# Patient Record
Sex: FEMALE | Race: WHITE | Employment: OTHER | URBAN - METROPOLITAN AREA
[De-identification: names, ages, dates, MRNs, and addresses within clinical notes are randomized per-mention and may not be internally consistent; named-entity substitution may affect disease eponyms.]

---

## 2019-11-05 PROBLEM — I48.0 PAROXYSMAL ATRIAL FIBRILLATION (HCC): Status: ACTIVE | Noted: 2019-11-05

## 2020-07-17 PROBLEM — I49.5 SINUS NODE DYSFUNCTION (HCC): Status: ACTIVE | Noted: 2020-07-17

## 2021-07-20 PROBLEM — R53.82 CHRONIC FATIGUE: Status: ACTIVE | Noted: 2021-07-20

## 2021-07-27 PROBLEM — R94.39 ABNORMAL NUCLEAR STRESS TEST: Status: ACTIVE | Noted: 2021-07-27

## 2021-08-10 NOTE — PROGRESS NOTES
Called to pre-assess for [facility] , Scheduled [facility]. No answer & message left.

## 2021-08-10 NOTE — PROGRESS NOTES
Patient pre-assessment complete for [facility] poss with Dr Jesse Luu scheduled for 21 at 11am, arrival time 9am. Patient verified using . Patient instructed to bring all home medications in labeled bottles on the day of procedure. NPO status reinforced. Patient informed to take a full dose aspirin 325mg  or 81 mg x 4 on the day of procedure. Patient instructed to HOLD Eliquis (last dose 8/10/21 am). Instructed they can take all other medications excluding vitamins & supplements. Patient verbalizes understanding of all instructions & denies any questions at this time.

## 2021-08-11 ENCOUNTER — HOSPITAL ENCOUNTER (OUTPATIENT)
Age: 86
Setting detail: OUTPATIENT SURGERY
Discharge: HOME | End: 2021-08-11
Attending: INTERNAL MEDICINE | Admitting: INTERNAL MEDICINE
Payer: MEDICARE

## 2021-08-11 VITALS
HEIGHT: 67 IN | SYSTOLIC BLOOD PRESSURE: 139 MMHG | WEIGHT: 200 LBS | BODY MASS INDEX: 31.39 KG/M2 | DIASTOLIC BLOOD PRESSURE: 75 MMHG | OXYGEN SATURATION: 95 % | HEART RATE: 80 BPM

## 2021-08-11 DIAGNOSIS — R94.39 ABNORMAL NUCLEAR STRESS TEST: ICD-10-CM

## 2021-08-11 LAB
ATRIAL RATE: 394 BPM
CALCULATED R AXIS, ECG10: -59 DEGREES
CALCULATED T AXIS, ECG11: 23 DEGREES
DIAGNOSIS, 93000: NORMAL
Q-T INTERVAL, ECG07: 394 MS
QRS DURATION, ECG06: 130 MS
QTC CALCULATION (BEZET), ECG08: 425 MS
VENTRICULAR RATE, ECG03: 70 BPM

## 2021-08-11 PROCEDURE — 93005 ELECTROCARDIOGRAM TRACING: CPT | Performed by: INTERNAL MEDICINE

## 2021-08-11 PROCEDURE — 93458 L HRT ARTERY/VENTRICLE ANGIO: CPT | Performed by: INTERNAL MEDICINE

## 2021-08-11 PROCEDURE — 74011000636 HC RX REV CODE- 636: Performed by: INTERNAL MEDICINE

## 2021-08-11 PROCEDURE — 74011250636 HC RX REV CODE- 250/636: Performed by: INTERNAL MEDICINE

## 2021-08-11 PROCEDURE — C1894 INTRO/SHEATH, NON-LASER: HCPCS | Performed by: INTERNAL MEDICINE

## 2021-08-11 PROCEDURE — 74011000250 HC RX REV CODE- 250: Performed by: INTERNAL MEDICINE

## 2021-08-11 PROCEDURE — 76210000027 HC REC RM PH II 3.5 TO 4 HR: Performed by: INTERNAL MEDICINE

## 2021-08-11 PROCEDURE — 99152 MOD SED SAME PHYS/QHP 5/>YRS: CPT | Performed by: INTERNAL MEDICINE

## 2021-08-11 PROCEDURE — 77030042317 HC BND COMPR HEMSTAT -B: Performed by: INTERNAL MEDICINE

## 2021-08-11 PROCEDURE — C1769 GUIDE WIRE: HCPCS | Performed by: INTERNAL MEDICINE

## 2021-08-11 PROCEDURE — 77030016699 HC CATH ANGI DX INFN1 CARD -A: Performed by: INTERNAL MEDICINE

## 2021-08-11 RX ORDER — LIDOCAINE HYDROCHLORIDE 10 MG/ML
INJECTION INFILTRATION; PERINEURAL AS NEEDED
Status: DISCONTINUED | OUTPATIENT
Start: 2021-08-11 | End: 2021-08-11 | Stop reason: HOSPADM

## 2021-08-11 RX ORDER — SODIUM CHLORIDE 9 MG/ML
75 INJECTION, SOLUTION INTRAVENOUS CONTINUOUS
Status: DISCONTINUED | OUTPATIENT
Start: 2021-08-11 | End: 2021-08-11 | Stop reason: HOSPADM

## 2021-08-11 RX ORDER — HEPARIN SODIUM 200 [USP'U]/100ML
INJECTION, SOLUTION INTRAVENOUS
Status: DISCONTINUED | OUTPATIENT
Start: 2021-08-11 | End: 2021-08-11 | Stop reason: HOSPADM

## 2021-08-11 RX ORDER — MIDAZOLAM HYDROCHLORIDE 1 MG/ML
INJECTION, SOLUTION INTRAMUSCULAR; INTRAVENOUS AS NEEDED
Status: DISCONTINUED | OUTPATIENT
Start: 2021-08-11 | End: 2021-08-11 | Stop reason: HOSPADM

## 2021-08-11 RX ORDER — FENTANYL CITRATE 50 UG/ML
INJECTION, SOLUTION INTRAMUSCULAR; INTRAVENOUS AS NEEDED
Status: DISCONTINUED | OUTPATIENT
Start: 2021-08-11 | End: 2021-08-11 | Stop reason: HOSPADM

## 2021-08-11 NOTE — PROGRESS NOTES
Report received from [address] [place] [address] Procedural findings communicated. Intra procedural  medication administration reviewed. Progression of care discussed.      Patient received into [address] [facility] 4 post sheath removal.     Access site without bleeding or swelling yes    Dressing dry and intact yes    Patient instructed to limit movement to left upper extremity    Routine post procedural vital signs and site assessment initiated yes

## 2021-08-11 NOTE — Clinical Note
Contrast Dose Calculator:   Patient's age: 80.   Patient's sex: Female. Patient weight (kg) = 90.7. Creatinine level (mg/dL) = 0.94. Creatinine clearance (mL/min): 62.   Contrast concentration (mg/mL) = 370. MACD = 300 mL. Max Contrast dose per Creatinine Cl calculator = 139.5 mL.

## 2021-08-11 NOTE — Clinical Note
TRANSFER - OUT REPORT:     Verbal report given to: eileen. Report consisted of patient's Situation, Background, Assessment and   Recommendations(SBAR). Opportunity for questions and clarification was provided. Patient transported with a Cardiac Cath Tech / Patient Care Tech.

## 2021-08-11 NOTE — DISCHARGE INSTRUCTIONS
HEART CATHETERIZATION/ANGIOGRAPHY DISCHARGE INSTRUCTIONS    1. Check puncture site frequently for swelling or bleeding. If there is any bleeding, lie down and apply pressure over the area with a clean towel or washcloth and call 911. Notify your doctor for any redness, swelling, drainage, or oozing from the puncture site. Notify your doctor for any fever or chills. 2. If the extremity becomes cold, numb, or painful call Dr. Celeste Yanez at [phone].  3. Activity should be limited for the next 48 hours. Avoid pushing, pulling and bending of affected wrist for 48 hours. No heavy lifting (anything over 5 pounds) for 3 days. No driving for 48 hours. 4. You may resume your usual diet. Drink more fluids than usual.  5. Have a responsible person drive you home and stay with you for at least 24 hours after your heart catheterization/angiography. 6. You may remove bandage from your right arm  in 24 hours. You may shower in 24 hours. No tub baths, hot tubs, or swimming for 1 week. Do not place any lotions, creams, powders, or ointments over puncture site for 1 week. You may place a clean band-aid over the puncture site each day for 5 days. Change daily. I have read the above instructions and have had the opportunity to ask questions.

## 2021-08-11 NOTE — PROGRESS NOTES
Terumo band completely deflated. 1335 Terumo band removed from right wrist using sterile technique. Sterile dressing applied. No signs and symptoms of bleeding, oozing or hematoma.

## 2021-08-11 NOTE — PROGRESS NOTES
Patient received to [address] [place] [address] room # 10  Ambulatory from [facility]. Patient scheduled for [facility] today with Dr Sandip Mckeon. Procedure reviewed & questions answered, voiced good understanding consent obtained & placed on chart. All medications and medical history reviewed. Will prep patient per orders. Patient & family updated on plan of care.       The patient has a fraility score of 3-MANAGING WELL, based on age and abilities

## 2021-08-11 NOTE — Clinical Note
TRANSFER - IN REPORT:     Verbal report received from: eileen. Report consisted of patient's Situation, Background, Assessment and   Recommendations(SBAR). Opportunity for questions and clarification was provided. Assessment completed upon patient's arrival to unit and care assumed. Patient transported with a Cardiac Cath Tech / Patient Care Tech.

## 2021-08-11 NOTE — PROGRESS NOTES
Patient up to bedside, vital signs and site stable. Patient ambulated to bathroom without difficulty. Patient voided without difficulty. Vascular site stable. Discharge instructions and home medications reviewed with patient. Time allowed for questions and answers. Site stable after ambulation. Peripheral IV sites dc'd without difficulty with tips intact. 1350 Patient discharged to home with family.

## 2021-08-11 NOTE — ROUTINE PROCESS
TRANSFER - OUT REPORT:    [facility]  Dr. Gerald Beltran  LRA access    Versed 2mg, fentanyl 25mcg  2000 units heparin in radial cocktail  Diagnostic cath, medical management  R band placed left wrist with 12ml air    Verbal report given to [facility] RN(name) on Tia Purvis  being transferred to cpru(unit) for routine progression of care       Report consisted of patients Situation, Background, Assessment and   Recommendations(SBAR). Information from the following report(s) Procedure Summary was reviewed with the receiving nurse. Lines:   Peripheral IV 08/11/21 Anterior;Proximal;Right Forearm (Active)       Peripheral IV 08/11/21 Anterior;Left;Proximal Forearm (Active)        Opportunity for questions and clarification was provided.       Patient transported with:   [employer]

## 2021-09-29 ENCOUNTER — HOSPITAL ENCOUNTER (OUTPATIENT)
Dept: GENERAL RADIOLOGY | Age: 86
Discharge: HOME | End: 2021-09-29

## 2021-09-29 DIAGNOSIS — R05.9 COUGH: ICD-10-CM

## 2022-03-03 PROBLEM — I25.10 CORONARY ARTERY DISEASE INVOLVING NATIVE CORONARY ARTERY OF NATIVE HEART WITHOUT ANGINA PECTORIS: Status: ACTIVE | Noted: 2022-03-03

## 2022-03-19 PROBLEM — I48.0 PAROXYSMAL ATRIAL FIBRILLATION (HCC): Status: ACTIVE | Noted: 2019-11-05

## 2022-03-19 PROBLEM — I49.5 SINUS NODE DYSFUNCTION (HCC): Status: ACTIVE | Noted: 2020-07-17

## 2022-03-19 PROBLEM — R94.39 ABNORMAL NUCLEAR STRESS TEST: Status: ACTIVE | Noted: 2021-07-27

## 2022-03-19 PROBLEM — I25.10 CORONARY ARTERY DISEASE INVOLVING NATIVE CORONARY ARTERY OF NATIVE HEART WITHOUT ANGINA PECTORIS: Status: ACTIVE | Noted: 2022-03-03

## 2022-03-20 PROBLEM — R53.82 CHRONIC FATIGUE: Status: ACTIVE | Noted: 2021-07-20

## 2022-04-29 PROBLEM — R00.1 SYMPTOMATIC BRADYCARDIA: Status: ACTIVE | Noted: 2022-04-29

## 2022-05-13 ENCOUNTER — HOSPITAL ENCOUNTER (OUTPATIENT)
Dept: LAB | Age: 87
Discharge: HOME | End: 2022-05-13
Payer: MEDICARE

## 2022-05-13 DIAGNOSIS — I49.5 SINUS NODE DYSFUNCTION (HCC): ICD-10-CM

## 2022-05-13 DIAGNOSIS — I48.0 PAROXYSMAL ATRIAL FIBRILLATION (HCC): ICD-10-CM

## 2022-05-13 DIAGNOSIS — R00.1 SYMPTOMATIC BRADYCARDIA: ICD-10-CM

## 2022-05-13 LAB
ANION GAP SERPL CALC-SCNC: 6 MMOL/L (ref 7–16)
BASOPHILS # BLD: 0 K/UL (ref 0–0.2)
BASOPHILS NFR BLD: 1 % (ref 0–2)
BUN SERPL-MCNC: 23 MG/DL (ref 8–23)
CALCIUM SERPL-MCNC: 9.3 MG/DL (ref 8.3–10.4)
CHLORIDE SERPL-SCNC: 109 MMOL/L (ref 98–107)
CO2 SERPL-SCNC: 28 MMOL/L (ref 21–32)
CREAT SERPL-MCNC: 0.8 MG/DL (ref 0.6–1)
DIFFERENTIAL METHOD BLD: NORMAL
EOSINOPHIL # BLD: 0.1 K/UL (ref 0–0.8)
EOSINOPHIL NFR BLD: 2 % (ref 0.5–7.8)
ERYTHROCYTE [DISTWIDTH] IN BLOOD BY AUTOMATED COUNT: 13.3 % (ref 11.9–14.6)
GLUCOSE SERPL-MCNC: 98 MG/DL (ref 65–100)
HCT VFR BLD AUTO: 45.1 % (ref 35.8–46.3)
HGB BLD-MCNC: 14.6 G/DL (ref 11.7–15.4)
IMM GRANULOCYTES # BLD AUTO: 0 K/UL (ref 0–0.5)
IMM GRANULOCYTES NFR BLD AUTO: 0 % (ref 0–5)
LYMPHOCYTES # BLD: 1.5 K/UL (ref 0.5–4.6)
LYMPHOCYTES NFR BLD: 28 % (ref 13–44)
MAGNESIUM SERPL-MCNC: 2.4 MG/DL (ref 1.8–2.4)
MCH RBC QN AUTO: 30.6 PG (ref 26.1–32.9)
MCHC RBC AUTO-ENTMCNC: 32.4 G/DL (ref 31.4–35)
MCV RBC AUTO: 94.5 FL (ref 79.6–97.8)
MONOCYTES # BLD: 0.5 K/UL (ref 0.1–1.3)
MONOCYTES NFR BLD: 9 % (ref 4–12)
NEUTS SEG # BLD: 3.2 K/UL (ref 1.7–8.2)
NEUTS SEG NFR BLD: 61 % (ref 43–78)
NRBC # BLD: 0 K/UL (ref 0–0.2)
PLATELET # BLD AUTO: 241 K/UL (ref 150–450)
PMV BLD AUTO: 11.3 FL (ref 9.4–12.3)
POTASSIUM SERPL-SCNC: 4.2 MMOL/L (ref 3.5–5.1)
RBC # BLD AUTO: 4.77 M/UL (ref 4.05–5.2)
SODIUM SERPL-SCNC: 143 MMOL/L (ref 136–145)
WBC # BLD AUTO: 5.3 K/UL (ref 4.3–11.1)

## 2022-05-13 PROCEDURE — 36415 COLL VENOUS BLD VENIPUNCTURE: CPT

## 2022-05-13 PROCEDURE — 80048 BASIC METABOLIC PNL TOTAL CA: CPT

## 2022-05-13 PROCEDURE — 85025 COMPLETE CBC W/AUTO DIFF WBC: CPT

## 2022-05-13 PROCEDURE — 83735 ASSAY OF MAGNESIUM: CPT

## 2022-05-14 NOTE — PROGRESS NOTES
Patient pre-assessment complete for Pacemaker with Dr John Davis scheduled for 22 at 1:30pm, arrival time 11:30am. Patient verified using . Patient instructed to bring a list of all home medications on the day of procedure. NPO status reinforced. Patient instructed to HOLD eliquis on  & ONLY take cardizem on Monday. Instructed they can take all other medications excluding vitamins & supplements. Patient verbalizes understanding of all instructions & denies any questions at this time.

## 2022-05-16 ENCOUNTER — APPOINTMENT (OUTPATIENT)
Dept: GENERAL RADIOLOGY | Age: 87
End: 2022-05-16
Attending: INTERNAL MEDICINE
Payer: MEDICARE

## 2022-05-16 ENCOUNTER — HOSPITAL ENCOUNTER (OUTPATIENT)
Age: 87
Setting detail: OBSERVATION
Discharge: HOME | End: 2022-05-17
Attending: INTERNAL MEDICINE | Admitting: INTERNAL MEDICINE
Payer: MEDICARE

## 2022-05-16 ENCOUNTER — ANESTHESIA EVENT (OUTPATIENT)
Dept: CARDIAC CATH/INVASIVE PROCEDURES | Age: 87
End: 2022-05-16
Payer: MEDICARE

## 2022-05-16 ENCOUNTER — ANESTHESIA (OUTPATIENT)
Dept: CARDIAC CATH/INVASIVE PROCEDURES | Age: 87
End: 2022-05-16
Payer: MEDICARE

## 2022-05-16 DIAGNOSIS — R00.1 SYMPTOMATIC BRADYCARDIA: ICD-10-CM

## 2022-05-16 LAB
ATRIAL RATE: 60 BPM
CALCULATED R AXIS, ECG10: -50 DEGREES
CALCULATED T AXIS, ECG11: -9 DEGREES
DIAGNOSIS, 93000: NORMAL
Q-T INTERVAL, ECG07: 470 MS
QRS DURATION, ECG06: 134 MS
QTC CALCULATION (BEZET), ECG08: 484 MS
VENTRICULAR RATE, ECG03: 64 BPM

## 2022-05-16 PROCEDURE — 74011250637 HC RX REV CODE- 250/637: Performed by: INTERNAL MEDICINE

## 2022-05-16 PROCEDURE — 74011000250 HC RX REV CODE- 250: Performed by: NURSE ANESTHETIST, CERTIFIED REGISTERED

## 2022-05-16 PROCEDURE — C1892 INTRO/SHEATH,FIXED,PEEL-AWAY: HCPCS | Performed by: INTERNAL MEDICINE

## 2022-05-16 PROCEDURE — 77030013504 HC DEV ELECSURG MEDT -F: Performed by: INTERNAL MEDICINE

## 2022-05-16 PROCEDURE — 76060000033 HC ANESTHESIA 1 TO 1.5 HR: Performed by: INTERNAL MEDICINE

## 2022-05-16 PROCEDURE — C1898 LEAD, PMKR, OTHER THAN TRANS: HCPCS | Performed by: INTERNAL MEDICINE

## 2022-05-16 PROCEDURE — 74011250636 HC RX REV CODE- 250/636: Performed by: INTERNAL MEDICINE

## 2022-05-16 PROCEDURE — 77030031139 HC SUT VCRL2 J&J -A: Performed by: INTERNAL MEDICINE

## 2022-05-16 PROCEDURE — G0378 HOSPITAL OBSERVATION PER HR: HCPCS

## 2022-05-16 PROCEDURE — 93005 ELECTROCARDIOGRAM TRACING: CPT | Performed by: INTERNAL MEDICINE

## 2022-05-16 PROCEDURE — 74011000250 HC RX REV CODE- 250: Performed by: INTERNAL MEDICINE

## 2022-05-16 PROCEDURE — 33207 INSERT HEART PM VENTRICULAR: CPT | Performed by: INTERNAL MEDICINE

## 2022-05-16 PROCEDURE — 74011000272 HC RX REV CODE- 272: Performed by: INTERNAL MEDICINE

## 2022-05-16 PROCEDURE — 77030041279 HC DRSG PRMSL AG MDII -B: Performed by: INTERNAL MEDICINE

## 2022-05-16 PROCEDURE — 74011250636 HC RX REV CODE- 250/636: Performed by: NURSE ANESTHETIST, CERTIFIED REGISTERED

## 2022-05-16 PROCEDURE — C1786 PMKR, SINGLE, RATE-RESP: HCPCS | Performed by: INTERNAL MEDICINE

## 2022-05-16 PROCEDURE — 77030022704 HC SUT VLOC COVD -B: Performed by: INTERNAL MEDICINE

## 2022-05-16 PROCEDURE — 71045 X-RAY EXAM CHEST 1 VIEW: CPT

## 2022-05-16 PROCEDURE — 77030002912 HC SUT ETHBND J&J -A: Performed by: INTERNAL MEDICINE

## 2022-05-16 PROCEDURE — 77030008462 HC STPLR SKN PROX J&J -A: Performed by: INTERNAL MEDICINE

## 2022-05-16 PROCEDURE — 77030013687 HC GD NDL BARD -B: Performed by: INTERNAL MEDICINE

## 2022-05-16 DEVICE — PACE/SENSE LEAD
Type: IMPLANTABLE DEVICE | Status: FUNCTIONAL
Brand: INGEVITY™+

## 2022-05-16 DEVICE — PACEMAKER
Type: IMPLANTABLE DEVICE | Site: CHEST | Status: FUNCTIONAL
Brand: ACCOLADE™ MRI SR

## 2022-05-16 RX ORDER — PROPOFOL 10 MG/ML
INJECTION, EMULSION INTRAVENOUS
Status: DISCONTINUED | OUTPATIENT
Start: 2022-05-16 | End: 2022-05-16 | Stop reason: HOSPADM

## 2022-05-16 RX ORDER — CEFAZOLIN SODIUM/WATER 2 G/20 ML
2 SYRINGE (ML) INTRAVENOUS EVERY 8 HOURS
Status: COMPLETED | OUTPATIENT
Start: 2022-05-16 | End: 2022-05-17

## 2022-05-16 RX ORDER — LIDOCAINE HYDROCHLORIDE 20 MG/ML
INJECTION, SOLUTION EPIDURAL; INFILTRATION; INTRACAUDAL; PERINEURAL AS NEEDED
Status: DISCONTINUED | OUTPATIENT
Start: 2022-05-16 | End: 2022-05-16 | Stop reason: HOSPADM

## 2022-05-16 RX ORDER — ACETAMINOPHEN 325 MG/1
650 TABLET ORAL
Status: DISCONTINUED | OUTPATIENT
Start: 2022-05-16 | End: 2022-05-17 | Stop reason: HOSPADM

## 2022-05-16 RX ORDER — SODIUM CHLORIDE 0.9 % (FLUSH) 0.9 %
5-40 SYRINGE (ML) INJECTION EVERY 8 HOURS
Status: DISCONTINUED | OUTPATIENT
Start: 2022-05-16 | End: 2022-05-17 | Stop reason: HOSPADM

## 2022-05-16 RX ORDER — CEFAZOLIN SODIUM/WATER 2 G/20 ML
SYRINGE (ML) INTRAVENOUS AS NEEDED
Status: DISCONTINUED | OUTPATIENT
Start: 2022-05-16 | End: 2022-05-16 | Stop reason: HOSPADM

## 2022-05-16 RX ORDER — PROPOFOL 10 MG/ML
INJECTION, EMULSION INTRAVENOUS AS NEEDED
Status: DISCONTINUED | OUTPATIENT
Start: 2022-05-16 | End: 2022-05-16 | Stop reason: HOSPADM

## 2022-05-16 RX ORDER — SODIUM CHLORIDE, SODIUM LACTATE, POTASSIUM CHLORIDE, CALCIUM CHLORIDE 600; 310; 30; 20 MG/100ML; MG/100ML; MG/100ML; MG/100ML
INJECTION, SOLUTION INTRAVENOUS
Status: DISCONTINUED | OUTPATIENT
Start: 2022-05-16 | End: 2022-05-16 | Stop reason: HOSPADM

## 2022-05-16 RX ORDER — HYDROCODONE BITARTRATE AND ACETAMINOPHEN 5; 325 MG/1; MG/1
1 TABLET ORAL
Status: DISCONTINUED | OUTPATIENT
Start: 2022-05-16 | End: 2022-05-17 | Stop reason: HOSPADM

## 2022-05-16 RX ORDER — DILTIAZEM HYDROCHLORIDE 120 MG/1
120 CAPSULE, COATED, EXTENDED RELEASE ORAL DAILY
Status: DISCONTINUED | OUTPATIENT
Start: 2022-05-17 | End: 2022-05-17

## 2022-05-16 RX ORDER — LOSARTAN POTASSIUM 25 MG/1
25 TABLET ORAL DAILY
Status: DISCONTINUED | OUTPATIENT
Start: 2022-05-17 | End: 2022-05-17 | Stop reason: HOSPADM

## 2022-05-16 RX ORDER — SODIUM CHLORIDE 0.9 % (FLUSH) 0.9 %
5-40 SYRINGE (ML) INJECTION AS NEEDED
Status: DISCONTINUED | OUTPATIENT
Start: 2022-05-16 | End: 2022-05-17 | Stop reason: HOSPADM

## 2022-05-16 RX ORDER — LANOLIN ALCOHOL/MO/W.PET/CERES
400 CREAM (GRAM) TOPICAL 2 TIMES DAILY
Status: DISCONTINUED | OUTPATIENT
Start: 2022-05-16 | End: 2022-05-17 | Stop reason: HOSPADM

## 2022-05-16 RX ORDER — MORPHINE SULFATE 2 MG/ML
2 INJECTION, SOLUTION INTRAMUSCULAR; INTRAVENOUS
Status: DISCONTINUED | OUTPATIENT
Start: 2022-05-16 | End: 2022-05-17 | Stop reason: HOSPADM

## 2022-05-16 RX ADMIN — Medication 2 G: at 14:30

## 2022-05-16 RX ADMIN — PROPOFOL 150 MCG/KG/MIN: 10 INJECTION, EMULSION INTRAVENOUS at 14:33

## 2022-05-16 RX ADMIN — PROPOFOL 25 MG: 10 INJECTION, EMULSION INTRAVENOUS at 14:25

## 2022-05-16 RX ADMIN — PROPOFOL 100 MCG/KG/MIN: 10 INJECTION, EMULSION INTRAVENOUS at 14:25

## 2022-05-16 RX ADMIN — PHENYLEPHRINE HYDROCHLORIDE 60 MCG: 10 INJECTION INTRAVENOUS at 14:48

## 2022-05-16 RX ADMIN — PHENYLEPHRINE HYDROCHLORIDE 120 MCG: 10 INJECTION INTRAVENOUS at 15:03

## 2022-05-16 RX ADMIN — PROPOFOL 25 MG: 10 INJECTION, EMULSION INTRAVENOUS at 14:27

## 2022-05-16 RX ADMIN — SODIUM CHLORIDE, PRESERVATIVE FREE 10 ML: 5 INJECTION INTRAVENOUS at 21:11

## 2022-05-16 RX ADMIN — SODIUM CHLORIDE, PRESERVATIVE FREE 10 ML: 5 INJECTION INTRAVENOUS at 17:43

## 2022-05-16 RX ADMIN — Medication 400 MG: at 17:42

## 2022-05-16 RX ADMIN — SODIUM CHLORIDE, SODIUM LACTATE, POTASSIUM CHLORIDE, AND CALCIUM CHLORIDE: 600; 310; 30; 20 INJECTION, SOLUTION INTRAVENOUS at 14:15

## 2022-05-16 RX ADMIN — CEFAZOLIN 2 G: 10 INJECTION, POWDER, FOR SOLUTION INTRAVENOUS at 21:12

## 2022-05-16 RX ADMIN — PHENYLEPHRINE HYDROCHLORIDE 128 MCG: 10 INJECTION INTRAVENOUS at 15:09

## 2022-05-16 RX ADMIN — LIDOCAINE HYDROCHLORIDE 60 MG: 20 INJECTION, SOLUTION EPIDURAL; INFILTRATION; INTRACAUDAL; PERINEURAL at 14:25

## 2022-05-16 NOTE — ANESTHESIA PREPROCEDURE EVALUATION
Relevant Problems   No relevant active problems       Anesthetic History               Review of Systems / Medical History  Patient summary reviewed and pertinent labs reviewed    Pulmonary                   Neuro/Psych              Cardiovascular    Hypertension        Dysrhythmias (Paroxysmal AF, Sinus node dysfunction) : atrial fibrillation and PVC  CAD and hyperlipidemia    Exercise tolerance: >4 METS  Comments: VANESSA 4/2022:  Left Ventricle: Normal left ventricular systolic function with a visually estimated EF of 60 - 65%. Left ventricle size is normal. Normal wall thickness. Normal wall motion.   Aortic Valve: Valve structure is normal. Mild sclerosis of the aortic valve cusp. Mild regurgitation.   Mitral Valve: Mild to moderate regurgitation.   Tricuspid Valve: The estimated RVSP is 38 mmHgmmHg.   Left Atrium: Left atrium is moderately dilated. LA Vol Index is  49 ml/m2.   Right Atrium: Right atrium is moderately dilated. Cath: 8/2021:  · Mild to moderate nonobstructive coronary artery disease  · Normal LV systolic function. · Aortic root appears mildly dilated.          GI/Hepatic/Renal     GERD           Endo/Other             Other Findings            Physical Exam    Airway  Mallampati: II  TM Distance: > 6 cm  Neck ROM: normal range of motion   Mouth opening: Normal     Cardiovascular  Regular rate and rhythm,  S1 and S2 normal,  no murmur, click, rub, or gallop             Dental  No notable dental hx       Pulmonary  Breath sounds clear to auscultation               Abdominal         Other Findings            Anesthetic Plan    ASA: 3  Anesthesia type: total IV anesthesia            Anesthetic plan and risks discussed with: Patient

## 2022-05-16 NOTE — PROGRESS NOTES
TRANSFER - IN REPORT:    Verbal report received from ANASTASIA Rg on Kraig Simpson  being received from cath lab for routine progression of care      Report consisted of patients Situation, Background, Assessment and   Recommendations(SBAR). Information from the following report(s) SBAR, Kardex, Procedure Summary, MAR and Recent Results was reviewed with the receiving nurse. Opportunity for questions and clarification was provided. Assessment completed upon patients arrival to unit and care assumed. Patient arrived to unit with L arm in a sling post pacemaker insertion. Patients sacrum and heels are clean, dry and intact with no other issues noted.

## 2022-05-16 NOTE — Clinical Note
Pocket closed with 2-0 vicryl and 3-0 vloc sutures, incision closed with staples and covered with aquacel dressing.

## 2022-05-16 NOTE — PROGRESS NOTES
Pt arrived for scheduled PPM today with Dr Jeovanny Ortiz. CM chart reviewed. PCP confirmed. Insurance verified. No d/c needs identified at this time but will remain available. Care Management Interventions  PCP Verified by CM: Yes Jose Farooq)  Mode of Transport at Discharge:  Other (see comment) (Family)  Transition of Care Consult (CM Consult): Discharge Planning  MyChart Signup: No  Discharge Durable Medical Equipment: No  Physical Therapy Consult: No  Occupational Therapy Consult: No  Speech Therapy Consult: No  Support Systems: Child(lillian),Other Family Member(s),Friend/Neighbor,[employer]/[place] [employer]  Confirm Follow Up Transport: Family  The Plan for Transition of Care is Related to the Following Treatment Goals : Return home and back to her baseline  Discharge Location  Patient Expects to be Discharged to[de-identified] Home

## 2022-05-16 NOTE — PROGRESS NOTES
TRANSFER - OUT REPORT:    Verbal report given to Elsa RN(name) on Veronica Aponte  being transferred to tele(unit) for routine progression of care       Report consisted of patients Situation, Background, Assessment and   Recommendations(SBAR). Information from the following report(s) Procedure Summary was reviewed with the receiving nurse. Lines:   Peripheral IV 05/16/22 Anterior;Left;Proximal Forearm (Active)        Opportunity for questions and clarification was provided.       Patient transported with:   [employer]

## 2022-05-16 NOTE — ANESTHESIA POSTPROCEDURE EVALUATION
Procedure(s):  INSERT PPM SINGLE VENTRICULAR.    total IV anesthesia    Anesthesia Post Evaluation      Multimodal analgesia: multimodal analgesia used between 6 hours prior to anesthesia start to PACU discharge  Patient location during evaluation: bedside  Patient participation: complete - patient participated  Level of consciousness: awake and awake and alert  Pain management: adequate  Airway patency: patent  Anesthetic complications: no  Cardiovascular status: acceptable and stable  Respiratory status: acceptable and room air  Hydration status: acceptable  Post anesthesia nausea and vomiting:  none      INITIAL Post-op Vital signs:   Vitals Value Taken Time   /80 05/16/22 1528   Temp 37.2 °C (98.9 °F) 05/16/22 1520   Pulse 77 05/16/22 1528   Resp 14 05/16/22 1520   SpO2 97 % 05/16/22 1528   Vitals shown include unvalidated device data.

## 2022-05-16 NOTE — PROGRESS NOTES
Patient received to [facility] room # 9  Ambulatory from [facility]. Patient scheduled for PPM today with Dr Marcus Barber. Procedure reviewed & questions answered, voiced good understanding consent obtained & placed on chart. All medications and medical history reviewed. Will prep patient per orders. Patient & family updated on plan of care. The patient has a fraility score of 3-MANAGING WELL, based on patient A&Ox3, patient able to ambulate to room without difficulty.

## 2022-05-16 NOTE — PROGRESS NOTES
Report received from EP Lab RN. Procedural finding communicated. Intra procedural medication administration reviewed. Progression of care discussed. Patient received into CPRU room 3, Post pacemaker    Access site without bleeding or swelling. Left chest    Patient instructed to limit movement of left upper extremity. Routine post procedural vital signs & site assessment initiated.

## 2022-05-17 VITALS
BODY MASS INDEX: 31.06 KG/M2 | OXYGEN SATURATION: 94 % | HEIGHT: 67 IN | RESPIRATION RATE: 18 BRPM | HEART RATE: 69 BPM | DIASTOLIC BLOOD PRESSURE: 82 MMHG | TEMPERATURE: 97.8 F | SYSTOLIC BLOOD PRESSURE: 130 MMHG | WEIGHT: 197.9 LBS

## 2022-05-17 PROCEDURE — 74011250637 HC RX REV CODE- 250/637: Performed by: INTERNAL MEDICINE

## 2022-05-17 PROCEDURE — 74011250636 HC RX REV CODE- 250/636: Performed by: INTERNAL MEDICINE

## 2022-05-17 PROCEDURE — G0378 HOSPITAL OBSERVATION PER HR: HCPCS

## 2022-05-17 PROCEDURE — 74011000250 HC RX REV CODE- 250: Performed by: INTERNAL MEDICINE

## 2022-05-17 RX ORDER — HYDROCODONE BITARTRATE AND ACETAMINOPHEN 5; 325 MG/1; MG/1
1 TABLET ORAL
Qty: 10 TABLET | Refills: 0 | Status: SHIPPED | OUTPATIENT
Start: 2022-05-17 | End: 2022-05-20

## 2022-05-17 RX ORDER — DILTIAZEM HYDROCHLORIDE 120 MG/1
240 CAPSULE, COATED, EXTENDED RELEASE ORAL DAILY
Status: DISCONTINUED | OUTPATIENT
Start: 2022-05-17 | End: 2022-05-17 | Stop reason: HOSPADM

## 2022-05-17 RX ADMIN — CEFAZOLIN 2 G: 10 INJECTION, POWDER, FOR SOLUTION INTRAVENOUS at 06:24

## 2022-05-17 RX ADMIN — SODIUM CHLORIDE, PRESERVATIVE FREE 10 ML: 5 INJECTION INTRAVENOUS at 06:24

## 2022-05-17 RX ADMIN — Medication 400 MG: at 08:44

## 2022-05-17 RX ADMIN — ACETAMINOPHEN 650 MG: 325 TABLET ORAL at 02:31

## 2022-05-17 RX ADMIN — DILTIAZEM HYDROCHLORIDE 240 MG: 120 CAPSULE, COATED, EXTENDED RELEASE ORAL at 08:43

## 2022-05-17 NOTE — ROUTINE PROCESS
Bedside shift report received from [facility], [address] [facility]

## 2022-05-17 NOTE — PROGRESS NOTES
Discharge order is in. Pt is discharging home in stable condition. No discharge needs identified. Tx goals have been met. Care Management Interventions  PCP Verified by CM: Yes Conchita Trotter)  Mode of Transport at Discharge:  Other (see comment) (Family)  Transition of Care Consult (CM Consult): Discharge Planning  MyChart Signup: No  Discharge Durable Medical Equipment: No  Physical Therapy Consult: No  Occupational Therapy Consult: No  Speech Therapy Consult: No  Support Systems: Child(lillian),Other Family Member(s),[employer]/[place] [employer],Friend/Neighbor  Confirm Follow Up Transport: Family  The Plan for Transition of Care is Related to the Following Treatment Goals : Return home and back to his baseline  The Patient and/or Patient Representative was Provided with a Choice of Provider and Agrees with the Discharge Plan?: Yes  Name of the Patient Representative Who was Provided with a Choice of Provider and Agrees with the Discharge Plan: Patient  Freedom of Choice List was Provided with Basic Dialogue that Supports the Patient's Individualized Plan of Care/Goals, Treatment Preferences and Shares the Quality Data Associated with the Providers?: Yes  [place] Resource Information Provided?: No  Discharge Location  Patient Expects to be Discharged to[de-identified] Home

## 2022-05-17 NOTE — DISCHARGE SUMMARY
[facility] Discharge Summary     Patient ID:  Mary Hernandez  [id number]  80 y.o.  1935    Admit date: 5/16/2022    Discharge date:  5/17/2022    Admitting Physician: Kylee Camacho MD     Discharge Physician: Dr. Sarina Betts    Admission Diagnoses: Symptomatic bradycardia [R00.1]    Discharge Diagnoses:    Diagnosis    Symptomatic bradycardia       Cardiology Procedures this admission:  pacemaker implantation, CXR and device interrogation   Consults: None    Hospital Course: Patient was seen at the office of [facility] by Dr. Sarina Betts for management of symptomatic bradycardia and was subsequently scheduled for an AM Admission PM implantation at [facility] on 5/16/22. Patient was taken to the EP lab and underwent successful implantation of [employer] PM by Dr. Sarina Betts. Patient tolerated the procedure well and was taken to the telemetry floor for recovery. Follow up chest xray showed no pneumothorax. The following morning patient was up feeling well without any complaints of chest pain, shortness of breath, or palpitations. Patient's left subclavian cath site was clean, dry and intact without hematoma. Patient's labs were WNL. Patient device was interrogated prior to d/c showing normal function. Patient was seen and examined by Dr. Sarina Betts and determined stable and ready for discharge. Patient was instructed on the importance of medication compliance and outpatient follow up. Patient has been scheduled for follow-up with [facility] -- device clinic on 5/27/2022 at 3pm in [place] office. DISPOSITION: Patient has been instructed to keep affected arm below shoulder level for the next 4 weeks or until cleared by doctor. The arm sling should be worn while sleeping. The dressing will be removed at follow-up. The incision site must be kept clean and dry. The patient may shower in a few days.   Lotions, powders, or creams should be avoided as these can increase the risk of infection. The affected arm should not be used for any pushing, pulling, or lifting until cleared by doctor. Driving is prohibited until cleared by doctor in a follow up appointment. Any signs of infection including increased redness, suspicious drainage, or unexplained fever should be reported to the doctor immediately by calling [phone]. Discharge Exam:  Patient has been seen by Dr. Enzo Caceres: see his progress note for exam details. Visit Vitals  BP (!) 140/85 (BP 1 Location: Right upper arm, BP Patient Position: At rest)   Pulse 66   Temp 97.5 °F (36.4 °C)   Resp 18   Ht 5' 7\" (1.702 m)   Wt 89.8 kg (197 lb 14.4 oz)   SpO2 96%   BMI 31.00 kg/m²         Recent Results (from the past 24 hour(s))   EKG, 12 LEAD, INITIAL    Collection Time: 05/16/22  6:06 PM   Result Value Ref Range    Ventricular Rate 64 BPM    Atrial Rate 60 BPM    QRS Duration 134 ms    Q-T Interval 470 ms    QTC Calculation (Bezet) 484 ms    Calculated R Axis -50 degrees    Calculated T Axis -9 degrees    Diagnosis       Atrial fibrillation with frequent ventricular-paced complexes  Right bundle branch block  Left anterior fascicular block    Confirmed by Quoc Warner ([id number]) on 5/16/2022 7:44:55 PM           Patient Instructions:     Current Discharge Medication List      START taking these medications    Details   HYDROcodone-acetaminophen (NORCO) 5-325 mg per tablet Take 1 Tablet by mouth every four (4) hours as needed for Pain for up to 3 days. Max Daily Amount: 6 Tablets. Qty: 10 Tablet, Refills: 0  Start date: 5/17/2022, End date: 5/20/2022    Associated Diagnoses: Symptomatic bradycardia         CONTINUE these medications which have NOT CHANGED    Details   cholecalciferol, vitamin D3, (Vitamin D3) 50 mcg (2,000 unit) tab Take  by mouth daily. dilTIAZem ER (CARDIZEM CD) 120 mg capsule Take 1 Capsule by mouth daily.   Qty: 30 Capsule, Refills: 11      apixaban (Eliquis) 5 mg tablet Take 1 Tablet by mouth two (2) times a day. Indications: treatment to prevent blood clots in chronic atrial fibrillation  Qty: 180 Tablet, Refills: 3      multivitamin (ONE A DAY) tablet Take 1 Tablet by mouth daily. AZO CRANBERRY 587-84-61 mg-mg-million tab Take 2 Tablets by mouth daily. magnesium oxide (MAG-OX) 400 mg tablet Take 1 Tab by mouth two (2) times a day. Qty: 180 Tab, Refills: 3    Associated Diagnoses: Hypomagnesemia      ASCORBATE CALCIUM (ALHAJI-C PO) Take  by mouth daily (after breakfast). losartan (COZAAR) 25 mg tablet Take 1 Tablet by mouth daily.   Qty: 90 Tablet, Refills: 3

## 2022-05-17 NOTE — DISCHARGE INSTRUCTIONS
DEVICE IMPLANT FOLLOWUP INSTRUCTIONS  You will be discharged with an occlusive dressing over the incision site. This dressing will be removed at the 10 -14-day postop site check with the [address]. Your new device will be checked at that visit and all your device teaching will be given. Keep the incision site dry until your follow up. Use a hand-held shower or bath. Do not submerge or soak in pools or tubs for 6 weeks. If you are discharged with a prescription for antibiotics, please take the full prescription until it is gone. After your site check, you may shower and get the incision site wet. You may let soap and water run on the incision and pat dry. Please do not apply creams, lotions or powders on or near the incision. Mild bruising and swelling can be normal after implant and will resolve in a few weeks. Call the office at [phone] if you have any of the following:  -Signs of infection, such as fever over 100 F, drainage from the incision, redness, significant swelling, or warmth at the incision site.  -Significant pain around the site that gets worse. Mild discomfort can be normal.   -Bleeding from the incision site  -Swelling in the arm on the side of the incision site  -Chest pain or shortness of breath     Your device has the capability of transmitting device information from home to our office using a home monitoring system or phone rahel. The information will transmit through a cellular connection outside of your home. Your device data is reviewed during working hours to ensure proper device function. You will be given your equipment and instruction upon your hospital discharge.                                                                       -You will be given a sling to wear upon discharge with instructions when it should be worn.    -Do not lift the affected arm above the shoulder level, on the side the device was put in, for 4 weeks.   -Do not lift or push more than 5 to 10 pounds for 4 weeks on the affected side. Walking is fine and encouraged.   -Driving is restricted for 5-7 days. Long travel is not recommended until after your site check.    -Do not do any vigorous upper body activities, such as golfing, bowling tennis or mowing for about 6 weeks. -If you work, you may return to work. However, with limitations on lifting for 4 weeks.   -Please avoid any dental work or invasive procedures for 6 weeks, if possible, after implant. Please avoid strong magnetic fields, large power plant transformers and arc welders. Please stay 10 feet away of electromagnetic fields such as working over a large running engine, stereo speakers and large stereo systems. Home appliances are safe. You may operate any electrical or battery-operated device in your home. Modern Devices are seldom affected by normally operating home appliances, such as microwave ovens. Flying is safe and airport metal detectors will not affect your device, although your device may occasionally set off the metal detectors. If this happens, show the  your identification card. Security wanding over the device is contraindicated. Cellular Phones should be used with the hand opposite to the side where the device was implanted. The phone should not be carried in the pocket on the side of the device. CDL licenses are not renewed if you have a defibrillator implanted. Radiation Therapy should be avoided on or near the device. Should you require surgery in the future; some electrosurgical devices can interfere with the device function. You should discuss this with your surgeon prior to any operation. If you are ordered an MRI, Please contact the clinic prior to ensure you have an MRI safe device. You must wait 6 weeks after implant before you may have an MRI. Tens units are contraindicated. [address] [phone]          Patient Education        Pacemaker Placement: What to Expect at [facility]     Pacemaker placement is surgery to put a pacemaker in your chest. This surgery may be done if you have bradycardia (a slow heart rate). Your doctor made a cut (incision) in your chest. The doctor put the pacemaker leads through the cut, into a large blood vessel, then into the heart. The doctor put the pacemaker under the skin of your chest and attached the leads to it. Your chest may be sore where the doctor made the cut. You also may have a bruise and mild swelling. These symptoms usually get better in 1 to 2 weeks. You may feel a hard ridge along the incision. This usually gets softer in the months after surgery. You may be able to see or feel the outline of the pacemaker under your skin. You will probably be able to go back to work or your usual routine 1 to 2 weeks after surgery. Pacemaker batteries usually last 5 to 15 years. Your doctor will talk to you about how often you will need to have your pacemaker checked. You'll need to take steps to safely use electric devices. Some of these devices can stop your pacemaker from working right for a short time. Check with your doctor about what to avoid and what to keep a short distance away from your pacemaker. For example, you will need to stay away from things with strong magnetic and electrical fields. An example is an MRI machine (unless your pacemaker is safe for an MRI). You can use a cellphone and other wireless devices, but keep them at least 6 inches away from your pacemaker. Many household and office electronics don't affect a pacemaker. These include kitchen appliances and computers. This care sheet gives you a general idea about how long it will take for you to recover. But each person recovers at a different pace. Follow the steps below to get better as quickly as possible.   How can you care for yourself at home? Activity    · Rest when you feel tired.     · Be active. Walking is a good choice.     · For 4 to 6 weeks:  ? Avoid activities that strain your chest or upper arm muscles. This includes pushing a  or vacuum, or mopping floors. It also includes swimming, or swinging a golf club or tennis racquet. ? Do not raise your arm (the one on the side of your body where the pacemaker is located) above your shoulder. ? Allow your body to heal. Don't move quickly or lift anything heavy until you are feeling better.     · Many people are able to return to work within 1 to 2 weeks after surgery.     · Ask your doctor when it is okay for you to have sex. Diet    · You can eat your normal diet. If your stomach is upset, try bland, low-fat foods like plain rice, broiled chicken, toast, and yogurt. Medicines    · Your doctor will tell you if and when you can restart your medicines. He or she will also give you instructions about taking any new medicines.     · If you take aspirin or some other blood thinner, be sure to talk to your doctor. He or she will tell you if and when to start taking this medicine again. Make sure that you understand exactly what your doctor wants you to do.     · Be safe with medicines. Read and follow all instructions on the label. ? If the doctor gave you a prescription medicine for pain, take it as prescribed. ? If you are not taking a prescription pain medicine, ask your doctor if you can take an over-the-counter medicine. ? Do not take aspirin, ibuprofen (Advil, Motrin), naproxen (Aleve), or other nonsteroidal anti-inflammatory drugs (NSAIDs) unless your doctor says it is okay.     · If your doctor prescribed antibiotics, take them as directed. Do not stop taking them just because you feel better. You need to take the full course of antibiotics.    Incision care    · If you have strips of tape on the incision, leave the tape on for a week or until it falls off.     · Keep the incision dry while it heals. Your doctor may recommend sponge baths for about 7 days, but do not get the incision wet. Your doctor will let you know when you may take showers. After a shower, pat the incision dry.     · Don't use hydrogen peroxide or alcohol on the incision, which can slow healing. You may cover the area with a gauze bandage if it oozes fluid or rubs against clothing. Change the bandage every day.     · Do not take a bath or get into a hot tub for the first 2 weeks, or until your doctor tells you it is okay. Other instructions    · Keep a medical ID card with you at all times that says you have a pacemaker. The card should include the  and model information.     · Wear medical alert jewelry stating that you have a pacemaker. You can buy this at most [employer].     · Check your pulse as directed by your doctor.     · Have your pacemaker checked as often as your doctor recommends. In some cases, this may be done over the phone or the Internet. Your doctor will give you instructions about how to do this. Follow-up care is a key part of your treatment and safety. Be sure to make and go to all appointments, and call your doctor if you are having problems. It's also a good idea to know your test results and keep a list of the medicines you take. When should you call for help? Call 911 anytime you think you may need emergency care. For example, call if:    · You passed out (lost consciousness).     · You have trouble breathing.    Call your doctor now or seek immediate medical care if:    · You are dizzy or light-headed, or you feel like you may faint.     · You have pain that does not get better after you take pain medicine.     · You hear an alarm or feel a vibration from your pacemaker.     · You have loose stitches, or your incision comes open.     · Bright red blood has soaked through the bandage over your incision.     · You have signs of infection, such as:  ? Increased pain, swelling, warmth, or redness. ? Red streaks leading from the incision. ? Pus draining from the incision. ? A fever. Watch closely for changes in your health, and be sure to contact your doctor if:    · You have any problems with your pacemaker. Where can you learn more? Go to http://www.gray.com/  Enter G550 in the search box to learn more about \"Pacemaker Placement: What to Expect at Home. \"  Current as of: January 10, 2022               Content Version: 13.2  © 9679-0751 [employer]. Care instructions adapted under license by [employer] (which disclaims liability or warranty for this information). If you have questions about a medical condition or this instruction, always ask your healthcare professional. [address] any warranty or liability for your use of this information. Patient Education      Hydrocodone/Acetaminophen (Vicodin, Norco, Lortab) - (By mouth)   Why this medicine is used:   Treats pain. Contact a nurse or doctor right away if you have:  · Blistering, peeling, red skin rash  · Fast or slow heartbeat, shallow breathing, blue lips, fingernails, or skin  · Anxiety, restlessness, muscle spasms, twitching, seeing or hearing things that are not there  · Dark urine or pale stools, yellow skin or eyes  · Extreme weakness, sweating, seizures, cold or clammy skin  · Lightheadedness, dizziness, fainting, fever, sweating     Common side effects:  · Constipation, nausea, vomiting, loss of appetite, stomach pain  · Tiredness or sleepiness  © 2017 [facility] [place] [facility]– [place] Information is for End User's use only and may not be sold, redistributed or otherwise used for commercial purposes.

## 2022-05-17 NOTE — PROGRESS NOTES
[facility] CARDIOLOGY PROGRESS NOTE           5/17/2022 6:42 AM    Admit Date: 5/16/2022    Admit Diagnosis: Symptomatic bradycardia [R00.1]      Subjective:   No complaints this AM, no chest pain or shortness of breath, appropriate post op device pocket pain    Interval History: (History of pertinent interval events obtained from nursing staff)  Cardiac device placed yesterday, no events overnight, no immediate complications    ROS:  GEN:  No fever or chills  Cardiovascular:  As noted above  Pulmonary:  As noted above  Neuro:  No new focal motor or sensory loss      Objective:     Vitals:    05/16/22 1638 05/16/22 2000 05/16/22 2356 05/17/22 0440   BP: (!) 140/78 135/82 (!) 141/89 (!) 140/85   Pulse: 70 61 71 66   Resp: 18 17 18 18   Temp: 97.4 °F (36.3 °C) 97.5 °F (36.4 °C) 97.9 °F (36.6 °C) 97.5 °F (36.4 °C)   SpO2: 97% 96% 96% 96%   Weight:    197 lb 14.4 oz (89.8 kg)   Height:           Physical Exam:  General- NAD  CV-irreg, no MRG, left infraclavicular pocket with dressing in place, no evidence of hematoma, redness, warmth or excessive drainage  Lung- clear bilaterally  Abd- soft, nontender, nondistended  Ext- no edema bilaterally.     Current Facility-Administered Medications   Medication Dose Route Frequency    dilTIAZem ER (CARDIZEM CD) capsule 120 mg  120 mg Oral DAILY    losartan (COZAAR) tablet 25 mg  25 mg Oral DAILY    magnesium oxide (MAG-OX) tablet 400 mg  400 mg Oral BID    sodium chloride (NS) flush 5-40 mL  5-40 mL IntraVENous Q8H    sodium chloride (NS) flush 5-40 mL  5-40 mL IntraVENous PRN    acetaminophen (TYLENOL) tablet 650 mg  650 mg Oral Q4H PRN    HYDROcodone-acetaminophen (NORCO) 5-325 mg per tablet 1 Tablet  1 Tablet Oral Q4H PRN    morphine injection 2 mg  2 mg IntraVENous Q4H PRN     Data Review:   Recent Results (from the past 24 hour(s))   EKG, 12 LEAD, INITIAL    Collection Time: 05/16/22  6:06 PM   Result Value Ref Range    Ventricular Rate 64 BPM Atrial Rate 60 BPM    QRS Duration 134 ms    Q-T Interval 470 ms    QTC Calculation (Bezet) 484 ms    Calculated R Axis -50 degrees    Calculated T Axis -9 degrees    Diagnosis       Atrial fibrillation with frequent ventricular-paced complexes  Right bundle branch block  Left anterior fascicular block    Confirmed by Reece Cranker ([id number]) on 5/16/2022 7:44:55 PM         EKG:  (EKG has been independently visualized by me with interpretation below)  Assessment:     Principal Problem:    Symptomatic bradycardia (4/29/2022)      Plan:   1. Cardiac device implantation: Pt device interrogation this AM reveals normal function, excellent pacing and sensing parameters, CXR without pneumothorax with excellent device position, no recognized complications, stable for discharge home today with appropriate follow up. 2. Afib: rate control strategy, increase home diltiazem to 240mg  3. CVA protection: eliquis 5mg, restart tomorrow    Grover Alexander MD  Cardiology/Electrophysiology

## 2022-05-27 ENCOUNTER — NURSE ONLY (OUTPATIENT)
Dept: CARDIOLOGY CLINIC | Age: 87
End: 2022-05-27
Payer: MEDICARE

## 2022-05-27 DIAGNOSIS — I48.0 PAROXYSMAL ATRIAL FIBRILLATION (HCC): Primary | ICD-10-CM

## 2022-05-27 DIAGNOSIS — I49.5 SINUS NODE DYSFUNCTION (HCC): ICD-10-CM

## 2022-05-27 PROCEDURE — 93288 INTERROG EVL PM/LDLS PM IP: CPT | Performed by: INTERNAL MEDICINE

## 2022-05-27 NOTE — PROGRESS NOTES
This note will not be viewable in [address] IN OFFICE CHECK    Preliminary Impression: All normal function. No changes were made. Following MD: Dr. Mary Carmen Goodman  Implanting MD: Dr. Matty Mckeon presented to the [address] [place] [address] Drive today for a device check s/p pacemaker. Left subclavian postop dressing was removed. Site is approximated, staples were removed. No swelling or s/s of infection. RV lead stable. All instructions and restrictions were reviewed. The device was interrogated, and the results were forwarded to the ordering provider for review.      Arlin Arellano RN  5/27/2022  3:10 PM

## 2022-06-07 ENCOUNTER — OFFICE VISIT (OUTPATIENT)
Dept: FAMILY MEDICINE CLINIC | Facility: CLINIC | Age: 87
End: 2022-06-07
Payer: MEDICARE

## 2022-06-07 VITALS
DIASTOLIC BLOOD PRESSURE: 80 MMHG | WEIGHT: 190 LBS | SYSTOLIC BLOOD PRESSURE: 122 MMHG | OXYGEN SATURATION: 98 % | HEART RATE: 67 BPM | BODY MASS INDEX: 29.76 KG/M2

## 2022-06-07 DIAGNOSIS — R00.1 SYMPTOMATIC BRADYCARDIA: ICD-10-CM

## 2022-06-07 DIAGNOSIS — Z09 HOSPITAL DISCHARGE FOLLOW-UP: Primary | ICD-10-CM

## 2022-06-07 DIAGNOSIS — Z95.0 PACEMAKER: ICD-10-CM

## 2022-06-07 PROCEDURE — 99214 OFFICE O/P EST MOD 30 MIN: CPT | Performed by: NURSE PRACTITIONER

## 2022-06-09 ASSESSMENT — ENCOUNTER SYMPTOMS
SORE THROAT: 0
SINUS PAIN: 0
NAUSEA: 0
BACK PAIN: 0
DIARRHEA: 0
VOMITING: 0
EYE PAIN: 0
BLOOD IN STOOL: 0
EYE REDNESS: 0
CONSTIPATION: 0
COUGH: 0
SHORTNESS OF BREATH: 0

## 2022-06-09 NOTE — PROGRESS NOTES
Meet Palumbo (:  1935) is a 80 y.o. female,Established patient, here for evaluation of the following chief complaint(s):  Follow-up         ASSESSMENT/PLAN:  1. Hospital discharge follow-up  2. Pacemaker  3. Symptomatic bradycardia    Current treatment plan is effective. No change in therapy. Cont f/up with specialists. Return if symptoms worsen or fail to improve. Subjective   SUBJECTIVE/OBJECTIVE:  [facility]   Hospital follow-up 22 - 22 PM implantation for symptomatic bradycardia. She is feeling well. Denies CP, SOB and palpitations. Left subclavian site clean, dry and intact without hematoma. She already had follow-up at the device clinic . She has no new concerns and does not need medication refills or repeat labs today. Review of Systems   Constitutional: Negative for chills and fever. HENT: Negative for congestion, ear pain, sinus pain and sore throat. Eyes: Negative for pain and redness. Respiratory: Negative for cough and shortness of breath. Cardiovascular: Negative for chest pain and palpitations. Gastrointestinal: Negative for blood in stool, constipation, diarrhea, nausea and vomiting. Endocrine: Negative for polydipsia. Genitourinary: Negative for dysuria, frequency and urgency. Musculoskeletal: Negative for arthralgias, back pain and myalgias. Skin: Negative for rash. Allergic/Immunologic: Negative for environmental allergies. Neurological: Negative for dizziness and headaches. Psychiatric/Behavioral: Negative for hallucinations and suicidal ideas. Objective   Physical Exam  Vitals and nursing note reviewed. Constitutional:       General: She is not in acute distress. Appearance: Normal appearance. HENT:      Head: Normocephalic. Right Ear: External ear normal.      Left Ear: External ear normal.      Nose: Nose normal.      Mouth/Throat:      Lips: Pink.       Mouth: Mucous membranes are moist.   Eyes: Conjunctiva/sclera: Conjunctivae normal.      Pupils: Pupils are equal, round, and reactive to light. Cardiovascular:      Rate and Rhythm: Normal rate and regular rhythm. Pulmonary:      Effort: Pulmonary effort is normal.      Breath sounds: Normal breath sounds. No wheezing, rhonchi or rales. Musculoskeletal:         General: Normal range of motion. Cervical back: Normal range of motion. Right lower leg: No edema. Left lower leg: No edema. Lymphadenopathy:      Cervical: No cervical adenopathy. Skin:     General: Skin is warm and dry. Capillary Refill: Capillary refill takes less than 2 seconds. Findings: No rash. Comments: Left upper chest incision clean, dry and intact   Neurological:      General: No focal deficit present. Mental Status: She is alert and oriented to person, place, and time. Mental status is at baseline. An electronic signature was used to authenticate this note.     --MARIA VICTORIA Mullins - CNP

## 2022-06-28 ENCOUNTER — NURSE TRIAGE (OUTPATIENT)
Dept: OTHER | Facility: CLINIC | Age: 87
End: 2022-06-28

## 2022-06-28 NOTE — TELEPHONE ENCOUNTER
Received call from [address] at [facility] with Red Flag Complaint. Subjective: Caller states \"woke up a couple weeks ago with her toe sore and swollen, it went away but woke up today with left thumb pain and swelling\"     Current Symptoms: Left thumb pain and swelling at proximal joint    Onset: 0 day ago; sudden    Associated Symptoms: NA    Pain Severity: 5/10; soreness; constant    Temperature: Denies     What has been tried: Nothing    LMP: NA Pregnant: NA    Recommended disposition: See PCP within 3 Days    Care advice provided, patient verbalizes understanding; denies any other questions or concerns; instructed to call back for any new or worsening symptoms. Patient/Caller agrees with recommended disposition; writer provided warm transfer to Tory Doe at [facility] for appointment scheduling     Attention Provider: Thank you for allowing me to participate in the care of your patient. The patient was connected to triage in response to information provided to the ECC/PSC. Please do not respond through this encounter as the response is not directed to a shared pool. Reason for Disposition   Swollen joint and no fever or redness    Protocols used:  FINGER PAIN-ADULT-[place]

## 2022-07-01 ENCOUNTER — NURSE TRIAGE (OUTPATIENT)
Dept: OTHER | Facility: CLINIC | Age: 87
End: 2022-07-01

## 2022-07-01 ENCOUNTER — OFFICE VISIT (OUTPATIENT)
Dept: FAMILY MEDICINE CLINIC | Facility: CLINIC | Age: 87
End: 2022-07-01
Payer: MEDICARE

## 2022-07-01 VITALS
HEART RATE: 82 BPM | WEIGHT: 200 LBS | SYSTOLIC BLOOD PRESSURE: 128 MMHG | TEMPERATURE: 97.6 F | OXYGEN SATURATION: 97 % | DIASTOLIC BLOOD PRESSURE: 88 MMHG | BODY MASS INDEX: 31.32 KG/M2

## 2022-07-01 DIAGNOSIS — R53.83 FATIGUE, UNSPECIFIED TYPE: ICD-10-CM

## 2022-07-01 DIAGNOSIS — R60.9 SWELLING: ICD-10-CM

## 2022-07-01 DIAGNOSIS — R09.89 BILATERAL CAROTID BRUITS: ICD-10-CM

## 2022-07-01 DIAGNOSIS — I10 PRIMARY HYPERTENSION: Primary | ICD-10-CM

## 2022-07-01 DIAGNOSIS — L29.9 ITCHING: ICD-10-CM

## 2022-07-01 DIAGNOSIS — M79.645 PAIN OF LEFT THUMB: ICD-10-CM

## 2022-07-01 LAB — TSH, 3RD GENERATION: 1.57 UIU/ML (ref 0.36–3.74)

## 2022-07-01 PROCEDURE — 99214 OFFICE O/P EST MOD 30 MIN: CPT | Performed by: FAMILY MEDICINE

## 2022-07-01 PROCEDURE — 96372 THER/PROPH/DIAG INJ SC/IM: CPT | Performed by: FAMILY MEDICINE

## 2022-07-01 PROCEDURE — 1123F ACP DISCUSS/DSCN MKR DOCD: CPT | Performed by: FAMILY MEDICINE

## 2022-07-01 RX ORDER — PREDNISONE 10 MG/1
TABLET ORAL
Qty: 21 EACH | Refills: 0 | Status: SHIPPED | OUTPATIENT
Start: 2022-07-01 | End: 2022-08-01 | Stop reason: ALTCHOICE

## 2022-07-01 RX ORDER — METHYLPREDNISOLONE SODIUM SUCCINATE 40 MG/ML
40 INJECTION, POWDER, LYOPHILIZED, FOR SOLUTION INTRAMUSCULAR; INTRAVENOUS ONCE
Status: COMPLETED | OUTPATIENT
Start: 2022-07-01 | End: 2022-07-01

## 2022-07-01 RX ORDER — TRIAMTERENE AND HYDROCHLOROTHIAZIDE 75; 50 MG/1; MG/1
.5-1 TABLET ORAL DAILY PRN
Qty: 30 TABLET | Refills: 3 | Status: SHIPPED | OUTPATIENT
Start: 2022-07-01 | End: 2022-08-24

## 2022-07-01 RX ADMIN — METHYLPREDNISOLONE SODIUM SUCCINATE 40 MG: 40 INJECTION, POWDER, LYOPHILIZED, FOR SOLUTION INTRAMUSCULAR; INTRAVENOUS at 13:21

## 2022-07-01 ASSESSMENT — PATIENT HEALTH QUESTIONNAIRE - PHQ9
SUM OF ALL RESPONSES TO PHQ QUESTIONS 1-9: 0
SUM OF ALL RESPONSES TO PHQ QUESTIONS 1-9: 0
SUM OF ALL RESPONSES TO PHQ9 QUESTIONS 1 & 2: 0
SUM OF ALL RESPONSES TO PHQ QUESTIONS 1-9: 0
1. LITTLE INTEREST OR PLEASURE IN DOING THINGS: 0
SUM OF ALL RESPONSES TO PHQ QUESTIONS 1-9: 0
2. FEELING DOWN, DEPRESSED OR HOPELESS: 0

## 2022-07-01 ASSESSMENT — ENCOUNTER SYMPTOMS
RESPIRATORY NEGATIVE: 1
GASTROINTESTINAL NEGATIVE: 1
EYES NEGATIVE: 1

## 2022-07-01 NOTE — PROGRESS NOTES
HISTORY OF PRESENT ILLNESS    Ernst Christensen is a 80 y.o. female. HPI  Chief Complaint   Patient presents with    Swelling     bilateral fingers with pain x1 week     Headache     at wake up x1.5 months    Other     back itchiness    Fatigue     See above. Had pacemaker inserted about 6 weeks ago for bradycardia. Procedure went well. Has not yet noted improvement; still has significant fatigue. Since then has had puffiness in fingers of both hands. Denies foot or ankle swelling. No exertional chest pain or SOB. Pain and puffiness at base of left thumb. Onset about 4 weeks ago. Worst in morning but continues during the day depending on activity. Over the last month has had a mild headache on top of the head. Only occurs in the morning. Resolves in 30-40 minutes. Usually not bad enough for medication. No vision change or nausea. Has noted dizziness when looking upwards. Since procedure has had itching in the skin between the left side of the neck extending toward left shoulder area. Denies rash. No pain. Current Outpatient Medications on File Prior to Visit   Medication Sig Dispense Refill    apixaban (ELIQUIS) 5 MG TABS tablet Take 5 mg by mouth 2 times daily      Cholecalciferol 50 MCG (2000 UT) TABS Take by mouth      dilTIAZem (TIAZAC) 120 MG extended release capsule Take 120 mg by mouth daily      losartan (COZAAR) 25 MG tablet Take 25 mg by mouth daily      magnesium oxide (MAG-OX) 400 (240 Mg) MG tablet Take 400 mg by mouth 2 times daily       No current facility-administered medications on file prior to visit. Past Medical History:   Diagnosis Date    Arrhythmia     Esophageal reflux     Esophageal reflux     H/O complete eye exam Yearly    Dr. Rebecca Aldridge (hyperlipidemia)     Hypertension     controlled with medication       Review of Systems   Constitutional: Positive for fatigue. Negative for activity change, appetite change, chills, diaphoresis and fever.    HENT: Negative. Eyes: Negative. Respiratory: Negative. Cardiovascular: Negative. Gastrointestinal: Negative. Endocrine: Negative. Genitourinary: Negative. Musculoskeletal: Positive for arthralgias and joint swelling. Neurological: Negative. Hematological: Negative. Blood pressure 128/88, pulse 82, temperature 97.6 °F (36.4 °C), temperature source Temporal, weight 200 lb (90.7 kg), SpO2 97 %. Physical Exam  Vitals and nursing note reviewed. Constitutional:       Appearance: Normal appearance. HENT:      Mouth/Throat:      Mouth: Mucous membranes are moist.      Pharynx: Oropharynx is clear. Eyes:      Conjunctiva/sclera: Conjunctivae normal.   Neck:      Vascular: Carotid bruit (bilateral) present. Cardiovascular:      Rate and Rhythm: Normal rate and regular rhythm. Heart sounds: Normal heart sounds. Pulmonary:      Breath sounds: Normal breath sounds. Musculoskeletal:         General: Swelling present. Cervical back: Neck supple. Comments: Tender puffiness at the middle phalanx of left thumb. Full ROM with crepitus on flexion and extension. Neurovascular is intact. Lymphadenopathy:      Cervical: No cervical adenopathy. Skin:     Comments: The skin between the neck and left shoulder has a few excoriated areas from scratching. No redness or vesicles. No hypesthesia. Neurological:      Cranial Nerves: No cranial nerve deficit. Deep Tendon Reflexes: Reflexes normal.   Psychiatric:         Mood and Affect: Mood normal.          ASSESSMENT and Wilma Lizandro was seen today for swelling, headache, other and fatigue. Diagnoses and all orders for this visit:    Primary hypertension    Itching    Fatigue, unspecified type  -     TSH; Future    Pain of left thumb    Swelling     Explained that thumb pain is related to DJD. Hopefully both joint pain and itching will resolve with steroid.   Reviewed appropriate use of Maxzide only as needed for swelling. Notify carotid US results. Continue maintenance medications. Notify lab results      Return in about 4 weeks (around 7/29/2022), or if symptoms worsen or fail to improve.     Estrellita Lopez MD

## 2022-07-05 ENCOUNTER — TELEPHONE (OUTPATIENT)
Dept: FAMILY MEDICINE CLINIC | Facility: CLINIC | Age: 87
End: 2022-07-05

## 2022-07-05 NOTE — TELEPHONE ENCOUNTER
----- Message from Dee Moffett MD sent at 7/2/2022  8:23 AM EDT -----  Notify labs are normal. F/U as needed.

## 2022-07-07 ENCOUNTER — TELEPHONE (OUTPATIENT)
Dept: FAMILY MEDICINE CLINIC | Facility: CLINIC | Age: 87
End: 2022-07-07

## 2022-07-07 NOTE — TELEPHONE ENCOUNTER
----- Message from [facility] sent at 7/6/2022  3:12 PM EDT -----  Subject: Referral Request    Reason for referral request? PT asked about the [employer] referral, She   thought someone was suppose to call her, but she hasn't heard anything as   of yet. Provider patient wants to be referred to(if known):     Provider Phone Number(if known): Additional Information for Provider?  If patient don't answer let the phone   ring 6 times to get to v/m  ---------------------------------------------------------------------------  --------------  [address] [facility]    [phone]; OK to leave message on voicemail  ---------------------------------------------------------------------------  --------------

## 2022-07-08 ENCOUNTER — TELEPHONE (OUTPATIENT)
Dept: FAMILY MEDICINE CLINIC | Facility: CLINIC | Age: 87
End: 2022-07-08

## 2022-07-08 NOTE — TELEPHONE ENCOUNTER
[place] and Jude Oliva from Radiology called they need the order Released so they may schedule the patient ASAP

## 2022-07-11 NOTE — TELEPHONE ENCOUNTER
Looked into this with Jhoana and unsure what releasing means. We placed a new order in, hopefully this works.

## 2022-08-01 ENCOUNTER — OFFICE VISIT (OUTPATIENT)
Dept: FAMILY MEDICINE CLINIC | Facility: CLINIC | Age: 87
End: 2022-08-01
Payer: MEDICARE

## 2022-08-01 VITALS
DIASTOLIC BLOOD PRESSURE: 86 MMHG | BODY MASS INDEX: 31.17 KG/M2 | OXYGEN SATURATION: 97 % | TEMPERATURE: 98 F | SYSTOLIC BLOOD PRESSURE: 134 MMHG | WEIGHT: 199 LBS | HEART RATE: 62 BPM

## 2022-08-01 DIAGNOSIS — I10 PRIMARY HYPERTENSION: Primary | ICD-10-CM

## 2022-08-01 DIAGNOSIS — I48.19 PERSISTENT ATRIAL FIBRILLATION (HCC): ICD-10-CM

## 2022-08-01 PROCEDURE — 99213 OFFICE O/P EST LOW 20 MIN: CPT | Performed by: FAMILY MEDICINE

## 2022-08-01 PROCEDURE — 1123F ACP DISCUSS/DSCN MKR DOCD: CPT | Performed by: FAMILY MEDICINE

## 2022-08-01 ASSESSMENT — PATIENT HEALTH QUESTIONNAIRE - PHQ9
1. LITTLE INTEREST OR PLEASURE IN DOING THINGS: 0
SUM OF ALL RESPONSES TO PHQ QUESTIONS 1-9: 0
2. FEELING DOWN, DEPRESSED OR HOPELESS: 0
SUM OF ALL RESPONSES TO PHQ9 QUESTIONS 1 & 2: 0

## 2022-08-01 ASSESSMENT — ENCOUNTER SYMPTOMS
GASTROINTESTINAL NEGATIVE: 1
EYES NEGATIVE: 1
RESPIRATORY NEGATIVE: 1

## 2022-08-01 NOTE — PROGRESS NOTES
HISTORY OF PRESENT ILLNESS    Anjelica Juarez is a 80 y.o. female. HPI  Chief Complaint   Patient presents with    Follow-up     Finger stiffness in the morning / fatigue    Headache     See above. Thumb pain resolved but has some residual stiffness in fingers. Still has generalized fatigue. Denies chest pain or SOB. No exertional symptoms; just feels like she could take a nap most of the day. Feels like it began when she started diltiazem. Followed by cardiology for PAF; pacemaker inserted a few months ago. Was hoping pacemaker would help with her energy but has not. Current Outpatient Medications on File Prior to Visit   Medication Sig Dispense Refill    triamterene-hydroCHLOROthiazide (MAXZIDE) 75-50 MG per tablet Take 0.5-1 tablets by mouth daily as needed (swelling) 30 tablet 3    apixaban (ELIQUIS) 5 MG TABS tablet Take 5 mg by mouth 2 times daily      Cholecalciferol 50 MCG (2000 UT) TABS Take by mouth      dilTIAZem (TIAZAC) 120 MG extended release capsule Take 120 mg by mouth daily      losartan (COZAAR) 25 MG tablet Take 25 mg by mouth daily      magnesium oxide (MAG-OX) 400 (240 Mg) MG tablet Take 400 mg by mouth 2 times daily       No current facility-administered medications on file prior to visit. Past Medical History:   Diagnosis Date    Arrhythmia     Esophageal reflux     Esophageal reflux     H/O complete eye exam Yearly    Dr. Roland Barnard (hyperlipidemia)     Hypertension     controlled with medication       Review of Systems   Constitutional:  Positive for activity change (decreased) and fatigue. Negative for appetite change, chills, diaphoresis and fever. Eyes: Negative. Respiratory: Negative. Cardiovascular: Negative. Gastrointestinal: Negative. Genitourinary: Negative. Musculoskeletal: Negative. Neurological: Negative.      Blood pressure 134/86, pulse 62, temperature 98 °F (36.7 °C), temperature source Temporal, weight 199 lb (90.3 kg), SpO2 97 %.    Physical Exam  Vitals and nursing note reviewed. Constitutional:       Appearance: Normal appearance. HENT:      Mouth/Throat:      Mouth: Mucous membranes are moist.      Pharynx: Oropharynx is clear. Eyes:      Conjunctiva/sclera: Conjunctivae normal.   Neck:      Vascular: No carotid bruit. Cardiovascular:      Rate and Rhythm: Normal rate and regular rhythm. Heart sounds: Normal heart sounds. Comments: Frequent ectopic beats  Pulmonary:      Breath sounds: Normal breath sounds. Musculoskeletal:         General: No swelling. Cervical back: Neck supple. Lymphadenopathy:      Cervical: No cervical adenopathy. Psychiatric:         Mood and Affect: Mood normal.        ASSESSMENT and Broderick Coke was seen today for follow-up and headache. Diagnoses and all orders for this visit:    Primary hypertension    Persistent atrial fibrillation ([address]    Other orders  -     dilTIAZem (CARDIZEM) 30 MG tablet; Take 1 tablet by mouth in the morning and 1 tablet at noon and 1 tablet before bedtime. Change extended release diltiazem to regular release 30mg tid. Hopefully this will control BP and heart rate without causing excessive fatigue. Return in about 4 weeks (around 8/29/2022), or if symptoms worsen or fail to improve.     Minh Feliz MD

## 2022-08-03 ENCOUNTER — TELEPHONE (OUTPATIENT)
Dept: CARDIOLOGY CLINIC | Age: 87
End: 2022-08-03

## 2022-08-03 NOTE — TELEPHONE ENCOUNTER
Please call patient re: she is very tired after being placed on pacemaker and on diltiazem and spoke with her family  re: lowering diltiazem dosage. But family  wants her to check with us.

## 2022-08-03 NOTE — TELEPHONE ENCOUNTER
Saw PCP 8/1 and c/o generalized fatigue since starting Diltiazem. BP yesterday 134/86 hr=62. PCP suggested she lower Diltiazem to 30mg tid and she wants 's opinion.

## 2022-08-08 ENCOUNTER — HOSPITAL ENCOUNTER (OUTPATIENT)
Dept: ULTRASOUND IMAGING | Age: 87
Discharge: HOME | End: 2022-08-10
Payer: MEDICARE

## 2022-08-08 DIAGNOSIS — R09.89 BILATERAL CAROTID BRUITS: ICD-10-CM

## 2022-08-08 PROCEDURE — 93880 EXTRACRANIAL BILAT STUDY: CPT

## 2022-08-15 ENCOUNTER — TELEPHONE (OUTPATIENT)
Dept: CARDIOLOGY CLINIC | Age: 87
End: 2022-08-15

## 2022-08-15 ENCOUNTER — TELEPHONE (OUTPATIENT)
Dept: FAMILY MEDICINE CLINIC | Facility: CLINIC | Age: 87
End: 2022-08-15

## 2022-08-15 NOTE — TELEPHONE ENCOUNTER
Pt called inquiring about a B-12 shot. Stated that she is very fatigued and is supposed to be be going on a trip soon.

## 2022-08-15 NOTE — TELEPHONE ENCOUNTER
Patient would like to  more samples of eliquis (5mg). Patient states she would like to pick them up at [facility] but is okay to pick them up at [place]. Please call when ready.

## 2022-08-24 ENCOUNTER — OFFICE VISIT (OUTPATIENT)
Dept: CARDIOLOGY CLINIC | Age: 87
End: 2022-08-24
Payer: MEDICARE

## 2022-08-24 VITALS
BODY MASS INDEX: 31.09 KG/M2 | SYSTOLIC BLOOD PRESSURE: 146 MMHG | HEIGHT: 67 IN | DIASTOLIC BLOOD PRESSURE: 94 MMHG | HEART RATE: 70 BPM | WEIGHT: 198.1 LBS

## 2022-08-24 DIAGNOSIS — I48.0 PAROXYSMAL ATRIAL FIBRILLATION (HCC): Primary | ICD-10-CM

## 2022-08-24 DIAGNOSIS — I10 ESSENTIAL HYPERTENSION: ICD-10-CM

## 2022-08-24 DIAGNOSIS — R00.1 SYMPTOMATIC BRADYCARDIA: ICD-10-CM

## 2022-08-24 PROCEDURE — 1123F ACP DISCUSS/DSCN MKR DOCD: CPT | Performed by: INTERNAL MEDICINE

## 2022-08-24 PROCEDURE — 99214 OFFICE O/P EST MOD 30 MIN: CPT | Performed by: INTERNAL MEDICINE

## 2022-08-24 RX ORDER — METOPROLOL SUCCINATE 25 MG/1
25 TABLET, EXTENDED RELEASE ORAL EVERY 12 HOURS
Qty: 60 TABLET | Refills: 5 | Status: SHIPPED | OUTPATIENT
Start: 2022-08-24

## 2022-08-24 NOTE — PROGRESS NOTES
[address] [place]  [address], [address] [facility], [address] [place] [address]  PHONE: [phone]  1935    SUBJECTIVE:   Bruce Tierney is a 80 y.o. female seen for a follow up visit regarding the following:     Chief Complaint   Patient presents with    Irregular Heart Beat    Device Check       HPI:    Bruce Tierney is a very pleasant 80 y.o. female with a past medical and cardiac history significant for permanent AF, CAD, and presents for evaluation of symptomatic bradycardia. She  has been having ongoing fatigue, low energy, some dizziness/lightheaded spells, no syncope, no chest pain. She is now s/p PPM and having problems with RVR, not tolerating higher doses of diltiazem. Cardiac PMH: (Old records have been reviewed and summarized below)   PAF and CAD. New SSS on monitor and seen by EP at [facility]. Needing PPM,wants this done at [facility]. TTE (4/26/22): EF 60%   HR low, 16 pauses > 3 sec on monitor      Reviewed office note Dr. iMlton Donnelly 4/13/22    Past Medical History, Past Surgical History, Family history, Social History, and Medications were all reviewed with the patient today and updated as necessary. Current Outpatient Medications   Medication Sig Dispense Refill    dilTIAZem (CARDIZEM) 30 MG tablet Take 1 tablet by mouth in the morning and 1 tablet at noon and 1 tablet before bedtime. (Patient taking differently: Take 30 mg by mouth in the morning and at bedtime) 90 tablet 5    apixaban (ELIQUIS) 5 MG TABS tablet Take 5 mg by mouth 2 times daily      Cholecalciferol 50 MCG (2000 UT) TABS Take by mouth      magnesium oxide (MAG-OX) 400 (240 Mg) MG tablet Take 400 mg by mouth 2 times daily       No current facility-administered medications for this visit.      Allergies   Allergen Reactions    Sulfa Antibiotics Rash     [unfilled]  Past Medical History:   Diagnosis Date    Arrhythmia     Esophageal reflux     Esophageal reflux     H/O complete eye exam Yearly    Dr. Eulogio Aguila (hyperlipidemia)     Hypertension     controlled with medication     Past Surgical History:   Procedure Laterality Date    APPENDECTOMY      CATARACT REMOVAL Bilateral     GYN      fibroid tumors removed, 1.5 ovaries removed    ORTHOPEDIC SURGERY  2012    left elbow surgery    ORTHOPEDIC SURGERY      R knee scope    IN CARDIAC SURG PROCEDURE UNLIST      cath    PRE-MALIGNANT / BENIGN SKIN LESION EXCISION      basal cell removed from face     Family History   Problem Relation Age of Onset    Heart Disease Mother     Stroke Brother         mild x2; major x1  (smoker)    Hypertension Mother     Other Mother         pacemaker,  at 80    Lung Disease Father     Heart Disease Father     Other Father         pacemaker -  at 80     Social History     Tobacco Use    Smoking status: Never    Smokeless tobacco: Never   Substance Use Topics    Alcohol use: No       PHYSICAL EXAM:    BP (!) 146/94   Pulse 70   Ht 5' 7\" (1.702 m)   Wt 198 lb 1.6 oz (89.9 kg)   BMI 31.03 kg/m²      Wt Readings from Last 5 Encounters:   22 198 lb 1.6 oz (89.9 kg)   22 199 lb (90.3 kg)   22 200 lb (90.7 kg)   22 190 lb (86.2 kg)   22 201 lb (91.2 kg)       BP Readings from Last 5 Encounters:   22 (!) 146/94   22 134/86   22 128/88   22 122/80   22 130/70       General appearance: Alert, well appearing, and in no distress   CV: irreg irreg, no M/R/G, no JVD, normal distal pulses, no lower extremity edema  Pulm: Clear to auscultation, no wheezes, no crackles, no accessory muscle use  GI: Soft, nontender, nondistended  Neuro: Alert and oriented    Medical problems and test results were reviewed with the patient today.      Lab Results   Component Value Date/Time    K 4.2 2022 02:40 PM     Creatinine   Date Value Ref Range Status   2022 0.80 0.6 - 1.0 MG/DL Final     Lab Results   Component Value Date/Time    HGB 14.6 2022 02:40 PM     No results found for: INR, PTMR, PT1    Lab Results   Component Value Date    WBC 5.3 05/13/2022    HGB 14.6 05/13/2022    HCT 45.1 05/13/2022    MCV 94.5 05/13/2022     05/13/2022      Lab Results   Component Value Date/Time     05/13/2022 02:40 PM    K 4.2 05/13/2022 02:40 PM     05/13/2022 02:40 PM    CO2 28 05/13/2022 02:40 PM    BUN 23 05/13/2022 02:40 PM    CREATININE 0.80 05/13/2022 02:40 PM    GLUCOSE 98 05/13/2022 02:40 PM    CALCIUM 9.3 05/13/2022 02:40 PM         EKG: (EKG has been independently visualized by me with interpretation below)      Hali Vanessa was seen today for irregular heart beat and device check. Diagnoses and all orders for this visit:    Paroxysmal atrial fibrillation ([address]  -     Case Request EP Lab    Essential hypertension    Symptomatic bradycardia      ASSESSMENT and PLAN  1. Permanent afib, failing medical therapy: discussed the options of AV node ablation and escalation of medical therapy. She has not tolerated higher doses of diltiazem. Discussed risks, benefits and alternatives to AV node ablation, she will discuss with family and likely proceed. 2. CVA protection: cont eliquis 5mg Q12H    Patient has been instructed and agrees to call our office with any issues or other concerns related to their cardiac condition(s) and/or complaint(s). No follow-ups on file. Amber Backers  Mira Kuamr MD, MS  Cardiology/Electrophysiology  8/24/2022  2:20 PM

## 2022-08-25 ENCOUNTER — NURSE ONLY (OUTPATIENT)
Dept: FAMILY MEDICINE CLINIC | Facility: CLINIC | Age: 87
End: 2022-08-25
Payer: MEDICARE

## 2022-08-25 DIAGNOSIS — R53.83 FATIGUE, UNSPECIFIED TYPE: Primary | ICD-10-CM

## 2022-08-25 PROCEDURE — 96372 THER/PROPH/DIAG INJ SC/IM: CPT | Performed by: FAMILY MEDICINE

## 2022-08-25 PROCEDURE — 99211 OFF/OP EST MAY X REQ PHY/QHP: CPT | Performed by: FAMILY MEDICINE

## 2022-08-25 RX ORDER — CYANOCOBALAMIN 1000 UG/ML
1000 INJECTION INTRAMUSCULAR; SUBCUTANEOUS ONCE
Status: COMPLETED | OUTPATIENT
Start: 2022-08-25 | End: 2022-08-25

## 2022-08-25 RX ADMIN — CYANOCOBALAMIN 1000 MCG: 1000 INJECTION INTRAMUSCULAR; SUBCUTANEOUS at 11:51

## 2022-09-13 ENCOUNTER — OFFICE VISIT (OUTPATIENT)
Dept: FAMILY MEDICINE CLINIC | Facility: CLINIC | Age: 87
End: 2022-09-13
Payer: MEDICARE

## 2022-09-13 VITALS
BODY MASS INDEX: 31.32 KG/M2 | HEART RATE: 50 BPM | WEIGHT: 200 LBS | OXYGEN SATURATION: 97 % | SYSTOLIC BLOOD PRESSURE: 136 MMHG | DIASTOLIC BLOOD PRESSURE: 84 MMHG | TEMPERATURE: 97.9 F

## 2022-09-13 DIAGNOSIS — I10 PRIMARY HYPERTENSION: Primary | ICD-10-CM

## 2022-09-13 PROCEDURE — 1123F ACP DISCUSS/DSCN MKR DOCD: CPT | Performed by: FAMILY MEDICINE

## 2022-09-13 PROCEDURE — 99213 OFFICE O/P EST LOW 20 MIN: CPT | Performed by: FAMILY MEDICINE

## 2022-09-13 ASSESSMENT — ENCOUNTER SYMPTOMS
RESPIRATORY NEGATIVE: 1
EYES NEGATIVE: 1

## 2022-09-13 ASSESSMENT — PATIENT HEALTH QUESTIONNAIRE - PHQ9
SUM OF ALL RESPONSES TO PHQ9 QUESTIONS 1 & 2: 0
SUM OF ALL RESPONSES TO PHQ QUESTIONS 1-9: 0
1. LITTLE INTEREST OR PLEASURE IN DOING THINGS: 0
SUM OF ALL RESPONSES TO PHQ QUESTIONS 1-9: 0
2. FEELING DOWN, DEPRESSED OR HOPELESS: 0

## 2022-09-13 NOTE — PROGRESS NOTES
HISTORY OF PRESENT ILLNESS    Willis Mohs is a 80 y.o. female. HPI  Chief Complaint   Patient presents with    Hypertension     See above. Feeling well. No side effects from BP medication. No headache or vision change. Denies chest pain or SOB. No swelling. Followed by cardiology for PAF; stable. Current Outpatient Medications on File Prior to Visit   Medication Sig Dispense Refill    metoprolol succinate (TOPROL XL) 25 MG extended release tablet Take 1 tablet by mouth in the morning and 1 tablet in the evening. 60 tablet 5    apixaban (ELIQUIS) 5 MG TABS tablet Take 5 mg by mouth 2 times daily      Cholecalciferol 50 MCG (2000 UT) TABS Take by mouth      magnesium oxide (MAG-OX) 400 (240 Mg) MG tablet Take 400 mg by mouth 2 times daily       No current facility-administered medications on file prior to visit. Past Medical History:   Diagnosis Date    Arrhythmia     Esophageal reflux     Esophageal reflux     H/O complete eye exam Yearly    Dr. Jose Emmanuel (hyperlipidemia)     Hypertension     controlled with medication       Review of Systems   Constitutional: Negative. Eyes: Negative. Respiratory: Negative. Cardiovascular: Negative. Genitourinary: Negative. Musculoskeletal: Negative. Neurological: Negative. Blood pressure 136/84, pulse 50, temperature 97.9 °F (36.6 °C), temperature source Temporal, weight 200 lb (90.7 kg), SpO2 97 %. Physical Exam  Vitals and nursing note reviewed. Constitutional:       Appearance: Normal appearance. HENT:      Mouth/Throat:      Mouth: Mucous membranes are moist.      Pharynx: Oropharynx is clear. Eyes:      Conjunctiva/sclera: Conjunctivae normal.   Neck:      Vascular: No carotid bruit. Cardiovascular:      Rate and Rhythm: Normal rate and regular rhythm. Heart sounds: Normal heart sounds. Pulmonary:      Breath sounds: Normal breath sounds. Musculoskeletal:         General: No swelling.       Cervical back: Neck supple. Lymphadenopathy:      Cervical: No cervical adenopathy. Psychiatric:         Mood and Affect: Mood normal.        ASSESSMENT and Danelle Garcia was seen today for hypertension. Diagnoses and all orders for this visit:    Primary hypertension   Discussed history and medications with patient. Continue current measures. Follow up if side effects occur or if new symptoms develop. Note initial BP elevation decreased on recheck by me      Return in about 6 months (around 3/13/2023), or if symptoms worsen or fail to improve.     Alvin Fritz MD

## 2022-09-15 ENCOUNTER — TELEPHONE (OUTPATIENT)
Dept: CARDIOLOGY CLINIC | Age: 87
End: 2022-09-15

## 2022-09-15 NOTE — TELEPHONE ENCOUNTER
She is having unexpected joint pain. Rt.foot this am is painful to walk on and is swollen. She has swelling in rt.ring finger as well. She read that side effect of Eliquis is unexpected joint pain and swelling. Could this be side effect of Eliquis? She is wondering if Eliquis should be reduced? She also reports that her balance seems off a bit. She has not fallen. She thought the pacemaker would help w/this but has not. Saw PCP Tuesday and BP was 130/84. Did not mention this to PCP. What does he think about this?

## 2022-09-15 NOTE — TELEPHONE ENCOUNTER
Hammad Dupont, DO  You 14 minutes ago (10:28 AM)     ES  Let's refer her back to Dr. Elian Sawyer to discuss watchman consult, other options for OACs. Remain on eliquis for now. Call for other issues.   Thanks         Note       Pt.notified and note sent to

## 2022-09-15 NOTE — TELEPHONE ENCOUNTER
Let's refer her back to Dr. Ian Casey to discuss watchman consult, other options for OACs. Remain on eliquis for now. Call for other issues.   Thanks

## 2022-09-16 ENCOUNTER — TELEPHONE (OUTPATIENT)
Dept: CARDIOLOGY CLINIC | Age: 87
End: 2022-09-16

## 2022-09-16 NOTE — TELEPHONE ENCOUNTER
Called patient who needed to talk with her daughter about scheduling. Gave patient my name and extension to call back to schedule.  Pt voiced understanding

## 2022-09-16 NOTE — TELEPHONE ENCOUNTER
Per [employer] coordinator ok to set up a watchman consult. Received: Kyung Greene, REDD Zamora, REDD Maldonado,  she qualifies with a CHADSVASC of 4, balance issues, and intolerance to [facility]. I looked to see if she had a TANESHA in the past.  She hasn't. Based on her last note, I think she would be a good candidate for device placement. Thanks!

## 2022-09-22 NOTE — TELEPHONE ENCOUNTER
LVM with patients daughter, Allison Rader, to schedule WC with dr. Mel Carlisle.  LVM with my name and extension to call to schedule

## 2022-09-26 ENCOUNTER — TELEPHONE (OUTPATIENT)
Dept: FAMILY MEDICINE CLINIC | Facility: CLINIC | Age: 87
End: 2022-09-26

## 2022-09-26 DIAGNOSIS — M79.671 RIGHT FOOT PAIN: Primary | ICD-10-CM

## 2022-09-26 RX ORDER — TRIAMTERENE AND HYDROCHLOROTHIAZIDE 75; 50 MG/1; MG/1
.5-1 TABLET ORAL DAILY PRN
Qty: 90 TABLET | Refills: 1 | OUTPATIENT
Start: 2022-09-26

## 2022-09-26 NOTE — TELEPHONE ENCOUNTER
Right foot pain that has been going on for about 2 days. Has put it off and now becoming sharp and hard to walk.

## 2022-09-26 NOTE — TELEPHONE ENCOUNTER
Patient has been having foot pain on the top of her foot and thinks she needs an xray. She's been taking lots of Tylenol; this has been going on about 4 days. Can this be ordered without an office? Please advise patient.

## 2022-10-03 ENCOUNTER — APPOINTMENT (OUTPATIENT)
Dept: GENERAL RADIOLOGY | Age: 87
End: 2022-10-03
Payer: MEDICARE

## 2022-10-03 ENCOUNTER — HOSPITAL ENCOUNTER (EMERGENCY)
Age: 87
Discharge: HOME | End: 2022-10-03
Attending: GENERAL PRACTICE
Payer: MEDICARE

## 2022-10-03 VITALS
HEIGHT: 67 IN | SYSTOLIC BLOOD PRESSURE: 130 MMHG | HEART RATE: 68 BPM | BODY MASS INDEX: 29.82 KG/M2 | OXYGEN SATURATION: 93 % | TEMPERATURE: 97.9 F | RESPIRATION RATE: 18 BRPM | DIASTOLIC BLOOD PRESSURE: 81 MMHG | WEIGHT: 190 LBS

## 2022-10-03 DIAGNOSIS — S46.912A STRAIN OF LEFT SHOULDER, INITIAL ENCOUNTER: Primary | ICD-10-CM

## 2022-10-03 PROCEDURE — 73030 X-RAY EXAM OF SHOULDER: CPT

## 2022-10-03 PROCEDURE — 99283 EMERGENCY DEPT VISIT LOW MDM: CPT

## 2022-10-03 ASSESSMENT — PAIN - FUNCTIONAL ASSESSMENT: PAIN_FUNCTIONAL_ASSESSMENT: 0-10

## 2022-10-03 ASSESSMENT — PAIN SCALES - GENERAL: PAINLEVEL_OUTOF10: 8

## 2022-10-03 NOTE — ED TRIAGE NOTES
Patient ambulatory to triage after lifting something heavy into her cabinet and hurt her left shoulder. Patient states it happened Friday but the pain has only gotten worse.

## 2022-10-03 NOTE — ED PROVIDER NOTES
Emergency Department Provider Note                   PCP:                Fernie Mendoza MD               Age: 80 y.o. Sex: female     No diagnosis found. DISPOSITION          MDM  Number of Diagnoses or Management Options  Strain of left shoulder, initial encounter: new, needed workup  Diagnosis management comments: Patient has evidence of likely shoulder strain to the left shoulder. There is nothing to suggest fracture or dislocation. There is a potential of having a rotator cuff or labrum injury. Patient is instructed to follow-up with primary as she may need an MRI. Patient has a sling at home which I told her to use for comfort but still needs to maintain range of motion as much as tolerable to her left shoulder so she does not get frozen shoulder. Patient given return precautions       Amount and/or Complexity of Data Reviewed  Tests in the radiology section of CPT®: ordered and reviewed  Discussion of test results with the performing providers: no  Obtain history from someone other than the patient: no  Discuss the patient with other providers: no  Independent visualization of images, tracings, or specimens: yes    Risk of Complications, Morbidity, and/or Mortality  Presenting problems: low  Diagnostic procedures: low  Management options: low    Patient Progress  Patient progress: stable             Orders Placed This Encounter   Procedures    XR SHOULDER LEFT (MIN 2 VIEWS)        Medications - No data to display    New Prescriptions    No medications on file        Ramon Kohler is a 80 y.o. female who presents to the Emergency Department with chief complaint of    Chief Complaint   Patient presents with    Shoulder Pain      Patient states that she hurt her shoulder about 3 or 4 days ago. She was lifting something into the cupboard above her head and had a sudden onset of sharp pain in her anterior left shoulder with some pain radiating up into her trapezius and posterior scapular area. Patient denies any shooting pains down the arm or weakness or numbness in the arm. Patient admits to pain is worsened when she tries to bring her arm across her chest and shoulder flexion. Pain is also worsened with elevation of the arm above her head. She states she has never had pain like this in her shoulder before. Review of Systems   All other systems reviewed and are negative. Past Medical History:   Diagnosis Date    Arrhythmia     Esophageal reflux     Esophageal reflux     H/O complete eye exam Yearly    Dr. Domi Galvez (hyperlipidemia)     Hypertension     controlled with medication        Past Surgical History:   Procedure Laterality Date    APPENDECTOMY      CATARACT REMOVAL Bilateral     GYN      fibroid tumors removed, 1.5 ovaries removed    ORTHOPEDIC SURGERY  2012    left elbow surgery    ORTHOPEDIC SURGERY      R knee scope    [place] CARDIAC SURG PROCEDURE UNLIST      cath    PRE-MALIGNANT / BENIGN SKIN LESION EXCISION      basal cell removed from face        Family History   Problem Relation Age of Onset    Heart Disease Mother     Stroke Brother         mild x2; major x1  (smoker)    Hypertension Mother     Other Mother         pacemaker,  at 80    Lung Disease Father     Heart Disease Father     Other Father         pacemaker -  at 80        Social History     Socioeconomic History    Marital status:       Spouse name: None    Number of children: None    Years of education: None    Highest education level: None   Tobacco Use    Smoking status: Never    Smokeless tobacco: Never   Substance and Sexual Activity    Alcohol use: No    Drug use: Never         Sulfa antibiotics     Previous Medications    APIXABAN (ELIQUIS) 5 MG TABS TABLET    Take 5 mg by mouth 2 times daily    CHOLECALCIFEROL 50 MCG (2000) TABS    Take by mouth    MAGNESIUM OXIDE (MAG-OX) 400 (240 MG) MG TABLET    Take 400 mg by mouth 2 times daily    METOPROLOL SUCCINATE (Selvin Heck XL) 25 MG EXTENDED RELEASE TABLET    Take 1 tablet by mouth in the morning and 1 tablet in the evening. Vitals signs and nursing note reviewed. Patient Vitals for the past 4 hrs:   Temp Pulse Resp BP SpO2   10/03/22 1045 97.9 °F (36.6 °C) 68 18 130/81 97 %          Physical Exam  Constitutional:       General: She is not in acute distress. HENT:      Head: Normocephalic and atraumatic. Nose: Nose normal.      Mouth/Throat:      Mouth: Mucous membranes are moist.   Eyes:      Extraocular Movements: Extraocular movements intact. Pupils: Pupils are equal, round, and reactive to light. Cardiovascular:      Rate and Rhythm: Normal rate and regular rhythm. Heart sounds: Normal heart sounds. Pulmonary:      Effort: No respiratory distress. Breath sounds: No wheezing. Abdominal:      General: Abdomen is flat. Palpations: Abdomen is soft. Tenderness: There is no abdominal tenderness. Musculoskeletal:         General: No swelling or tenderness. Cervical back: Normal range of motion. Comments: Good passive range of motion to left shoulder. Mild tenderness to the anterior deltoid and trapezius area at the shoulder. Normal muscle strength and  strength of the left hand. Normal sensation to soft touch to the left arm. Skin:     Findings: No erythema or rash. Neurological:      General: No focal deficit present. Mental Status: She is oriented to person, place, and time. Psychiatric:         Mood and Affect: Mood normal.         Behavior: Behavior normal.        Procedures    Results for orders placed or performed during the hospital encounter of 10/03/22   XR SHOULDER LEFT (MIN 2 VIEWS)    Narrative    EXAMINATION: XR SHOULDER LEFT (MIN 2 VIEWS) 10/3/2022 11:07 AM    ACCESSION NUMBER: [id number]    COMPARISON: Chest radiograph 5/16/2022. INDICATION: shoulder pain after lifting    TECHNIQUE: 3 views of the left shoulder were obtained. FINDINGS:  Diffuse osseous demineralization. No acute fracture or dislocation. Mild  glenohumeral osteophytosis. Mild acromial clavicular osseous overgrowth. Partially imaged left chest wall pacer device. No focal consolidation in the  visualized left chest.        Impression    No acute osseous abnormality. Mild glenohumeral and acromioclavicular osteoarthrosis. XR SHOULDER LEFT (MIN 2 VIEWS)   Final Result   No acute osseous abnormality. Mild glenohumeral and acromioclavicular osteoarthrosis. Voice dictation software was used during the making of this note. This software is not perfect and grammatical and other typographical errors may be present. This note has not been completely proofread for errors.      Jey Allen DO  10/03/22 [id number]

## 2022-10-03 NOTE — ED NOTES
I have reviewed discharge instructions with the patient. The patient verbalized understanding. Patient left ED via Discharge Method: ambulatory to Home with self    Opportunity for questions and clarification provided. Patient given 0 scripts. To continue your aftercare when you leave the hospital, you may receive an automated call from our care team to check in on how you are doing. This is a free service and part of our promise to provide the best care and service to meet your aftercare needs.  If you have questions, or wish to unsubscribe from this service please call [phone]. Thank you for Choosing our [address] [place] [address] Emergency Department.        Murali Gil RN  10/03/22 1047

## 2022-10-05 ENCOUNTER — TELEPHONE (OUTPATIENT)
Dept: CARDIOLOGY CLINIC | Age: 87
End: 2022-10-05

## 2022-10-05 NOTE — TELEPHONE ENCOUNTER
Patient called stating she would greatly appreciate samples of :    (ELIQUIS) 5 MG TABS tablet    Patient states she would like to pick these up at the [facility] office. Please call if need be.

## 2022-10-13 ENCOUNTER — OFFICE VISIT (OUTPATIENT)
Dept: FAMILY MEDICINE CLINIC | Facility: CLINIC | Age: 87
End: 2022-10-13
Payer: MEDICARE

## 2022-10-13 VITALS
OXYGEN SATURATION: 99 % | BODY MASS INDEX: 29.6 KG/M2 | SYSTOLIC BLOOD PRESSURE: 138 MMHG | DIASTOLIC BLOOD PRESSURE: 84 MMHG | TEMPERATURE: 97.7 F | HEART RATE: 69 BPM | WEIGHT: 189 LBS

## 2022-10-13 DIAGNOSIS — R26.9 GAIT ABNORMALITY: ICD-10-CM

## 2022-10-13 DIAGNOSIS — M25.512 ACUTE PAIN OF LEFT SHOULDER: Primary | ICD-10-CM

## 2022-10-13 DIAGNOSIS — R26.89 BALANCE PROBLEM: ICD-10-CM

## 2022-10-13 PROCEDURE — 99213 OFFICE O/P EST LOW 20 MIN: CPT | Performed by: FAMILY MEDICINE

## 2022-10-13 PROCEDURE — 1123F ACP DISCUSS/DSCN MKR DOCD: CPT | Performed by: FAMILY MEDICINE

## 2022-10-13 RX ORDER — TIZANIDINE 4 MG/1
4 TABLET ORAL NIGHTLY PRN
Qty: 30 TABLET | Refills: 0 | Status: SHIPPED | OUTPATIENT
Start: 2022-10-13 | End: 2022-11-04

## 2022-10-13 RX ORDER — PREDNISONE 10 MG/1
TABLET ORAL
Qty: 1 EACH | Refills: 0 | Status: SHIPPED | OUTPATIENT
Start: 2022-10-13 | End: 2022-11-04 | Stop reason: ALTCHOICE

## 2022-10-13 ASSESSMENT — PATIENT HEALTH QUESTIONNAIRE - PHQ9
SUM OF ALL RESPONSES TO PHQ QUESTIONS 1-9: 0
2. FEELING DOWN, DEPRESSED OR HOPELESS: 0
SUM OF ALL RESPONSES TO PHQ QUESTIONS 1-9: 0
1. LITTLE INTEREST OR PLEASURE IN DOING THINGS: 0
SUM OF ALL RESPONSES TO PHQ QUESTIONS 1-9: 0
SUM OF ALL RESPONSES TO PHQ9 QUESTIONS 1 & 2: 0
SUM OF ALL RESPONSES TO PHQ QUESTIONS 1-9: 0

## 2022-10-13 ASSESSMENT — ENCOUNTER SYMPTOMS
RESPIRATORY NEGATIVE: 1
GASTROINTESTINAL NEGATIVE: 1

## 2022-10-13 NOTE — PROGRESS NOTES
HISTORY OF PRESENT ILLNESS    Jo Ann Alvarado is a 80 y.o. female. HPI  Chief Complaint   Patient presents with    Follow-up     Left shoulder pain - got xrays/ wore sling for a few days    Other     Foot, elbow, hands, wrist pain     See above. About 2 weeks ago strained left shoulder when reaching above her head. ER evaluation revealed no bony injury on x-ray. Pain is decreased but still sore when reaching. No numbness or weakness. 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 Current Outpatient Medications on File Prior to Visit   Medication Sig Dispense Refill    metoprolol succinate (TOPROL XL) 25 MG extended release tablet Take 1 tablet by mouth in the morning and 1 tablet in the evening. 60 tablet 5    apixaban (ELIQUIS) 5 MG TABS tablet Take 5 mg by mouth 2 times daily      Cholecalciferol 50 MCG (2000 UT) TABS Take by mouth      magnesium oxide (MAG-OX) 400 (240 Mg) MG tablet Take 400 mg by mouth 2 times daily       No current facility-administered medications on file prior to visit. Past Medical History:   Diagnosis Date    Arrhythmia     Esophageal reflux     Esophageal reflux     H/O complete eye exam Yearly    Dr. Tristin Otto (hyperlipidemia)     Hypertension     controlled with medication       Review of Systems   Constitutional: Negative. Respiratory: Negative. Cardiovascular: Negative. Gastrointestinal: Negative. Genitourinary: Negative. Musculoskeletal:  Positive for arthralgias. Neurological: Negative. Blood pressure 138/84, pulse 69, temperature 97.7 °F (36.5 °C), temperature source Temporal, weight 189 lb (85.7 kg), SpO2 99 %. Physical Exam  Vitals and nursing note reviewed. Constitutional:       Appearance: Normal appearance. HENT:      Mouth/Throat:      Mouth: Mucous membranes are moist.      Pharynx: Oropharynx is clear. Eyes:      Conjunctiva/sclera: Conjunctivae normal.   Neck:      Vascular: No carotid bruit.    Cardiovascular: Rate and Rhythm: Normal rate and regular rhythm. Heart sounds: Normal heart sounds. Pulmonary:      Breath sounds: Normal breath sounds. Musculoskeletal:         General: No swelling. Cervical back: Neck supple. Comments: Left shoulder has no point tenderness or swelling. Moderate spasm in suprascapular area. Full passive ROM with no laxity or crepitus. Tender with anterior and posterior extension; increases with resistance. Neurovascular is intact. Lymphadenopathy:      Cervical: No cervical adenopathy. Psychiatric:         Mood and Affect: Mood normal.        ASSESSMENT and Dannielle Fabry was seen today for follow-up and other. Diagnoses and all orders for this visit:    Acute pain of left shoulder  -     [facility]    Balance problem  -     [facility]    Gait abnormality  -     [facility]    Other orders  -     predniSONE 10 MG (21) TBPK; As directed  -     tiZANidine (ZANAFLEX) 4 MG tablet; Take 1 tablet by mouth nightly as needed (spasm)   Note initial BP elevation decreased on recheck by me  PT is ordered. Follow up if no improvement. Return in about 4 weeks (around 11/10/2022), or if symptoms worsen or fail to improve.     Duong Ramirez MD

## 2022-10-14 ENCOUNTER — TELEPHONE (OUTPATIENT)
Dept: CARDIOLOGY CLINIC | Age: 87
End: 2022-10-14

## 2022-10-14 ENCOUNTER — OFFICE VISIT (OUTPATIENT)
Dept: CARDIOLOGY CLINIC | Age: 87
End: 2022-10-14
Payer: MEDICARE

## 2022-10-14 VITALS
HEIGHT: 67 IN | DIASTOLIC BLOOD PRESSURE: 84 MMHG | WEIGHT: 196 LBS | BODY MASS INDEX: 30.76 KG/M2 | SYSTOLIC BLOOD PRESSURE: 132 MMHG | HEART RATE: 72 BPM

## 2022-10-14 DIAGNOSIS — I48.0 PAROXYSMAL ATRIAL FIBRILLATION (HCC): Primary | ICD-10-CM

## 2022-10-14 DIAGNOSIS — I49.3 VENTRICULAR PREMATURE BEATS: ICD-10-CM

## 2022-10-14 DIAGNOSIS — R00.1 SYMPTOMATIC BRADYCARDIA: ICD-10-CM

## 2022-10-14 DIAGNOSIS — I49.5 SINUS NODE DYSFUNCTION (HCC): ICD-10-CM

## 2022-10-14 DIAGNOSIS — I49.1 PREMATURE ATRIAL CONTRACTION: ICD-10-CM

## 2022-10-14 DIAGNOSIS — I25.10 CORONARY ARTERY DISEASE INVOLVING NATIVE CORONARY ARTERY OF NATIVE HEART WITHOUT ANGINA PECTORIS: ICD-10-CM

## 2022-10-14 PROCEDURE — 1123F ACP DISCUSS/DSCN MKR DOCD: CPT | Performed by: INTERNAL MEDICINE

## 2022-10-14 PROCEDURE — 99214 OFFICE O/P EST MOD 30 MIN: CPT | Performed by: INTERNAL MEDICINE

## 2022-10-14 NOTE — PROGRESS NOTES
[address], [address] [facility], [address] [place] [address]  PHONE: [phone]     10/14/22    NAME:  Lizzy Munson  : 1935  MRN: [id number]       SUBJECTIVE:   Lizzy Munson is a 80 y.o. female seen for a follow up visit regarding the following:     Chief Complaint   Patient presents with    Coronary Artery Disease       HPI: Here for PAF and CAD. PPM as well. Aug 2021 LHC -mild to mod nonobstructive disease  Echo 2022: normal EF, mod MR, mild AI      Some RUTH, some lack of energy, no CP. She is asx in Afib, no palpitations. Doing well on anticoagulation treatment as reviewed today, no bleeding issues or excessive bruising noted. , 3 kids in town. Past Medical History, Past Surgical History, Family history, Social History, and Medications were all reviewed with the patient today and updated as necessary. Current Outpatient Medications   Medication Sig Dispense Refill    Bioflavonoid Products (RICK C PO) Take by mouth      Multiple Vitamins-Minerals (ONE-A-DAY WOMENS PO) Take 1 tablet by mouth daily      AZO-CRANBERRY PO Take 2 tablets by mouth daily      predniSONE 10 MG (21) TBPK As directed 1 each 0    tiZANidine (ZANAFLEX) 4 MG tablet Take 1 tablet by mouth nightly as needed (spasm) 30 tablet 0    metoprolol succinate (TOPROL XL) 25 MG extended release tablet Take 1 tablet by mouth in the morning and 1 tablet in the evening. 60 tablet 5    apixaban (ELIQUIS) 5 MG TABS tablet Take 5 mg by mouth 2 times daily      Cholecalciferol 50 MCG (2000) TABS Take by mouth      magnesium oxide (MAG-OX) 400 (240 Mg) MG tablet Take 400 mg by mouth 2 times daily       No current facility-administered medications for this visit.         Allergies   Allergen Reactions    Sulfa Antibiotics Rash     Patient Active Problem List    Diagnosis Date Noted    Symptomatic bradycardia 2022    Coronary artery disease involving native coronary artery of native heart without angina pectoris 03/03/2022    Abnormal nuclear stress test 07/27/2021     Added automatically from request for surgery [id number]        Chronic fatigue 07/20/2021 Jul 2021- lexiscan MPI - reversible anterolateral defect, not gated d/t   atrial fib        Sinus node dysfunction ([facility] 07/17/2020    Essential hypertension      controlled with medication        Paroxysmal atrial fibrillation ([facility] 11/05/2019     Dec 2019- Echo EF 55-60%, indeterminate DF, marked LAE  May 2021- Echo - mild LVH, normal SF, abnormal DF, LAE, mild MR/TR, RVSP   25  48 hr monitor - sustained afib, occurrences of RVR are brief, fastest 138   < 10 seconds, avg 71. 3 second pause during sleep        Premature atrial contraction 09/15/2015     2015 - managed with oral magnesium (Dr Cooney Overall)        Ventricular premature beats 09/15/2015     2015 - managed with oral magnesium (Dr Cooney Overall)  Feb 2017 - 24 hour monitor - essentially normal tracing but for first   degree AV block. No symptoms reported. Oct 2018 - 24 hour monitor - 1. Normal sinus rhythm, sinus arrhythmia,   sinus bradycardia and occasional sinus tachycardia  2. Rare PVC/PAC  3. Rare and brief atrial runs at 100 bpm  4. First degree AV block and RBBB at baseline  5. Average heart rate 57, ranging 38 (sleep) to 117  6. No pauses or advanced AV block  7. One symptom of back pain recorded with NSR  8. No obvious indication for pacemaker.    Sep 2019- 24 hr monitor - SB,SR, ST, occasional PAC, first degree AV   block, no correlation        HLD (hyperlipidemia)     Esophageal reflux       Past Surgical History:   Procedure Laterality Date    APPENDECTOMY      CATARACT REMOVAL Bilateral 2012    GYN      fibroid tumors removed, 1.5 ovaries removed    ORTHOPEDIC SURGERY  06/2012    left elbow surgery    ORTHOPEDIC SURGERY  2000's    R knee scope    MI CARDIAC SURG PROCEDURE UNLIST      cath    PRE-MALIGNANT / BENIGN SKIN LESION EXCISION      basal cell removed from face     Family History Problem Relation Age of Onset    Heart Disease Mother     Stroke Brother         mild x2; major x1  (smoker)    Hypertension Mother     Other Mother         pacemaker,  at 80    Lung Disease Father     Heart Disease Father     Other Father         pacemaker -  at 80     Social History     Tobacco Use    Smoking status: Never    Smokeless tobacco: Never   Substance Use Topics    Alcohol use: No         ROS:    No obvious pertinent positives on review of systems except for what was outlined in the HPI today.       PHYSICAL EXAM:     /84   Pulse 72   Ht 5' 7\" (1.702 m)   Wt 196 lb (88.9 kg)   BMI 30.70 kg/m²    General/Constitutional:   Alert and oriented x 3, no acute distress  HEENT:   normocephalic, atraumatic, moist mucous membranes  Neck:   No JVD or carotid bruits bilaterally  Cardiovascular:   regular rate and rhythm, no murmur/rub/gallop appreciated  Pulmonary:   clear to auscultation bilaterally, no respiratory distress  Abdomen:   soft, non-tender, non-distended  Ext:   No sig LE edema bilaterally  Skin:  warm and dry, no obvious rashes seen  Neuro:   no obvious sensory or motor deficits  Psychiatric:   normal mood and affect      Lab Results   Component Value Date/Time     2022 02:40 PM    K 4.2 2022 02:40 PM     2022 02:40 PM    CO2 28 2022 02:40 PM    BUN 23 2022 02:40 PM    CREATININE 0.80 2022 02:40 PM    GLUCOSE 98 2022 02:40 PM    CALCIUM 9.3 2022 02:40 PM        Lab Results   Component Value Date    WBC 5.3 2022    HGB 14.6 2022    HCT 45.1 2022    MCV 94.5 2022     2022       Lab Results   Component Value Date    TSH 2.710 2022       No results found for: LABA1C  No results found for: EAG    Lab Results   Component Value Date    CHOL 256 (H) 2022    CHOL 233 (H) 2021    CHOL 255 (H) 2020     Lab Results   Component Value Date    TRIG 115 2022    TRIG 118 02/01/2021    TRIG 157 (H) 01/28/2020     Lab Results   Component Value Date    HDL 73 02/07/2022    HDL 72 02/01/2021    HDL 65 01/28/2020     Lab Results   Component Value Date    LDLCALC 163 (H) 02/07/2022    LDLCALC 140 (H) 02/01/2021    LDLCALC 159 (H) 01/28/2020     Lab Results   Component Value Date    LABVLDL 31 01/28/2020    VLDL 20 02/07/2022    VLDL 21 02/01/2021     No results found for: CHOLHDLRATIO        I have Independently reviewed prior care notes, any ER records available, cardiac testing, labs and results with the patient and before seeing the patient today. Also independently reviewed outside records when available. ASSESSMENT:    Joe Lazo was seen today for coronary artery disease. Diagnoses and all orders for this visit:    Paroxysmal atrial fibrillation (HCC)    Premature atrial contraction    Sinus node dysfunction (HCC)    Symptomatic bradycardia    Ventricular premature beats    Coronary artery disease involving native coronary artery of native heart without angina pectoris        PLAN:      1. PAF:  Rate control and NOAC, seems asx in Afib. Remain on meds. Follow. s/p PPM.    Refer for [facility] consult. 2. CAD:  EF normal.   on BB. [facility] for more angina. The patient has been instructed to call with any angina or equivalent as reviewed today. All questions were answered with the patient voicing complete understanding. 3. HPL:  , cannot take statins. Patient has been instructed and agrees to call our office with any issues or other concerns related to their cardiac condition(s) and/or complaint(s). Return in about 6 months (around 4/14/2023).        WILBER BORRERO DO  10/14/2022

## 2022-10-17 NOTE — Clinical Note
Left chest clipped prepped with ChloraPrep and draped. Humira Counseling:  I discussed with the patient the risks of adalimumab including but not limited to myelosuppression, immunosuppression, autoimmune hepatitis, demyelinating diseases, lymphoma, and serious infections.  The patient understands that monitoring is required including a PPD at baseline and must alert us or the primary physician if symptoms of infection or other concerning signs are noted.

## 2022-10-21 ENCOUNTER — TELEPHONE (OUTPATIENT)
Dept: CARDIOLOGY CLINIC | Age: 87
End: 2022-10-21

## 2022-10-21 NOTE — TELEPHONE ENCOUNTER
Patient would like to know if we have any eliquis (5mg) samples. Patient would like to pick them up in [facility] but can  in [place]. Please call and let her know if we have some available.

## 2022-10-21 NOTE — TELEPHONE ENCOUNTER
Notified pt that samples of Eliquis 5 mg are available for pu at [facility]. Pt voiced understanding.   Drug Samples Documentation:  Drug:Eliquis  Strength:5mg   Quantity:56  Expiration Date:1/2025  Magi Zavala

## 2022-10-26 ENCOUNTER — HOSPITAL ENCOUNTER (OUTPATIENT)
Dept: PHYSICAL THERAPY | Age: 87
Setting detail: RECURRING SERIES
Discharge: HOME | End: 2022-10-29
Payer: MEDICARE

## 2022-10-26 PROCEDURE — 97110 THERAPEUTIC EXERCISES: CPT

## 2022-10-26 PROCEDURE — 97161 PT EVAL LOW COMPLEX 20 MIN: CPT

## 2022-10-26 NOTE — THERAPY EVALUATION
Jaycee Stamp  : 1935  Primary: Hector Pina Ppo  Secondary:  [address] [facility]. [address]  Phone: [phone]  Fax: [phone] Plan Frequency: 1-2x/wk for 8 wks, pt has financial barrier with high co-pay  Plan of Care/Certification Expiration Date: 22    PT Visit Info:         Visit Count:  1                OUTPATIENT PHYSICAL THERAPY:             OP NOTE TYPE: Initial Assessment 10/26/2022               Episode  Appt Desk         Treatment Diagnosis:  Pain in Left Shoulder (M25.512)  Stiffness of Left Shoulder, Not elsewhere classified (M25.612)  Generalized Muscle Weakness (M62.81)  Difficulty in walking, Not elsewhere classified (R26.2)  Medical/Referring Diagnosis:  Acute pain of left shoulder [M25.512]  Balance problem [R26.89]  Gait abnormality [R26.9]  Referring Physician:  Tommie West MD MD Orders:  PT Eval and Treat   Return MD Appt:  TBD by patient  Date of Onset:  Onset Date: 10/03/22   Allergies:  Sulfa antibiotics  Restrictions/Precautions:    Restrictions/Precautions: None      Medications Last Reviewed:  10/26/2022     SUBJECTIVE   History of Injury/Illness (Reason for Referral):  Pt reports she has left shoulder pain that began when she was lifting an object to top shelf. Pt reports that overall the shoulder is getting much better other than later in the day when her shoulder feels tired, fatigued. Pt reports that she has been using body balm from [employer] and feels that is helping her left shoulder and left hand discomfort that started approx 2 days ago. Pt feels that she is hesitant to use her left arm due to not understanding her restrictions after a strain per patient. Pt reports no specific issue with the shoulder at all.  Pt reports her main concern at this point is her balance - difficulty climbing stairs with carrying objects while negotiating stairs, pt reports that when she walks she feels weak after approx 15-20 min. Pt reports no falls and no near falls. Pt is considering a watchman implant for a-fib in order to get off blood thinners. Patient Stated Goal(s):  \"to walk better and feel more balanced. Know how to exercise safely\"  Initial:    0  /10 Post Session:   0   /10  Past Medical History/Comorbidities:   Ms. Karine Mccarthy  has a past medical history of Arrhythmia, Esophageal reflux, Esophageal reflux, H/O complete eye exam, HLD (hyperlipidemia), and Hypertension. Ms. Karine Mccarthy  has a past surgical history that includes Cataract removal (Bilateral, 2012); orthopedic surgery (06/2012); orthopedic surgery (2000's); gyn; Appendectomy; pre-malignant / benign skin lesion excision; and pr cardiac surg procedure unlist.  Social History/Living Environment:   Lives With: Alone  Home Layout: Two level     Prior Level of Function/Work/Activity:   Prior level of function: Independent  Current level of function: INdependnet, decline in stamina, feelings of imbalance, strength  Occupation: Retired  Type of Occupation: dental hygienist           Learning:   Does the patient/guardian have any barriers to learning?: No barriers  Will there be a co-learner?: No  What is the preferred language of the patient/guardian?: English  Is an  required?: No  How does the patient/guardian prefer to learn new concepts?: Listening; Reading; Demonstration; Pictures/Videos     Fall Risk Scale:    Mayer Total Score: 35  Mayer Fall Risk: Medium (25-44)     Dominant Side:  right handed      OBJECTIVE     Range of Motion    [] This section not tested    AROM Degrees   Shoulder Flexion WNL and symmetrical   Shoulder ER T1   Shoulder IR T10   Shoulder abduction  WNL symmetrical, no pain        Strength   [] This section not tested    Motion RIGHT LEFT   Shoulder Flexion 4-/5 4-/5    Shoulder ER 4-/5 4-/5    Shoulder IR 4/5 4/5    Shoulder Extension 4-/5 4-/5    Shoulder Abduction 4-/5 4-/5    Hip flex 4- 4-   Hip ext 3+ 3+   Knee flex 4- 4-   Hip abduction 3+ 3+   Knee ext 4 4             Neurological Screen   [x] This section not tested    Test/Result     Denies n/t or issues with sensation in LEs                   ASSESSMENT   Initial Assessment:  Jaycee Mota presents to physical therapy with generalized weakness in the UE and LE, decreased walking speed indicating balance deficits, and high level of fatigue and elevated HR with 6 min walk test. Pt with generalized debility that has impacted her confidence in her ability to navigate community settings, walk in neighborhood, and complete more heavy activities. Pt has unrestricted shoulder mobility and no significant pain with daily routine, however pt does have generalized weakness in the her shoulders. Jaycee Mota will benefit from skilled physical therapy (medically necessary) to address above deficits affecting participation in basic ADLs and functional mobility/tolerance. Patient will benefit from manual therapeutic techniques (stretching, joint mobilizations, soft tissue mobilization/myofascial release), therapeutic exercises and activities, postural strengthening/education, progressive resistance training, patient education, and comprehensive home exercises program to address current impairments and functional limitations. Problem List: (Impacting functional limitations): Body Structures, Functions, Activity Limitations Requiring Skilled Therapeutic Intervention: Decreased functional mobility ; Decreased body mechanics; Decreased ROM; Decreased ADL status; Decreased strength; Decreased endurance; Decreased balance; Decreased high-level IADLs;  Increased pain     Therapy Prognosis:   Therapy Prognosis: Good     Assessment Complexity:   Low Complexity  PLAN   Effective Dates: 10/26/22 TO Plan of Care/Certification Expiration Date: 12/21/22   Frequency/Duration: Plan Frequency: 1-2x/wk for 8 wks, pt has financial barrier with high co-pay   Interventions Planned (Treatment may consist of any combination of the following):    Current Treatment Recommendations: Strengthening; ROM; Balance training; Functional mobility training; ADL/Self-care training; IADL training; Endurance training; Gait training; Stair training; Neuromuscular re-education; Home exercise program; Safety education & training; Therapeutic activities     GOALS: (Goals have been discussed and agreed upon with patient.)     Goals: 8 weeks  Goal Met   1. Tristen Lewis will be able to perform 8 sit to stand transfers independently from a 18  inch high surface in 30 seconds to rise from chair/toilet at home and in the community. 1.  [] Date:   2. Tristen Lewis will increase steady max gait speed to 4.32 ft/sec to improve mobility in household and community and decrease fall risk. 2.  [] Date:   3. Tristen Lewis will be able to push and pull >=35 lbs to increase safety and functional capacity required for household chores. 3.  [] Date:   4. Tristen Lewis will be able to carry 8 lbs in bilateral UEs over 150 feet in order to carry groceries and laundry between rooms without LOB. 4.  [] Date:   5. Tristen Lewis will be independent in HEP for balance, ROM, stretching, and strengthening in order to maintain functional gains and decrease fall risk. 5.  [] Date:   6. Tristen Lewis will be able to complete a floor transfer with B UE support for fall recovery x stand by assist level 6. [] Date:   7. Tristen Lewis will be able to complete a 6 minute walk test over level and mild unlevel terrain at >=1500 ft to be comparable to age related norms. 7.  [] Date:   8. Tristen Lewis will be able to reach downward toward floor to retrieve >= 30 lb object without LOB in order to safely complete heavy household chores. 8.  [] Date:   9. Tristen Lewis will be able to climb a flight of stairs with 10 lb in order to improve access into loft area of home.  9.  [] Date:   [address] Cinthya Cook will be able to lift 10 lb overhead without LOB in order to complete roles and responsibilities in household. 10.  [] Date:              Outcome Measure: Tool Used:  Strength  Score:  Initial:   Left: 43 lbs  Right: 52 lbs Most Recent:  (Date: -- )  Left: X lbs  Right: X lbs     Tool Used: 6 min walk test without AD  Score:  Initial: 1234 ft  Most Recent: X reps (Date: -- )       Tool Used: 30 sec sit to Stand Test  Score:  Initial: 3 reps Most Recent: X reps (Date: -- )   Interpretation of Score: patient completes assessment from a standard height chair without upper extremity support. Tool Used: Gait Speed   Score:  Initial Self Selected Walking Speed:  2.73 ft/sec Most Recent: X ft/sec (Date: -- )    Initial Max Walking Speed: 4.1 ft/sec Most Recent: X ft/sec (Date: -- )   Interpretation of Score: Walking speed is used for assessing and monitoring functional status and over all health on an individual. The individual walks for 5 meters/16.4 ft with the therapist timing. The individual has an acceleration phase of 3 meters, or 9.8ft, prior to timing is initiated. A community ambulator walks at 2.62 ft/sec to 4.32 ft/sec. The [facility] for a community dwelling older adult is a change of 0.45 ft/sec at a self selected pace. A MDC is not yet established for a community dwelling older adult for max walking speed. Medical Necessity:   Tavon Mejia is expected to demonstrate progress in strength, range of motion, balance, coordination, and functional technique to increase independence with participation in basic ADLs and functional mobility/tolerance and improve safety during roles and responsibilities in home and community. Reason For Services/Other Comments:  Tavon Mejia has tolerated an increase in activity as demonstrated by: increased resistance/repetitions during therapeutic exercise, more challenging dynamic balance maneuvers, and more complicated coordination activities.     Patient's improvemnet in strength, range of motion, balance, coordination, and functional technique has impacted their ability to participate in ADLs, IADLs, and functional activities by increasing safety and decreasing assistance required. Carlos Daniel continues to require skilled intervention due to patient continues to present with impairments assessed at initial evaluation and requiring skilled physical therapy to meet goals for PT. Total Duration: 45 min plus treatment  Time In: 1545  Time Out: 1645    Regarding Alma Pererar's therapy, I certify that the treatment plan above will be carried out by a therapist or under their direction.   Thank you for this referral,  Ho Sky, PT     Referring Physician Signature: Tish Tafoya., *                    Post Session Pain  Charge Capture  PT Visit Info MD Guidelines  [place]

## 2022-10-26 NOTE — PROGRESS NOTES
Landy Scheuermann  : 1935  Primary: Jabari Ribera Ppo  Secondary:  [address] @ [address] [address] [address]  Phone: [phone]  Fax: [phone] Plan Frequency: 1-2x/wk for 8 wks, pt has financial barrier with high co-pay  Plan of Care/Certification Expiration Date: 22    PT Visit Info:    Visit Count:  1   OUTPATIENT PHYSICAL THERAPY:OP NOTE TYPE: Treatment Note 10/26/2022       Episode  }Appt Desk             Treatment Diagnosis:  Pain in Left Shoulder (M25.512)  Stiffness of Left Shoulder, Not elsewhere classified (M25.612)  Generalized Muscle Weakness (M62.81)  Difficulty in walking, Not elsewhere classified (R26.2)  Medical/Referring Diagnosis:  Acute pain of left shoulder [M25.512]  Balance problem [R26.89]  Gait abnormality [R26.9]  Referring Physician:  Mariana Jackson MD MD Orders:  PT Eval and Treat   Date of Onset:  Onset Date: 10/03/22     Allergies:   Sulfa antibiotics  Restrictions/Precautions:  Restrictions/Precautions: None  No data recorded   Interventions Planned (Treatment may consist of any combination of the following):    Current Treatment Recommendations: Strengthening; ROM; Balance training; Functional mobility training; ADL/Self-care training; IADL training; Endurance training; Gait training; Stair training; Neuromuscular re-education; Home exercise program; Safety education & training; Therapeutic activities     Subjective Comments:  please see inital eval  Initial:}   0  /10Post Session:    0    /10  Medications Last Reviewed:  10/26/2022  Updated Objective Findings:  See evaluation note from today  Target HR 81-95 bpm  Treatment     THERAPEUTIC EXERCISE: ( 15 minutes):    Exercises per grid below to improve mobility, strength, balance, and coordination. Required minimal visual, verbal, manual, and tactile cues to promote proper body mechanics. Progressed resistance and repetitions as indicated. Date:  10/26/2022 Date:   Date:     Activity/Exercise Parameters Parameters Parameters   Education Diagnosis, prognosis, POC, HEP, anatomy/physiology of condition     Sit to stand 5x     6 min walk 1234 ft without AD                                   THERAPEUTIC ACTIVITY: ( 0 minutes): Therapeutic activities per grid below to improve mobility, strength, coordination, and dynamic movement of upper body, lower body, and trunk to improve functional lifting, carrying, reaching, catching, and overhead activites. Required minimal visual, verbal, manual, and tactile cues to promote coordination of bilateral, upper extremity(s), lower extremity(s) and promote motor control of bilateral, upper extremity(s), lower extremity(s). Date:   Date:   Date:     Activity/Exercise Parameters Parameters Parameters                                                   HEP Log Date    walking baseline, sit to stand 10/26/2022   2.     3.    4.     5.            Treatment/Session Summary:      Treatment Assessment:    please see inital eval   Communication/Consultation:       Eval sent to MD, discuss and review POC and goals. Equipment provided today: HEP see log above.     Recommendations/Intent for next  treatment session:  Next visit will focus on improving mobility, strength, pain science, cardiorespiratory health, balance         Total Treatment Billable Duration:  15 minutes   Time In: 1545  Time Out: 1645    Ines Juarez, PT       Charge Capture  }Post Session Pain  PT Visit Info  [facility] Portal  MD Guidelines  Scanned Media  Benefits  MyChart    Future Appointments   Date Time Provider Devyn Bermeo   11/2/2022  2:45 PM Sherlyn Coffey, PT [facility] AND [place] [facility]   11/4/2022  2:15 PM MD NOHEMI Maier [facility] AMB   11/11/2022 11:30 AM MD ERIC Bernal [facility] AMB   2/20/2023  9:30 AM MD ERIC Bernal [facility] AMB   4/14/2023  2:00 PM DO NOHEMI Ann [facility] AMB

## 2022-11-02 ENCOUNTER — HOSPITAL ENCOUNTER (OUTPATIENT)
Dept: PHYSICAL THERAPY | Age: 87
Setting detail: RECURRING SERIES
Discharge: HOME | End: 2022-11-05
Payer: MEDICARE

## 2022-11-02 PROCEDURE — 97530 THERAPEUTIC ACTIVITIES: CPT

## 2022-11-02 PROCEDURE — 97110 THERAPEUTIC EXERCISES: CPT

## 2022-11-02 NOTE — PROGRESS NOTES
Lex Shravan  : 1935  Primary: Danni Evans Ppo  Secondary:  [address] @ [address] Timi Carballo [place] [address]  Phone: [phone]  Fax: [phone] Plan Frequency: 1-2x/wk for 8 wks, pt has financial barrier with high co-pay  Plan of Care/Certification Expiration Date: 22      PT Visit Info:    Visit Count:  2   OUTPATIENT PHYSICAL THERAPY:OP NOTE TYPE: Treatment Note 2022       Episode  }Appt Desk             Treatment Diagnosis:  Pain in Left Shoulder (M25.512)  Stiffness of Left Shoulder, Not elsewhere classified (M25.612)  Generalized Muscle Weakness (M62.81)  Difficulty in walking, Not elsewhere classified (R26.2)  Medical/Referring Diagnosis:  Acute pain of left shoulder [M25.512]  Balance problem [R26.89]  Gait abnormality [R26.9]  Referring Physician:  Carmita Solomon.MD POLLOCK Orders:  PT Eval and Treat   Date of Onset:  Onset Date: 10/03/22     Allergies:   Sulfa antibiotics  Restrictions/Precautions:  Restrictions/Precautions: None  No data recorded   Interventions Planned (Treatment may consist of any combination of the following):    Current Treatment Recommendations: Strengthening; ROM; Balance training; Functional mobility training; ADL/Self-care training; IADL training; Endurance training; Gait training; Stair training; Neuromuscular re-education; Home exercise program; Safety education & training; Therapeutic activities     Subjective Comments:  pt with no specific reported issues. pt does have a 7 lb and 12 lb weights at home  Initial:}   0  /10Post Session:    0    /10  Medications Last Reviewed:  2022  Updated Objective Findings:  none today  Target HR 81-95 bpm  Treatment     THERAPEUTIC EXERCISE: ( 10 minutes):    Exercises per grid below to improve mobility, strength, balance, and coordination. Required minimal visual, verbal, manual, and tactile cues to promote proper body mechanics.   Progressed resistance and repetitions as indicated. Date:  10/26/2022 Date:  11/2/22 Date:     Activity/Exercise Parameters Parameters Parameters   Education Diagnosis, prognosis, POC, HEP, anatomy/physiology of condition Education on 1 rep max living and how to improve    Sit to stand 5x     6 min walk 1234 ft without AD   1040 ft without AD    Floor to ceiling  12x                          THERAPEUTIC ACTIVITY: ( 28 minutes): Therapeutic activities per grid below to improve mobility, strength, coordination, and dynamic movement of upper body, lower body, and trunk to improve functional lifting, carrying, reaching, catching, and overhead activites. Required minimal visual, verbal, manual, and tactile cues to promote coordination of bilateral, upper extremity(s), lower extremity(s) and promote motor control of bilateral, upper extremity(s), lower extremity(s). Date:  11/2/22 Date:   Date:     Activity/Exercise Parameters Parameters Parameters   Deadlift 15 lb  2 x 5 reps  AMRAP: 15 reps       Sit to stand 21 in, 5 lb OH Press  3 x 5 reps     Steering wheels 5 lb  12x                                   HEP Log Date    walking baseline, sit to stand 10/26/2022   2.     3.    4.     5.            Treatment/Session Summary:      Treatment Assessment:    pt with increased debility level. pt 1 rep max for deadlift 22 lb and to continue with activiteis to build LE strength and shoulder strength. Begin graded floor transfers for fall recovery next visit. Communication/Consultation:       Eval sent to MD, discuss and review POC and goals. Equipment provided today: HEP see log above.     Recommendations/Intent for next  treatment session:  Next visit will focus on improving mobility, strength, pain science, cardiorespiratory health, balance         Total Treatment Billable Duration:  38 minutes 7 min untimed due to rest  Time In: 1445  Time Out: 1530    [place], PT       Charge Capture  }Post Session Pain  PT Visit [facility] Portal  MD Guidelines  Scanned Media  Benefits  MyChart    Future Appointments   Date Time Provider Devyn Bermeo   11/4/2022  2:15 PM Rita Zhang [place] [facility]   11/9/2022  1:00 PM Ho Sky PT [facility] AND [facility]   11/11/2022 11:30 AM Wellington Kerns MD [facility] AMB   2/20/2023  9:30 AM Wellington Kerns MD [facility] AMB   4/14/2023  2:00 PM [facility]

## 2022-11-04 ENCOUNTER — OFFICE VISIT (OUTPATIENT)
Dept: CARDIOLOGY CLINIC | Age: 87
End: 2022-11-04
Payer: MEDICARE

## 2022-11-04 VITALS
WEIGHT: 207 LBS | DIASTOLIC BLOOD PRESSURE: 84 MMHG | BODY MASS INDEX: 32.49 KG/M2 | SYSTOLIC BLOOD PRESSURE: 136 MMHG | HEIGHT: 67 IN | HEART RATE: 78 BPM

## 2022-11-04 DIAGNOSIS — I48.0 PAROXYSMAL ATRIAL FIBRILLATION (HCC): Primary | ICD-10-CM

## 2022-11-04 PROCEDURE — 99214 OFFICE O/P EST MOD 30 MIN: CPT | Performed by: INTERNAL MEDICINE

## 2022-11-04 PROCEDURE — 93000 ELECTROCARDIOGRAM COMPLETE: CPT | Performed by: INTERNAL MEDICINE

## 2022-11-04 PROCEDURE — 1123F ACP DISCUSS/DSCN MKR DOCD: CPT | Performed by: INTERNAL MEDICINE

## 2022-11-04 NOTE — PROGRESS NOTES
[address] [place], [place]  [address], [address] [facility], [address] [place] [address]  PHONE: [phone]  1935    SUBJECTIVE:   Diogo Francois is a 80 y.o. female seen for a follow up visit regarding the following:     Chief Complaint   Patient presents with    Irregular Heart Beat         HPI:    Diogo Francois is a very pleasant 80 y.o. female with a past medical and cardiac history significant for permanent AF, CAD, and presents for evaluation of symptomatic bradycardia. She  has been having ongoing fatigue, low energy, some dizziness/lightheaded spells, no syncope, no chest pain. She is now s/p PPM and having problems with RVR, not tolerating higher doses of diltiazem. She presents back today to discuss Watchman. Has had some balance and weakness problems, concerned about falls, lives alone. Cardiac PMH: (Old records have been reviewed and summarized below)   PAF and CAD. New SSS on monitor and seen by EP at [facility]. Needing PPM,wants this done at [facility]. TTE (4/26/22): EF 60%   HR low, 16 pauses > 3 sec on monitor      Reviewed office note Dr. Roderick Hawk 4/13/22    Past Medical History, Past Surgical History, Family history, Social History, and Medications were all reviewed with the patient today and updated as necessary. Current Outpatient Medications   Medication Sig Dispense Refill    Bioflavonoid Products (RICK C PO) Take by mouth      Multiple Vitamins-Minerals (ONE-A-DAY WOMENS PO) Take 1 tablet by mouth daily      AZO-CRANBERRY PO Take 2 tablets by mouth daily      metoprolol succinate (TOPROL XL) 25 MG extended release tablet Take 1 tablet by mouth in the morning and 1 tablet in the evening. 60 tablet 5    apixaban (ELIQUIS) 5 MG TABS tablet Take 5 mg by mouth 2 times daily      Cholecalciferol 50 MCG (2000 UT) TABS Take by mouth      magnesium oxide (MAG-OX) 400 (240 Mg) MG tablet Take 400 mg by mouth 2 times daily       No current facility-administered medications for this visit.      Allergies Allergen Reactions    Guaifenesin      Other reaction(s): Unknown-Unspecified    Loratadine-Pseudoephedrine Er Other (See Comments)     Other reaction(s):  Other- (not listed) - Allergy  Difficulty sleeping and jittery  Difficulty sleeping and jittery      Sulfa Antibiotics Rash     [unfilled]  Past Medical History:   Diagnosis Date    Arrhythmia     Esophageal reflux     Esophageal reflux     H/O complete eye exam Yearly    Dr. Micheal Singh (hyperlipidemia)     Hypertension     controlled with medication     Past Surgical History:   Procedure Laterality Date    APPENDECTOMY      CATARACT REMOVAL Bilateral     GYN      fibroid tumors removed, 1.5 ovaries removed    ORTHOPEDIC SURGERY  2012    left elbow surgery    ORTHOPEDIC SURGERY      R knee scope    IL CARDIAC SURG PROCEDURE UNLIST      cath    PRE-MALIGNANT / BENIGN SKIN LESION EXCISION      basal cell removed from face     Family History   Problem Relation Age of Onset    Heart Disease Mother     Stroke Brother         mild x2; major x1  (smoker)    Hypertension Mother     Other Mother         pacemaker,  at 80    Lung Disease Father     Heart Disease Father     Other Father         pacemaker -  at 80     Social History     Tobacco Use    Smoking status: Never    Smokeless tobacco: Never   Substance Use Topics    Alcohol use: No       PHYSICAL EXAM:    /84   Pulse 78   Ht 5' 7\" (1.702 m)   Wt 207 lb (93.9 kg)   BMI 32.42 kg/m²      Wt Readings from Last 5 Encounters:   22 207 lb (93.9 kg)   10/14/22 196 lb (88.9 kg)   10/13/22 189 lb (85.7 kg)   10/03/22 190 lb (86.2 kg)   22 200 lb (90.7 kg)       BP Readings from Last 5 Encounters:   22 136/84   10/14/22 132/84   10/13/22 138/84   10/03/22 130/81   22 136/84       General appearance: Alert, well appearing, and in no distress   CV: irreg irreg, no M/R/G, no JVD, normal distal pulses, no lower extremity edema  Pulm: Clear to auscultation, no wheezes, no crackles, no accessory muscle use  GI: Soft, nontender, nondistended  Neuro: Alert and oriented    Medical problems and test results were reviewed with the patient today. Lab Results   Component Value Date/Time    K 4.2 05/13/2022 02:40 PM     Creatinine   Date Value Ref Range Status   05/13/2022 0.80 0.6 - 1.0 MG/DL Final     Lab Results   Component Value Date/Time    HGB 14.6 05/13/2022 02:40 PM     No results found for: INR, PTMR, PT1    Lab Results   Component Value Date    WBC 5.3 05/13/2022    HGB 14.6 05/13/2022    HCT 45.1 05/13/2022    MCV 94.5 05/13/2022     05/13/2022      Lab Results   Component Value Date/Time     05/13/2022 02:40 PM    K 4.2 05/13/2022 02:40 PM     05/13/2022 02:40 PM    CO2 28 05/13/2022 02:40 PM    BUN 23 05/13/2022 02:40 PM    CREATININE 0.80 05/13/2022 02:40 PM    GLUCOSE 98 05/13/2022 02:40 PM    CALCIUM 9.3 05/13/2022 02:40 PM         EKG: (EKG has been independently visualized by me with interpretation below)  Atrial fibrillation, rate 78  -Right bundle branch block with left axis -bifascicular block.    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 Vidal Ruggiero was seen today for irregular heart beat. Diagnoses and all orders for this visit:    Paroxysmal atrial fibrillation ([address]  -     EKG 12 lead        ASSESSMENT and PLAN  1. Permanent afib, failing medical therapy: discussed the options of AV node ablation and escalation of medical therapy. She has not tolerated higher doses of diltiazem. Discussed risks, benefits and alternatives to AV node ablation, she is doing better on metoprolol 25mg daily and wants to cont meds at this time. 2. CVA protection: Patient is an appropriate candidate for consideration of left atrial appendage occlusion. The patient's [id number] score is  5 which indicated a 6.7% annual risk of stroke. (Age, HTN, CAD, F). Has-BLED score is 2 which indicates a 1.88% annual risk of a major bleeding event. (Age, HTN).  I have discussed with the rationale for  left atrial appendage occlusion. We discussed the available data from randomized trials which demonstate left atrial appendage occlusion is as effective as long term anticoagulation at prevention of CVA or systemic embolism. We also discussed that left atrial appendage closure reduces risk of CVA or sytemic embolization by 67-83% compared to no anticoagulation. The risk of left atrial appendage were also discussed. Risk of major complication is approximately 1.5% based on available data. Risk include but not limited:   - Pericardial tamponade (1.28%) which can be treated percutaneously in 63% of cases but can require sugical therapy in  31% of cases (3 out of 1000 patients). - Procedural related CVA in 2 out of 1000 patients. - Device embolization in less than 3 out of 1000 patients   - Death related to procedure in approximately 6 out of 10,000 patients. Based on our discussion, it is felt benefits of left atrial appendage significantly outweigh risk. All questions answered to the best of my ability. Patient would like to think about procedure. Patient has been instructed and agrees to call our office with any issues or other concerns related to their cardiac condition(s) and/or complaint(s). No follow-ups on file. Justina Beasley MD, MS  Cardiology/Electrophysiology  11/4/2022  2:18 PM

## 2022-11-07 ENCOUNTER — TELEPHONE (OUTPATIENT)
Dept: CASE MANAGEMENT | Age: 87
End: 2022-11-07

## 2022-11-07 NOTE — TELEPHONE ENCOUNTER
Mrs. Morales Friends was contacted to discuss [address]. The watchman process and procedure was explained in depth. She would like to discuss the process with her family. WM coordinator contact information was provided.

## 2022-11-09 ENCOUNTER — HOSPITAL ENCOUNTER (OUTPATIENT)
Dept: PHYSICAL THERAPY | Age: 87
Setting detail: RECURRING SERIES
Discharge: HOME | End: 2022-11-12
Payer: MEDICARE

## 2022-11-09 PROCEDURE — 97530 THERAPEUTIC ACTIVITIES: CPT

## 2022-11-09 PROCEDURE — 97110 THERAPEUTIC EXERCISES: CPT

## 2022-11-09 NOTE — PROGRESS NOTES
Tristen Lewis  : 1935  Primary: Balaji Leahy Ppo  Secondary:  [address] @ [address] [address] [place] [address] [address]  Phone: [phone]  Fax: [phone] Plan Frequency: 1-2x/wk for 8 wks, pt has financial barrier with high co-pay  Plan of Care/Certification Expiration Date: 22      PT Visit Info:    Visit Count:  3   OUTPATIENT PHYSICAL THERAPY:OP NOTE TYPE: Treatment Note 2022       Episode  }Appt Desk             Treatment Diagnosis:  Pain in Left Shoulder (M25.512)  Stiffness of Left Shoulder, Not elsewhere classified (M25.612)  Generalized Muscle Weakness (M62.81)  Difficulty in walking, Not elsewhere classified (R26.2)  Medical/Referring Diagnosis:  Acute pain of left shoulder [M25.512]  Balance problem [R26.89]  Gait abnormality [R26.9]  Referring Physician:  Chu Khan MD MD Orders:  PT Eval and Treat   Date of Onset:  Onset Date: 10/03/22     Allergies:   Guaifenesin, Loratadine-pseudoephedrine er, and Sulfa antibiotics  Restrictions/Precautions:  Restrictions/Precautions: None  No data recorded   Interventions Planned (Treatment may consist of any combination of the following):    Current Treatment Recommendations: Strengthening; ROM; Balance training; Functional mobility training; ADL/Self-care training; IADL training; Endurance training; Gait training; Stair training; Neuromuscular re-education; Home exercise program; Safety education & training; Therapeutic activities     Subjective Comments:  pt reports that she was working on exercises at home and has a bit of soreness in UE. Initial:}   0  10Post Session:    0    10  Medications Last Reviewed:  2022  Updated Objective Findings:  none today  Target HR 81-95 bpm  Treatment     THERAPEUTIC EXERCISE: ( 10 minutes):    Exercises per grid below to improve mobility, strength, balance, and coordination.  Required minimal visual, verbal, manual, and tactile cues to promote proper body mechanics. Progressed resistance and repetitions as indicated. Date:  10/26/2022 Date:  11/2/22 Date:  11/9/22   Activity/Exercise Parameters Parameters Parameters   Education Diagnosis, prognosis, POC, HEP, anatomy/physiology of condition Education on 1 rep max living and how to improve Education on HEP progressions, weights and strategies to use household objects for resistance training. Sit to stand 5x     6 min walk 1234 ft without AD   1040 ft without AD    Floor to ceiling  12x    ScitFit    6 min, Hill function                   THERAPEUTIC ACTIVITY: ( 28 minutes): Therapeutic activities per grid below to improve mobility, strength, coordination, and dynamic movement of upper body, lower body, and trunk to improve functional lifting, carrying, reaching, catching, and overhead activites. Required minimal visual, verbal, manual, and tactile cues to promote coordination of bilateral, upper extremity(s), lower extremity(s) and promote motor control of bilateral, upper extremity(s), lower extremity(s). Date:  11/2/22 Date:  11/9/22 Date:     Activity/Exercise Parameters Parameters Parameters   Deadlift 15 lb  2 x 5 reps  AMRAP: 15 reps       Sit to stand 21 in, 5 lb OH Press  3 x 5 reps 20 in  3 x 5 reps    Steering wheels 5 lb  12x     Circuit  Sit to stand 20 in x 5  Riff[employer]x 7 lb x 150 ft  KB DL 18lb x 5 reps  3 rounds     KB Drag  15 lb - 30 lb  All directions, single and double hand  5 min    sled  50 lb  Push/pull  30ft                HEP Log Date    walking baseline, sit to stand 10/26/2022   2. KB drag, deadlift, farmer carry 11/9/22   3.    4.     5.            Treatment/Session Summary:      Treatment Assessment:    pt lower height of sit to stand by 1 inch today. pt and therapist discuss in detail progression of exercises.  pt continues to have decreased hip strength noted by signficant valgus in B knees that is moderately corrected with cues from therapist.   Communication/Consultation:       Eval sent to MD, discuss and review POC and goals. Equipment provided today: HEP see log above.     Recommendations/Intent for next  treatment session:  Next visit will focus on improving mobility, strength, pain science, cardiorespiratory health, balance         Total Treatment Billable Duration:  38 minutes 7 min untimed due to rest  Time In: 1300  Time Out: 1345    Esther Singh, PT       Charge Capture  }Post Session Pain  PT Visit Info  [facility] Portal  MD Guidelines  Scanned Media  Benefits  MyChart    Future Appointments   Date Time Provider Devyn Bermeo   11/11/2022 11:30 AM MD ERIC Lang AMB   11/16/2022  1:00 PM Esther Singh PT [facility]   2/20/2023  9:30 AM MD ERIC Lang AMB   4/14/2023  2:00 PM DO NOHEMI Flowers AMB

## 2022-11-11 ENCOUNTER — OFFICE VISIT (OUTPATIENT)
Dept: FAMILY MEDICINE CLINIC | Facility: CLINIC | Age: 87
End: 2022-11-11
Payer: MEDICARE

## 2022-11-11 VITALS
BODY MASS INDEX: 31.48 KG/M2 | TEMPERATURE: 97.3 F | WEIGHT: 201 LBS | HEART RATE: 72 BPM | OXYGEN SATURATION: 99 % | SYSTOLIC BLOOD PRESSURE: 136 MMHG | DIASTOLIC BLOOD PRESSURE: 80 MMHG

## 2022-11-11 DIAGNOSIS — M25.512 CHRONIC PAIN OF BOTH SHOULDERS: Primary | ICD-10-CM

## 2022-11-11 DIAGNOSIS — I10 PRIMARY HYPERTENSION: ICD-10-CM

## 2022-11-11 DIAGNOSIS — M25.511 CHRONIC PAIN OF BOTH SHOULDERS: Primary | ICD-10-CM

## 2022-11-11 DIAGNOSIS — G89.29 CHRONIC PAIN OF BOTH SHOULDERS: Primary | ICD-10-CM

## 2022-11-11 PROCEDURE — 1123F ACP DISCUSS/DSCN MKR DOCD: CPT | Performed by: FAMILY MEDICINE

## 2022-11-11 PROCEDURE — 99213 OFFICE O/P EST LOW 20 MIN: CPT | Performed by: FAMILY MEDICINE

## 2022-11-11 ASSESSMENT — PATIENT HEALTH QUESTIONNAIRE - PHQ9
1. LITTLE INTEREST OR PLEASURE IN DOING THINGS: 0
SUM OF ALL RESPONSES TO PHQ QUESTIONS 1-9: 0
DEPRESSION UNABLE TO ASSESS: FUNCTIONAL CAPACITY MOTIVATION LIMITS ACCURACY
SUM OF ALL RESPONSES TO PHQ QUESTIONS 1-9: 0
2. FEELING DOWN, DEPRESSED OR HOPELESS: 0
SUM OF ALL RESPONSES TO PHQ9 QUESTIONS 1 & 2: 0

## 2022-11-11 ASSESSMENT — ENCOUNTER SYMPTOMS
EYES NEGATIVE: 1
RESPIRATORY NEGATIVE: 1
GASTROINTESTINAL NEGATIVE: 1

## 2022-11-11 NOTE — PROGRESS NOTES
HISTORY OF PRESENT ILLNESS    José Miguel Yates is a 80 y.o. female. HPI  Chief Complaint   Patient presents with    1 Month Follow-Up     See above. States that neck and shoulder pain is improving with therapy. States symptoms increased today but this is typical with weather change and typically does not last.  No side effects from BP medication. No headache or vision change. Denies chest pain or SOB. No swelling. Saw cardiologist and is leaning toward proceeding with Watchman procedure. Would love to be able to come off Eliquis etc.    Current Outpatient Medications on File Prior to Visit   Medication Sig Dispense Refill    Bioflavonoid Products (RICK C PO) Take by mouth      Multiple Vitamins-Minerals (ONE-A-DAY WOMENS PO) Take 1 tablet by mouth daily      AZO-CRANBERRY PO Take 2 tablets by mouth daily      metoprolol succinate (TOPROL XL) 25 MG extended release tablet Take 1 tablet by mouth in the morning and 1 tablet in the evening. 60 tablet 5    apixaban (ELIQUIS) 5 MG TABS tablet Take 5 mg by mouth 2 times daily      Cholecalciferol 50 MCG (2000 UT) TABS Take by mouth      magnesium oxide (MAG-OX) 400 (240 Mg) MG tablet Take 400 mg by mouth 2 times daily       No current facility-administered medications on file prior to visit. Past Medical History:   Diagnosis Date    Arrhythmia     Esophageal reflux     Esophageal reflux     H/O complete eye exam Yearly    Dr. Kristin Raymundo (hyperlipidemia)     Hypertension     controlled with medication       Review of Systems   Constitutional: Negative. HENT: Negative. Eyes: Negative. Respiratory: Negative. Cardiovascular: Negative. Gastrointestinal: Negative. Genitourinary: Negative. Musculoskeletal:  Positive for arthralgias (improving). Neurological: Negative. Blood pressure 136/80, pulse 72, temperature 97.3 °F (36.3 °C), temperature source Temporal, weight 201 lb (91.2 kg), SpO2 99 %. Rocio Castle v  Physical Exam  Vitals and nursing note reviewed. Constitutional:       Appearance: Normal appearance. HENT:      Mouth/Throat:      Mouth: Mucous membranes are moist.      Pharynx: Oropharynx is clear. Eyes:      Conjunctiva/sclera: Conjunctivae normal.   Neck:      Vascular: No carotid bruit. Cardiovascular:      Rate and Rhythm: Normal rate and regular rhythm. Heart sounds: Normal heart sounds. Pulmonary:      Breath sounds: Normal breath sounds. Musculoskeletal:         General: No swelling or tenderness. Cervical back: Neck supple. Lymphadenopathy:      Cervical: No cervical adenopathy. Neurological:      Coordination: Coordination normal.      Gait: Gait normal.      Deep Tendon Reflexes: Reflexes normal.   Psychiatric:         Mood and Affect: Mood normal.        ASSESSMENT and Abigail Steen was seen today for 1 month follow-up. Diagnoses and all orders for this visit:    Chronic pain of both shoulders    Primary hypertension   Continue current measures. Follow up if symptoms do not continue to improve. Explained that my opinion is that she is a good candidate for Watchman procedure. Return in about 6 months (around 5/11/2023), or if symptoms worsen or fail to improve.     Juliet Tejada MD

## 2022-11-14 ENCOUNTER — TELEPHONE (OUTPATIENT)
Dept: CASE MANAGEMENT | Age: 87
End: 2022-11-14

## 2022-11-14 NOTE — TELEPHONE ENCOUNTER
Mrs. Deonte Bazan was phoned this afternoon. She would like to proceed with the Watchman screening process after the winter holidays. She will phone back the week of 12/19/22 to schedule a TANESHA with anesthesia. She has WM coordinator contact information for questions or concerns.

## 2022-11-16 ENCOUNTER — HOSPITAL ENCOUNTER (OUTPATIENT)
Dept: PHYSICAL THERAPY | Age: 87
Setting detail: RECURRING SERIES
Discharge: HOME | End: 2022-11-19
Payer: MEDICARE

## 2022-11-16 PROCEDURE — 97530 THERAPEUTIC ACTIVITIES: CPT

## 2022-11-16 PROCEDURE — 97110 THERAPEUTIC EXERCISES: CPT

## 2022-11-16 NOTE — PROGRESS NOTES
Manuel Parker  : 1935  Primary: Fiorella Pandey Ppo  Secondary:  [address] @ [facility] [address] [address]  Phone: [phone]  Fax: [phone] Plan Frequency: 1-2x/wk for 8 wks, pt has financial barrier with high co-pay  Plan of Care/Certification Expiration Date: 22      PT Visit Info:    Visit Count:  4   OUTPATIENT PHYSICAL THERAPY:OP NOTE TYPE: Treatment Note 2022       Episode  }Appt Desk             Treatment Diagnosis:  Pain in Left Shoulder (M25.512)  Stiffness of Left Shoulder, Not elsewhere classified (M25.612)  Generalized Muscle Weakness (M62.81)  Difficulty in walking, Not elsewhere classified (R26.2)  Medical/Referring Diagnosis:  Acute pain of left shoulder [M25.512]  Balance problem [R26.89]  Gait abnormality [R26.9]  Referring Physician:  Edd Gotti.MD POLLOCK Orders:  PT Eval and Treat   Date of Onset:  Onset Date: 10/03/22     Allergies:   Guaifenesin, Loratadine-pseudoephedrine er, and Sulfa antibiotics  Restrictions/Precautions:  Restrictions/Precautions: None  No data recorded   Interventions Planned (Treatment may consist of any combination of the following):    Current Treatment Recommendations: Strengthening; ROM; Balance training; Functional mobility training; ADL/Self-care training; IADL training; Endurance training; Gait training; Stair training; Neuromuscular re-education; Home exercise program; Safety education & training;  Therapeutic activities     Subjective Comments:  pt reports that she has been working on things at home and has been using her stationary bike at home  Initial:}   0  10Post Session:    0    /10  Medications Last Reviewed:  2022  Updated Objective Findings: Blood Pressure - 152/108 mmHg, 74 bpm after 5 min of rest 129/89 mmHg, 76 bpm  Target HR 81-95 bpm  Treatment     THERAPEUTIC EXERCISE: ( 15 minutes):    Exercises per grid below to improve mobility, strength, balance, and coordination. Required minimal visual, verbal, manual, and tactile cues to promote proper body mechanics. Progressed resistance and repetitions as indicated. Date:  10/26/2022 Date:  11/2/22 Date:  11/9/22 Date:  11/16/22   Activity/Exercise Parameters Parameters Parameters    Education Diagnosis, prognosis, POC, HEP, anatomy/physiology of condition Education on 1 rep max living and how to improve Education on HEP progressions, weights and strategies to use household objects for resistance training. Assessment of vitals, education on target HR and BP during exercises. Pt encouraged to monitor throughout exercise and if she becomes symptomatic. Education on balance reactions and importance of training stepping rxns. Sit to stand 5x      6 min walk 1234 ft without AD   1040 ft without AD     Floor to ceiling  12x     ScitFit    6 min, Hill function 6 min, Hill function   Clock yourself    30 SPM   Full clock, colors  3 min each              THERAPEUTIC ACTIVITY: ( 15 minutes): Therapeutic activities per grid below to improve mobility, strength, coordination, and dynamic movement of upper body, lower body, and trunk to improve functional lifting, carrying, reaching, catching, and overhead activites. Required minimal visual, verbal, manual, and tactile cues to promote coordination of bilateral, upper extremity(s), lower extremity(s) and promote motor control of bilateral, upper extremity(s), lower extremity(s).      Date:  11/2/22 Date:  11/9/22 Date:  11/16/22   Activity/Exercise Parameters Parameters Parameters   Deadlift 15 lb  2 x 5 reps  AMRAP: 15 reps       Sit to stand 21 in, 5 lb OH Press  3 x 5 reps 20 in  3 x 5 reps    Steering wheels 5 lb  12x     Circuit  Sit to stand 20 in x 5  [employer] 7 lb x 150 ft  KB DL 18lb x 5 reps  3 rounds  Sit to stand x 5  Ramos carry 5 lb x 100 ft  3 rounds   KB Drag  15 lb - 30 lb  All directions, single and double hand  5 min    sled 50 lb  Push/pull  30ft                HEP Log Date    walking baseline, sit to stand 10/26/2022   2. KB drag, deadlift, farmer carry 11/9/22   3.    4.     5.            Treatment/Session Summary:      Treatment Assessment:    pt with increased lethargy today and reports minimal energy today. pt also reports mild headache today that she has already taken Tylenol for earlier today, however still has residual HA. blood pressure returns to normal levels after 5 min quiet resting. pt verbalizes understanding of target HRs and how to guage intensity of exercise.  pt with poor balance with lateral adn posterior wt shifts increasing risk for falls   Communication/Consultation:       None today   Equipment provided today: Encourage use of [employer] diana to train balance at home    Recommendations/Intent for next  treatment session:  Next visit will focus on improving mobility, strength, pain science, cardiorespiratory health, balance         Total Treatment Billable Duration:  30 minutes 15 min untimed due to rest  Time In: 1300  Time Out: 1345    Chavez Trejo, PT       Charge Capture  }Post Session Pain  PT Visit Info  [facility] Portal  MD Guidelines  Scanned Media  Benefits  MyChart    Future Appointments   Date Time Provider Devyn Bermeo   11/23/2022  9:45 AM Chavez Trejo PT [facility]   2/20/2023  9:30 AM Wyatt Loyola MD \[facility]\"" G[facility] AMB   4/14/2023  2:00 PM DO NOHEMI Vela GVL AMB

## 2022-11-21 NOTE — TELEPHONE ENCOUNTER
Patient stated that she was told that her order for the ultrasound of her Carotid has not been received/ isnt visible to imaging dept. Stated that this is the number that she was given.  [phone] Statement Selected

## 2022-11-23 ENCOUNTER — HOSPITAL ENCOUNTER (OUTPATIENT)
Dept: PHYSICAL THERAPY | Age: 87
Setting detail: RECURRING SERIES
Discharge: HOME | End: 2022-11-26
Payer: MEDICARE

## 2022-11-23 PROCEDURE — 97110 THERAPEUTIC EXERCISES: CPT

## 2022-11-23 PROCEDURE — 97530 THERAPEUTIC ACTIVITIES: CPT

## 2022-11-23 NOTE — PROGRESS NOTES
Jaycee Stamp  : 1935  Primary: Hector Pina Ppo  Secondary:  [address] @ [address] [address]  Phone: [phone]  Fax: [phone] Plan Frequency: 1-2x/wk for 8 wks, pt has financial barrier with high co-pay  Plan of Care/Certification Expiration Date: 22      PT Visit Info:    Visit Count:  5   OUTPATIENT PHYSICAL THERAPY:OP NOTE TYPE: Treatment Note 2022       Episode  }Appt Desk             Treatment Diagnosis:  Pain in Left Shoulder (M25.512)  Stiffness of Left Shoulder, Not elsewhere classified (M25.612)  Generalized Muscle Weakness (M62.81)  Difficulty in walking, Not elsewhere classified (R26.2)  Medical/Referring Diagnosis:  Acute pain of left shoulder [M25.512]  Balance problem [R26.89]  Gait abnormality [R26.9]  Referring Physician:  Tommie West MD MD Orders:  PT Eval and Treat   Date of Onset:  Onset Date: 10/03/22     Allergies:   Guaifenesin, Loratadine-pseudoephedrine er, and Sulfa antibiotics  Restrictions/Precautions:  Restrictions/Precautions: None  No data recorded   Interventions Planned (Treatment may consist of any combination of the following):    Current Treatment Recommendations: Strengthening; ROM; Balance training; Functional mobility training; ADL/Self-care training; IADL training; Endurance training; Gait training; Stair training; Neuromuscular re-education; Home exercise program; Safety education & training; Therapeutic activities     Subjective Comments:  pt reports her back is really bothering her today. Initial:}   0  /10Post Session:    0    /10  Medications Last Reviewed:  2022  Updated Objective Findings: Blood Pressure - 152/108 mmHg, 74 bpm after 5 min of rest 129/89 mmHg, 76 bpm  Target HR 81-95 bpm  Treatment     THERAPEUTIC EXERCISE: ( 23 minutes):    Exercises per grid below to improve mobility, strength, balance, and coordination.  Required minimal visual, verbal, manual, and tactile cues to promote proper body mechanics. Progressed resistance and repetitions as indicated. Date:  10/26/2022 Date:  11/2/22 Date:  11/9/22 Date:  11/16/22 Date:  11/23/22   Activity/Exercise Parameters Parameters Parameters     Education Diagnosis, prognosis, POC, HEP, anatomy/physiology of condition Education on 1 rep max living and how to improve Education on HEP progressions, weights and strategies to use household objects for resistance training. Assessment of vitals, education on target HR and BP during exercises. Pt encouraged to monitor throughout exercise and if she becomes symptomatic. Education on balance reactions and importance of training stepping rxns. Education on balance and strength training and importance of glut strength and intensity of balance training. Sit to stand 5x       6 min walk 1234 ft without AD   1040 ft without AD      Floor to ceiling  12x   15x   ScitFit    6 min, Hill function 6 min, Hill function 8 min Hill Function lvl 2  26 calories   Clock yourself    30 SPM   Full clock, colors  3 min each    Standing trunk rotation     15x   L Stretch     3 min       THERAPEUTIC ACTIVITY: ( 15 minutes): Therapeutic activities per grid below to improve mobility, strength, coordination, and dynamic movement of upper body, lower body, and trunk to improve functional lifting, carrying, reaching, catching, and overhead activites. Required minimal visual, verbal, manual, and tactile cues to promote coordination of bilateral, upper extremity(s), lower extremity(s) and promote motor control of bilateral, upper extremity(s), lower extremity(s).      Date:  11/2/22 Date:  11/9/22 Date:  11/16/22 Date:  11/23/22   Activity/Exercise Parameters Parameters Parameters    Deadlift 15 lb  2 x 5 reps  AMRAP: 15 reps        Sit to stand 21 in, 5 lb OH Press  3 x 5 reps 20 in  3 x 5 reps  22 in, 5 lb OH  4 x 5 reps   Steering wheels 5 lb  12x      Circuit  Sit to stand 20 in x 5  Ramos Carry 7 lb x 150 ft  KB DL 18lb x 5 reps  3 rounds  Sit to stand x 5  Ramos carry 5 lb x 100 ft  3 rounds    KB Drag  15 lb - 30 lb  All directions, single and double hand  5 min     sled  50 lb  Push/pull  30ft     Ramos Carry    5 lb  3 x 120 ft         HEP Log Date    walking baseline, sit to stand 10/26/2022   2. KB drag, deadlift, farmer carry 11/9/22   3.    4.     5.            Treatment/Session Summary:      Treatment Assessment:    pt with low tolerance to increased activity intensity today due to LBP.  pt does demos ability to complete self mobilizations with a reduction in pain intensity notes   Communication/Consultation:       None today   Equipment provided today: Encourage use of [employer] diana to train balance at home    Recommendations/Intent for next  treatment session:  Next visit will focus on improving mobility, strength, pain science, cardiorespiratory health, balance         Total Treatment Billable Duration:  38 minutes 7 min untimed due to rest  Time In: 0945  Time Out: 1030    Ines Quintanilla, PT       Charge Capture  }Post Session Pain  PT Visit [address] [facility] Portal  MD [address]    Future Appointments   Date Time Provider Devyn Bermeo   11/30/2022  9:45 AM Shelva Patterson, PT [facility]   12/7/2022  9:45 AM Shelva Patterson, PT [facility]   12/14/2022  9:45 AM Shelva Patterson, PT [facility]   12/21/2022  9:45 AM Shelva Patterson, PT [facility]   12/28/2022  9:45 AM Shelva Patterson, PT [facility]   2/20/2023  9:30 AM Bruce Jimenez MD F [facility] AMB   4/14/2023  2:00 PM Miriam Bright,  [facility] AMB

## 2022-11-30 ENCOUNTER — HOSPITAL ENCOUNTER (OUTPATIENT)
Dept: PHYSICAL THERAPY | Age: 87
Setting detail: RECURRING SERIES
Discharge: HOME | End: 2022-12-03
Payer: MEDICARE

## 2022-11-30 PROCEDURE — 97110 THERAPEUTIC EXERCISES: CPT

## 2022-11-30 PROCEDURE — 97530 THERAPEUTIC ACTIVITIES: CPT

## 2022-11-30 NOTE — PROGRESS NOTES
Hay Chavez  : 1935  Primary: Royal Logan Ppo  Secondary:  [address] @ [address] [address]  Phone: [phone]  Fax: [phone] Plan Frequency: 1-2x/wk for 8 wks, pt has financial barrier with high co-pay  Plan of Care/Certification Expiration Date: 22      PT Visit Info:    Visit Count:  6   OUTPATIENT PHYSICAL THERAPY:OP NOTE TYPE: Treatment Note 2022       Episode  }Appt Desk             Treatment Diagnosis:  Pain in Left Shoulder (M25.512)  Stiffness of Left Shoulder, Not elsewhere classified (M25.612)  Generalized Muscle Weakness (M62.81)  Difficulty in walking, Not elsewhere classified (R26.2)  Medical/Referring Diagnosis:  Acute pain of left shoulder [M25.512]  Balance problem [R26.89]  Gait abnormality [R26.9]  Referring Physician:  Sage Ennis MD MD Orders:  PT Eval and Treat   Date of Onset:  Onset Date: 10/03/22     Allergies:   Guaifenesin, Loratadine-pseudoephedrine er, and Sulfa antibiotics  Restrictions/Precautions:  Restrictions/Precautions: None  No data recorded   Interventions Planned (Treatment may consist of any combination of the following):    Current Treatment Recommendations: Strengthening; ROM; Balance training; Functional mobility training; ADL/Self-care training; IADL training; Endurance training; Gait training; Stair training; Neuromuscular re-education; Home exercise program; Safety education & training; Therapeutic activities     Subjective Comments:  pt reports that she was able to  mop her floor with no signficant issue with balace and back soreness.   Initial:}   0  /10Post Session:    0    /10  Medications Last Reviewed:  2022  Updated Objective Findings: Blood Pressure - 152/108 mmHg, 74 bpm after 5 min of rest 129/89 mmHg, 76 bpm  Target HR 81-95 bpm  Treatment     THERAPEUTIC EXERCISE: ( 10 minutes):    Exercises per grid below to improve mobility, strength, balance, and coordination. Required minimal visual, verbal, manual, and tactile cues to promote proper body mechanics. Progressed resistance and repetitions as indicated. Date:  10/26/2022 Date:  11/2/22 Date:  11/9/22 Date:  11/16/22 Date:  11/23/22 Date:  11/30/22   Activity/Exercise Parameters Parameters Parameters      Education Diagnosis, prognosis, POC, HEP, anatomy/physiology of condition Education on 1 rep max living and how to improve Education on HEP progressions, weights and strategies to use household objects for resistance training. Assessment of vitals, education on target HR and BP during exercises. Pt encouraged to monitor throughout exercise and if she becomes symptomatic. Education on balance reactions and importance of training stepping rxns. Education on balance and strength training and importance of glut strength and intensity of balance training. Sit to stand 5x        6 min walk 1234 ft without AD   1040 ft without AD       Floor to ceiling  12x   15x    ScitFit    6 min, Hill function 6 min, Hill function 8 min Hill Function lvl 2  26 calories 8 min Hill Function lvl 4  23 calories   Clock yourself    30 SPM   Full clock, colors  3 min each     Standing trunk rotation     15x    L Stretch     3 min 12x       THERAPEUTIC ACTIVITY: ( 28 minutes): Therapeutic activities per grid below to improve mobility, strength, coordination, and dynamic movement of upper body, lower body, and trunk to improve functional lifting, carrying, reaching, catching, and overhead activites. Required minimal visual, verbal, manual, and tactile cues to promote coordination of bilateral, upper extremity(s), lower extremity(s) and promote motor control of bilateral, upper extremity(s), lower extremity(s).      Date:  11/2/22 Date:  11/9/22 Date:  11/16/22 Date:  11/23/22 Date:  11/30/22   Activity/Exercise Parameters Parameters Parameters     Deadlift 15 lb  2 x 5 reps  AMRAP: 15 reps   Sit to stand 21 in, 5 lb OH Press  3 x 5 reps 20 in  3 x 5 reps  22 in, 5 lb OH  4 x 5 reps    Steering wheels 5 lb  12x       Circuit  Sit to stand 20 in x 5  [employer] 7 lb x 150 ft  KB DL 18lb x 5 reps  3 rounds  Sit to stand x 5  Ramos carry 5 lb x 100 ft  3 rounds  Sit to stand, arms across chest, 22 in x 8 reps   Alt marching x 20 reps  [employer] 7 lb x 60 ft  3 rounds   KB Drag  15 lb - 30 lb  All directions, single and double hand  5 min      sled  50 lb  Push/pull  30ft      Step up     4in  2 x 10 reps  Left/right   Ramos Carry    5 lb  3 x 120 ft          HEP Log Date    walking baseline, sit to stand 10/26/2022   2. KB drag, deadlift, farmer carry 11/9/22   3.    4.     5.            Treatment/Session Summary:      Treatment Assessment:    pt progressed to single limb strenthening to improve glut and hamstring strength required for stair climbing and sit to stand. pt continues to have significant hip weakness prevent progression to lower chair height without knees gripping on chair.    Communication/Consultation:       None today   Equipment provided today: None today   Recommendations/Intent for next  treatment session:  Next visit will focus on improving mobility, strength, pain science, cardiorespiratory health, balance         Total Treatment Billable Duration:  38 minutes 7 min untimed due to rest  Time In: 0945  Time Out: 1030    Ines Quintanilla, PT       Charge Capture  }Post Session Pain  PT Visit Info  [employer] Portal  MD [address]    Future Appointments   Date Time Provider Devyn Bermeo   12/7/2022  9:45 AM Tutu Nichole, PT [facility]   12/14/2022  9:45 AM Tutu Nichole PT [facility]   12/21/2022  9:45 AM Tutu Nichole PT [facility]   12/28/2022  9:45 AM Tutu Nichole, PT [facility]   2/20/2023  9:30 AM MD ERIC Mendez [facility] AMB   4/14/2023  2:00 PM DO NOHEMI Larsen [facility] AMB

## 2022-12-05 ENCOUNTER — TELEPHONE (OUTPATIENT)
Dept: CARDIOLOGY CLINIC | Age: 87
End: 2022-12-05

## 2022-12-06 NOTE — TELEPHONE ENCOUNTER
Called pt advised samples ready for  at [facility] office. Spoke with Daija Jaimes 12/5/22 placed up front for pt.

## 2022-12-07 ENCOUNTER — HOSPITAL ENCOUNTER (OUTPATIENT)
Dept: PHYSICAL THERAPY | Age: 87
Setting detail: RECURRING SERIES
Discharge: HOME | End: 2022-12-10
Payer: MEDICARE

## 2022-12-07 PROCEDURE — 97530 THERAPEUTIC ACTIVITIES: CPT

## 2022-12-07 PROCEDURE — 97110 THERAPEUTIC EXERCISES: CPT

## 2022-12-07 NOTE — PROGRESS NOTES
Esvin Figueroa  : 1935  Primary: Diane Dailey Ppo  Secondary:  [address] @ [facility] [address] [address]  Phone: [phone]  Fax: [phone] Plan Frequency: 1-2x/wk for 8 wks, pt has financial barrier with high co-pay  Plan of Care/Certification Expiration Date: 22      PT Visit Info:    Visit Count:  7   OUTPATIENT PHYSICAL THERAPY:OP NOTE TYPE: Treatment Note 2022       Episode  }Appt Desk             Treatment Diagnosis:  Pain in Left Shoulder (M25.512)  Stiffness of Left Shoulder, Not elsewhere classified (M25.612)  Generalized Muscle Weakness (M62.81)  Difficulty in walking, Not elsewhere classified (R26.2)  Medical/Referring Diagnosis:  Acute pain of left shoulder [M25.512]  Balance problem [R26.89]  Gait abnormality [R26.9]  Referring Physician:  Rayna Mei MD MD Orders:  PT Eval and Treat   Date of Onset:  Onset Date: 10/03/22     Allergies:   Guaifenesin, Loratadine-pseudoephedrine er, and Sulfa antibiotics  Restrictions/Precautions:  Restrictions/Precautions: None  No data recorded   Interventions Planned (Treatment may consist of any combination of the following):    Current Treatment Recommendations: Strengthening; ROM; Balance training; Functional mobility training; ADL/Self-care training; IADL training; Endurance training; Gait training; Stair training; Neuromuscular re-education; Home exercise program; Safety education & training; Therapeutic activities     Subjective Comments:  pt reports she has been working on getting her [employer] out. Initial:}   0  /10Post Session:    0    /10  Medications Last Reviewed:  2022  Updated Objective Findings: Blood Pressure - 152/108 mmHg, 74 bpm after 5 min of rest 129/89 mmHg, 76 bpm  Target HR 81-95 bpm  Treatment     THERAPEUTIC EXERCISE: ( 15 minutes):    Exercises per grid below to improve mobility, strength, balance, and coordination.  Required minimal visual, verbal, manual, and tactile cues to promote proper body mechanics. Progressed resistance and repetitions as indicated. Date:  10/26/2022 Date:  11/2/22 Date:  11/9/22 Date:  11/16/22 Date:  11/23/22 Date:  11/30/22 Date:  12/7/22   Activity/Exercise Parameters Parameters Parameters       Education Diagnosis, prognosis, POC, HEP, anatomy/physiology of condition Education on 1 rep max living and how to improve Education on HEP progressions, weights and strategies to use household objects for resistance training. Assessment of vitals, education on target HR and BP during exercises. Pt encouraged to monitor throughout exercise and if she becomes symptomatic. Education on balance reactions and importance of training stepping rxns. Education on balance and strength training and importance of glut strength and intensity of balance training. Sit to stand 5x         6 min walk 1234 ft without AD   1040 ft without AD        Floor to ceiling  12x   15x     ScitFit    6 min, Hill function 6 min, Hill function 8 min Hill Function lvl 2  26 calories 8 min Hill Function lvl 4  23 calories 8 min Hill Function lvl 4  28 calories   Clock yourself    30 SPM   Full clock, colors  3 min each      Standing trunk rotation     15x     L Stretch     3 min 12x        THERAPEUTIC ACTIVITY: ( 23 minutes): Therapeutic activities per grid below to improve mobility, strength, coordination, and dynamic movement of upper body, lower body, and trunk to improve functional lifting, carrying, reaching, catching, and overhead activites. Required minimal visual, verbal, manual, and tactile cues to promote coordination of bilateral, upper extremity(s), lower extremity(s) and promote motor control of bilateral, upper extremity(s), lower extremity(s).      Date:  11/2/22 Date:  11/9/22 Date:  11/16/22 Date:  11/23/22 Date:  11/30/22 Date:  12/7/22   Activity/Exercise Parameters Parameters Parameters      Deadlift 15 lb  2 x 5 reps  AMRAP: 15 reps          Sit to stand 21 in, 5 lb OH Press  3 x 5 reps 20 in  3 x 5 reps  22 in, 5 lb OH  4 x 5 reps     Steering wheels 5 lb  12x        Circuit  Sit to stand 20 in x 5  [employer] 7 lb x 150 ft  KB DL 18lb x 5 reps  3 rounds  Sit to stand x 5  Ramos carry 5 lb x 100 ft  3 rounds  Sit to stand, arms across chest, 22 in x 8 reps   Alt marching x 20 reps  [employer] 7 lb x 60 ft  3 rounds Sled 50 lb push/pull 30 ft  Tidal tank  walk 15 lb x 120 ft  3 rounds  HR 94   KB Drag  15 lb - 30 lb  All directions, single and double hand  5 min       sled  50 lb  Push/pull  30ft       Step up     4in  2 x 10 reps  Left/right    Split squat      12 in surface with unilateral UE support  Left/right  15x each     Modified Burpee      5x, 17 in bench   Ramos Carry    5 lb  3 x 120 ft           HEP Log Date    walking baseline, sit to stand 10/26/2022   2. KB drag, deadlift, farmer carry 11/9/22   3.    4.     5.            Treatment/Session Summary:      Treatment Assessment:     Pt  with increased balance challenge with use of tidal tank during walking resulting in significant path deviation. Pt progressed to split squat and initiation of modified burpee to assist with fall recovery.    Communication/Consultation:       None today   Equipment provided today: None today   Recommendations/Intent for next  treatment session:  Next visit will focus on improving mobility, strength, pain science, cardiorespiratory health, balance         Total Treatment Billable Duration:  38 minutes 7 min untimed due to rest  Time In: 0945  Time Out: 1030    Ines Quintanilla, PT       Charge Capture  }Post Session Pain  PT Visit Info  "[employer] Portal  MD [address]    Future Appointments   Date Time Provider Devyn Bermeo   12/14/2022  9:45 AM Tutu Nichole, PT [facility]   12/21/2022  9:45 AM Tutu Nichole PT [facility]   12/28/2022  9:45 AM Ines Quintanilla, PT [facility]   2/20/2023  9:30 AM MD ERIC Mendez [facility] AMB   4/14/2023  2:00 PM DO NOHEMI Larsen [facility] AMB

## 2022-12-14 ENCOUNTER — HOSPITAL ENCOUNTER (OUTPATIENT)
Dept: PHYSICAL THERAPY | Age: 87
Setting detail: RECURRING SERIES
Discharge: HOME | End: 2022-12-17
Payer: MEDICARE

## 2022-12-14 NOTE — PROGRESS NOTES
Sue Fontana  : 1935  Primary: Ivis Welsh Ppo  Secondary:  [address] @ [address] [place] [address]  Phone: [phone]  Fax: [phone] Plan Frequency: 1-2x/wk for 8 wks, pt has financial barrier with high co-pay    Plan of Care/Certification Expiration Date: 22      PT Visit Info:  No data recorded       OUTPATIENT PHYSICAL THERAPY 2022     Appt Desk   Episode   MyChart      Ms. Hunter Perez left without being seen due to personal/family stress.     Ramiro Doherty, PT    Future Appointments   Date Time Provider Devyn Bermeo   2022  7:00 PM Ramiro Doherty [facility]   2022  9:45 AM [facility]   2022  9:45 AM Ramiro Doherty PT [facility]   2023  9:30 AM Yohana White MD [facility] AMB   2023  2:00 PM Olya Hernandez DO [facility] AMB

## 2022-12-18 ENCOUNTER — HOSPITAL ENCOUNTER (EMERGENCY)
Age: 87
Discharge: HOME HEALTH | End: 2022-12-18
Attending: EMERGENCY MEDICINE
Payer: MEDICARE

## 2022-12-18 VITALS
HEART RATE: 76 BPM | TEMPERATURE: 97.6 F | OXYGEN SATURATION: 95 % | DIASTOLIC BLOOD PRESSURE: 89 MMHG | HEIGHT: 67 IN | RESPIRATION RATE: 16 BRPM | WEIGHT: 198 LBS | SYSTOLIC BLOOD PRESSURE: 131 MMHG | BODY MASS INDEX: 31.08 KG/M2

## 2022-12-18 DIAGNOSIS — R04.0 EPISTAXIS: Primary | ICD-10-CM

## 2022-12-18 DIAGNOSIS — I10 ESSENTIAL HYPERTENSION: ICD-10-CM

## 2022-12-18 PROCEDURE — 99283 EMERGENCY DEPT VISIT LOW MDM: CPT

## 2022-12-18 PROCEDURE — 6370000000 HC RX 637 (ALT 250 FOR IP): Performed by: EMERGENCY MEDICINE

## 2022-12-18 RX ORDER — OXYMETAZOLINE HYDROCHLORIDE 0.05 G/100ML
2 SPRAY NASAL
Status: COMPLETED | OUTPATIENT
Start: 2022-12-18 | End: 2022-12-18

## 2022-12-18 RX ADMIN — NITROGLYCERIN 0.5 INCH: 20 OINTMENT TOPICAL at 03:53

## 2022-12-18 RX ADMIN — OXYMETAZOLINE HCL 2 SPRAY: 0.05 SPRAY NASAL at 03:53

## 2022-12-18 ASSESSMENT — ENCOUNTER SYMPTOMS
NAUSEA: 0
RHINORRHEA: 0
VOMITING: 0
ABDOMINAL PAIN: 0
SHORTNESS OF BREATH: 0
SINUS PRESSURE: 0

## 2022-12-18 ASSESSMENT — LIFESTYLE VARIABLES
HOW OFTEN DO YOU HAVE A DRINK CONTAINING ALCOHOL: NEVER
HOW MANY STANDARD DRINKS CONTAINING ALCOHOL DO YOU HAVE ON A TYPICAL DAY: PATIENT DOES NOT DRINK

## 2022-12-18 ASSESSMENT — PAIN - FUNCTIONAL ASSESSMENT: PAIN_FUNCTIONAL_ASSESSMENT: NONE - DENIES PAIN

## 2022-12-18 NOTE — DISCHARGE INSTRUCTIONS
Closely monitor blood pressure. Take blood pressure medication as prescribed. Schedule close follow-up with primary care physician, ENT. Return if symptoms worsen or progress in any way.

## 2022-12-18 NOTE — ED TRIAGE NOTES
Pt brought in by [facility] after being called out for a nose bleed. EMS reports bleeding had stopped upon arrival to scene. Pt wanted to be brought in to be checked out anyway.

## 2022-12-18 NOTE — ED PROVIDER NOTES
Emergency Department Provider Note                   PCP:                Antoine Judd MD               Age: 80 y.o. Sex: female       ICD-10-CM    1. Epistaxis  R04.0       2. Essential hypertension  I10           DISPOSITION Discharge - Pending Orders Complete 12/18/2022 03:50:12 AM        MDM  Number of Diagnoses or Management Options  Epistaxis: established, improving  Essential hypertension: established, improving  Diagnosis management comments: Patient noted to be hypertensive on arrival.  Nitropaste applied to chest wall. No active epistaxis noted. Dried blood noted to right nares. Afrin given. Will observe patient to ensure no additional epistaxis noted. Will discharge home with instructions to closely monitor blood pressure. On reassessment, epistaxes present. Patient complaining of mild headache after having Nitropaste in place. Will remove Nitropaste at this time. Patient instructed to follow-up with primary care physician, ENT. Amount and/or Complexity of Data Reviewed  Tests in the medicine section of CPT®: ordered and reviewed  Review and summarize past medical records: yes    Risk of Complications, Morbidity, and/or Mortality  Presenting problems: low  Diagnostic procedures: low  Management options: low    Patient Progress  Patient progress: improved             No orders of the defined types were placed in this encounter. Medications   oxymetazoline (AFRIN) 0.05 % nasal spray 2 spray (2 sprays Each Nostril Given 12/18/22 0353)   nitroglycerin (NITRO-BID) 2 % ointment 0.5 inch (0.5 inches Topical Given 12/18/22 0353)       New Prescriptions    No medications on file        Marvel Toth is a 80 y.o. female who presents to the Emergency Department with chief complaint of  R epistaxis. Chief Complaint   Patient presents with    Epistaxis     Pt brought in by [facility] after being called out for a nose bleed.  EMS reports bleeding had stopped upon arrival to scene. Pt wanted to be brought in to be checked out anyway. 24-year-old female with history of atrial fibrillation on Eliquis, hypertension, CAD, hyperlipidemia, GERD presents via [facility] with complaint of right-sided epistaxis that started around 2:30 AM.  Denies any recent trauma or injury to nose. Denies picking at right nostril prior to onset of symptoms. EMS reports the bleeding had stopped upon their arrival to scene. Patient stated that she wanted to be brought to ED to be checked out regardless. Denies chest pain, shortness of breath, nausea, vomiting, fever, chills. Patient reports compliance with all home medications. Denies history of epistaxis in the past.  States that symptoms resolved with applying pressure to site. Rates symptoms as mild. Denies any alleviating or exacerbating factors. The history is provided by the patient and the EMS personnel. No  was used. Review of Systems   Constitutional:  Negative for chills, fatigue and fever. HENT:  Positive for nosebleeds. Negative for congestion, rhinorrhea and sinus pressure. Respiratory:  Negative for shortness of breath. Cardiovascular:  Negative for chest pain. Gastrointestinal:  Negative for abdominal pain, nausea and vomiting. Musculoskeletal:  Negative for myalgias. Skin:  Negative for pallor and rash. Neurological:  Negative for dizziness, syncope, weakness, light-headedness and headaches. Hematological:  Does not bruise/bleed easily.      Past Medical History:   Diagnosis Date    Arrhythmia     Esophageal reflux     Esophageal reflux     H/O complete eye exam Yearly    Dr. Hilda Hernández (hyperlipidemia)     Hypertension     controlled with medication        Past Surgical History:   Procedure Laterality Date    APPENDECTOMY      CATARACT REMOVAL Bilateral 2012    GYN      fibroid tumors removed, 1.5 ovaries removed    ORTHOPEDIC SURGERY  06/2012 left elbow surgery    ORTHOPEDIC SURGERY      R knee scope    IN CARDIAC SURG PROCEDURE UNLIST      cath    PRE-MALIGNANT / BENIGN SKIN LESION EXCISION      basal cell removed from face        Family History   Problem Relation Age of Onset    Heart Disease Mother     Stroke Brother         mild x2; major x1  (smoker)    Hypertension Mother     Other Mother         pacemaker,  at 80    Lung Disease Father     Heart Disease Father     Other Father         pacemaker -  at 80        Social History     Socioeconomic History    Marital status:    Tobacco Use    Smoking status: Never    Smokeless tobacco: Never   Substance and Sexual Activity    Alcohol use: No    Drug use: Never         Guaifenesin, Loratadine-pseudoephedrine er, and Sulfa antibiotics     Previous Medications    APIXABAN (ELIQUIS) 5 MG TABS TABLET    Take 5 mg by mouth 2 times daily    AZO-CRANBERRY PO    Take 2 tablets by mouth daily    BIOFLAVONOID PRODUCTS (RICK C PO)    Take by mouth    CHOLECALCIFEROL 50 MCG (2000) TABS    Take by mouth    MAGNESIUM OXIDE (MAG-OX) 400 (240 MG) MG TABLET    Take 400 mg by mouth 2 times daily    METOPROLOL SUCCINATE (TOPROL XL) 25 MG EXTENDED RELEASE TABLET    Take 1 tablet by mouth in the morning and 1 tablet in the evening. MULTIPLE VITAMINS-MINERALS (ONE-A-DAY WOMENS PO)    Take 1 tablet by mouth daily        Vitals signs and nursing note reviewed. Patient Vitals for the past 4 hrs:   Temp Pulse Resp BP SpO2   22 0400 -- -- -- (!) 166/92 97 %   22 0329 97.6 °F (36.4 °C) 71 17 (!) 179/104 96 %          Physical Exam  Vitals and nursing note reviewed. Constitutional:       Appearance: Normal appearance. HENT:      Head: Normocephalic. Right Ear: Tympanic membrane and ear canal normal.      Left Ear: Tympanic membrane and ear canal normal.      Nose: Nose normal.      Comments: Dried blood noted to right nares. No active epistaxis noted.   No blood noted to posterior oropharynx. Small region of irritation noted to anterior aspect of right nasal septum. Mouth/Throat:      Mouth: Mucous membranes are moist.   Eyes:      Extraocular Movements: Extraocular movements intact. Pupils: Pupils are equal, round, and reactive to light. Cardiovascular:      Rate and Rhythm: Normal rate. Pulses: Normal pulses. Pulmonary:      Effort: Pulmonary effort is normal.      Breath sounds: Normal breath sounds. Abdominal:      Palpations: Abdomen is soft. Tenderness: There is no abdominal tenderness. Musculoskeletal:         General: No swelling. Normal range of motion. Cervical back: Normal range of motion. No rigidity. Skin:     General: Skin is warm. Coloration: Skin is not pale. Findings: No rash. Neurological:      General: No focal deficit present. Mental Status: She is alert and oriented to person, place, and time. Cranial Nerves: No cranial nerve deficit. Sensory: No sensory deficit. Motor: No weakness. Comments: No focal deficits. Procedures    No results found for any visits on 12/18/22. No orders to display                       Voice dictation software was used during the making of this note. This software is not perfect and grammatical and other typographical errors may be present. This note has not been completely proofread for errors.      Nova Hargrove., MD  12/18/22 0520

## 2022-12-18 NOTE — ED NOTES
I have reviewed discharge instructions with the patient and son. The patient and son verbalized understanding. Patient left ED via Discharge Method: ambulatory to Home with son. Opportunity for questions and clarification provided. Patient given 0 scripts. To continue your aftercare when you leave the hospital, you may receive an automated call from our care team to check in on how you are doing. This is a free service and part of our promise to provide the best care and service to meet your aftercare needs.  If you have questions, or wish to unsubscribe from this service please call [phone]. Thank you for Choosing our [address] [place] [address] Emergency Department.         Itzel Roe RN  12/18/22 2010

## 2022-12-19 ENCOUNTER — CARE COORDINATION (OUTPATIENT)
Dept: CARE COORDINATION | Facility: CLINIC | Age: 87
End: 2022-12-19

## 2022-12-19 NOTE — CARE COORDINATION
Ambulatory Care Coordination Note  12/19/2022    ACC: Itz Arnold RN    See assessment below. Ccm outreached to patient. Patient agreeable to ccm services. Reviewed with patient discharge summary. Patient verbalized understanding. Patient took her blood pressure this morning and it was 130/69. Patient overall is feeling better. Patient states no questions or concerns. Ccm discussed that I would follow up in 2 weeks. Patient agreeable. Offered patient enrollment in the Remote Patient Monitoring (RPM) program for in-home monitoring: NA. Ambulatory Care Coordination Assessment    Care Coordination Protocol  Referral from Primary Care Provider: No  Week 1 - Initial Assessment     Do you have all of your prescriptions and are they filled?: Yes  Barriers to medication adherence: None  Are you able to afford your medications?: Yes  How often do you have trouble taking your medications the way you have been told to take them?: I always take them as prescribed. Do you have Home O2 Therapy?: No      Ability to seek help/take action for Emergent Urgent situations i.e. fire, crime, inclement weather or health crisis. : Independent  Ability to ambulate to restroom: Independent  Ability handle personal hygeine needs (bathing/dressing/grooming): Independent  Ability to manage Medications: Independent  Ability to prepare Food Preparation: Independent  Ability to maintain home (clean home, laundry): Independent  Ability to drive and/or has transportation: Independent  Ability to do shopping: Independent  Ability to manage finances:  Independent  Is patient able to live independently?: Yes     Current Housing: Private Residence        Per the Fall Risk Screening, did the patient have 2 or more falls or 1 fall with injury in the past year?: No     Frequent urination at night?: Yes  Do you use rails/bars?: No  Do you have a non-slip tub mat?: Yes     Are you experiencing loss of meaning?: No  Are you experiencing loss of hope and peace?: No     Thinking about your patient's physical health needs, are there any symptoms or problems (risk indicators) you are unsure about that require further investigation?: No identified areas of uncertainly or problems already being investigated   Are the patients physical health problems impacting on their mental well-being?: No identified areas of concern   Are there any problems with your patients lifestyle behaviors (alcohol, drugs, diet, exercise) that are impacting on physical or mental well-being?: No identified areas of concern   Do you have any other concerns about your patients mental well-being? How would you rate their severity and impact on the patient?: No identified areas of concern   How would you rate their home environment in terms of safety and stability (including domestic violence, insecure housing, neighbor harassment)?: Consistently safe, supportive, stable, no identified problems   How do daily activities impact on the patient's well-being? (include current or anticipated unemployment, work, caregiving, access to transportation or other): No identified problems or perceived positive benefits   How would you rate their social network (family, work, friends)?: Good participation with social networks   How would you rate their financial resources (including ability to afford all required medical care)?: Financially secure, resources adequate, no identified problems   How wells does the patient now understand their health and well-being (symptoms, signs or risk factors) and what they need to do to manage their health?: Reasonable to good understanding and already engages in managing health or is willing to undertake better management   How well do you think your patient can engage in healthcare discussions?  (Barriers include language, deafness, aphasia, alcohol or drug problems, learning difficulties, concentration): Clear and open communication, no identified barriers   Do other services need to be involved to help this patient?: Other care/services not required at this time   Suggested Interventions and [employer]         Schedule an appointment with the patient's PCP, Set up/Review Goals, Set up/Review an Education Plan              Prior to Admission medications    Medication Sig Start Date End Date Taking?  Authorizing Provider   Bioflavonoid Products (RICK C PO) Take by mouth    Historical Provider, MD   Multiple Vitamins-Minerals (ONE-A-DAY WOMENS PO) Take 1 tablet by mouth daily    Historical Provider, MD   AZO-CRANBERRY PO Take 2 tablets by mouth daily    Historical Provider, MD   metoprolol succinate (TOPROL XL) 25 MG extended release tablet Take 1 tablet by mouth in the morning and 1 tablet in the evening. 8/24/22   Rex Hartley MD   apixaban (ELIQUIS) 5 MG TABS tablet Take 5 mg by mouth 2 times daily 1/18/22   Ar Automatic Reconciliation   Cholecalciferol 50 MCG (2000 UT) TABS Take by mouth    Ar Automatic Reconciliation   magnesium oxide (MAG-OX) 400 (240 Mg) MG tablet Take 400 mg by mouth 2 times daily 1/28/20   Ar Automatic Reconciliation       Future Appointments   Date Time Provider Devyn Bermeo   12/21/2022  9:45 AM Diamond Stewart, PT [facility]   12/22/2022  2:00 PM Gillian Silva.MD [facility] AMB   12/28/2022  9:45 AM Diamond Stewart PT [facility]   2/20/2023  9:30 AM Gillianmarta Silva.MD [facility] AMB   4/14/2023  2:00 PM DO NOHEMI Wade AMB

## 2022-12-21 ENCOUNTER — APPOINTMENT (OUTPATIENT)
Dept: PHYSICAL THERAPY | Age: 87
End: 2022-12-21
Payer: MEDICARE

## 2022-12-22 ENCOUNTER — OFFICE VISIT (OUTPATIENT)
Dept: FAMILY MEDICINE CLINIC | Facility: CLINIC | Age: 87
End: 2022-12-22
Payer: MEDICARE

## 2022-12-22 VITALS
TEMPERATURE: 98.3 F | OXYGEN SATURATION: 98 % | DIASTOLIC BLOOD PRESSURE: 86 MMHG | WEIGHT: 196 LBS | HEART RATE: 74 BPM | SYSTOLIC BLOOD PRESSURE: 133 MMHG | BODY MASS INDEX: 30.7 KG/M2

## 2022-12-22 DIAGNOSIS — I10 PRIMARY HYPERTENSION: ICD-10-CM

## 2022-12-22 DIAGNOSIS — R04.0 EPISTAXIS: Primary | ICD-10-CM

## 2022-12-22 PROCEDURE — 99213 OFFICE O/P EST LOW 20 MIN: CPT | Performed by: FAMILY MEDICINE

## 2022-12-22 PROCEDURE — 1123F ACP DISCUSS/DSCN MKR DOCD: CPT | Performed by: FAMILY MEDICINE

## 2022-12-22 ASSESSMENT — ENCOUNTER SYMPTOMS
GASTROINTESTINAL NEGATIVE: 1
SINUS PRESSURE: 0
EYES NEGATIVE: 1
RHINORRHEA: 1
SINUS PAIN: 0
SORE THROAT: 0
TROUBLE SWALLOWING: 0
RESPIRATORY NEGATIVE: 1

## 2022-12-22 NOTE — PROGRESS NOTES
HISTORY OF PRESENT ILLNESS    Natacha Perera is a 80 y.o. female. HPI  Chief Complaint   Patient presents with    ED Follow-up     Epistaxis, hypertension     See above. Feeling well at present. No side effects from BP medication. No headache or vision change. Denies chest pain or SOB. No swelling. On Eliquis. Last Sat had profuse bleeding from right nostril. BP was high in ED. Bleeding resolved with pressure and BP came down. Had a small amount of bleeding 2 days ago but this was minor and promptly resolved. Denies unusual bruising. Home BP readings have been normal. No nasal congestion. Current Outpatient Medications on File Prior to Visit   Medication Sig Dispense Refill    Bioflavonoid Products (RICK C PO) Take by mouth      Multiple Vitamins-Minerals (ONE-A-DAY WOMENS PO) Take 1 tablet by mouth daily      AZO-CRANBERRY PO Take 2 tablets by mouth daily      metoprolol succinate (TOPROL XL) 25 MG extended release tablet Take 1 tablet by mouth in the morning and 1 tablet in the evening. 60 tablet 5    apixaban (ELIQUIS) 5 MG TABS tablet Take 5 mg by mouth 2 times daily      Cholecalciferol 50 MCG (2000 UT) TABS Take by mouth      magnesium oxide (MAG-OX) 400 (240 Mg) MG tablet Take 400 mg by mouth 2 times daily       No current facility-administered medications on file prior to visit. Past Medical History:   Diagnosis Date    Arrhythmia     Esophageal reflux     Esophageal reflux     H/O complete eye exam Yearly    Dr. Domi Galvez (hyperlipidemia)     Hypertension     controlled with medication       Review of Systems   Constitutional: Negative. HENT:  Positive for rhinorrhea. Negative for congestion, ear pain, nosebleeds (resolved), sinus pressure, sinus pain, sore throat and trouble swallowing. Eyes: Negative. Respiratory: Negative. Cardiovascular: Negative. Gastrointestinal: Negative. Genitourinary: Negative. Musculoskeletal: Negative. Neurological: Negative. Hematological: Negative. Blood pressure 133/86, pulse 74, temperature 98.3 °F (36.8 °C), temperature source Temporal, weight 196 lb (88.9 kg), SpO2 98 %. Physical Exam  Vitals and nursing note reviewed. Constitutional:       Appearance: Normal appearance. HENT:      Right Ear: Tympanic membrane normal.      Left Ear: Tympanic membrane normal.      Nose: No congestion or rhinorrhea. Comments: Nasal mucosa is normal. No hematoma. No bleeding. No eschar apparent. Mouth/Throat:      Mouth: Mucous membranes are moist.      Pharynx: Oropharynx is clear. Eyes:      Conjunctiva/sclera: Conjunctivae normal.   Neck:      Vascular: No carotid bruit. Cardiovascular:      Rate and Rhythm: Normal rate and regular rhythm. Heart sounds: Normal heart sounds. Pulmonary:      Breath sounds: Normal breath sounds. Musculoskeletal:         General: No swelling. Cervical back: Neck supple. Lymphadenopathy:      Cervical: No cervical adenopathy. Psychiatric:         Mood and Affect: Mood normal.        ASSESSMENT and Raffy Reese was seen today for ed follow-up. Diagnoses and all orders for this visit:    Epistaxis    Primary hypertension   Discussed history and medications with patient. Continue current measures. Follow up if side effects occur or if new symptoms develop. Reviewed measures to prevent and treat nosebleeds should it recur. Follow up if home BP readings elevate. Return in about 4 months (around 4/22/2023), or if symptoms worsen or fail to improve.     Dennis Almendarez MD

## 2022-12-28 ENCOUNTER — HOSPITAL ENCOUNTER (OUTPATIENT)
Dept: PHYSICAL THERAPY | Age: 87
Setting detail: RECURRING SERIES
End: 2022-12-28
Payer: MEDICARE

## 2022-12-28 NOTE — PROGRESS NOTES
Sari Cabot  : 1935  Primary: [employer]qui Leonlles Ppo  Secondary:  Shanna Taylor @ [address] [address]  Phone: [phone]  Fax: [phone] Plan Frequency: 1-2x/wk for 8 wks, pt has financial barrier with high co-pay    Plan of Care/Certification Expiration Date: 22      PT Visit Info:  No data recorded       OUTPATIENT PHYSICAL THERAPY 2022     Appt Desk   Episode   MyChart      Ms. Ridley Sers cancelled today due to not feeling well    Nick Woo, PT    Future Appointments   Date Time Provider Devyn Ngoi   2022  7:00 PM Nick Woo PT [facility]   2023  9:30 AM Ramonita Marlow MD [facility]   2023  2:00 PM Seferino Taylor DO [facility] AMB

## 2022-12-29 NOTE — THERAPY DISCHARGE
Angelique BALBUENA Bozena  : 1935  Primary: [employer] Ppo  Secondary:  [facility] @ [place] [address] [place] [address]  Phone: [phone]  Fax: [phone] Plan Frequency: 1-2x/wk for 8 wks, pt has financial barrier with high co-pay  Plan of Care/Certification Expiration Date: 22      PT Visit Info:         Visit Count:  Visit count could not be calculated. Make sure you are using a visit which is associated with an episode.                OUTPATIENT PHYSICAL THERAPY:             OP NOTE TYPE: DISCONTINUATION Assessment 2022               Episode  Appt Desk         Treatment Diagnosis:  Pain in Left Shoulder (M25.512)  Stiffness of Left Shoulder, Not elsewhere classified (M25.612)  Generalized Muscle Weakness (M62.81)  Difficulty in walking, Not elsewhere classified (R26.2)  Medical/Referring Diagnosis:  Acute pain of left shoulder [M25.512]  Balance problem [R26.89]  Gait abnormality [R26.9]  Referring Physician:  Antonio Godfrey Jr., MD MD Orders:  PT Eval and Treat   Return MD Appt:  TBD by patient  Date of Onset:  Onset Date: 10/03/22     Allergies:  Guaifenesin, Loratadine-pseudoephedrine er, and Sulfa antibiotics  Restrictions/Precautions:    Restrictions/Precautions: None      Medications Last Reviewed:  2022         ASSESSMENT   Discontinuation Assessment:  Angelique Martinezoer to self d/c due to family stressors and medical complications. Therapist unable to assess progress toward goals. PT POC Closed.     PLAN   Effective Dates: 10/26/22 TO Plan of Care/Certification Expiration Date: 22     Frequency/Duration: Plan Frequency: 1-2x/wk for 8 wks, pt has financial barrier with high co-pay     Interventions Planned (Treatment may consist of any combination of the following):    Current Treatment Recommendations: Strengthening; ROM; Balance training; Functional mobility training; ADL/Self-care training; IADL training; Endurance  training; Gait training; Stair training; Neuromuscular re-education; Home exercise program; Safety education & training; Therapeutic activities     GOALS: (Goals have been discussed and agreed upon with patient.)     Goals: 8 weeks  Goal Met   1. Josh Mae will be able to perform 8 sit to stand transfers independently from a 18  inch high surface in 30 seconds to rise from chair/toilet at home and in the community. 1.  [] Date:   2. Josh Mae will increase steady max gait speed to 4.32 ft/sec to improve mobility in household and community and decrease fall risk. 2.  [] Date:   3. Josh Mae will be able to push and pull >=35 lbs to increase safety and functional capacity required for household chores. 3.  [] Date:   4. Josh Mae will be able to carry 8 lbs in bilateral UEs over 150 feet in order to carry groceries and laundry between rooms without LOB. 4.  [] Date:   5. Josh Mae will be independent in HEP for balance, ROM, stretching, and strengthening in order to maintain functional gains and decrease fall risk. 5.  [] Date:   6. Josh Mae will be able to complete a floor transfer with B UE support for fall recovery x stand by assist level 6. [] Date:   7. Josh Mae will be able to complete a 6 minute walk test over level and mild unlevel terrain at >=1500 ft to be comparable to age related norms. 7.  [] Date:   8. Josh Mae will be able to reach downward toward floor to retrieve >= 30 lb object without LOB in order to safely complete heavy household chores. 8.  [] Date:   9. Josh Mae will be able to climb a flight of stairs with 10 lb in order to improve access into loft area of home. 9.  [] Date:   5 [facility] Dr Carol Paniagua will be able to lift 10 lb overhead without LOB in order to complete roles and responsibilities in household. 10.  [] Date:              Outcome Measure:    Tool Used:  Strength  Score:  Initial:   Left: 43 lbs  Right: 52 lbs Most Recent:  (Date: -- )  Left: X lbs  Right: X lbs     Tool Used: 6 min walk test without AD  Score:  Initial: 1234 ft  Most Recent: X reps (Date: -- )       Tool Used: 30 sec sit to Stand Test  Score:  Initial: 3 reps Most Recent: X reps (Date: -- )   Interpretation of Score: patient completes assessment from a standard height chair without upper extremity support. Tool Used: Gait Speed   Score:  Initial Self Selected Walking Speed:  2.73 ft/sec Most Recent: X ft/sec (Date: -- )    Initial Max Walking Speed: 4.1 ft/sec Most Recent: X ft/sec (Date: -- )   Interpretation of Score: Walking speed is used for assessing and monitoring functional status and over all health on an individual. The individual walks for 5 meters/16.4 ft with the therapist timing. The individual has an acceleration phase of 3 meters, or 9.8ft, prior to timing is initiated. A community ambulator walks at 2.62 ft/sec to 4.32 ft/sec. The [facility] for a community dwelling older adult is a change of 0.45 ft/sec at a self selected pace. A MDC is not yet established for a community dwelling older adult for max walking speed.          Total Duration: 0 min        Thank you for this referral,  Rodriguez Saha, PT     Referring Physician Signature: Mary Goodrich., *                    Post Session Pain  Charge Capture  PT Visit Info MD James Ng

## 2022-12-30 ENCOUNTER — TELEPHONE (OUTPATIENT)
Dept: CARDIAC CATH/INVASIVE PROCEDURES | Age: 87
End: 2022-12-30

## 2022-12-30 NOTE — TELEPHONE ENCOUNTER
Phoned Mrs. Seals to schedule Watchman screening TANESHA with anesthesia. LVM with WM coordinator contact information.

## 2023-01-04 ENCOUNTER — TELEPHONE (OUTPATIENT)
Dept: CARDIOLOGY CLINIC | Age: 88
End: 2023-01-04

## 2023-01-09 ENCOUNTER — TELEPHONE (OUTPATIENT)
Dept: FAMILY MEDICINE CLINIC | Facility: CLINIC | Age: 88
End: 2023-01-09

## 2023-01-09 ENCOUNTER — CARE COORDINATION (OUTPATIENT)
Dept: CARE COORDINATION | Facility: CLINIC | Age: 88
End: 2023-01-09

## 2023-01-09 ENCOUNTER — TELEPHONE (OUTPATIENT)
Dept: CASE MANAGEMENT | Age: 88
End: 2023-01-09

## 2023-01-09 NOTE — TELEPHONE ENCOUNTER
Patient's right index finger is painful and swollen. She spoke with Dr. Stephany Dave on Saturday and he prescribed her antibiotics.  She said it is still swollen and painful but not as bad as it has been so she said she would wait to be seen by Dr. Stephany Dave

## 2023-01-09 NOTE — CARE COORDINATION
Ambulatory Care Coordination Note  1/9/2023    ACC: Alferdo Bauman, REDD    Ccm outreached to patient. Patient states she is doing well. Patient had follow up with pcp on 12/22/22. Patient has been participating outpatient therapy. Patient is currently taking abt therapy for possible infection to finger. Patient states she feels better and swelling has gone down some. Ccm left message with pcp office per request of patient to possible have her finger checked. Patient states no other questions or concerns. Ccm discussed that I would outreach next week. Patient agreeable. Offered patient enrollment in the Remote Patient Monitoring (RPM) program for in-home monitoring: NA. Lab Results       None            Care Coordination Interventions    Referral from Primary Care Provider: No  Suggested Interventions and Community Resources          Goals Addressed                   This Visit's Progress     Conditions and Symptoms   On track     I will schedule office visits, as directed by my provider. I will keep my appointment or reschedule if I have to cancel. I will notify my provider of any barriers to my plan of care. I will notify my provider of any symptoms that indicate a worsening of my condition. Barriers: none  Plan for overcoming my barriers: N/A  Confidence: 9/10  Anticipated Goal Completion Date: 3/19/23         Self Monitoring   On track     Blood Pressure - I will take my blood pressure as directed - Daily  I will notify my provider of any trends of increasing or decreasing blood pressures over a month period of time. I will notify my provider of any changes in blood pressure associated with symptoms of dizziness, falls, passing out, headache, confusion/change in mental status. Patient Reported Blood Pressure No flowsheet data found.     Barriers: none  Plan for overcoming my barriers: N/A  Confidence: 9/10  Anticipated Goal Completion Date: 3/19/23                Prior to Admission medications Medication Sig Start Date End Date Taking?  Authorizing Provider   Bioflavonoid Products (RICK C PO) Take by mouth    Historical Provider, MD   Multiple Vitamins-Minerals (ONE-A-DAY WOMENS PO) Take 1 tablet by mouth daily    Historical Provider, MD   AZO-CRANBERRY PO Take 2 tablets by mouth daily    Historical Provider, MD   metoprolol succinate (TOPROL XL) 25 MG extended release tablet Take 1 tablet by mouth in the morning and 1 tablet in the evening. 8/24/22   Misha Rizvi MD   apixaban (ELIQUIS) 5 MG TABS tablet Take 5 mg by mouth 2 times daily 1/18/22   Ar Automatic Reconciliation   Cholecalciferol 50 MCG (2000 UT) TABS Take by mouth    Ar Automatic Reconciliation   magnesium oxide (MAG-OX) 400 (240 Mg) MG tablet Take 400 mg by mouth 2 times daily 1/28/20   Ar Automatic Reconciliation       Future Appointments   Date Time Provider Devyn Bermeo   1/30/2023 12:30 PM SFD CATH/CV FLOAT [facility] AT [facility] SFD   2/20/2023  9:30 AM Chad Walters MD [facility] G[facility] AMB   4/14/2023  2:00 PM Hammad Dupont DO [facility] G[facility] AMB

## 2023-01-10 DIAGNOSIS — Z95.0 PACEMAKER: ICD-10-CM

## 2023-01-10 DIAGNOSIS — R00.1 SYMPTOMATIC BRADYCARDIA: ICD-10-CM

## 2023-01-10 DIAGNOSIS — I48.0 PAROXYSMAL ATRIAL FIBRILLATION (HCC): ICD-10-CM

## 2023-01-10 DIAGNOSIS — I48.0 PAROXYSMAL ATRIAL FIBRILLATION (HCC): Primary | ICD-10-CM

## 2023-01-11 ENCOUNTER — NURSE TRIAGE (OUTPATIENT)
Dept: OTHER | Facility: CLINIC | Age: 88
End: 2023-01-11

## 2023-01-11 NOTE — TELEPHONE ENCOUNTER
Location of patient: [place]    Received call from Estela Amaya at [facility] with [employer]. Subjective: Caller states \"Swelling in right hand. Spoke to Dr Irene Kuo on Saturday. Top of hand was swollen more on index finger. There is still some swelling there. My hand looks strange. I had some nosebleeds around [place]. Sindhu Jarred from the office to called to see how I was feeling then. I told her I am having some problem with my hand. On the side of the index finger there are two little marks. It could be a bite armond. \"     Current Symptoms: Right hand swelling, index finger swelling with two possible bite marks. Can not close hand normally. Can not use hand normally. Rash on ankle maybe. Onset: 3 weeks ago; sudden    Associated Symptoms: NA    Pain Severity: 6/10; aching; intermittent    Temperature: Patient denies by unknown method    What has been tried: antibiotic - Dr. Irene Kuo called in     LMP: NA Pregnant: NA    Recommended disposition: See PCP within 24 Hours    Care advice provided, patient verbalizes understanding; denies any other questions or concerns; instructed to call back for any new or worsening symptoms. Patient/Caller agrees with recommended disposition; writer provided warm transfer to Scott Olivares at [facility] for appointment scheduling    Attention Provider: Thank you for allowing me to participate in the care of your patient. The patient was connected to triage in response to information provided to the ECC/PSC. Please do not respond through this encounter as the response is not directed to a shared pool.         Reason for Disposition   Swelling followed an insect bite to the area   Spider bite(s)   [1] Red or very tender (to touch) area AND [2] started over 24 hours after the bite    Protocols used: Hand Swelling-ADULT-AH, Insect Bite-ADULT-AH, Spider Bite - [place]-ADULT-AH

## 2023-01-13 ENCOUNTER — OFFICE VISIT (OUTPATIENT)
Dept: FAMILY MEDICINE CLINIC | Facility: CLINIC | Age: 88
End: 2023-01-13
Payer: MEDICARE

## 2023-01-13 VITALS
WEIGHT: 199 LBS | SYSTOLIC BLOOD PRESSURE: 138 MMHG | TEMPERATURE: 97.3 F | DIASTOLIC BLOOD PRESSURE: 82 MMHG | OXYGEN SATURATION: 99 % | BODY MASS INDEX: 31.17 KG/M2 | HEART RATE: 65 BPM

## 2023-01-13 DIAGNOSIS — I10 PRIMARY HYPERTENSION: ICD-10-CM

## 2023-01-13 DIAGNOSIS — M19.90 INFLAMMATORY ARTHRITIS: Primary | ICD-10-CM

## 2023-01-13 DIAGNOSIS — I48.0 PAROXYSMAL ATRIAL FIBRILLATION (HCC): ICD-10-CM

## 2023-01-13 PROCEDURE — 96372 THER/PROPH/DIAG INJ SC/IM: CPT | Performed by: FAMILY MEDICINE

## 2023-01-13 PROCEDURE — 1123F ACP DISCUSS/DSCN MKR DOCD: CPT | Performed by: FAMILY MEDICINE

## 2023-01-13 PROCEDURE — 99214 OFFICE O/P EST MOD 30 MIN: CPT | Performed by: FAMILY MEDICINE

## 2023-01-13 RX ORDER — METHYLPREDNISOLONE SODIUM SUCCINATE 40 MG/ML
40 INJECTION, POWDER, LYOPHILIZED, FOR SOLUTION INTRAMUSCULAR; INTRAVENOUS ONCE
Status: COMPLETED | OUTPATIENT
Start: 2023-01-13 | End: 2023-01-13

## 2023-01-13 RX ORDER — METHYLPREDNISOLONE 4 MG/1
TABLET ORAL
Qty: 1 KIT | Refills: 0 | Status: SHIPPED | OUTPATIENT
Start: 2023-01-13 | End: 2023-01-19

## 2023-01-13 RX ADMIN — METHYLPREDNISOLONE SODIUM SUCCINATE 40 MG: 40 INJECTION, POWDER, LYOPHILIZED, FOR SOLUTION INTRAMUSCULAR; INTRAVENOUS at 16:25

## 2023-01-13 ASSESSMENT — ENCOUNTER SYMPTOMS
ABDOMINAL PAIN: 0
CHEST TIGHTNESS: 0
SINUS PAIN: 0
EYE DISCHARGE: 0
CONSTIPATION: 0
COUGH: 0
SHORTNESS OF BREATH: 0
DIARRHEA: 0

## 2023-01-13 ASSESSMENT — PATIENT HEALTH QUESTIONNAIRE - PHQ9
SUM OF ALL RESPONSES TO PHQ QUESTIONS 1-9: 1
SUM OF ALL RESPONSES TO PHQ9 QUESTIONS 1 & 2: 1
1. LITTLE INTEREST OR PLEASURE IN DOING THINGS: 0
2. FEELING DOWN, DEPRESSED OR HOPELESS: 1
SUM OF ALL RESPONSES TO PHQ QUESTIONS 1-9: 1

## 2023-01-13 NOTE — PROGRESS NOTES
Maria Fernanda Javier is a 80 y.o. female who presents with   Chief Complaint   Patient presents with    Swelling     And pain. Right hand, 2nd finger and into hand, x2-3 weeks. Two reddened areas on side of finger. No known injury. Dr Vince Quinonez called in Augmentin - has 3 days of tx remaining       History of Present Illness  Pt with petechial rash on LE's. Had Augmentin since Saturday. Has had Swelling and redness At R 2nd MCP joint. Has pacemaker and planning Watchman in Feb.  On Eliquis. Review of Systems  Review of Systems   Constitutional:  Negative for appetite change, fatigue and fever. HENT:  Negative for congestion, ear pain and sinus pain. Eyes:  Negative for discharge. Respiratory:  Negative for cough, chest tightness and shortness of breath. Cardiovascular:  Negative for chest pain, palpitations and leg swelling. Gastrointestinal:  Negative for abdominal pain, constipation and diarrhea. Genitourinary:  Negative for dysuria. Musculoskeletal:  Positive for arthralgias. Negative for joint swelling. Skin:  Negative for rash. Neurological:  Negative for headaches. Hematological:  Negative for adenopathy. Psychiatric/Behavioral:  Negative for dysphoric mood. The patient is not nervous/anxious. Medications  Current Outpatient Medications   Medication Sig Dispense Refill    methylPREDNISolone (MEDROL DOSEPACK) 4 MG tablet Take by mouth. 1 kit 0    Bioflavonoid Products (RICK C PO) Take by mouth      Multiple Vitamins-Minerals (ONE-A-DAY WOMENS PO) Take 1 tablet by mouth daily      AZO-CRANBERRY PO Take 2 tablets by mouth daily      metoprolol succinate (TOPROL XL) 25 MG extended release tablet Take 1 tablet by mouth in the morning and 1 tablet in the evening.  60 tablet 5    apixaban (ELIQUIS) 5 MG TABS tablet Take 5 mg by mouth 2 times daily      Cholecalciferol 50 MCG (2000 UT) TABS Take by mouth      magnesium oxide (MAG-OX) 400 (240 Mg) MG tablet Take 400 mg by mouth 2 times daily No current facility-administered medications for this visit. Past Medical History  Past Medical History:   Diagnosis Date    Anticoagulant long-term use     Arrhythmia     Atrial fibrillation (HCC)     Chronic back pain Many years ago    Esophageal reflux     Esophageal reflux     H/O complete eye exam Yearly    Dr. Roc Rodriguez    Headache     HLD (hyperlipidemia)     Hypertension     controlled with medication       Surgical History  Past Surgical History:   Procedure Laterality Date    APPENDECTOMY      CATARACT REMOVAL Bilateral     GYN      fibroid tumors removed, 1.5 ovaries removed    ORTHOPEDIC SURGERY  2012    left elbow surgery    ORTHOPEDIC SURGERY      R knee scope    [place] UNLISTED PROCEDURE CARDIAC SURGERY      cath    PRE-MALIGNANT / BENIGN SKIN LESION EXCISION      basal cell removed from face          Family History  Family History   Problem Relation Age of Onset    Heart Disease Mother         Cogestive heart  disease    Hypertension Mother     Other Mother         pacemaker,  at 80    Arthritis Mother     High Blood Pressure Mother     Stroke Brother         mild x2; major x1  (smoker)    Heart Disease Brother         2 mild strokes and one serious . He lost his ability to speak    Lung Disease Father     Heart Disease Father     Other Father         pacemaker -  at 80       Social History  Social History     Socioeconomic History    Marital status:       Spouse name: Not on file    Number of children: Not on file    Years of education: Not on file    Highest education level: Not on file   Occupational History    Not on file   Tobacco Use    Smoking status: Never    Smokeless tobacco: Never   Vaping Use    Vaping Use: Never used   Substance and Sexual Activity    Alcohol use: No    Drug use: Never    Sexual activity: Not Currently   Other Topics Concern    Not on file   Social History Narrative    Not on file     Social Determinants of Health Financial Resource Strain: Not on file   Food Insecurity: Not on file   Transportation Needs: Not on file   Physical Activity: Not on file   Stress: Not on file   Social Connections: Not on file   Intimate Partner Violence: Not on file   Housing Stability: Not on file       Social History     Tobacco Use   Smoking Status Never   Smokeless Tobacco Never       Allergies  Allergies   Allergen Reactions    Guaifenesin      Other reaction(s): Unknown-Unspecified    Loratadine-Pseudoephedrine Er Other (See Comments)     Other reaction(s): Other- (not listed) - Allergy  Difficulty sleeping and jittery  Difficulty sleeping and jittery      Sulfa Antibiotics Rash       Vital Signs  Body mass index is 31.17 kg/m². Vitals:    01/13/23 1442   BP: 138/82   Pulse: 65   Temp: 97.3 °F (36.3 °C)   TempSrc: Temporal   SpO2: 99%   Weight: 199 lb (90.3 kg)       Physical Exam  Physical Exam  Vitals reviewed. Constitutional:       Appearance: Normal appearance. HENT:      Head: Normocephalic. Right Ear: Tympanic membrane normal.      Left Ear: Tympanic membrane normal.      Nose: No congestion. Mouth/Throat:      Pharynx: No oropharyngeal exudate or posterior oropharyngeal erythema. Eyes:      Extraocular Movements: Extraocular movements intact. Conjunctiva/sclera: Conjunctivae normal.   Cardiovascular:      Rate and Rhythm: Normal rate. Rhythm irregular. Heart sounds: Normal heart sounds. No murmur heard. Pulmonary:      Effort: Pulmonary effort is normal.      Breath sounds: Normal breath sounds. No wheezing. Musculoskeletal:         General: Swelling and tenderness (R 2nd MCP; swollen and tender; decreased rom) present. Right lower leg: No edema. Left lower leg: No edema. Lymphadenopathy:      Cervical: No cervical adenopathy. Skin:     General: Skin is warm and dry. Findings: No rash. Neurological:      General: No focal deficit present. Mental Status: She is alert. Psychiatric:         Mood and Affect: Mood normal.         Thought Content: Thought content normal.        Assessment and Plan  Nikkie Oliva was seen today for swelling. Diagnoses and all orders for this visit:    Inflammatory arthritis  -     methylPREDNISolone sodium (SOLU-MEDROL) injection 40 mg    Primary hypertension    Paroxysmal atrial fibrillation (HCC)    Other orders  -     methylPREDNISolone (MEDROL DOSEPACK) 4 MG tablet; Take by mouth. Call if not improved  Awaiting Watchman    On this date I have spent 30 minutes reviewing previous notes, test results and face to face with the patient discussing the diagnosis and importance of compliance with the treatment plan as well as documenting on the day of the visit.

## 2023-01-16 ENCOUNTER — CARE COORDINATION (OUTPATIENT)
Dept: CARE COORDINATION | Facility: CLINIC | Age: 88
End: 2023-01-16

## 2023-01-16 NOTE — CARE COORDINATION
Ambulatory Care Coordination Note  1/16/2023    ACC: Rima Castellanos RN    Ccm outreached to patient. Patient states she went to pcp office on 1/13/23. Patient states she had to stop abt therapy due to rash. Pcp started patient on medrol pack. Patient states pain has subsided. Patient states blood pressure this morning was 173/109. Patient states she took her blood pressure medication this morning and will recheck in 1 hour and call ccm back. Patient states no other questions or concerns. Ccm discussed that I would outreach next week. Patient agreeable. Offered patient enrollment in the Remote Patient Monitoring (RPM) program for in-home monitoring: NA. Lab Results       None            Care Coordination Interventions    Referral from Primary Care Provider: No  Suggested Interventions and Community Resources          Goals Addressed                   This Visit's Progress     Conditions and Symptoms   On track     I will schedule office visits, as directed by my provider. I will keep my appointment or reschedule if I have to cancel. I will notify my provider of any barriers to my plan of care. I will notify my provider of any symptoms that indicate a worsening of my condition. Barriers: none  Plan for overcoming my barriers: N/A  Confidence: 9/10  Anticipated Goal Completion Date: 3/19/23         Self Monitoring   On track     Blood Pressure - I will take my blood pressure as directed - Daily  I will notify my provider of any trends of increasing or decreasing blood pressures over a month period of time. I will notify my provider of any changes in blood pressure associated with symptoms of dizziness, falls, passing out, headache, confusion/change in mental status. Patient Reported Blood Pressure No flowsheet data found.     Barriers: none  Plan for overcoming my barriers: N/A  Confidence: 9/10  Anticipated Goal Completion Date: 3/19/23                Prior to Admission medications    Medication Sig Start Date End Date Taking? Authorizing Provider   methylPREDNISolone (MEDROL DOSEPACK) 4 MG tablet Take by mouth.  1/13/23 1/19/23  Sudarshan Thao MD   Bioflavonoid Products (RICK C PO) Take by mouth    Historical Provider, MD   Multiple Vitamins-Minerals (ONE-A-DAY WOMENS PO) Take 1 tablet by mouth daily    Historical Provider, MD   AZO-CRANBERRY PO Take 2 tablets by mouth daily    Historical Provider, MD   metoprolol succinate (TOPROL XL) 25 MG extended release tablet Take 1 tablet by mouth in the morning and 1 tablet in the evening. 8/24/22   Charline Ricci MD   apixaban (ELIQUIS) 5 MG TABS tablet Take 5 mg by mouth 2 times daily 1/18/22   Ar Automatic Reconciliation   Cholecalciferol 50 MCG (2000 UT) TABS Take by mouth    Ar Automatic Reconciliation   magnesium oxide (MAG-OX) 400 (240 Mg) MG tablet Take 400 mg by mouth 2 times daily 1/28/20   Ar Automatic Reconciliation       Future Appointments   Date Time Provider Devyn Bermeo   1/30/2023 12:30 PM SFD CATH/CV FLOAT [facility] AT [facility] SFD   2/20/2023  9:30 AM Sheri Kemp MD [facility] GVL AMB   4/14/2023  2:00 PM Lilliam Shah DO [facility] AMB

## 2023-01-16 NOTE — CARE COORDINATION
Ccm received call from patient. Patient states she retook her blood pressure and it is now 136/72. Ccm discussed with patient to keep logs of her blood pressures. Patient agreeable.

## 2023-01-17 ENCOUNTER — TELEPHONE (OUTPATIENT)
Dept: FAMILY MEDICINE CLINIC | Facility: CLINIC | Age: 88
End: 2023-01-17

## 2023-01-17 ENCOUNTER — NURSE TRIAGE (OUTPATIENT)
Dept: OTHER | Facility: CLINIC | Age: 88
End: 2023-01-17

## 2023-01-17 NOTE — TELEPHONE ENCOUNTER
Patient has headache and elevated /94. Patient is also having frequent urgency to use the restroom. Please call patient and advise    No available appointments this week and patient stated she did not want to go to an urgent care.

## 2023-01-17 NOTE — TELEPHONE ENCOUNTER
Received call from [facility] at [facility] with [employer]. Subjective: Caller states \"I was put on prednisone last week for swelling in my hands. I now have a headache and my blood pressure is elevated. \"     Current Symptoms: elevated BP-   Yesterday- 152/90- \"went down\" per patient- took BP medication. Patient spoke with RN in the PCP office yesterday and was told to keep a log of blood pressure's. 180/94- This morning- has taken BP medication    Onset: a few days ago    Associated Symptoms: headache- on top of head, \"brain\" fog, running to the \"bathroom\" all night per patient- having frequency/urgency    Pain Severity: 7/10; squeezing; intermittent    Temperature: Denies    What has been tried: Took BP medication    Recommended disposition: See in Office Today    Care advice provided, patient verbalizes understanding; denies any other questions or concerns; instructed to call back for any new or worsening symptoms. Patient/Caller agrees with recommended disposition; writer provided warm transfer to Yajaira at [facility] for appointment scheduling    Attention Provider: Thank you for allowing me to participate in the care of your patient. The patient was connected to triage in response to information provided to the ECC/PSC. Please do not respond through this encounter as the response is not directed to a shared pool.     Reason for Disposition   Systolic BP >= 195 OR Diastolic >= 170    Protocols used: Blood Pressure - High-ADULT-[place]

## 2023-01-23 ENCOUNTER — CARE COORDINATION (OUTPATIENT)
Dept: CARE COORDINATION | Facility: CLINIC | Age: 88
End: 2023-01-23

## 2023-01-23 NOTE — CARE COORDINATION
Ambulatory Care Coordination Note  1/23/2023    ACC: Alfredo Bauman, REDD    Ccm outreached to patient. Patient states she is feeling better today. Patient states blood pressure has been more stable. Patient states she has procedure next week. Patient states no questions or concerns. Ccm discussed that I would outreach next week. Patient agreeable. Offered patient enrollment in the Remote Patient Monitoring (RPM) program for in-home monitoring: NA. Lab Results       None            Care Coordination Interventions    Referral from Primary Care Provider: No  Suggested Interventions and Community Resources          Goals Addressed                   This Visit's Progress     Conditions and Symptoms   On track     I will schedule office visits, as directed by my provider. I will keep my appointment or reschedule if I have to cancel. I will notify my provider of any barriers to my plan of care. I will notify my provider of any symptoms that indicate a worsening of my condition. Barriers: none  Plan for overcoming my barriers: N/A  Confidence: 9/10  Anticipated Goal Completion Date: 3/19/23         Self Monitoring   On track     Blood Pressure - I will take my blood pressure as directed - Daily  I will notify my provider of any trends of increasing or decreasing blood pressures over a month period of time. I will notify my provider of any changes in blood pressure associated with symptoms of dizziness, falls, passing out, headache, confusion/change in mental status. Patient Reported Blood Pressure No flowsheet data found. Barriers: none  Plan for overcoming my barriers: N/A  Confidence: 9/10  Anticipated Goal Completion Date: 3/19/23                Prior to Admission medications    Medication Sig Start Date End Date Taking?  Authorizing Provider   Bioflavonoid Products (RICK C PO) Take by mouth    Historical Provider, MD   Multiple Vitamins-Minerals (ONE-A-DAY WOMENS PO) Take 1 tablet by mouth daily Historical Provider, MD   AZO-CRANBERRY PO Take 2 tablets by mouth daily    Historical Provider, MD   metoprolol succinate (TOPROL XL) 25 MG extended release tablet Take 1 tablet by mouth in the morning and 1 tablet in the evening. 8/24/22   Donna Smith MD   apixaban (ELIQUIS) 5 MG TABS tablet Take 5 mg by mouth 2 times daily 1/18/22   Ar Automatic Reconciliation   Cholecalciferol 50 MCG (2000 UT) TABS Take by mouth    Ar Automatic Reconciliation   magnesium oxide (MAG-OX) 400 (240 Mg) MG tablet Take 400 mg by mouth 2 times daily 1/28/20   Ar Automatic Reconciliation       Future Appointments   Date Time Provider Devyn Bermeo   1/30/2023 12:30 PM SFD CATH/CV FLOAT RN [facility] AT [facility] SFD   2/20/2023  9:30 AM MD ERIC Webb GVL AMB   4/14/2023  2:00 PM DO REYNA LubinDG GVL AMB

## 2023-01-24 NOTE — TELEPHONE ENCOUNTER
Please send in losartan 100mg #30 1 po qd and work in next week.  In the mean time can come in to get UA (lab only)

## 2023-01-25 RX ORDER — LOSARTAN POTASSIUM 100 MG/1
100 TABLET ORAL DAILY
Qty: 30 TABLET | Refills: 0 | Status: SHIPPED | OUTPATIENT
Start: 2023-01-25

## 2023-01-27 ENCOUNTER — TELEPHONE (OUTPATIENT)
Dept: CASE MANAGEMENT | Age: 88
End: 2023-01-27

## 2023-01-27 NOTE — TELEPHONE ENCOUNTER
Patient phoned this am with questions re:instructions for TANESHA on 1/30/2023 with Dr. Quentin Villarreal. NPO status and need for  reinfornced. She will take metoprolol and eliquis with a small sip of water, the morning of her procedure. Patient instructed to HOLD all other medications and supplements. Patient verbalizes understanding of all instructions & denies any questions at this time. Patient has Watchman coordinator contact ([phone]) information for questions or concerns.

## 2023-01-30 ENCOUNTER — ANESTHESIA EVENT (OUTPATIENT)
Dept: CARDIAC CATH/INVASIVE PROCEDURES | Age: 88
End: 2023-01-30
Payer: MEDICARE

## 2023-01-30 ENCOUNTER — ANESTHESIA (OUTPATIENT)
Dept: CARDIAC CATH/INVASIVE PROCEDURES | Age: 88
End: 2023-01-30
Payer: MEDICARE

## 2023-01-30 ENCOUNTER — HOSPITAL ENCOUNTER (OUTPATIENT)
Dept: CARDIAC CATH/INVASIVE PROCEDURES | Age: 88
Discharge: HOME | End: 2023-01-30
Attending: INTERNAL MEDICINE | Admitting: INTERNAL MEDICINE
Payer: MEDICARE

## 2023-01-30 ENCOUNTER — CARE COORDINATION (OUTPATIENT)
Dept: CARE COORDINATION | Facility: CLINIC | Age: 88
End: 2023-01-30

## 2023-01-30 VITALS
SYSTOLIC BLOOD PRESSURE: 130 MMHG | WEIGHT: 199 LBS | DIASTOLIC BLOOD PRESSURE: 84 MMHG | TEMPERATURE: 97.7 F | HEART RATE: 66 BPM | RESPIRATION RATE: 16 BRPM | OXYGEN SATURATION: 98 % | HEIGHT: 68 IN | BODY MASS INDEX: 30.16 KG/M2

## 2023-01-30 DIAGNOSIS — I48.0 PAROXYSMAL A-FIB (HCC): ICD-10-CM

## 2023-01-30 LAB
ANION GAP SERPL CALC-SCNC: 7 MMOL/L (ref 2–11)
BUN SERPL-MCNC: 14 MG/DL (ref 8–23)
CALCIUM SERPL-MCNC: 9.4 MG/DL (ref 8.3–10.4)
CHLORIDE SERPL-SCNC: 109 MMOL/L (ref 101–110)
CO2 SERPL-SCNC: 26 MMOL/L (ref 21–32)
CREAT SERPL-MCNC: 0.9 MG/DL (ref 0.6–1)
EKG ATRIAL RATE: 71 BPM
EKG DIAGNOSIS: NORMAL
EKG Q-T INTERVAL: 432 MS
EKG QRS DURATION: 132 MS
EKG QTC CALCULATION (BAZETT): 469 MS
EKG R AXIS: -49 DEGREES
EKG T AXIS: 9 DEGREES
EKG VENTRICULAR RATE: 71 BPM
ERYTHROCYTE [DISTWIDTH] IN BLOOD BY AUTOMATED COUNT: 13.9 % (ref 11.9–14.6)
GLUCOSE SERPL-MCNC: 109 MG/DL (ref 65–100)
HCT VFR BLD AUTO: 46.2 % (ref 35.8–46.3)
HGB BLD-MCNC: 14.8 G/DL (ref 11.7–15.4)
MCH RBC QN AUTO: 30.1 PG (ref 26.1–32.9)
MCHC RBC AUTO-ENTMCNC: 32 G/DL (ref 31.4–35)
MCV RBC AUTO: 94.1 FL (ref 82–102)
NRBC # BLD: 0 K/UL (ref 0–0.2)
PLATELET # BLD AUTO: 186 K/UL (ref 150–450)
PMV BLD AUTO: 10.5 FL (ref 9.4–12.3)
POTASSIUM SERPL-SCNC: 4 MMOL/L (ref 3.5–5.1)
RBC # BLD AUTO: 4.91 M/UL (ref 4.05–5.2)
SODIUM SERPL-SCNC: 142 MMOL/L (ref 133–143)
WBC # BLD AUTO: 7 K/UL (ref 4.3–11.1)

## 2023-01-30 PROCEDURE — 2580000003 HC RX 258: Performed by: NURSE ANESTHETIST, CERTIFIED REGISTERED

## 2023-01-30 PROCEDURE — 85027 COMPLETE CBC AUTOMATED: CPT

## 2023-01-30 PROCEDURE — 6360000002 HC RX W HCPCS: Performed by: NURSE ANESTHETIST, CERTIFIED REGISTERED

## 2023-01-30 PROCEDURE — 2500000003 HC RX 250 WO HCPCS: Performed by: NURSE ANESTHETIST, CERTIFIED REGISTERED

## 2023-01-30 PROCEDURE — 93325 DOPPLER ECHO COLOR FLOW MAPG: CPT

## 2023-01-30 PROCEDURE — 3700000001 HC ADD 15 MINUTES (ANESTHESIA)

## 2023-01-30 PROCEDURE — 99152 MOD SED SAME PHYS/QHP 5/>YRS: CPT

## 2023-01-30 PROCEDURE — 93005 ELECTROCARDIOGRAM TRACING: CPT | Performed by: INTERNAL MEDICINE

## 2023-01-30 PROCEDURE — 80048 BASIC METABOLIC PNL TOTAL CA: CPT

## 2023-01-30 PROCEDURE — 3700000000 HC ANESTHESIA ATTENDED CARE

## 2023-01-30 RX ORDER — PROPOFOL 10 MG/ML
INJECTION, EMULSION INTRAVENOUS PRN
Status: DISCONTINUED | OUTPATIENT
Start: 2023-01-30 | End: 2023-01-30 | Stop reason: SDUPTHER

## 2023-01-30 RX ORDER — LIDOCAINE HYDROCHLORIDE 20 MG/ML
INJECTION, SOLUTION EPIDURAL; INFILTRATION; INTRACAUDAL; PERINEURAL PRN
Status: DISCONTINUED | OUTPATIENT
Start: 2023-01-30 | End: 2023-01-30 | Stop reason: SDUPTHER

## 2023-01-30 RX ORDER — SODIUM CHLORIDE 9 MG/ML
INJECTION, SOLUTION INTRAVENOUS CONTINUOUS PRN
Status: DISCONTINUED | OUTPATIENT
Start: 2023-01-30 | End: 2023-01-30 | Stop reason: SDUPTHER

## 2023-01-30 RX ORDER — 0.9 % SODIUM CHLORIDE 0.9 %
1000 INTRAVENOUS SOLUTION INTRAVENOUS ONCE
Status: DISCONTINUED | OUTPATIENT
Start: 2023-01-30 | End: 2023-02-01 | Stop reason: HOSPADM

## 2023-01-30 RX ADMIN — LIDOCAINE HYDROCHLORIDE 60 MG: 20 INJECTION, SOLUTION EPIDURAL; INFILTRATION; INTRACAUDAL; PERINEURAL at 13:04

## 2023-01-30 RX ADMIN — PROPOFOL 30 MG: 10 INJECTION, EMULSION INTRAVENOUS at 13:06

## 2023-01-30 RX ADMIN — SODIUM CHLORIDE: 9 INJECTION, SOLUTION INTRAVENOUS at 13:02

## 2023-01-30 RX ADMIN — PROPOFOL 70 MG: 10 INJECTION, EMULSION INTRAVENOUS at 13:04

## 2023-01-30 ASSESSMENT — ENCOUNTER SYMPTOMS
SHORTNESS OF BREATH: 0
RESPIRATORY NEGATIVE: 1
COUGH: 0

## 2023-01-30 NOTE — ANESTHESIA POSTPROCEDURE EVALUATION
Department of Anesthesiology  Postprocedure Note    Patient: Liane Posada  MRN: [id number]  YOB: 1935  Date of evaluation: 1/30/2023      Procedure Summary     Date: 01/30/23 Room / Location: [address] [place] [address]    Anesthesia Start: 1302 Anesthesia Stop: 7398    Procedure: TRANSESOPHAGEAL ECHOCARDIOGRAM (CONTRAST/3D PRN) Diagnosis:       Paroxysmal A-fib (HCC)      Paroxysmal A-fib ([address]    Scheduled Providers: Shabbir Camacho DO Responsible Provider: Kelvin Almeida MD    Anesthesia Type: TIVA ASA Status: 3          Anesthesia Type: No value filed. Nory Phase I:      Nory Phase II:        Anesthesia Post Evaluation    Patient location during evaluation: PACU  Patient participation: complete - patient participated  Level of consciousness: awake and alert  Airway patency: patent  Nausea: well controlled. Complications: no  Cardiovascular status: acceptable.   Respiratory status: acceptable  Hydration status: stable

## 2023-01-30 NOTE — PROGRESS NOTES
Pt arrived, ambulated to room with no visible problems, planned TANESHA for Dr Lyly Eid. Consent signed, Procedure discussed with pt all questions answered voiced understanding. Medications and history discussed with pt.     Pt prepped per orders The patient has a fraility score of 4-VULNERABLE, based on no need for assistance
TANESHA by Dr Whiteside Standard along with anesthesia  II ASA II Mallampati  Pt tolerated well.
Transesophageal Echo Note  - Indication: pre watchman , atrial fibrillation  - pt underwent successful TANESHA today in cath holding  - sedation: as per anesthesiology service, please see their records for complete sedation totals and times. - anesthesiology service pushed medications and monitored the patient's level of consciousness and vital signs/physiologic status throughout the procedure duration.   - No complications, pt in stable condition  - TANESHA Brief Findings: LVEF appears preserved, biatrial enlargement, light spontaneous echo contrast noted in the LA, 3D images obtained. - Complete TANESHA report to follow. - OK for oral intake at 1515  - No driving or heavy machine operation today.    - no medication changes   - images will be made available to [facility] team for procedure planning
never

## 2023-01-30 NOTE — ANESTHESIA PRE PROCEDURE
Department of Anesthesiology  Preprocedure Note       Name:  Lizzy Munson   Age:  80 y.o.  :  1935                                          MRN:  [id number]         Date:  2023      Surgeon: * No surgeons listed *    Procedure: * No procedures listed *    Medications prior to admission:   Prior to Admission medications    Medication Sig Start Date End Date Taking? Authorizing Provider   losartan (COZAAR) 100 MG tablet Take 1 tablet by mouth daily  Patient not taking: No sig reported 23   Kylee Smith MD   Bioflavonoid Products (RICK C PO) Take by mouth    Historical Provider, MD   Multiple Vitamins-Minerals (ONE-A-DAY WOMENS PO) Take 1 tablet by mouth daily    Historical Provider, MD   AZO-CRANBERRY PO Take 2 tablets by mouth daily    Historical Provider, MD   metoprolol succinate (TOPROL XL) 25 MG extended release tablet Take 1 tablet by mouth in the morning and 1 tablet in the evening. 22   Romayne Sicard, MD   apixaban (ELIQUIS) 5 MG TABS tablet Take 5 mg by mouth 2 times daily 22   Ar Automatic Reconciliation   Cholecalciferol 50 MCG (2000 UT) TABS Take by mouth    Ar Automatic Reconciliation   magnesium oxide (MAG-OX) 400 (240 Mg) MG tablet Take 400 mg by mouth 2 times daily 20   Ar Automatic Reconciliation       Current medications:    Current Facility-Administered Medications   Medication Dose Route Frequency Provider Last Rate Last Admin    0.9 % sodium chloride bolus  1,000 mL IntraVENous Once Mikey Shearing, DO           Allergies: Allergies   Allergen Reactions    Guaifenesin      Other reaction(s): Unknown-Unspecified    Loratadine-Pseudoephedrine Er Other (See Comments)     Other reaction(s):  Other- (not listed) - Allergy  Difficulty sleeping and jittery  Difficulty sleeping and jittery      Amoxicillin Rash     Bilateral lower extremities    Sulfa Antibiotics Rash       Problem List:    Patient Active Problem List   Diagnosis Code    HLD (hyperlipidemia) E78.5    Premature atrial contraction I49.1    Ventricular premature beats I49.3    Esophageal reflux K21.9    Essential hypertension I10    Paroxysmal atrial fibrillation (HCC) I48.0    Abnormal nuclear stress test R94.39    Sinus node dysfunction (HCC) I49.5    Coronary artery disease involving native coronary artery of native heart without angina pectoris I25.10    Chronic fatigue R53.82    Symptomatic bradycardia R00.1       Past Medical History:        Diagnosis Date    Anticoagulant long-term use     Arrhythmia     Atrial fibrillation (HCC)     Chronic back pain Many years ago    Esophageal reflux     Esophageal reflux     H/O complete eye exam Yearly    Dr. Margie Beck    Headache     HLD (hyperlipidemia)     Hypertension     controlled with medication       Past Surgical History:        Procedure Laterality Date    APPENDECTOMY      CATARACT REMOVAL Bilateral 2012    GYN      fibroid tumors removed, 1.5 ovaries removed    ORTHOPEDIC SURGERY  06/2012    left elbow surgery    ORTHOPEDIC SURGERY  2000's    R knee scope    GA UNLISTED PROCEDURE CARDIAC SURGERY      cath    PRE-MALIGNANT / BENIGN SKIN LESION EXCISION      basal cell removed from face       Social History:    Social History     Tobacco Use    Smoking status: Never    Smokeless tobacco: Never   Substance Use Topics    Alcohol use:  No                                Counseling given: Not Answered      Vital Signs (Current):   Vitals:    01/27/23 1028 01/30/23 1209   BP:  (!) 157/95   Pulse:  71   Temp:  98.1 °F (36.7 °C)   TempSrc:  Oral   SpO2:  97%   Weight: 199 lb (90.3 kg) 199 lb (90.3 kg)   Height: 5' 7.5\" (1.715 m) 5' 7.5\" (1.715 m)                                              BP Readings from Last 3 Encounters:   01/30/23 (!) 157/95   01/13/23 138/82   12/22/22 133/86       NPO Status: Time of last liquid consumption: 2100                        Time of last solid consumption: 2100 Date of last liquid consumption: 01/29/23                        Date of last solid food consumption: 01/29/23    BMI:   Wt Readings from Last 3 Encounters:   01/30/23 199 lb (90.3 kg)   01/13/23 199 lb (90.3 kg)   12/22/22 196 lb (88.9 kg)     Body mass index is 30.71 kg/m². CBC:   Lab Results   Component Value Date/Time    WBC 7.0 01/30/2023 11:54 AM    RBC 4.91 01/30/2023 11:54 AM    HGB 14.8 01/30/2023 11:54 AM    HCT 46.2 01/30/2023 11:54 AM    MCV 94.1 01/30/2023 11:54 AM    RDW 13.9 01/30/2023 11:54 AM     01/30/2023 11:54 AM       CMP:   Lab Results   Component Value Date/Time     01/30/2023 11:54 AM    K 4.0 01/30/2023 11:54 AM     01/30/2023 11:54 AM    CO2 26 01/30/2023 11:54 AM    BUN 14 01/30/2023 11:54 AM    CREATININE 0.90 01/30/2023 11:54 AM    GFRAA >60 05/13/2022 02:40 PM    AGRATIO 1.8 02/07/2022 10:19 AM    LABGLOM >60 01/30/2023 11:54 AM    GLUCOSE 109 01/30/2023 11:54 AM    PROT 6.9 02/07/2022 10:19 AM    CALCIUM 9.4 01/30/2023 11:54 AM    BILITOT 0.6 02/07/2022 10:19 AM    ALKPHOS 77 02/07/2022 10:19 AM    AST 26 02/07/2022 10:19 AM    ALT 23 02/07/2022 10:19 AM       POC Tests: No results for input(s): POCGLU, POCNA, POCK, POCCL, POCBUN, POCHEMO, POCHCT in the last 72 hours.     Coags: No results found for: PROTIME, INR, APTT    HCG (If Applicable): No results found for: PREGTESTUR, PREGSERUM, HCG, HCGQUANT     ABGs: No results found for: PHART, PO2ART, WJV8QFR, OPI0TXN, BEART, C4NQHLEH     Type & Screen (If Applicable):  No results found for: LABABO, LABRH    Drug/Infectious Status (If Applicable):  No results 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, HEPCAB    COVID-19 Screening (If Applicable):   Lab Results   Component Value Date/Time    COVID19 Not Detected 09/23/2021 01:26 PM    COVID19 Performed 09/23/2021 01:26 PM           Anesthesia Evaluation  Patient summary reviewed and Nursing notes reviewed no history of anesthetic complications:   Airway: Mallampati: III     Neck ROM: limited  Comment: Small mouth  Limited neck ROM  Mouth opening: < 3 FB   Dental: normal exam         Pulmonary:Negative Pulmonary ROS breath sounds clear to auscultation                             Cardiovascular:  Exercise tolerance: good (>4 METS),   (+) hypertension:, pacemaker: pacemaker, CAD (mild to moderate non-obstructive CAD on [facility] 2021):, dysrhythmias (Afib, PVCs):, hyperlipidemia          Rate: normal                 ROS comment: Echo 4/2022 - EF normal, mild to mod MR     Neuro/Psych:   (+) headaches:,             GI/Hepatic/Renal: Neg GI/Hepatic/Renal ROS            Endo/Other:    (+) : arthritis:., .                 Abdominal:             Vascular: Other Findings:           Anesthesia Plan      TIVA     ASA 3       Induction: intravenous. Anesthetic plan and risks discussed with patient.                         Barney Mireles MD   1/30/2023

## 2023-01-30 NOTE — DISCHARGE INSTRUCTIONS
Learning About Sedation (Including MAC)  What is sedation? Sedation is the use of medicine to help you feel relaxed and comfortable during a procedure. Sometimes it's used to help with pain. Sedation may be used with an injection to numb the area or with other medicine to reduce pain. It's often used in procedures like a colonoscopy or a biopsy. It also can be used in many surgeries. Examples include knee surgery and hernia repair. You may be awake and able to talk with your care team. Or you may fall asleep. You might remember little, if anything, of the procedure or surgery. How is it done? Sedation is usually given in a vein in the arm (intravenously, or IV). It is often used with local or regional anesthesia. The local type numbs a small part of the body. The regional type blocks pain to a larger area of the body. While you are sedated, a doctor or nurse will watch you closely. They'll make sure you stay safe and comfortable. In some cases, an anesthesia professional may be there during the procedure to help keep you safe. This is often called monitored anesthesia care (MAC). How do you prepare? Your doctor will tell you what to expect when you have sedation. You'll get instructions to help you prepare. They'll include when to stop eating or drinking. If you take medicine, you'll be told what you can and can't take before sedation. You'll need to bring someone who can take you home. What are the risks? Serious problems are rare. They include breathing that slows or stops and an allergic reaction to the medicine. Some health issues may increase the risk of problems. These include:  Smoking. Sleep apnea. This happens during sleep when a blocked airway causes breathing problems. Being overweight. Where can you learn more? Go to http://www.woods.com/ and enter K985 to learn more about \"Learning About Sedation (Including MAC). \"  Current as of: February 16, 2022 Content Version: 13.5  © 0375-9003 [employer]. Care instructions adapted under license by [employer]. If you have questions about a medical condition or this instruction, always ask your healthcare professional. [address] any warranty or liability for your use of this information. Transesophageal Echocardiogram: What to Expect at [address]  A transesophageal echocardiogram is a test to help your doctor look at the inside of your heart. A small device called a transducer directs sound waves toward your heart. The sound waves make a picture of the heart's valves and chambers. Before the test, your throat was sprayed with medicine to numb it. Your throat may be sore for a few days. You may have had a sedative to help you relax. You may be unsteady after having sedation. It can take a few hours for the medicine's effects to wear off. Common side effects include nausea, vomiting, and feeling sleepy or tired. This care sheet gives you a general idea about how long it will take for you to recover. But each person recovers at a different pace. Follow the steps below to feel better as quickly as possible. How can you care for yourself at home? Activity    If a sedative was used, your doctor will tell you when it is safe for you to do your normal activities. For your safety, do not drive or operate any machinery that could be dangerous. Wait until the medicine wears off and you can think clearly and react easily. Diet    Do not eat or drink until 3:15pm today     When the numbness is gone, you can eat your normal diet. Throat lozenges and warm saltwater gargles can help relieve throat soreness. Throat lozenges can be used by people age 3 or older. And most people can gargle at age 6 and older. Do not drink alcohol for 24 hours. Follow-up care is a key part of your treatment and safety.  Be sure to make and go to all appointments, and call your doctor if you are having problems. It's also a good idea to know your test results and keep a list of the medicines you take. When should you call for help? Call 911 anytime you think you may need emergency care. For example, call if:    Your stools are maroon or very bloody. You vomit blood or what looks like coffee grounds. Call your doctor now or seek immediate medical care if:    You have pain in your chest, belly, or back. You have new or worse trouble swallowing. You have trouble breathing. Watch closely for changes in your health, and be sure to contact your doctor if you have any problems. Current as of: September 7, 2022               Content Version: 13.5  © 2006-2022 [employer]. Care instructions adapted under license by [facility] ([facility]). If you have questions about a medical condition or this instruction, always ask your healthcare professional. [place] [address] any warranty or liability for your use of this information.

## 2023-01-30 NOTE — H&P
[address]     2 [facility], [address]      Patient:  Altamese Holiday Bozena  1935       SUBJECTIVE:  Seng Vaca is a  80 y.o. female seen for the following:     No chief complaint on file. .     CC: atrial fibrillation     HPI:     Seng Vaca is a very pleasant 80 y.o. female with a past medical and cardiac history significant for permanent AF, CAD, and presents for evaluation of symptomatic bradycardia. She  has been having ongoing fatigue, low energy, some dizziness/lightheaded spells, no syncope, no chest pain. She is now s/p PPM and having problems with RVR, not tolerating higher doses of diltiazem. She presents back today to discuss Watchman. Has had some balance and weakness problems, concerned about falls, lives alone. Cardiac PMH: (Old records have been reviewed and summarized below)   PAF and CAD. New SSS on monitor and seen by EP at [facility]. Needing PPM,wants this done at [facility]. TTE (22): EF 60%   HR low, 16 pauses > 3 sec on monitor    Here for pre Watchman TANESHA. No chest pain, difficulty swallowing food/liquids, dyspnea. Past medical history, past surgical history, family history, social history, and medications were all reviewed with the patient today and updated as necessary.          Patient Active Problem List   Diagnosis    HLD (hyperlipidemia)    Premature atrial contraction    Ventricular premature beats    Esophageal reflux    Essential hypertension    Paroxysmal atrial fibrillation (HCC)    Abnormal nuclear stress test    Sinus node dysfunction (HCC)    Coronary artery disease involving native coronary artery of native heart without angina pectoris    Chronic fatigue    Symptomatic bradycardia         Family History   Problem Relation Age of Onset    Heart Disease Mother         Cogestive heart  disease    Hypertension Mother     Other Mother         pacemaker,  at 80    Arthritis Mother     High Blood Pressure Mother Stroke Brother         mild x2; major x1  (smoker)    Heart Disease Brother         2 mild strokes and one serious . He lost his ability to speak    Lung Disease Father     Heart Disease Father     Other Father         pacemaker -  at 80       Social History     Tobacco Use    Smoking status: Never    Smokeless tobacco: Never   Substance Use Topics    Alcohol use: No       Review of Systems   Cardiovascular:  Negative for chest pain. Respiratory: Negative. Negative for cough and shortness of breath. Gastrointestinal:         No difficulty swallowing food or liquids. All other systems reviewed and are negative. PHYSICAL EXAM:    BP (!) 157/95   Pulse 71   Temp 98.1 °F (36.7 °C) (Oral)   Ht 5' 7.5\" (1.715 m)   Wt 199 lb (90.3 kg)   SpO2 97%   Breastfeeding No   BMI 30.71 kg/m²     Physical Exam  Vitals reviewed. Constitutional:       General: She is not in acute distress. Appearance: She is obese. She is not ill-appearing, toxic-appearing or diaphoretic. HENT:      Head: Normocephalic and atraumatic. Right Ear: External ear normal.      Left Ear: External ear normal.      Nose: Nose normal.   Eyes:      General: No scleral icterus. Right eye: No discharge. Left eye: No discharge. Cardiovascular:      Rate and Rhythm: Normal rate and regular rhythm. Heart sounds: Normal heart sounds. No murmur heard. No friction rub. No gallop. Pulmonary:      Effort: Pulmonary effort is normal. No respiratory distress. Breath sounds: Normal breath sounds. No stridor. No wheezing or rales. Abdominal:      General: Abdomen is flat. There is no distension. Palpations: Abdomen is soft. Tenderness: There is no abdominal tenderness. Musculoskeletal:         General: Normal range of motion. Right lower leg: No edema. Left lower leg: No edema. Skin:     General: Skin is warm and dry. Coloration: Skin is not jaundiced or pale.       Findings: No bruising. Neurological:      Mental Status: She is alert and oriented to person, place, and time. Psychiatric:         Mood and Affect: Mood normal.         Behavior: Behavior normal.         Judgment: Judgment normal.       Recent Results (from the past 168 hour(s))   Basic Metabolic Panel    Collection Time: 01/30/23 11:54 AM   Result Value Ref Range    Sodium 142 133 - 143 mmol/L    Potassium 4.0 3.5 - 5.1 mmol/L    Chloride 109 101 - 110 mmol/L    CO2 26 21 - 32 mmol/L    Anion Gap 7 2 - 11 mmol/L    Glucose 109 (H) 65 - 100 mg/dL    BUN 14 8 - 23 MG/DL    Creatinine 0.90 0.6 - 1.0 MG/DL    Est, Glom Filt Rate >60 >60 ml/min/1.73m2    Calcium 9.4 8.3 - 10.4 MG/DL   CBC    Collection Time: 01/30/23 11:54 AM   Result Value Ref Range    WBC 7.0 4.3 - 11.1 K/uL    RBC 4.91 4.05 - 5.2 M/uL    Hemoglobin 14.8 11.7 - 15.4 g/dL    Hematocrit 46.2 35.8 - 46.3 %    MCV 94.1 82 - 102 FL    MCH 30.1 26.1 - 32.9 PG    MCHC 32.0 31.4 - 35.0 g/dL    RDW 13.9 11.9 - 14.6 %    Platelets 866 832 - 727 K/uL    MPV 10.5 9.4 - 12.3 FL    nRBC 0.00 0.0 - 0.2 K/uL   EKG 12 Lead    Collection Time: 01/30/23 12:23 PM   Result Value Ref Range    Ventricular Rate 71 BPM    Atrial Rate 71 BPM    QRS Duration 132 ms    Q-T Interval 432 ms    QTc Calculation (Bazett) 469 ms    R Axis -49 degrees    T Axis 9 degrees    Diagnosis       Atrial fibrillation with occasional ventricular-paced complexes       ASSESSMENT/PLAN:      ICD-10-CM    1.  Paroxysmal A-fib (HCC)  I48.0 Transesophageal echocardiogram (TANESHA) with contrast and 3D PRN     Transesophageal echocardiogram (TANESHA) with contrast and 3D PRN        Atrial fibrillation  - patient is pre Watchman (left atrial occluder device) implantation  - NPO  - consent on chart, risks benefits, alternatives have been discussed with the patient/patient's family   - questions answered  - appropriate to proceed to transesophageal echocardiogram    Shantel Welsh DO  1/30/2023

## 2023-01-30 NOTE — CARE COORDINATION
Ambulatory Care Coordination Note  1/30/2023    ACC: Theresa Iglesias, RN    Ccm outreached to patient. Patient states she has TANESHA scheduled today. Patient states watchmen to be placed on 2/14/23. Patient states no questions or concerns. [facility] discussed that I would outreach next week. Patient agreeable. Offered patient enrollment in the Remote Patient Monitoring (RPM) program for in-home monitoring: NA. Lab Results       None            Care Coordination Interventions    Referral from Primary Care Provider: No  Suggested Interventions and Community Resources          Goals Addressed                   This Visit's Progress     Conditions and Symptoms   On track     I will schedule office visits, as directed by my provider. I will keep my appointment or reschedule if I have to cancel. I will notify my provider of any barriers to my plan of care. I will notify my provider of any symptoms that indicate a worsening of my condition. Barriers: none  Plan for overcoming my barriers: N/A  Confidence: 9/10  Anticipated Goal Completion Date: 3/19/23         Self Monitoring   On track     Blood Pressure - I will take my blood pressure as directed - Daily  I will notify my provider of any trends of increasing or decreasing blood pressures over a month period of time. I will notify my provider of any changes in blood pressure associated with symptoms of dizziness, falls, passing out, headache, confusion/change in mental status. Patient Reported Blood Pressure No flowsheet data found. Barriers: none  Plan for overcoming my barriers: N/A  Confidence: 9/10  Anticipated Goal Completion Date: 3/19/23                Prior to Admission medications    Medication Sig Start Date End Date Taking?  Authorizing Provider   losartan (COZAAR) 100 MG tablet Take 1 tablet by mouth daily  Patient not taking: Reported on 1/27/2023 1/25/23   Lucio Myles MD   Bioflavonoid Products (RICK C PO) Take by mouth    Historical Provider, MD   Multiple Vitamins-Minerals (ONE-A-DAY WOMENS PO) Take 1 tablet by mouth daily    Historical Provider, MD   AZO-CRANBERRY PO Take 2 tablets by mouth daily    Historical Provider, MD   metoprolol succinate (TOPROL XL) 25 MG extended release tablet Take 1 tablet by mouth in the morning and 1 tablet in the evening. 8/24/22   Collin Walker MD   apixaban (ELIQUIS) 5 MG TABS tablet Take 5 mg by mouth 2 times daily 1/18/22   Ar Automatic Reconciliation   Cholecalciferol 50 MCG (2000 UT) TABS Take by mouth    Ar Automatic Reconciliation   magnesium oxide (MAG-OX) 400 (240 Mg) MG tablet Take 400 mg by mouth 2 times daily 1/28/20   Ar Automatic Reconciliation       Future Appointments   Date Time Provider Devyn Bermeo   1/30/2023 12:30 PM [facility] CATH/CV FLOAT RN [facility] AT [facility]   2/20/2023  9:30 AM MD ERIC Glez [facility] AMB   4/14/2023  2:00 PM DO NOHEMI Cruz [facility] AMB 29.9

## 2023-01-31 LAB
ECHO AO ROOT DIAM: 4 CM
ECHO AO ROOT INDEX: 1.97 CM/M2
ECHO BSA: 2.07 M2
ECHO LV IVSD: 1.3 CM (ref 0.6–0.9)
LV EF: 58 %
LVEF MODALITY: ABNORMAL

## 2023-01-31 PROCEDURE — 93312 ECHO TRANSESOPHAGEAL: CPT | Performed by: INTERNAL MEDICINE

## 2023-01-31 PROCEDURE — 99152 MOD SED SAME PHYS/QHP 5/>YRS: CPT | Performed by: INTERNAL MEDICINE

## 2023-01-31 PROCEDURE — 93319 3D ECHO IMG CGEN CAR ANOMAL: CPT | Performed by: INTERNAL MEDICINE

## 2023-01-31 PROCEDURE — 93320 DOPPLER ECHO COMPLETE: CPT | Performed by: INTERNAL MEDICINE

## 2023-02-01 ENCOUNTER — TELEPHONE (OUTPATIENT)
Dept: CASE MANAGEMENT | Age: 88
End: 2023-02-01

## 2023-02-01 NOTE — TELEPHONE ENCOUNTER
Successful screening TANESHA 1/30/2023. Discussed upcoming Watchman implant dates. Patient/daughter will be contacted upon final review of TANESHA by implanting physician. Watchman coordinator contact ([phone]) information provided.

## 2023-02-06 ENCOUNTER — CARE COORDINATION (OUTPATIENT)
Dept: CARE COORDINATION | Facility: CLINIC | Age: 88
End: 2023-02-06

## 2023-02-06 NOTE — CARE COORDINATION
Ambulatory Care Coordination Note  2/6/2023    ACC: Manfred Miles, RN    Ccm outreached to patient. Patient states she had procedure last week and it went well. Patient states no questions or concerns. [facility] discussed that I would outreach next week. Patient agreeable. Offered patient enrollment in the Remote Patient Monitoring (RPM) program for in-home monitoring: NA. Lab Results       None            Care Coordination Interventions    Referral from Primary Care Provider: No  Suggested Interventions and Community Resources          Goals Addressed                   This Visit's Progress     Conditions and Symptoms   On track     I will schedule office visits, as directed by my provider. I will keep my appointment or reschedule if I have to cancel. I will notify my provider of any barriers to my plan of care. I will notify my provider of any symptoms that indicate a worsening of my condition. Barriers: none  Plan for overcoming my barriers: N/A  Confidence: 9/10  Anticipated Goal Completion Date: 3/19/23         Self Monitoring   On track     Blood Pressure - I will take my blood pressure as directed - Daily  I will notify my provider of any trends of increasing or decreasing blood pressures over a month period of time. I will notify my provider of any changes in blood pressure associated with symptoms of dizziness, falls, passing out, headache, confusion/change in mental status. Patient Reported Blood Pressure No flowsheet data found. Barriers: none  Plan for overcoming my barriers: N/A  Confidence: 9/10  Anticipated Goal Completion Date: 3/19/23                Prior to Admission medications    Medication Sig Start Date End Date Taking?  Authorizing Provider   losartan (COZAAR) 100 MG tablet Take 1 tablet by mouth daily  Patient not taking: No sig reported 1/25/23   Grzegorz Gayle MD   Bioflavonoid Products (RICK C PO) Take by mouth    Historical Provider, MD   Multiple Vitamins-Minerals (ONE-A-DAY WOMENS PO) Take 1 tablet by mouth daily    Historical Provider, MD   AZO-CRANBERRY PO Take 2 tablets by mouth daily    Historical Provider, MD   metoprolol succinate (TOPROL XL) 25 MG extended release tablet Take 1 tablet by mouth in the morning and 1 tablet in the evening. 8/24/22   Bryce Martínez MD   apixaban (ELIQUIS) 5 MG TABS tablet Take 5 mg by mouth 2 times daily 1/18/22   Ar Automatic Reconciliation   Cholecalciferol 50 MCG (2000 UT) TABS Take by mouth    Ar Automatic Reconciliation   magnesium oxide (MAG-OX) 400 (240 Mg) MG tablet Take 400 mg by mouth 2 times daily 1/28/20   Ar Automatic Reconciliation       Future Appointments   Date Time Provider Devyn Bermeo   2/20/2023  9:30 AM Emily Arthur MD [facility] AMB   4/14/2023  2:00 PM Guillermo Davis DO [facility] AMB

## 2023-02-13 ENCOUNTER — CARE COORDINATION (OUTPATIENT)
Dept: CARE COORDINATION | Facility: CLINIC | Age: 88
End: 2023-02-13

## 2023-02-13 ENCOUNTER — TELEPHONE (OUTPATIENT)
Dept: CARDIOLOGY CLINIC | Age: 88
End: 2023-02-13

## 2023-02-13 NOTE — CARE COORDINATION
Ambulatory Care Coordination Note  2023    Patient Current Location:  [address] [place] [address]     ACM contacted the patient by telephone. Verified name and  with patient as identifiers. Provided introduction to self, and explanation of the ACM role. Challenges to be reviewed by the provider   Additional needs identified to be addressed with provider: No  none               Method of communication with provider: phone. ACM: Mark Young RN    Ccm outreached to patient. Patient states she is doing well at this time. Patient states no questions or concerns. Patient is awaiting to hear from cardiology regarding watchman procedure. Patient states no questions or concerns. [facility] discussed that I would outreach next week. Patient agreeable. Offered patient enrollment in the Remote Patient Monitoring (RPM) program for in-home monitoring: NA. Lab Results       None            Care Coordination Interventions    Referral from Primary Care Provider: No  Suggested Interventions and Community Resources          Goals Addressed                   This Visit's Progress     Conditions and Symptoms   On track     I will schedule office visits, as directed by my provider. I will keep my appointment or reschedule if I have to cancel. I will notify my provider of any barriers to my plan of care. I will notify my provider of any symptoms that indicate a worsening of my condition. Barriers: none  Plan for overcoming my barriers: N/A  Confidence: 9/10  Anticipated Goal Completion Date: 3/19/23         Self Monitoring   On track     Blood Pressure - I will take my blood pressure as directed - Daily  I will notify my provider of any trends of increasing or decreasing blood pressures over a month period of time. I will notify my provider of any changes in blood pressure associated with symptoms of dizziness, falls, passing out, headache, confusion/change in mental status.     Patient Reported Blood Pressure No flowsheet data found.     Barriers: none  Plan for overcoming my barriers: N/A  Confidence: 9/10  Anticipated Goal Completion Date: 3/19/23                Future Appointments   Date Time Provider Devyn Bermeo   2/20/2023  9:30 AM Priya Trevino MD [facility] D[facility] AMB   4/14/2023  2:00 PM DO NOHEMI HayL AMB

## 2023-02-13 NOTE — TELEPHONE ENCOUNTER
Patient called stating she would greatly appreciated samples of :    (ELIQUIS) 5 MG TABS tablet    Patient states she would like to pick these up at the [facility] office. Please call when/if available.

## 2023-02-14 ENCOUNTER — TELEPHONE (OUTPATIENT)
Dept: CARDIOLOGY CLINIC | Age: 88
End: 2023-02-14

## 2023-02-14 ENCOUNTER — CARE COORDINATION (OUTPATIENT)
Dept: CARE COORDINATION | Facility: CLINIC | Age: 88
End: 2023-02-14

## 2023-02-14 NOTE — CARE COORDINATION
Ccm received call from patient stating she needs refills on eliquis and metropolol. Ccm left message with cardiology office for refills.

## 2023-02-14 NOTE — TELEPHONE ENCOUNTER
Pt called back advised no samples available in any of our offices. Pt states has an Rx on file will just get from the pharmacy.

## 2023-02-15 ENCOUNTER — TELEPHONE (OUTPATIENT)
Dept: CASE MANAGEMENT | Age: 88
End: 2023-02-15

## 2023-02-15 RX ORDER — METOPROLOL SUCCINATE 25 MG/1
25 TABLET, EXTENDED RELEASE ORAL EVERY 12 HOURS
Qty: 180 TABLET | Refills: 3 | Status: SHIPPED | OUTPATIENT
Start: 2023-02-15

## 2023-02-15 NOTE — TELEPHONE ENCOUNTER
Ms. Antione Esquivelr to be scheduled for Watchman implant on 3/14/2023 with Dr. Edgar Post. She will be contacted the week prior to procedure with an arrival time and additional instructions. Patient also inquired about refills for eliquis 5 mg bid and metoprolol XL 25 mg bid. These prescriptions are currently being processed by [facility] office staff - verified via Teams communication. Patient has [address] coordinator contact ([phone]) information for questions or concerns.

## 2023-02-16 RX ORDER — TIZANIDINE 4 MG/1
4 TABLET ORAL NIGHTLY PRN
Qty: 30 TABLET | Refills: 0 | OUTPATIENT
Start: 2023-02-16

## 2023-02-20 ENCOUNTER — CARE COORDINATION (OUTPATIENT)
Dept: CARE COORDINATION | Facility: CLINIC | Age: 88
End: 2023-02-20

## 2023-02-20 ENCOUNTER — OFFICE VISIT (OUTPATIENT)
Dept: FAMILY MEDICINE CLINIC | Facility: CLINIC | Age: 88
End: 2023-02-20
Payer: MEDICARE

## 2023-02-20 VITALS
OXYGEN SATURATION: 99 % | WEIGHT: 197.8 LBS | HEART RATE: 68 BPM | DIASTOLIC BLOOD PRESSURE: 80 MMHG | BODY MASS INDEX: 30.52 KG/M2 | SYSTOLIC BLOOD PRESSURE: 138 MMHG

## 2023-02-20 DIAGNOSIS — I10 PRIMARY HYPERTENSION: ICD-10-CM

## 2023-02-20 DIAGNOSIS — E78.5 ELEVATED LIPIDS: ICD-10-CM

## 2023-02-20 DIAGNOSIS — Z00.00 ENCOUNTER FOR SUBSEQUENT ANNUAL WELLNESS VISIT (AWV) IN MEDICARE PATIENT: Primary | ICD-10-CM

## 2023-02-20 LAB
APPEARANCE UR: CLEAR
BACTERIA URNS QL MICRO: NEGATIVE /HPF
BASOPHILS # BLD: 0 K/UL (ref 0–0.2)
BASOPHILS NFR BLD: 1 % (ref 0–2)
BILIRUB UR QL: NEGATIVE
CASTS URNS QL MICRO: ABNORMAL /LPF (ref 0–2)
COLOR UR: ABNORMAL
DIFFERENTIAL METHOD BLD: NORMAL
EOSINOPHIL # BLD: 0.1 K/UL (ref 0–0.8)
EOSINOPHIL NFR BLD: 3 % (ref 0.5–7.8)
EPI CELLS #/AREA URNS HPF: ABNORMAL /HPF (ref 0–5)
ERYTHROCYTE [DISTWIDTH] IN BLOOD BY AUTOMATED COUNT: 14.3 % (ref 11.9–14.6)
GLUCOSE UR STRIP.AUTO-MCNC: NEGATIVE MG/DL
HCT VFR BLD AUTO: 45.1 % (ref 35.8–46.3)
HGB BLD-MCNC: 14.3 G/DL (ref 11.7–15.4)
HGB UR QL STRIP: NEGATIVE
IMM GRANULOCYTES # BLD AUTO: 0 K/UL (ref 0–0.5)
IMM GRANULOCYTES NFR BLD AUTO: 0 % (ref 0–5)
KETONES UR QL STRIP.AUTO: NEGATIVE MG/DL
LEUKOCYTE ESTERASE UR QL STRIP.AUTO: ABNORMAL
LYMPHOCYTES # BLD: 1.6 K/UL (ref 0.5–4.6)
LYMPHOCYTES NFR BLD: 27 % (ref 13–44)
MCH RBC QN AUTO: 30.4 PG (ref 26.1–32.9)
MCHC RBC AUTO-ENTMCNC: 31.7 G/DL (ref 31.4–35)
MCV RBC AUTO: 96 FL (ref 82–102)
MONOCYTES # BLD: 0.6 K/UL (ref 0.1–1.3)
MONOCYTES NFR BLD: 10 % (ref 4–12)
NEUTS SEG # BLD: 3.4 K/UL (ref 1.7–8.2)
NEUTS SEG NFR BLD: 59 % (ref 43–78)
NITRITE UR QL STRIP.AUTO: NEGATIVE
NRBC # BLD: 0 K/UL (ref 0–0.2)
PH UR STRIP: 6.5 (ref 5–9)
PLATELET # BLD AUTO: 240 K/UL (ref 150–450)
PMV BLD AUTO: 11.8 FL (ref 9.4–12.3)
PROT UR STRIP-MCNC: NEGATIVE MG/DL
RBC # BLD AUTO: 4.7 M/UL (ref 4.05–5.2)
RBC #/AREA URNS HPF: ABNORMAL /HPF (ref 0–5)
SP GR UR REFRACTOMETRY: 1.02 (ref 1–1.02)
UROBILINOGEN UR QL STRIP.AUTO: 0.2 EU/DL (ref 0.2–1)
WBC # BLD AUTO: 5.7 K/UL (ref 4.3–11.1)
WBC URNS QL MICRO: ABNORMAL /HPF (ref 0–4)

## 2023-02-20 PROCEDURE — G0439 PPPS, SUBSEQ VISIT: HCPCS | Performed by: FAMILY MEDICINE

## 2023-02-20 PROCEDURE — 99213 OFFICE O/P EST LOW 20 MIN: CPT | Performed by: FAMILY MEDICINE

## 2023-02-20 PROCEDURE — 1123F ACP DISCUSS/DSCN MKR DOCD: CPT | Performed by: FAMILY MEDICINE

## 2023-02-20 SDOH — ECONOMIC STABILITY: INCOME INSECURITY: HOW HARD IS IT FOR YOU TO PAY FOR THE VERY BASICS LIKE FOOD, HOUSING, MEDICAL CARE, AND HEATING?: NOT HARD AT ALL

## 2023-02-20 SDOH — ECONOMIC STABILITY: FOOD INSECURITY: WITHIN THE PAST 12 MONTHS, YOU WORRIED THAT YOUR FOOD WOULD RUN OUT BEFORE YOU GOT MONEY TO BUY MORE.: NEVER TRUE

## 2023-02-20 SDOH — ECONOMIC STABILITY: FOOD INSECURITY: WITHIN THE PAST 12 MONTHS, THE FOOD YOU BOUGHT JUST DIDN'T LAST AND YOU DIDN'T HAVE MONEY TO GET MORE.: NEVER TRUE

## 2023-02-20 SDOH — ECONOMIC STABILITY: HOUSING INSECURITY
IN THE LAST 12 MONTHS, WAS THERE A TIME WHEN YOU DID NOT HAVE A STEADY PLACE TO SLEEP OR SLEPT IN A SHELTER (INCLUDING NOW)?: NO

## 2023-02-20 ASSESSMENT — ENCOUNTER SYMPTOMS
EYES NEGATIVE: 1
RESPIRATORY NEGATIVE: 1
GASTROINTESTINAL NEGATIVE: 1

## 2023-02-20 ASSESSMENT — PATIENT HEALTH QUESTIONNAIRE - PHQ9
SUM OF ALL RESPONSES TO PHQ9 QUESTIONS 1 & 2: 0
SUM OF ALL RESPONSES TO PHQ QUESTIONS 1-9: 0
1. LITTLE INTEREST OR PLEASURE IN DOING THINGS: 0
SUM OF ALL RESPONSES TO PHQ QUESTIONS 1-9: 0
2. FEELING DOWN, DEPRESSED OR HOPELESS: 0
SUM OF ALL RESPONSES TO PHQ QUESTIONS 1-9: 0
SUM OF ALL RESPONSES TO PHQ QUESTIONS 1-9: 0

## 2023-02-20 NOTE — PATIENT INSTRUCTIONS
Advance Directives: Care Instructions  Overview  An advance directive is a legal way to state your wishes at the end of your life. It tells your family and your doctor what to do if you can't say what you want. There are two main types of advance directives. You can change them any time your wishes change. Living will. This form tells your family and your doctor your wishes about life support and other treatment. The form is also called a declaration. Medical power of . This form lets you name a person to make treatment decisions for you when you can't speak for yourself. This person is called a health care agent (health care proxy, health care surrogate). The form is also called a durable power of  for health care. If you do not have an advance directive, decisions about your medical care may be made by a family member, or by a doctor or a  who doesn't know you. It may help to think of an advance directive as a gift to the people who care for you. If you have one, they won't have to make tough decisions by themselves. For more information, including forms for your state, see the [address] website (www.caringinfo.org/planning/advance-directives/). Follow-up care is a key part of your treatment and safety. Be sure to make and go to all appointments, and call your doctor if you are having problems. It's also a good idea to know your test results and keep a list of the medicines you take. What should you include in an advance directive? Many states have a unique advance directive form. (It may ask you to address specific issues.) Or you might use a universal form that's approved by many states. If your form doesn't tell you what to address, it may be hard to know what to include in your advance directive. Use the questions below to help you get started. Who do you want to make decisions about your medical care if you are not able to?   What life-support measures do you want if you have a serious illness that gets worse over time or can't be cured? What are you most afraid of that might happen? (Maybe you're afraid of having pain, losing your independence, or being kept alive by machines.)  Where would you prefer to die? (Your home? A hospital? A nursing home?)  Do you want to donate your organs when you die? Do you want certain Jehovah's witness practices performed before you die? When should you call for help? Be sure to contact your doctor if you have any questions. Where can you learn more? Go to http://www.sandoval.com/ and enter R264 to learn more about \"Advance Directives: Care Instructions. \"  Current as of: June 16, 2022               Content Version: 13.5  © 9279-4410 Healthwise, [employer]ou[employer]. Care instructions adapted under license by [facility] ([facility]). If you have questions about a medical condition or this instruction, always ask your healthcare professional. [place] [address] any warranty or liability for your use of this information. Starting a Weight Loss Plan: Care Instructions  Overview     If you're thinking about losing weight, it can be hard to know where to start. Your doctor can help you set up a weight loss plan that best meets your needs. You may want to take a class on nutrition or exercise, or you could join a weight loss support group. If you have questions about how to make changes to your eating or exercise habits, ask your doctor about seeing a registered dietitian or an exercise specialist.  It can be a big challenge to lose weight. But you don't have to make huge changes at once. Make small changes, and stick with them. When those changes become habit, add a few more changes. If you don't think you're ready to make changes right now, try to pick a date in the future. Make an appointment to see your doctor to discuss whether the time is right for you to start a plan. Follow-up care is a key part of your treatment and safety.  Be sure to make and go to all appointments, and call your doctor if you are having problems. It's also a good idea to know your test results and keep a list of the medicines you take. How can you care for yourself at home? Set realistic goals. Many people expect to lose much more weight than is likely. A weight loss of 5% to 10% of your body weight may be enough to improve your health. Get family and friends involved to provide support. Talk to them about why you are trying to lose weight, and ask them to help. They can help by participating in exercise and having meals with you, even if they may be eating something different. Find what works best for you. If you do not have time or do not like to cook, a program that offers meal replacement bars or shakes may be better for you. Or if you like to prepare meals, finding a plan that includes daily menus and recipes may be best.  Ask your doctor about other health professionals who can help you achieve your weight loss goals. A dietitian can help you make healthy changes in your diet. An exercise specialist or  can help you develop a safe and effective exercise program.  A counselor or psychiatrist can help you cope with issues such as depression, anxiety, or family problems that can make it hard to focus on weight loss. Consider joining a support group for people who are trying to lose weight. Your doctor can suggest groups in your area. Where can you learn more? Go to http://www.woods.com/ and enter U357 to learn more about \"Starting a Weight Loss Plan: Care Instructions. \"  Current as of: August 25, 2022               Content Version: 13.5  © 2006-2022 Healthwise, Incorporated. Care instructions adapted under license by [facility] ([facility]).  If you have questions about a medical condition or this instruction, always ask your healthcare professional. [address] any warranty or liability for your use of this information. A Healthy Heart: Care Instructions  Your Care Instructions     Coronary artery disease, also called heart disease, occurs when a substance called plaque builds up in the vessels that supply oxygen-rich blood to your heart muscle. This can narrow the blood vessels and reduce blood flow. A heart attack happens when blood flow is completely blocked. A high-fat diet, smoking, and other factors increase the risk of heart disease. Your doctor has found that you have a chance of having heart disease. You can do lots of things to keep your heart healthy. It may not be easy, but you can change your diet, exercise more, and quit smoking. These steps really work to lower your chance of heart disease. Follow-up care is a key part of your treatment and safety. Be sure to make and go to all appointments, and call your doctor if you are having problems. It's also a good idea to know your test results and keep a list of the medicines you take. How can you care for yourself at home? Diet    Use less salt when you cook and eat. This helps lower your blood pressure. Taste food before salting. Add only a little salt when you think you need it. With time, your taste buds will adjust to less salt.     Eat fewer snack items, fast foods, canned soups, and other high-salt, high-fat, processed foods.     Read food labels and try to avoid saturated and trans fats. They increase your risk of heart disease by raising cholesterol levels.     Limit the amount of solid fat-butter, margarine, and shortening-you eat. Use olive, peanut, or canola oil when you cook. Bake, broil, and steam foods instead of frying them.     Eat a variety of fruit and vegetables every day. Dark green, deep orange, red, or yellow fruits and vegetables are especially good for you. Examples include spinach, carrots, peaches, and berries.     Foods high in fiber can reduce your cholesterol and provide important vitamins and minerals.  High-fiber foods include whole-grain cereals and breads, oatmeal, beans, brown rice, citrus fruits, and apples.     Eat lean proteins. Heart-healthy proteins include seafood, lean meats and poultry, eggs, beans, peas, nuts, seeds, and soy products.     Limit drinks and foods with added sugar. These include candy, desserts, and soda pop. Lifestyle changes    If your doctor recommends it, get more exercise. Walking is a good choice. Bit by bit, increase the amount you walk every day. Try for at least 30 minutes on most days of the week. You also may want to swim, bike, or do other activities.     Do not smoke. If you need help quitting, talk to your doctor about stop-smoking programs and medicines. These can increase your chances of quitting for good. Quitting smoking may be the most important step you can take to protect your heart. It is never too late to quit.     Limit alcohol to 2 drinks a day for men and 1 drink a day for women. Too much alcohol can cause health problems.     Manage other health problems such as diabetes, high blood pressure, and high cholesterol. If you think you may have a problem with alcohol or drug use, talk to your doctor. Medicines    Take your medicines exactly as prescribed. Call your doctor if you think you are having a problem with your medicine.     If your doctor recommends aspirin, take the amount directed each day. Make sure you take aspirin and not another kind of pain reliever, such as acetaminophen (Tylenol). When should you call for help? Call 911 if you have symptoms of a heart attack. These may include:    Chest pain or pressure, or a strange feeling in the chest.     Sweating.     Shortness of breath.     Pain, pressure, or a strange feeling in the back, neck, jaw, or upper belly or in one or both shoulders or arms.     Lightheadedness or sudden weakness.     A fast or irregular heartbeat.    After you call 911, the  may tell you to chew 1 adult-strength or 2 to 4 low-dose aspirin. Wait for an ambulance. Do not try to drive yourself. Watch closely for changes in your health, and be sure to contact your doctor if you have any problems. Where can you learn more? Go to http://www.sandoval.com/ and enter F075 to learn more about \"A Healthy Heart: Care Instructions. \"  Current as of: September 7, 2022               Content Version: 13.5  © 2006-2022 International Network for Outcomes Research(INOR). Care instructions adapted under license by [facility] ([facility]). If you have questions about a medical condition or this instruction, always ask your healthcare professional. [place] [address] any warranty or liability for your use of this information. Personalized Preventive Plan for Shayna Bañuelosr - 2/20/2023  Medicare offers a range of preventive health benefits. Some of the tests and screenings are paid in full while other may be subject to a deductible, co-insurance, and/or copay. Some of these benefits include a comprehensive review of your medical history including lifestyle, illnesses that may run in your family, and various assessments and screenings as appropriate. After reviewing your medical record and screening and assessments performed today your provider may have ordered immunizations, labs, imaging, and/or referrals for you. A list of these orders (if applicable) as well as your Preventive Care list are included within your After Visit Summary for your review. Other Preventive Recommendations:    A preventive eye exam performed by an eye specialist is recommended every 1-2 years to screen for glaucoma; cataracts, macular degeneration, and other eye disorders. A preventive dental visit is recommended every 6 months. Try to get at least 150 minutes of exercise per week or 10,000 steps per day on a pedometer . Order or download the FREE \"Exercise & Physical Activity: Your Everyday Guide\" from The [employer].  Call 8-123.830.9383 or search [employer] online. You need 4014-6216 mg of calcium and 4294-9435 IU of vitamin D per day. It is possible to meet your calcium requirement with diet alone, but a vitamin D supplement is usually necessary to meet this goal.  When exposed to the sun, use a sunscreen that protects against both UVA and UVB radiation with an SPF of 30 or greater. Reapply every 2 to 3 hours or after sweating, drying off with a towel, or swimming. Always wear a seat belt when traveling in a car. Always wear a helmet when riding a bicycle or motorcycle.

## 2023-02-20 NOTE — CARE COORDINATION
Ambulatory Care Management Note        Date/Time:  2/20/2023 9:45 AM    Ccm outreached to patient. No answer at this time, message left. Awaiting response. If no response, ccm will outreach next week.

## 2023-02-20 NOTE — PROGRESS NOTES
HISTORY OF PRESENT ILLNESS    Esvin Figueroa is a 80 y.o. female. HPI  Chief Complaint   Patient presents with    Medicare AWV     See above. Feeling well with no complaints. Followed by cardiology for atrial fib. Planning to have Watchman procedure in March. No side effects from BP medication. No headache or vision change. Denies chest pain or SOB. No swelling. The home BP readings have been in the 130's / 70's range. Current Outpatient Medications on File Prior to Visit   Medication Sig Dispense Refill    apixaban (ELIQUIS) 5 MG TABS tablet Take 1 tablet by mouth 2 times daily 180 tablet 3    metoprolol succinate (TOPROL XL) 25 MG extended release tablet Take 1 tablet by mouth in the morning and 1 tablet in the evening. 180 tablet 3    Bioflavonoid Products (RICK C PO) Take by mouth      Multiple Vitamins-Minerals (ONE-A-DAY WOMENS PO) Take 1 tablet by mouth daily      AZO-CRANBERRY PO Take 2 tablets by mouth daily      Cholecalciferol 50 MCG (2000 UT) TABS Take by mouth      magnesium oxide (MAG-OX) 400 (240 Mg) MG tablet Take 400 mg by mouth 2 times daily      losartan (COZAAR) 100 MG tablet Take 1 tablet by mouth daily (Patient not taking: No sig reported) 30 tablet 0     No current facility-administered medications on file prior to visit. Past Medical History:   Diagnosis Date    Anticoagulant long-term use     Arrhythmia     Atrial fibrillation (HCC)     Chronic back pain Many years ago    Esophageal reflux     Esophageal reflux     H/O complete eye exam Yearly    Dr. Margie Beck    Headache     HLD (hyperlipidemia)     Hypertension     controlled with medication       Review of Systems   Constitutional: Negative. HENT: Negative. Eyes: Negative. Respiratory: Negative. Cardiovascular: Negative. Gastrointestinal: Negative. Genitourinary: Negative. Musculoskeletal: Negative. Neurological: Negative. Psychiatric/Behavioral: Negative.       Blood pressure 138/80, pulse 68, weight 197 lb 12.8 oz (89.7 kg), SpO2 99 %, not currently breastfeeding. Physical Exam  Vitals and nursing note reviewed. Constitutional:       Appearance: Normal appearance. HENT:      Mouth/Throat:      Mouth: Mucous membranes are moist.      Pharynx: Oropharynx is clear. Eyes:      Conjunctiva/sclera: Conjunctivae normal.   Neck:      Vascular: No carotid bruit. Cardiovascular:      Rate and Rhythm: Normal rate and regular rhythm. Heart sounds: Normal heart sounds. Pulmonary:      Breath sounds: Normal breath sounds. Musculoskeletal:         General: No swelling or tenderness. Normal range of motion. Cervical back: Neck supple. Lymphadenopathy:      Cervical: No cervical adenopathy. Neurological:      Cranial Nerves: No cranial nerve deficit. Coordination: Coordination normal.      Gait: Gait normal.      Deep Tendon Reflexes: Reflexes normal.   Psychiatric:         Mood and Affect: Mood normal.        ASSESSMENT and Juan Ramon Philippe was seen today for medicare awv. Diagnoses and all orders for this visit:    Encounter for subsequent annual wellness visit (AWV) in Medicare patient    Primary hypertension  -     Urinalysis; Future  -     CBC with Auto Differential; Future  -     Comprehensive Metabolic Panel; Future  -     TSH; Future  -     TSH  -     Comprehensive Metabolic Panel  -     CBC with Auto Differential  -     Urinalysis    Elevated lipids  -     Lipid Panel; Future  -     Lipid Panel   Note initial BP elevation decreased on recheck by me  Discussed history and medications with patient. Continue current measures. Follow up if side effects occur or if new symptoms develop. Notify lab results      Return in 1 year (on 2/20/2024), or if symptoms worsen or fail to improve, for Medicare Annual Wellness Visit in 1 year.     [place], MDMedicare Annual Wellness Visit    Shayna Saha is here for [facility] AWV    Assessment & Plan   Encounter for subsequent annual wellness visit (AWV) in Medicare patient  Primary hypertension  -     Urinalysis; Future  -     CBC with Auto Differential; Future  -     Comprehensive Metabolic Panel; Future  -     TSH; Future  Elevated lipids  -     Lipid Panel; Future    Recommendations for Preventive Services Due: see orders and patient instructions/AVS.  Recommended screening schedule for the next 5-10 years is provided to the patient in written form: see Patient Instructions/AVS.     Return in 1 year (on 2/20/2024), or if symptoms worsen or fail to improve, for Medicare Annual Wellness Visit in 1 year. Subjective       Patient's complete Health Risk Assessment and screening values have been reviewed and are found in Flowsheets. The following problems were reviewed today and where indicated follow up appointments were made and/or referrals ordered. Positive Risk Factor Screenings with Interventions:                 Weight and Activity:  Physical Activity: Unknown    Days of Exercise per Week: Not on file    Minutes of Exercise per Session: 50 min        Have you lost any weight without trying in the past 3 months?: No     Obesity Interventions:  Patient declines any further evaluation or treatment                               Objective   Vitals:    02/20/23 0947   BP: 138/80   Site: Left Upper Arm   Position: Sitting   Cuff Size: Large Adult   Pulse: 68   SpO2: 99%   Weight: 197 lb 12.8 oz (89.7 kg)      Body mass index is 30.52 kg/m². Allergies   Allergen Reactions    Guaifenesin      Other reaction(s): Unknown-Unspecified    Loratadine-Pseudoephedrine Er Other (See Comments)     Other reaction(s): Other- (not listed) - Allergy  Difficulty sleeping and jittery  Difficulty sleeping and jittery      Amoxicillin Rash     Bilateral lower extremities    Sulfa Antibiotics Rash     Prior to Visit Medications    Medication Sig Taking?  Authorizing Provider   apixaban (ELIQUIS) 5 MG TABS tablet Take 1 tablet by mouth 2 times daily Yes Thuan Berg MD   metoprolol succinate (TOPROL XL) 25 MG extended release tablet Take 1 tablet by mouth in the morning and 1 tablet in the evening.  Yes Thuan Berg MD   Bioflavonoid Products (RICK C PO) Take by mouth Yes Historical Provider, MD   Multiple Vitamins-Minerals (ONE-A-DAY WOMENS PO) Take 1 tablet by mouth daily Yes Historical Provider, MD   AZO-CRANBERRY PO Take 2 tablets by mouth daily Yes Historical Provider, MD   Cholecalciferol 50 MCG (2000 UT) TABS Take by mouth Yes Ar Automatic Reconciliation   magnesium oxide (MAG-OX) 400 (240 Mg) MG tablet Take 400 mg by mouth 2 times daily Yes Ar Automatic Reconciliation   losartan (COZAAR) 100 MG tablet Take 1 tablet by mouth daily  Patient not taking: No sig reported  Ladi Olson MD       [facility] (Including outside providers/suppliers regularly involved in providing care):   Patient Care Team:  Ladi Olson MD as PCP - General  Ladi Olson MD as PCP - [facility]  Thuan Berg MD as Consulting Physician (Cardiac Electrophysiology)  Roe Luna DO as Consulting Physician (Internal Medicine Cardiovascular Disease)  Rivka Diego, RN as Ambulatory Care Manager     Reviewed and updated this visit:  Lex Cheney MD

## 2023-02-21 LAB
ALBUMIN SERPL-MCNC: 3.6 G/DL (ref 3.2–4.6)
ALBUMIN/GLOB SERPL: 1.1 (ref 0.4–1.6)
ALP SERPL-CCNC: 74 U/L (ref 50–136)
ALT SERPL-CCNC: 36 U/L (ref 12–65)
ANION GAP SERPL CALC-SCNC: 6 MMOL/L (ref 2–11)
AST SERPL-CCNC: 31 U/L (ref 15–37)
BILIRUB SERPL-MCNC: 0.4 MG/DL (ref 0.2–1.1)
BUN SERPL-MCNC: 25 MG/DL (ref 8–23)
CALCIUM SERPL-MCNC: 9.3 MG/DL (ref 8.3–10.4)
CHLORIDE SERPL-SCNC: 111 MMOL/L (ref 101–110)
CHOLEST SERPL-MCNC: 215 MG/DL
CO2 SERPL-SCNC: 27 MMOL/L (ref 21–32)
CREAT SERPL-MCNC: 0.8 MG/DL (ref 0.6–1)
GLOBULIN SER CALC-MCNC: 3.3 G/DL (ref 2.8–4.5)
GLUCOSE SERPL-MCNC: 102 MG/DL (ref 65–100)
HDLC SERPL-MCNC: 75 MG/DL (ref 40–60)
HDLC SERPL: 2.9
LDLC SERPL CALC-MCNC: 123 MG/DL
POTASSIUM SERPL-SCNC: 4.1 MMOL/L (ref 3.5–5.1)
PROT SERPL-MCNC: 6.9 G/DL (ref 6.3–8.2)
SODIUM SERPL-SCNC: 144 MMOL/L (ref 133–143)
TRIGL SERPL-MCNC: 85 MG/DL (ref 35–150)
TSH, 3RD GENERATION: 2.07 UIU/ML (ref 0.36–3.74)
VLDLC SERPL CALC-MCNC: 17 MG/DL (ref 6–23)

## 2023-02-27 ENCOUNTER — CARE COORDINATION (OUTPATIENT)
Dept: CARE COORDINATION | Facility: CLINIC | Age: 88
End: 2023-02-27

## 2023-02-27 NOTE — CARE COORDINATION
Ambulatory Care Coordination Note  2023    Patient Current Location:  [address] [place] [address]     ACM contacted the patient by telephone. Verified name and  with patient as identifiers. Provided introduction to self, and explanation of the ACM role. Challenges to be reviewed by the provider   Additional needs identified to be addressed with provider: No  none               Method of communication with provider: none. ACM: Torres Ulloa RN    Ccm outreached to patient. Patient states she is doing well at this time. Patient states no questions or concerns. Ccm discussed that I would outreach next week. Patient agreeable. Offered patient enrollment in the Remote Patient Monitoring (RPM) program for in-home monitoring: NA. Lab Results       None            Care Coordination Interventions    Referral from Primary Care Provider: No  Suggested Interventions and Community Resources          Goals Addressed                   This Visit's Progress     Conditions and Symptoms   On track     I will schedule office visits, as directed by my provider. I will keep my appointment or reschedule if I have to cancel. I will notify my provider of any barriers to my plan of care. I will notify my provider of any symptoms that indicate a worsening of my condition. Barriers: none  Plan for overcoming my barriers: N/A  Confidence: 9/10  Anticipated Goal Completion Date: 3/19/23         Self Monitoring   On track     Blood Pressure - I will take my blood pressure as directed - Daily  I will notify my provider of any trends of increasing or decreasing blood pressures over a month period of time. I will notify my provider of any changes in blood pressure associated with symptoms of dizziness, falls, passing out, headache, confusion/change in mental status. Patient Reported Blood Pressure No flowsheet data found.     Barriers: none  Plan for overcoming my barriers: N/A  Confidence: 9/10  Anticipated Goal Completion Date: 3/19/23                Future Appointments   Date Time Provider Devyn Elvie   4/14/2023  2:00 PM DO REYNA Lambert [facility]   2/22/2024  8:45 AM Duong [facility]askins [facility]

## 2023-03-01 NOTE — PROGRESS NOTES
Reny Mccord has been referred to the [address] [place] [address] [facility] for evaluation for Left Atrial Appendage Closure with the Watchman device for management of stroke risk resulting from non-valvular atrial fibrillation. Based on this patient's history of balance and weakness issues concerning for falls, she lives alone, it has been determined that she is a poor candidate for long-term oral anticoagulation, however may be tolerant of short term treatment needed for watchman implantation. Atrial Fibrillation CHADSVASC2 Score Stroke Risk:   80 y.o. > 76 - 2    female Female - 1   CHF HX: No - 0   HTN HX: Yes - 1   Stroke/TIA/Thromboembolism No - 0   Vascular Disease HX: Yes - 1   Diabetes Mellitus No - 0   CHADSVASC 2 Score 5      Annual Stroke Risk 7.2% - moderate-high      HAS-BLED Score     HTN Hx [x] 1 Point   Renal Disease  [] 1 Point   Liver Disease [] [address] Hx [] 1 Point   Prior Major Bleeding or Predisposition [] 1 Point   Labile INR [] 1 Point   Age > 65 Years Current: 80 y.o. [x] 1 Point   Rx Usage Predisposing to Bleeding [] 1 Point   Alcohol Use (> or = 8 Drinks/wk) [] 1 Point   Total: UCHASBLED: Score 2 Moderate Risk 4.1% Risk of major bleeding 1.88 Bleeds Per 100 pts years Recommendations Anticoagulation can be considered       Dr. Kallie Chang and Dr. Shanice Wyatt have both had a face to face conversation with this patient explaining atrial fibrillation, stroke risk with atrial fibrillation, and the different treatment plans as related to their elevated stroke risk with atrial fibrillation. Based on stroke risk, as shown with XFL0TI2-FPQe score, and bleeding risk, as shown with HAS-BLED score, a shared decision has been made to pursue closure of the left atrial appendage as a safe and effective alternative to oral anticoagulation.

## 2023-03-06 ENCOUNTER — CARE COORDINATION (OUTPATIENT)
Dept: CARE COORDINATION | Facility: CLINIC | Age: 88
End: 2023-03-06

## 2023-03-06 ENCOUNTER — TELEPHONE (OUTPATIENT)
Dept: FAMILY MEDICINE CLINIC | Facility: CLINIC | Age: 88
End: 2023-03-06

## 2023-03-06 NOTE — CARE COORDINATION
Ambulatory Care Coordination Note  3/6/2023    Patient Current Location:  [facility]     ACM contacted the patient by telephone. Verified name and  with patient as identifiers. Provided introduction to self, and explanation of the ACM role. Challenges to be reviewed by the provider   Additional needs identified to be addressed with provider: No  none               Method of communication with provider: none. ACM: Spencer Brady RN    Ccm outreached to patient. Patient states she had some lower left leg swelling over the weekend. Patient asked ccm to notify pcp of swelling. Ccm left message with pcp office to call patient. Patient states she is elevating foot. Patient states no other questions or concerns. Ccm discussed that I would outreach in 2 weeks as ccm is on pto. Patient agreeable. Offered patient enrollment in the Remote Patient Monitoring (RPM) program for in-home monitoring: NA. Lab Results       None            Care Coordination Interventions    Referral from Primary Care Provider: No  Suggested Interventions and Community Resources          Goals Addressed                   This Visit's Progress     Conditions and Symptoms   On track     I will schedule office visits, as directed by my provider. I will keep my appointment or reschedule if I have to cancel. I will notify my provider of any barriers to my plan of care. I will notify my provider of any symptoms that indicate a worsening of my condition. Barriers: none  Plan for overcoming my barriers: N/A  Confidence: 9/10  Anticipated Goal Completion Date: 3/19/23         Self Monitoring   On track     Blood Pressure - I will take my blood pressure as directed - Daily  I will notify my provider of any trends of increasing or decreasing blood pressures over a month period of time.   I will notify my provider of any changes in blood pressure associated with symptoms of dizziness, falls, passing out, headache, confusion/change in mental status. Patient Reported Blood Pressure No flowsheet data found.     Barriers: none  Plan for overcoming my barriers: N/A  Confidence: 9/10  Anticipated Goal Completion Date: 3/19/23                Future Appointments   Date Time Provider Devyn Bermeo   4/14/2023  2:00 PM Hammad Dupont DO [facility]   2/22/2024  8:45 AM Chad Walters MD [facility]

## 2023-03-06 NOTE — TELEPHONE ENCOUNTER
Per  reached out to pt due to left foot and ankle swelling. No appts available, pt advised to got to urgent care. Pt declined [facility].

## 2023-03-07 ENCOUNTER — OFFICE VISIT (OUTPATIENT)
Dept: FAMILY MEDICINE CLINIC | Facility: CLINIC | Age: 88
End: 2023-03-07
Payer: MEDICARE

## 2023-03-07 VITALS
OXYGEN SATURATION: 99 % | HEART RATE: 74 BPM | BODY MASS INDEX: 29.7 KG/M2 | DIASTOLIC BLOOD PRESSURE: 80 MMHG | SYSTOLIC BLOOD PRESSURE: 128 MMHG | HEIGHT: 68 IN | WEIGHT: 196 LBS | TEMPERATURE: 97.2 F

## 2023-03-07 DIAGNOSIS — M25.572 ACUTE LEFT ANKLE PAIN: ICD-10-CM

## 2023-03-07 DIAGNOSIS — M25.50 CHRONIC JOINT PAIN: Primary | ICD-10-CM

## 2023-03-07 DIAGNOSIS — G89.29 CHRONIC JOINT PAIN: Primary | ICD-10-CM

## 2023-03-07 DIAGNOSIS — I10 PRIMARY HYPERTENSION: ICD-10-CM

## 2023-03-07 LAB — ERYTHROCYTE [SEDIMENTATION RATE] IN BLOOD: 35 MM/HR (ref 0–30)

## 2023-03-07 PROCEDURE — 1123F ACP DISCUSS/DSCN MKR DOCD: CPT | Performed by: FAMILY MEDICINE

## 2023-03-07 PROCEDURE — 96372 THER/PROPH/DIAG INJ SC/IM: CPT | Performed by: FAMILY MEDICINE

## 2023-03-07 PROCEDURE — 99213 OFFICE O/P EST LOW 20 MIN: CPT | Performed by: FAMILY MEDICINE

## 2023-03-07 RX ORDER — METHYLPREDNISOLONE SODIUM SUCCINATE 40 MG/ML
40 INJECTION, POWDER, LYOPHILIZED, FOR SOLUTION INTRAMUSCULAR; INTRAVENOUS ONCE
Status: COMPLETED | OUTPATIENT
Start: 2023-03-07 | End: 2023-03-07

## 2023-03-07 RX ORDER — PREDNISONE 10 MG/1
10 TABLET ORAL 2 TIMES DAILY
Qty: 10 TABLET | Refills: 0 | Status: CANCELLED | OUTPATIENT
Start: 2023-03-07 | End: 2023-03-12

## 2023-03-07 RX ORDER — PREDNISONE 10 MG/1
TABLET ORAL
Qty: 21 EACH | Refills: 0 | Status: SHIPPED | OUTPATIENT
Start: 2023-03-07

## 2023-03-07 RX ADMIN — METHYLPREDNISOLONE SODIUM SUCCINATE 40 MG: 40 INJECTION, POWDER, LYOPHILIZED, FOR SOLUTION INTRAMUSCULAR; INTRAVENOUS at 15:50

## 2023-03-07 ASSESSMENT — ENCOUNTER SYMPTOMS
RESPIRATORY NEGATIVE: 1
GASTROINTESTINAL NEGATIVE: 1

## 2023-03-07 ASSESSMENT — PATIENT HEALTH QUESTIONNAIRE - PHQ9
SUM OF ALL RESPONSES TO PHQ QUESTIONS 1-9: 0
SUM OF ALL RESPONSES TO PHQ9 QUESTIONS 1 & 2: 0
SUM OF ALL RESPONSES TO PHQ QUESTIONS 1-9: 0
1. LITTLE INTEREST OR PLEASURE IN DOING THINGS: 0
2. FEELING DOWN, DEPRESSED OR HOPELESS: 0

## 2023-03-07 NOTE — PROGRESS NOTES
HISTORY OF PRESENT ILLNESS    Paul Haddad is a 80 y.o. female. HPI  Chief Complaint   Patient presents with    Swelling     Foot, hands , ankle also pain      See above. Onset about 2 weeks ago of pain and swelling in left ankle. No injury. Pain is mostly in the outer aspect of the left ankle. Slight soreness at rest; worsens with any activity. No numbness or weakness. Has a history of chronic joint pain for years. Seems to be increasing to involve soreness and stiffness in both hands and fingers. Concerned about possible psoriatic arthritis. Has occasional patches of dry itching skin. None at present. Denies chest pain or SOB. Stable on maintenance medications. Current Outpatient Medications on File Prior to Visit   Medication Sig Dispense Refill    apixaban (ELIQUIS) 5 MG TABS tablet Take 1 tablet by mouth 2 times daily 180 tablet 3    metoprolol succinate (TOPROL XL) 25 MG extended release tablet Take 1 tablet by mouth in the morning and 1 tablet in the evening. 180 tablet 3    losartan (COZAAR) 100 MG tablet Take 1 tablet by mouth daily 30 tablet 0    Bioflavonoid Products (RICK C PO) Take by mouth      Multiple Vitamins-Minerals (ONE-A-DAY WOMENS PO) Take 1 tablet by mouth daily      AZO-CRANBERRY PO Take 2 tablets by mouth daily      Cholecalciferol 50 MCG (2000 UT) TABS Take by mouth      magnesium oxide (MAG-OX) 400 (240 Mg) MG tablet Take 400 mg by mouth 2 times daily       No current facility-administered medications on file prior to visit. Past Medical History:   Diagnosis Date    Anticoagulant long-term use     Arrhythmia     Atrial fibrillation (HCC)     Chronic back pain Many years ago    Esophageal reflux     Esophageal reflux     H/O complete eye exam Yearly    Dr. Gregory Marina    Headache     HLD (hyperlipidemia)     Hypertension     controlled with medication       Review of Systems   Constitutional: Negative. Respiratory: Negative. Cardiovascular: Negative. Gastrointestinal: Negative. Genitourinary: Negative. Musculoskeletal:  Positive for arthralgias. Neurological: Negative. Blood pressure 128/80, pulse 74, temperature 97.2 °F (36.2 °C), temperature source Temporal, height 5' 7.5\" (1.715 m), weight 196 lb (88.9 kg), SpO2 99 %, not currently breastfeeding. Physical Exam  Vitals and nursing note reviewed. Constitutional:       Appearance: Normal appearance. HENT:      Mouth/Throat:      Mouth: Mucous membranes are moist.      Pharynx: Oropharynx is clear. Eyes:      Conjunctiva/sclera: Conjunctivae normal.   Neck:      Vascular: No carotid bruit. Cardiovascular:      Rate and Rhythm: Normal rate and regular rhythm. Heart sounds: Normal heart sounds. Pulmonary:      Breath sounds: Normal breath sounds. Musculoskeletal:      Cervical back: Neck supple. Comments: Left ankle has no redness. Moderate swelling around the lateral malleolus with moderate point tenderness. Full ROM with no laxity; mild crepitus. Neurovascular is intact. Lymphadenopathy:      Cervical: No cervical adenopathy. Psychiatric:         Mood and Affect: Mood normal.        ASSESSMENT and Mariah Dade was seen today for swelling. Diagnoses and all orders for this visit:    Chronic joint pain  -     Antistreptolysin O screen; Future  -     C-Reactive Protein; Future  -     Rheumatoid Factor; Future  -     JOEL, Direct, w/Reflex; Future  -     Sedimentation Rate; Future  -     Uric Acid; Future  -     Uric Acid  -     Sedimentation Rate  -     JOEL, Direct, w/Reflex  -     Rheumatoid Factor  -     C-Reactive Protein  -     Antistreptolysin O screen  -     methylPREDNISolone sodium (SOLU-MEDROL) injection 40 mg    Acute left ankle pain  -     Uric Acid;  Future  -     Uric Acid  -     methylPREDNISolone sodium (SOLU-MEDROL) injection 40 mg    Primary hypertension    Other orders  -     predniSONE 10 MG (21) TBPK; As directed disp pack   Discussed potential etiologies with patient. Follow up according to lab results. Referral to rheumatologist if indicated. Continue maintenance medications. Having Watchman's procedure in a week. Recommended that she wait til after the procedure to start the oral prednisone. Return in about 4 weeks (around 4/4/2023), or if symptoms worsen or fail to improve.     Dimitri May MD

## 2023-03-08 LAB
CRP SERPL-MCNC: 2.3 MG/DL (ref 0–0.9)
RHEUMATOID FACT SER QL LA: NEGATIVE
URATE SERPL-MCNC: 5.2 MG/DL (ref 2.6–6)

## 2023-03-09 ENCOUNTER — TELEPHONE (OUTPATIENT)
Dept: CASE MANAGEMENT | Age: 88
End: 2023-03-09

## 2023-03-09 LAB
ANA SER QL: POSITIVE
ASO AB SERPL-ACNC: 267 IU/ML (ref 0–200)
CENTROMERE B AB SER-ACNC: <0.2 AI (ref 0–0.9)
CHROMATIN AB SERPL-ACNC: <0.2 AI (ref 0–0.9)
DSDNA AB SER-ACNC: 16 IU/ML (ref 0–9)
ENA JO1 AB SER-ACNC: <0.2 AI (ref 0–0.9)
ENA RNP AB SER-ACNC: <0.2 AI (ref 0–0.9)
ENA SCL70 AB SER-ACNC: <0.2 AI (ref 0–0.9)
ENA SM AB SER-ACNC: <0.2 AI (ref 0–0.9)
ENA SS-A AB SER-ACNC: <0.2 AI (ref 0–0.9)
ENA SS-B AB SER-ACNC: <0.2 AI (ref 0–0.9)
Lab: ABNORMAL

## 2023-03-09 NOTE — TELEPHONE ENCOUNTER
Patient pre-assessment complete for Lucio Kessler scheduled for 3/14/2023, arrival time 1030. Patient verified using . Patient instructed to bring all home medications in labeled bottles on the day of procedure. NPO status reinforced. Patient's last dose of Eliquis will be on 3/12/2023. He/she will take metoprolol with a small sip of water, the morning of his procedure. Patient instructed to HOLD all other medications and supplements. Patient verbalizes understanding of all instructions & denies any questions at this time. Patient has watchman coordinator contact ([phone]) info questions or concerns. Vinita Brandt

## 2023-03-10 ENCOUNTER — TELEPHONE (OUTPATIENT)
Dept: FAMILY MEDICINE CLINIC | Facility: CLINIC | Age: 88
End: 2023-03-10

## 2023-03-10 ENCOUNTER — PATIENT MESSAGE (OUTPATIENT)
Dept: CARDIAC CATH/INVASIVE PROCEDURES | Age: 88
End: 2023-03-10

## 2023-03-10 ENCOUNTER — PREP FOR PROCEDURE (OUTPATIENT)
Dept: CARDIOLOGY CLINIC | Age: 88
End: 2023-03-10

## 2023-03-10 DIAGNOSIS — I48.21 PERMANENT ATRIAL FIBRILLATION (HCC): Primary | ICD-10-CM

## 2023-03-10 PROCEDURE — 93294 REM INTERROG EVL PM/LDLS PM: CPT | Performed by: INTERNAL MEDICINE

## 2023-03-10 PROCEDURE — 93296 REM INTERROG EVL PM/IDS: CPT | Performed by: INTERNAL MEDICINE

## 2023-03-13 ENCOUNTER — HOSPITAL ENCOUNTER (OUTPATIENT)
Dept: LAB | Age: 88
Discharge: HOME | End: 2023-03-16
Payer: MEDICARE

## 2023-03-13 ENCOUNTER — TELEPHONE (OUTPATIENT)
Dept: FAMILY MEDICINE CLINIC | Facility: CLINIC | Age: 88
End: 2023-03-13

## 2023-03-13 ENCOUNTER — HOSPITAL ENCOUNTER (OUTPATIENT)
Dept: LAB | Age: 88
Discharge: HOME | DRG: 274 | End: 2023-03-16
Payer: MEDICARE

## 2023-03-13 DIAGNOSIS — M25.50 CHRONIC JOINT PAIN: Primary | ICD-10-CM

## 2023-03-13 DIAGNOSIS — I48.21 PERMANENT ATRIAL FIBRILLATION (HCC): ICD-10-CM

## 2023-03-13 DIAGNOSIS — G89.29 CHRONIC JOINT PAIN: Primary | ICD-10-CM

## 2023-03-13 LAB
ALBUMIN SERPL-MCNC: 3.5 G/DL (ref 3.2–4.6)
ANION GAP SERPL CALC-SCNC: 3 MMOL/L (ref 2–11)
BASOPHILS # BLD: 0 K/UL (ref 0–0.2)
BASOPHILS NFR BLD: 1 % (ref 0–2)
BUN SERPL-MCNC: 24 MG/DL (ref 8–23)
CALCIUM SERPL-MCNC: 9.3 MG/DL (ref 8.3–10.4)
CHLORIDE SERPL-SCNC: 110 MMOL/L (ref 101–110)
CO2 SERPL-SCNC: 31 MMOL/L (ref 21–32)
CREAT SERPL-MCNC: 0.83 MG/DL (ref 0.6–1)
DIFFERENTIAL METHOD BLD: ABNORMAL
EOSINOPHIL # BLD: 0.2 K/UL (ref 0–0.8)
EOSINOPHIL NFR BLD: 3 % (ref 0.5–7.8)
ERYTHROCYTE [DISTWIDTH] IN BLOOD BY AUTOMATED COUNT: 14.1 % (ref 11.9–14.6)
GLUCOSE SERPL-MCNC: 96 MG/DL (ref 65–100)
HCT VFR BLD AUTO: 45.3 % (ref 35.8–46.3)
HGB BLD-MCNC: 14.2 G/DL (ref 11.7–15.4)
HISTORY CHECK: NORMAL
IMM GRANULOCYTES # BLD AUTO: 0 K/UL (ref 0–0.5)
IMM GRANULOCYTES NFR BLD AUTO: 0 % (ref 0–5)
INR PPP: 1.1
LYMPHOCYTES # BLD: 1.7 K/UL (ref 0.5–4.6)
LYMPHOCYTES NFR BLD: 26 % (ref 13–44)
MCH RBC QN AUTO: 29.1 PG (ref 26.1–32.9)
MCHC RBC AUTO-ENTMCNC: 31.3 G/DL (ref 31.4–35)
MCV RBC AUTO: 92.8 FL (ref 82–102)
MONOCYTES # BLD: 0.5 K/UL (ref 0.1–1.3)
MONOCYTES NFR BLD: 8 % (ref 4–12)
NEUTS SEG # BLD: 4.2 K/UL (ref 1.7–8.2)
NEUTS SEG NFR BLD: 63 % (ref 43–78)
NRBC # BLD: 0 K/UL (ref 0–0.2)
PLATELET # BLD AUTO: 279 K/UL (ref 150–450)
PMV BLD AUTO: 10.6 FL (ref 9.4–12.3)
POTASSIUM SERPL-SCNC: 3.9 MMOL/L (ref 3.5–5.1)
PROTHROMBIN TIME: 14 SEC (ref 12.6–14.3)
RBC # BLD AUTO: 4.88 M/UL (ref 4.05–5.2)
SODIUM SERPL-SCNC: 144 MMOL/L (ref 133–143)
WBC # BLD AUTO: 6.7 K/UL (ref 4.3–11.1)

## 2023-03-13 PROCEDURE — 86850 RBC ANTIBODY SCREEN: CPT

## 2023-03-13 PROCEDURE — 85610 PROTHROMBIN TIME: CPT

## 2023-03-13 PROCEDURE — 85025 COMPLETE CBC W/AUTO DIFF WBC: CPT

## 2023-03-13 PROCEDURE — 80048 BASIC METABOLIC PNL TOTAL CA: CPT

## 2023-03-13 PROCEDURE — 86923 COMPATIBILITY TEST ELECTRIC: CPT

## 2023-03-13 PROCEDURE — 82040 ASSAY OF SERUM ALBUMIN: CPT

## 2023-03-13 PROCEDURE — 36415 COLL VENOUS BLD VENIPUNCTURE: CPT

## 2023-03-13 NOTE — TELEPHONE ENCOUNTER
Patient notified about results and verbalized understanding . Referral to rheumatologist done, information was given to patient. Patient also inform that she is scheduled for a WATCHMAN Implant procedure tomorrow.

## 2023-03-13 NOTE — TELEPHONE ENCOUNTER
----- Message from Justina Chen MD sent at 3/8/2023 12:21 PM EST -----  So far lab shows inflammation but no confirmation of the cause.  ----- Message -----  From: Esteban Hodges Incoming [place] [facility]  Sent: 3/7/2023  10:34 PM EST  To: Justina Chen MD

## 2023-03-13 NOTE — TELEPHONE ENCOUNTER
Patient needs to speak with a nurse to explain her lab results from 3/7/2023. Please call her at [phone].

## 2023-03-13 NOTE — TELEPHONE ENCOUNTER
----- Message from Polo Bangura MD sent at 3/9/2023  3:42 PM EST -----  JOEL is positive which can sometimes indicate Lupus.  Recommend referral to rheumatologist to evaluate further \"chronic joint pain and positive JOEL\"  ----- Message -----  From: Raz Hodges Incoming [place] [facility]  Sent: 3/7/2023  10:34 PM EST  To: Polo Bangura MD

## 2023-03-14 ENCOUNTER — ANESTHESIA EVENT (OUTPATIENT)
Dept: SURGERY | Age: 88
DRG: 274 | End: 2023-03-14
Payer: MEDICARE

## 2023-03-14 ENCOUNTER — HOSPITAL ENCOUNTER (INPATIENT)
Age: 88
LOS: 1 days | Discharge: HOME | DRG: 274 | End: 2023-03-15
Attending: INTERNAL MEDICINE | Admitting: INTERNAL MEDICINE
Payer: MEDICARE

## 2023-03-14 ENCOUNTER — ANESTHESIA (OUTPATIENT)
Dept: SURGERY | Age: 88
DRG: 274 | End: 2023-03-14
Payer: MEDICARE

## 2023-03-14 ENCOUNTER — APPOINTMENT (OUTPATIENT)
Dept: CARDIAC CATH/INVASIVE PROCEDURES | Age: 88
DRG: 274 | End: 2023-03-14
Attending: INTERNAL MEDICINE
Payer: MEDICARE

## 2023-03-14 DIAGNOSIS — I48.21 PERMANENT ATRIAL FIBRILLATION (HCC): ICD-10-CM

## 2023-03-14 DIAGNOSIS — Z09 HOSPITAL DISCHARGE FOLLOW-UP: Primary | ICD-10-CM

## 2023-03-14 DIAGNOSIS — I48.0 PAROXYSMAL A-FIB (HCC): ICD-10-CM

## 2023-03-14 LAB
EKG ATRIAL RATE: 98 BPM
EKG DIAGNOSIS: NORMAL
EKG Q-T INTERVAL: 420 MS
EKG QRS DURATION: 134 MS
EKG QTC CALCULATION (BAZETT): 469 MS
EKG R AXIS: -51 DEGREES
EKG T AXIS: 28 DEGREES
EKG VENTRICULAR RATE: 75 BPM
GLUCOSE BLD STRIP.AUTO-MCNC: 94 MG/DL (ref 65–100)
LV EF: 58 %
LVEF MODALITY: NORMAL
SERVICE CMNT-IMP: NORMAL

## 2023-03-14 PROCEDURE — 33340 PERQ CLSR TCAT L ATR APNDGE: CPT | Performed by: INTERNAL MEDICINE

## 2023-03-14 PROCEDURE — 3700000001 HC ADD 15 MINUTES (ANESTHESIA): Performed by: INTERNAL MEDICINE

## 2023-03-14 PROCEDURE — 93005 ELECTROCARDIOGRAM TRACING: CPT | Performed by: INTERNAL MEDICINE

## 2023-03-14 PROCEDURE — 93355 ECHO TRANSESOPHAGEAL (TEE): CPT | Performed by: INTERNAL MEDICINE

## 2023-03-14 PROCEDURE — 93662 INTRACARDIAC ECG (ICE): CPT | Performed by: INTERNAL MEDICINE

## 2023-03-14 PROCEDURE — 82962 GLUCOSE BLOOD TEST: CPT

## 2023-03-14 PROCEDURE — 6370000000 HC RX 637 (ALT 250 FOR IP): Performed by: INTERNAL MEDICINE

## 2023-03-14 PROCEDURE — C1769 GUIDE WIRE: HCPCS | Performed by: INTERNAL MEDICINE

## 2023-03-14 PROCEDURE — 7100000000 HC PACU RECOVERY - FIRST 15 MIN: Performed by: INTERNAL MEDICINE

## 2023-03-14 PROCEDURE — 93325 DOPPLER ECHO COLOR FLOW MAPG: CPT

## 2023-03-14 PROCEDURE — 2580000003 HC RX 258: Performed by: NURSE ANESTHETIST, CERTIFIED REGISTERED

## 2023-03-14 PROCEDURE — B24BZZ4 ULTRASONOGRAPHY OF HEART WITH AORTA, TRANSESOPHAGEAL: ICD-10-PCS | Performed by: INTERNAL MEDICINE

## 2023-03-14 PROCEDURE — 6360000002 HC RX W HCPCS: Performed by: INTERNAL MEDICINE

## 2023-03-14 PROCEDURE — C1759 CATH, INTRA ECHOCARDIOGRAPHY: HCPCS | Performed by: INTERNAL MEDICINE

## 2023-03-14 PROCEDURE — 02L73DK OCCLUSION OF LEFT ATRIAL APPENDAGE WITH INTRALUMINAL DEVICE, PERCUTANEOUS APPROACH: ICD-10-PCS | Performed by: INTERNAL MEDICINE

## 2023-03-14 PROCEDURE — 2580000003 HC RX 258: Performed by: ANESTHESIOLOGY

## 2023-03-14 PROCEDURE — 2709999900 HC NON-CHARGEABLE SUPPLY: Performed by: INTERNAL MEDICINE

## 2023-03-14 PROCEDURE — 1100000003 HC PRIVATE W/ TELEMETRY

## 2023-03-14 PROCEDURE — 3700000000 HC ANESTHESIA ATTENDED CARE: Performed by: INTERNAL MEDICINE

## 2023-03-14 PROCEDURE — 2500000003 HC RX 250 WO HCPCS: Performed by: NURSE ANESTHETIST, CERTIFIED REGISTERED

## 2023-03-14 PROCEDURE — C1889 IMPLANT/INSERT DEVICE, NOC: HCPCS | Performed by: INTERNAL MEDICINE

## 2023-03-14 PROCEDURE — C1894 INTRO/SHEATH, NON-LASER: HCPCS | Performed by: INTERNAL MEDICINE

## 2023-03-14 PROCEDURE — C1760 CLOSURE DEV, VASC: HCPCS | Performed by: INTERNAL MEDICINE

## 2023-03-14 PROCEDURE — 2580000003 HC RX 258: Performed by: INTERNAL MEDICINE

## 2023-03-14 PROCEDURE — 6360000002 HC RX W HCPCS: Performed by: NURSE ANESTHETIST, CERTIFIED REGISTERED

## 2023-03-14 PROCEDURE — 6360000004 HC RX CONTRAST MEDICATION: Performed by: INTERNAL MEDICINE

## 2023-03-14 PROCEDURE — C1893 INTRO/SHEATH, FIXED,NON-PEEL: HCPCS | Performed by: INTERNAL MEDICINE

## 2023-03-14 PROCEDURE — 7100000001 HC PACU RECOVERY - ADDTL 15 MIN: Performed by: INTERNAL MEDICINE

## 2023-03-14 PROCEDURE — 2720000010 HC SURG SUPPLY STERILE: Performed by: INTERNAL MEDICINE

## 2023-03-14 DEVICE — LEFT ATRIAL APPENDAGE CLOSURE DEVICE WITH DELIVERY SYSTEM
Type: IMPLANTABLE DEVICE | Status: FUNCTIONAL
Brand: WATCHMAN FLX™

## 2023-03-14 RX ORDER — PROPOFOL 10 MG/ML
INJECTION, EMULSION INTRAVENOUS PRN
Status: DISCONTINUED | OUTPATIENT
Start: 2023-03-14 | End: 2023-03-14 | Stop reason: SDUPTHER

## 2023-03-14 RX ORDER — HEPARIN SODIUM 1000 [USP'U]/ML
INJECTION, SOLUTION INTRAVENOUS; SUBCUTANEOUS PRN
Status: DISCONTINUED | OUTPATIENT
Start: 2023-03-14 | End: 2023-03-14 | Stop reason: SDUPTHER

## 2023-03-14 RX ORDER — ROCURONIUM BROMIDE 10 MG/ML
INJECTION, SOLUTION INTRAVENOUS PRN
Status: DISCONTINUED | OUTPATIENT
Start: 2023-03-14 | End: 2023-03-14 | Stop reason: SDUPTHER

## 2023-03-14 RX ORDER — LOSARTAN POTASSIUM 50 MG/1
100 TABLET ORAL DAILY
Status: DISCONTINUED | OUTPATIENT
Start: 2023-03-14 | End: 2023-03-15 | Stop reason: HOSPADM

## 2023-03-14 RX ORDER — DIPHENHYDRAMINE HYDROCHLORIDE 50 MG/ML
12.5 INJECTION INTRAMUSCULAR; INTRAVENOUS
Status: DISCONTINUED | OUTPATIENT
Start: 2023-03-14 | End: 2023-03-14 | Stop reason: HOSPADM

## 2023-03-14 RX ORDER — HEPARIN SODIUM 200 [USP'U]/100ML
INJECTION, SOLUTION INTRAVENOUS CONTINUOUS PRN
Status: DISCONTINUED | OUTPATIENT
Start: 2023-03-14 | End: 2023-03-14 | Stop reason: HOSPADM

## 2023-03-14 RX ORDER — ACETAMINOPHEN 500 MG
1000 TABLET ORAL ONCE
Status: DISCONTINUED | OUTPATIENT
Start: 2023-03-14 | End: 2023-03-15 | Stop reason: HOSPADM

## 2023-03-14 RX ORDER — METOPROLOL SUCCINATE 25 MG/1
25 TABLET, EXTENDED RELEASE ORAL EVERY 12 HOURS
Status: DISCONTINUED | OUTPATIENT
Start: 2023-03-14 | End: 2023-03-15 | Stop reason: HOSPADM

## 2023-03-14 RX ORDER — LANOLIN ALCOHOL/MO/W.PET/CERES
400 CREAM (GRAM) TOPICAL 2 TIMES DAILY
Status: DISCONTINUED | OUTPATIENT
Start: 2023-03-14 | End: 2023-03-15 | Stop reason: HOSPADM

## 2023-03-14 RX ORDER — OXYCODONE HYDROCHLORIDE 5 MG/1
10 TABLET ORAL PRN
Status: DISCONTINUED | OUTPATIENT
Start: 2023-03-14 | End: 2023-03-14 | Stop reason: HOSPADM

## 2023-03-14 RX ORDER — LIDOCAINE HYDROCHLORIDE 20 MG/ML
INJECTION, SOLUTION EPIDURAL; INFILTRATION; INTRACAUDAL; PERINEURAL PRN
Status: DISCONTINUED | OUTPATIENT
Start: 2023-03-14 | End: 2023-03-14 | Stop reason: SDUPTHER

## 2023-03-14 RX ORDER — DEXAMETHASONE SODIUM PHOSPHATE 4 MG/ML
INJECTION, SOLUTION INTRA-ARTICULAR; INTRALESIONAL; INTRAMUSCULAR; INTRAVENOUS; SOFT TISSUE PRN
Status: DISCONTINUED | OUTPATIENT
Start: 2023-03-14 | End: 2023-03-14 | Stop reason: SDUPTHER

## 2023-03-14 RX ORDER — SODIUM CHLORIDE, SODIUM LACTATE, POTASSIUM CHLORIDE, CALCIUM CHLORIDE 600; 310; 30; 20 MG/100ML; MG/100ML; MG/100ML; MG/100ML
INJECTION, SOLUTION INTRAVENOUS CONTINUOUS
Status: DISCONTINUED | OUTPATIENT
Start: 2023-03-14 | End: 2023-03-15 | Stop reason: HOSPADM

## 2023-03-14 RX ORDER — SODIUM CHLORIDE 0.9 % (FLUSH) 0.9 %
5-40 SYRINGE (ML) INJECTION PRN
Status: DISCONTINUED | OUTPATIENT
Start: 2023-03-14 | End: 2023-03-15 | Stop reason: HOSPADM

## 2023-03-14 RX ORDER — MIDAZOLAM HYDROCHLORIDE 2 MG/2ML
2 INJECTION, SOLUTION INTRAMUSCULAR; INTRAVENOUS
Status: DISCONTINUED | OUTPATIENT
Start: 2023-03-14 | End: 2023-03-15 | Stop reason: HOSPADM

## 2023-03-14 RX ORDER — SODIUM CHLORIDE 0.9 % (FLUSH) 0.9 %
5-40 SYRINGE (ML) INJECTION EVERY 12 HOURS SCHEDULED
Status: DISCONTINUED | OUTPATIENT
Start: 2023-03-14 | End: 2023-03-15 | Stop reason: HOSPADM

## 2023-03-14 RX ORDER — ESMOLOL HYDROCHLORIDE 10 MG/ML
INJECTION INTRAVENOUS PRN
Status: DISCONTINUED | OUTPATIENT
Start: 2023-03-14 | End: 2023-03-14 | Stop reason: SDUPTHER

## 2023-03-14 RX ORDER — HYDROMORPHONE HYDROCHLORIDE 2 MG/ML
0.25 INJECTION, SOLUTION INTRAMUSCULAR; INTRAVENOUS; SUBCUTANEOUS EVERY 5 MIN PRN
Status: DISCONTINUED | OUTPATIENT
Start: 2023-03-14 | End: 2023-03-14 | Stop reason: HOSPADM

## 2023-03-14 RX ORDER — SODIUM CHLORIDE 9 MG/ML
INJECTION, SOLUTION INTRAVENOUS PRN
Status: DISCONTINUED | OUTPATIENT
Start: 2023-03-14 | End: 2023-03-15 | Stop reason: HOSPADM

## 2023-03-14 RX ORDER — ACETAMINOPHEN 325 MG/1
650 TABLET ORAL EVERY 4 HOURS PRN
Status: DISCONTINUED | OUTPATIENT
Start: 2023-03-14 | End: 2023-03-15 | Stop reason: HOSPADM

## 2023-03-14 RX ORDER — ONDANSETRON 2 MG/ML
INJECTION INTRAMUSCULAR; INTRAVENOUS PRN
Status: DISCONTINUED | OUTPATIENT
Start: 2023-03-14 | End: 2023-03-14 | Stop reason: SDUPTHER

## 2023-03-14 RX ORDER — FENTANYL CITRATE 50 UG/ML
100 INJECTION, SOLUTION INTRAMUSCULAR; INTRAVENOUS
Status: DISCONTINUED | OUTPATIENT
Start: 2023-03-14 | End: 2023-03-15 | Stop reason: HOSPADM

## 2023-03-14 RX ORDER — ONDANSETRON 2 MG/ML
4 INJECTION INTRAMUSCULAR; INTRAVENOUS
Status: DISCONTINUED | OUTPATIENT
Start: 2023-03-14 | End: 2023-03-14 | Stop reason: HOSPADM

## 2023-03-14 RX ORDER — OXYCODONE HYDROCHLORIDE 5 MG/1
5 TABLET ORAL PRN
Status: DISCONTINUED | OUTPATIENT
Start: 2023-03-14 | End: 2023-03-14 | Stop reason: HOSPADM

## 2023-03-14 RX ORDER — HYDROMORPHONE HYDROCHLORIDE 2 MG/ML
0.5 INJECTION, SOLUTION INTRAMUSCULAR; INTRAVENOUS; SUBCUTANEOUS EVERY 10 MIN PRN
Status: DISCONTINUED | OUTPATIENT
Start: 2023-03-14 | End: 2023-03-14 | Stop reason: HOSPADM

## 2023-03-14 RX ADMIN — SODIUM CHLORIDE, SODIUM LACTATE, POTASSIUM CHLORIDE, AND CALCIUM CHLORIDE: 600; 310; 30; 20 INJECTION, SOLUTION INTRAVENOUS at 12:07

## 2023-03-14 RX ADMIN — METOPROLOL SUCCINATE 25 MG: 25 TABLET, EXTENDED RELEASE ORAL at 21:03

## 2023-03-14 RX ADMIN — SODIUM CHLORIDE, PRESERVATIVE FREE 10 ML: 5 INJECTION INTRAVENOUS at 21:05

## 2023-03-14 RX ADMIN — PROPOFOL 150 MG: 10 INJECTION, EMULSION INTRAVENOUS at 12:18

## 2023-03-14 RX ADMIN — HEPARIN SODIUM 5000 UNITS: 1000 INJECTION, SOLUTION INTRAVENOUS; SUBCUTANEOUS at 12:45

## 2023-03-14 RX ADMIN — SUGAMMADEX 200 MG: 100 INJECTION, SOLUTION INTRAVENOUS at 13:17

## 2023-03-14 RX ADMIN — PHENYLEPHRINE HYDROCHLORIDE 0.1 ML: 10 INJECTION INTRAVENOUS at 12:50

## 2023-03-14 RX ADMIN — ONDANSETRON 4 MG: 2 INJECTION INTRAMUSCULAR; INTRAVENOUS at 12:34

## 2023-03-14 RX ADMIN — PHENYLEPHRINE HYDROCHLORIDE 0.1 ML: 10 INJECTION INTRAVENOUS at 12:52

## 2023-03-14 RX ADMIN — LOSARTAN POTASSIUM 100 MG: 50 TABLET, FILM COATED ORAL at 16:37

## 2023-03-14 RX ADMIN — PHENYLEPHRINE HYDROCHLORIDE 0.1 ML: 10 INJECTION INTRAVENOUS at 13:01

## 2023-03-14 RX ADMIN — Medication 2 G: at 12:30

## 2023-03-14 RX ADMIN — MAGNESIUM GLUCONATE 500 MG ORAL TABLET 400 MG: 500 TABLET ORAL at 21:03

## 2023-03-14 RX ADMIN — MAGNESIUM GLUCONATE 500 MG ORAL TABLET 400 MG: 500 TABLET ORAL at 16:37

## 2023-03-14 RX ADMIN — DEXAMETHASONE SODIUM PHOSPHATE 4 MG: 4 INJECTION, SOLUTION INTRAMUSCULAR; INTRAVENOUS at 12:34

## 2023-03-14 RX ADMIN — LIDOCAINE HYDROCHLORIDE 100 MG: 20 INJECTION, SOLUTION EPIDURAL; INFILTRATION; INTRACAUDAL; PERINEURAL at 12:18

## 2023-03-14 RX ADMIN — PHENYLEPHRINE HYDROCHLORIDE 0.1 ML: 10 INJECTION INTRAVENOUS at 12:30

## 2023-03-14 RX ADMIN — ROCURONIUM BROMIDE 30 MG: 50 INJECTION, SOLUTION INTRAVENOUS at 12:18

## 2023-03-14 RX ADMIN — PHENYLEPHRINE HYDROCHLORIDE 0.1 ML: 10 INJECTION INTRAVENOUS at 12:35

## 2023-03-14 RX ADMIN — PROPOFOL 50 MG: 10 INJECTION, EMULSION INTRAVENOUS at 12:19

## 2023-03-14 RX ADMIN — ESMOLOL HYDROCHLORIDE 40 MG: 10 INJECTION INTRAVENOUS at 13:26

## 2023-03-14 RX ADMIN — HEPARIN SODIUM 5000 UNITS: 1000 INJECTION, SOLUTION INTRAVENOUS; SUBCUTANEOUS at 12:40

## 2023-03-14 NOTE — Clinical Note
Contrast Dose Calculator:   Patient's age: 80.   Patient's sex: Female. Patient weight (kg) = 89. Creatinine level (mg/dL) = 0.83. Creatinine clearance (mL/min): 67.   Contrast concentration (mg/mL) = 370. MACD = 300 mL. Max Contrast dose per Creatinine Cl calculator = 150.75 mL.

## 2023-03-14 NOTE — PERIOP NOTE
TRANSFER - OUT REPORT:    Verbal report given to Kerrin Harada, RN on Erickson Foss  being transferred to [facility] for routine progression of patient care       Report consisted of patients Situation, Background, Assessment and   Recommendations(SBAR). Information from the following report(s) Nurse Handoff Report, Surgery Report, Cardiac Rhythm Atrial fibrillation, and Neuro Assessment was reviewed with the receiving nurse. Lines:   Peripheral IV 03/14/23 Right Antecubital (Active)       Peripheral IV 03/14/23 Left Hand (Active)        Opportunity for questions and clarification was provided. Patient transported with:   Monitor  Registered Nurse    VTE prophylaxis orders have been written for Erickson Foss. Patient and family given floor number and nurses name. Family updated re: pt status after security code verified.

## 2023-03-14 NOTE — Clinical Note
Transseptal needle inserted after heparin administration (NEEDLE TRANSSEPTAL L71CM FIX CRV C1 INSUL W/ RF ENERGY NRG). Transseptal catheterization performed under ICE guidance.  fluoro used

## 2023-03-14 NOTE — DISCHARGE INSTRUCTIONS
Watchman Discharge Instructions      Activity:    Follow your doctor’s recommendations  Return to normal activities gradually, pacing yourself as you feel better, resting when tired.  Activity should be limited for the next 48 hours. Climb stairs as little as possible and avoid any stooping, bending or strenuous activity for 48 hours. No heavy lifting (anything over 10 pounds) for five days.  Do not drive for 48 hoursHave a responsible person drive you home and stay with you for at least 24 hours after your heart catheterization/angiography.  Check the puncture site frequently for swelling or bleeding. If you see any bleeding, lie down and apply pressure over the area with a clean town or washcloth. Notify your doctor for any redness, swelling, drainage or oozing from the puncture site. Notify your doctor for any fever or chills.  You may resume your usual diet. Drink more fluids than usual.        Incision / Wound Care      Cleanse wounds with mild soap and water. Keep wound dry.  A small amount of bloody or clear drainage is normal.  Watch for redness, swelling, incision site hot to touch, foul or colored drainage from the incision site, these are all signs of infection and should be reported immediately to the physicians.  If there is site concern please notify your implanting physician.  You may remove the bandage from your Right and Groin in 24 hours. You may shower in 24 hours. No tub baths, hot tubs or swimming for one week. Do not place any lotions, creams, powders, ointments over the puncture site for one week. You may place a clean band-aid over the puncture site each day for 5 days. Change this daily.     Continued        Medications:  DO NOT STOP TAKING YOUR ELIQUIS  WITHOUT SPEAKING WITH YOUR CARDIOLOGIST FIRST!  Take your medications as ordered at the time of discharge.  Do NOT stop taking any medications without first discussing it with your doctor.  Notify all your doctors of current  medication lists. Follow instructions on medication administration especially if blood thinning medications are prescribed. Your doctor will monitor your medications and advise you when or if you can stop taking them. Notify your doctor immediately if any of the following:  Sudden weight gain  Increasing shortness of breath  Pain, change in color, temperature or swelling in lower legs or feet. Fevers greater than 101 degrees, redness, swelling, incision site hot to touch, foul or colored drainage from the incision site, these are all signs of infection and should be reported immediately to the physicians. Post Watchman Discharge Instructions Timeline    After a minimum of 45 days from date of procedure you will need to return to hospital to have an outpatient TANESHA performed to determine if the implant has closed the opening of the appendage. At this time you will see the [place] [id number]. Coordinator for a follow up. If the TANESHA reveals the appendage is not fully closed - you will require another TANESHA at a later date to reassess. Once the results from TANESHA have been reviewed the physicians will determine what medication changes need to be made. Your [facility] Coordinator can facilitate arranging these appointments. 6 months post implant you will need to see the [facility] Coordinator and visit the physician for a routine follow up and potentially EKG, and lab work. Your Coordinator can facilitate with setting up these appointments. At 1 and 2 years post implant you will be contacted by your [place] [id number]. Coordinator for a brief interview. This allows us to complete required patient monitoring. Prior to any dental work or surgery notify your dentist or surgeon about your Watchman implant and the medications you are on. Maintain regular follow up visits with your cardiologist    Keep all bloodwork appointments.       Thank you for entrusting us with your care! The patient is being discharged home in stable condition on a low saturated fat, low cholesterol and low salt diet. The patient is instructed to advance activities as tolerated to the limit of fatigue or shortness of breath. The patient is instructed to avoid lifting anything heavier than 10 lbs for 1 weeks. The patient is instructed to avoid any straining, stooping or squatting for 2 weeks. The patient is instructed not to drive for 1 week. The patient is instructed to watch the groin site for bleeding/oozing; if seen, the patient is instructed to apply firm pressure with a clean cloth and call [facility] at [phone]. The patient is instructed to watch for signs of infection which include: increasing area of redness, fever/hot to touch or purulent drainage at the groin site. The patient is instructed not to soak in a bathtub for 7-10 days, but is cleared to shower. The patient is instructed to return to the ER immediately for any severe pain, color change, or temperature change in leg.       The patient is informed not to stop any medications without discussing with our office and to contact our office if any dental work or possible surgeries are expected

## 2023-03-14 NOTE — ANESTHESIA PRE PROCEDURE
Department of Anesthesiology  Preprocedure Note       Name:  Amber Daley   Age:  80 y.o.  :  1935                                          MRN:  [id number]         Date:  3/14/2023      Surgeon: Leoncio Quintana):  MD Rayna Muñoz MD    Procedure: Procedure(s):  WATCHMAN AGNES CLOSURE DEVICE    Medications prior to admission:   Prior to Admission medications    Medication Sig Start Date End Date Taking? Authorizing Provider   predniSONE 10 MG (21) TBPK As directed disp pack  Patient not taking: Reported on 3/14/2023 3/7/23   Michel Conner MD   [facility]) 5 MG TABS tablet Take 1 tablet by mouth 2 times daily 2/15/23   Angela Cook MD   metoprolol succinate (TOPROL XL) 25 MG extended release tablet Take 1 tablet by mouth in the morning and 1 tablet in the evening. 2/15/23   nAgela Cook MD   losartan (COZAAR) 100 MG tablet Take 1 tablet by mouth daily  Patient not taking: Reported on 3/9/2023 1/25/23   Michel Conner MD   Bioflavonoid Products (RICK C PO) Take by mouth    Historical Provider, MD   Multiple Vitamins-Minerals (ONE-A-DAY WOMENS PO) Take 1 tablet by mouth daily    Historical Provider, MD   AZO-CRANBERRY PO Take 2 tablets by mouth daily    Historical Provider, MD   Cholecalciferol 50 MCG (2000 UT) TABS Take by mouth    Ar Automatic Reconciliation   magnesium oxide (MAG-OX) 400 (240 Mg) MG tablet Take 400 mg by mouth 2 times daily 20   Ar Automatic Reconciliation       Current medications:    Current Facility-Administered Medications   Medication Dose Route Frequency Provider Last Rate Last Admin    lactated ringers IV soln infusion   IntraVENous Continuous Angela Cook MD        ceFAZolin (ANCEF) 2000 mg in sterile water 20 mL IV syringe  2,000 mg IntraVENous On Call to [facility] [address], MD           Allergies:     Allergies   Allergen Reactions    Guaifenesin      Other reaction(s): Unknown-Unspecified    Loratadine-Pseudoephedrine Er Other (See Comments)     Other reaction(s): Other- (not listed) - Allergy  Difficulty sleeping and jittery  Difficulty sleeping and jittery      Amoxicillin Rash     Bilateral lower extremities    Sulfa Antibiotics Rash       Problem List:    Patient Active Problem List   Diagnosis Code    HLD (hyperlipidemia) E78.5    Premature atrial contraction I49.1    Ventricular premature beats I49.3    Esophageal reflux K21.9    Essential hypertension I10    Paroxysmal atrial fibrillation (HCC) I48.0    Abnormal nuclear stress test R94.39    Sinus node dysfunction (HCC) I49.5    Coronary artery disease involving native coronary artery of native heart without angina pectoris I25.10    Chronic fatigue R53.82    Symptomatic bradycardia R00.1       Past Medical History:        Diagnosis Date    Anticoagulant long-term use     Arrhythmia     Atrial fibrillation (HCC)     Chronic back pain Many years ago    H/O complete eye exam Yearly    Dr. Geraldo Ayon    Headache     HLD (hyperlipidemia)     Hypertension     controlled with medication       Past Surgical History:        Procedure Laterality Date    APPENDECTOMY      CATARACT REMOVAL Bilateral 2012    GYN      fibroid tumors removed, 1.5 ovaries removed    ORTHOPEDIC SURGERY  06/2012    left elbow surgery    ORTHOPEDIC SURGERY  2000's    R knee scope    OK UNLISTED PROCEDURE CARDIAC SURGERY      cath    PRE-MALIGNANT / BENIGN SKIN LESION EXCISION      basal cell removed from face       Social History:    Social History     Tobacco Use    Smoking status: Never    Smokeless tobacco: Never   Substance Use Topics    Alcohol use:  No                                Counseling given: Not Answered      Vital Signs (Current):   Vitals:    03/09/23 1402 03/14/23 1105   BP:  (!) 169/94   Pulse:  72   Resp:  16   Temp:  98.2 °F (36.8 °C)   TempSrc:  Oral   SpO2:  98%   Weight: 196 lb (88.9 kg)    Height: 5' 7.5\" (1.715 m) BP Readings from Last 3 Encounters:   03/14/23 (!) 169/94   03/07/23 128/80   02/20/23 138/80       NPO Status:                                                                                 BMI:   Wt Readings from Last 3 Encounters:   03/09/23 196 lb (88.9 kg)   03/07/23 196 lb (88.9 kg)   02/20/23 197 lb 12.8 oz (89.7 kg)     Body mass index is 30.24 kg/m².     CBC:   Lab Results   Component Value Date/Time    WBC 6.7 03/13/2023 11:20 AM    RBC 4.88 03/13/2023 11:20 AM    HGB 14.2 03/13/2023 11:20 AM    HCT 45.3 03/13/2023 11:20 AM    MCV 92.8 03/13/2023 11:20 AM    RDW 14.1 03/13/2023 11:20 AM     03/13/2023 11:20 AM       CMP:   Lab Results   Component Value Date/Time     03/13/2023 11:20 AM    K 3.9 03/13/2023 11:20 AM     03/13/2023 11:20 AM    CO2 31 03/13/2023 11:20 AM    BUN 24 03/13/2023 11:20 AM    CREATININE 0.83 03/13/2023 11:20 AM    GFRAA >60 05/13/2022 02:40 PM    AGRATIO 1.8 02/07/2022 10:19 AM    LABGLOM >60 03/13/2023 11:20 AM    GLUCOSE 96 03/13/2023 11:20 AM    PROT 6.9 02/20/2023 10:03 AM    CALCIUM 9.3 03/13/2023 11:20 AM    BILITOT 0.4 02/20/2023 10:03 AM    ALKPHOS 74 02/20/2023 10:03 AM    ALKPHOS 77 02/07/2022 10:19 AM    AST 31 02/20/2023 10:03 AM    ALT 36 02/20/2023 10:03 AM       POC Tests:   Recent Labs     03/14/23  1122   POCGLU 94       Coags:   Lab Results   Component Value Date/Time    PROTIME 14.0 03/13/2023 11:20 AM    INR 1.1 03/13/2023 11:20 AM       HCG (If Applicable): No results found for: PREGTESTUR, PREGSERUM, HCG, HCGQUANT     ABGs: No results found for: PHART, PO2ART, UJE1VTZ, DSE7XFU, BEART, J7YRDXPN     Type & Screen (If Applicable):  No results found for: LABABO, LABRH    Drug/Infectious Status (If Applicable):  No results 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, HEPCAB    COVID-19 Screening (If Applicable):   Lab Results   Component Value Date/Time    COVID19 Not Detected 09/23/2021 01:26 PM    COVID19 Performed 09/23/2021 01:26 PM           Anesthesia Evaluation  Patient summary reviewed and Nursing notes reviewed no history of anesthetic complications:   Airway: Mallampati: II  TM distance: >3 FB   Neck ROM: full  Mouth opening: > = 3 FB   Dental:    (+) partials  Comment: lower    Pulmonary:Negative Pulmonary ROS breath sounds clear to auscultation                             Cardiovascular:  Exercise tolerance: good (>4 METS),   (+) hypertension:, pacemaker (pacer placed 5/22):, dysrhythmias: atrial fibrillation, hyperlipidemia        Rhythm: regular  Rate: normal                 ROS comment: LHC--mild to mod CAD  ECHO 4/22 nl EF, mod MR, RVSP 38     Neuro/Psych:               GI/Hepatic/Renal:   (+) GERD: well controlled,      Morbid obesity: obese. Endo/Other: Negative Endo/Other ROS                    Abdominal:             Vascular: negative vascular ROS. Other Findings:           Anesthesia Plan      general     ASA 3       Induction: intravenous. continuous noninvasive hemodynamic monitor    Anesthetic plan and risks discussed with patient. Use of blood products discussed with patient whom.                      Haja Qureshi MD   3/14/2023

## 2023-03-14 NOTE — H&P
[facility] Cardiology/Electrophyiology Consult                Date of  Admission: 3/14/2023 10:37 AM     CC/Reason for admission: Watchman implantation     Juanis Pantoja is a 80 y.o. female admitted for Permanent atrial fibrillation ([address] [I48.21]. 80 y.o. female with a past medical and cardiac history significant for permanent AF, CAD, and s/p PPM and presents for scheduled Watchman. Has had some balance and weakness problems, concerned about falls, lives alone. Cardiac PMH: (Old records have been reviewed and summarized below)    Patient Active Problem List   Diagnosis    HLD (hyperlipidemia)    Premature atrial contraction    Ventricular premature beats    Esophageal reflux    Essential hypertension    Paroxysmal atrial fibrillation (HCC)    Abnormal nuclear stress test    Sinus node dysfunction (HCC)    Coronary artery disease involving native coronary artery of native heart without angina pectoris    Chronic fatigue    Symptomatic bradycardia       Past Medical History:   Diagnosis Date    Anticoagulant long-term use     Arrhythmia     Atrial fibrillation (HCC)     Chronic back pain Many years ago    H/O complete eye exam Yearly    Dr. Amarilis Giles    Headache     HLD (hyperlipidemia)     Hypertension     controlled with medication      Past Surgical History:   Procedure Laterality Date    APPENDECTOMY      CATARACT REMOVAL Bilateral 2012    GYN      fibroid tumors removed, 1.5 ovaries removed    ORTHOPEDIC SURGERY  06/2012    left elbow surgery    ORTHOPEDIC SURGERY  2000's    R knee scope    [place] UNLISTED PROCEDURE CARDIAC SURGERY      cath    PRE-MALIGNANT / BENIGN SKIN LESION EXCISION      basal cell removed from face     Allergies   Allergen Reactions    Guaifenesin      Other reaction(s): Unknown-Unspecified    Loratadine-Pseudoephedrine Er Other (See Comments)     Other reaction(s):  Other- (not listed) - Allergy  Difficulty sleeping and jittery  Difficulty sleeping and jittery Amoxicillin Rash     Bilateral lower extremities    Sulfa Antibiotics Rash      Family History   Problem Relation Age of Onset    Heart Disease Mother         Cogestive heart  disease    Hypertension Mother     Other Mother         pacemaker,  at 80    Arthritis Mother     High Blood Pressure Mother     Stroke Brother         mild x2; major x1  (smoker)    Heart Disease Brother         2 mild strokes and one serious . He lost his ability to speak    Lung Disease Father     Heart Disease Father     Other Father         pacemaker -  at 80        Current Facility-Administered Medications   Medication Dose Route Frequency    lactated ringers IV soln infusion   IntraVENous Continuous    ceFAZolin (ANCEF) 2000 mg in sterile water 20 mL IV syringe  2,000 mg IntraVENous On Call to OR       Review of Symptoms:  A comprehensive ROS was performed with the pertinent positives and negatives mentioned in the HPI, all other systems reviewed and are negative       Physical Exam  Vitals:    23 1402 23 1105   BP:  (!) 169/94   Pulse:  72   Resp:  16   Temp:  98.2 °F (36.8 °C)   TempSrc:  Oral   SpO2:  98%   Weight: 196 lb (88.9 kg)    Height: 5' 7.5\" (1.715 m)        Physical Exam:  General appearance: Alert, well appearing, and in no distress   CV: irreg irreg, no M/R/G, no JVD, normal distal pulses, no lower extremity edema  Pulm: Clear to auscultation, no wheezes, no crackles, no accessory muscle use  GI: Soft, nontender, nondistended  Neuro: Alert and oriented      Cardiographics    Telemetry:   ECG (Indpendently visualized and interpreted):  Echocardiogram:     Labs:   Recent Labs     23  1120   *   K 3.9   BUN 24*   WBC 6.7   HGB 14.2   HCT 45.3      INR 1.1        Assessment:      Active Problems:    Paroxysmal atrial fibrillation (HCC)  Resolved Problems:    * No resolved hospital problems. *         Plan:   1.  CVA protection:  Patient is an appropriate candidate for consideration of left atrial appendage occlusion. The patient's [id number] score is  5 which indicated a 6.7% annual risk of stroke. (Age, HTN, CAD, F). Has-BLED score is 2 which indicates a 1.88% annual risk of a major bleeding event. (Age, HTN). I have discussed with the rationale for  left atrial appendage occlusion. We discussed the available data from randomized trials which demonstate left atrial appendage occlusion is as effective as long term anticoagulation at prevention of CVA or systemic embolism. We also discussed that left atrial appendage closure reduces risk of CVA or sytemic embolization by 67-83% compared to no anticoagulation. The risk of left atrial appendage were also discussed. Risk of major complication is approximately 1.5% based on available data. Risk include but not limited:              - Pericardial tamponade (1.28%) which can be treated percutaneously in 63% of cases but can require sugical therapy in  31% of cases (3 out of 1000 patients). - Procedural related CVA in 2 out of 1000 patients. - Device embolization in less than 3 out of 1000 patients              - Death related to procedure in approximately 6 out of 10,000 patients. Based on our discussion, it is felt benefits of left atrial appendage significantly outweigh risk. All questions answered to the best of my ability. Patient would like to proceed with Watchman implantation. Julio Brandt MD, MS  Cardiology/Electrophysiology

## 2023-03-14 NOTE — ANESTHESIA POSTPROCEDURE EVALUATION
Department of Anesthesiology  Postprocedure Note    Patient: Diogo Francois  MRN: [id number]  YOB: 1935  Date of evaluation: 3/14/2023      Procedure Summary     Date: 03/14/23 Room / Location: [facility] MAIN OR  / [facility] MAIN OR    Anesthesia Start: 1200 Anesthesia Stop: 1338    Procedure: WATCHMAN AGNES CLOSURE DEVICE Diagnosis:       Permanent atrial fibrillation (HCC)      (Paroxysmal A-fib ([address] [I48.0])    Providers: Bing Barrios MD Responsible Provider: Amie Leahy MD    Anesthesia Type: general ASA Status: 3          Anesthesia Type: No value filed.     Nory Phase I: Nory Score: 8    Nory Phase II: Nory Score: 10      Anesthesia Post Evaluation    Patient location during evaluation: PACU  Patient participation: complete - patient participated  Level of consciousness: awake and alert  Airway patency: patent  Nausea & Vomiting: no nausea  Cardiovascular status: hemodynamically stable  Respiratory status: acceptable  Hydration status: euvolemic

## 2023-03-14 NOTE — PROGRESS NOTES
LAAO with Dr Ely Rodriguez  Successful deployment of Watchman device  16 fr RFV closed with Perclose device  10 fr RFV closed with vascade  Site covered with tegaderm and gauze  No bleeding or hematoma noted at site.  Site soft   Pt to go to PACU

## 2023-03-14 NOTE — OP NOTE
PROCEDURE/OPERATIVE REPORT    Patient: Maria Fernanda Javier MRN: [id number]  SSN: [id number]    YOB: 1935  Age: 80 y.o. Sex: female      DATE OF PROCEDURE: 3/14/2023     PROCEDURE: Left atrial appendage occlusion with Watchman device. Pre-procedural Diagnosis  1. Long standing Persistent Atrial fibrillation  2. Intolerant to long term anticoagulation     Procedure Performed  1. Transesophageal echocardiogram  2. Intracardiac Echocardiogram  3. Transeptal puncture   3. Watchman implantation    Cardiac Electrophysiologist: Geary Kanner. Silvana Chamberlain MD  Interventional Cardiologist: Thea Estrada MD    Anesthesia: General     Estimated Blood Loss: Less than 10 mL     Specimens: * No specimens in log *    Procedure in Detail:  The patient was brought to the hybrid OR suite in the fasting state. The patient was intubated by anesthesiology and invasive arterial blood pressure monitoring obtained. A tranesophageal echocardiogram was performed directly prior to the procedure and was negative for a AGNES thrombus (see full report in chart). As the secondary , I obtained venous access under ultrasound guidance x 2 using modified Seldinger technique with placement of a 16Fr short sidearm sheath and a 10Fr short side am sheath into the right femoral vein. Prior to placing the 16 Fr sheath, a Perclose device was deployed in a \"preclose\" fashion and will be used for hemostasis post procedure. I placed an SL-O 63cm long braided sheath through the 16 Fr sheath in the right femoral vein and guided over a wire to the RA. A BiosThinkspeedter intracardiac echo catheter was inserted into the 10Fr sheath and used for visualization and guidance for placing the Watchman device. Next, I inserted a trans-septal needle into the SLO and it was used to perform a trans-septal puncture with assistance from TANESHA, as well as fluoroscopy. The SLO sheath was advanced into the LA.  The ICE catheter was also advanced into the LA via the same transeptal puncture site. Total weight based heparin bolus was administered (1/2 prior to transeptal puncture and 1/2 just after transeptal access) and systemic blood pressure monitored invasively. The ACT target was 250-300. As the primary implanting physician, Dr. Constantin Cruz prepped the Watchman delivery sheath and delivered a 31 mm device per his procedure note with my assistance. I was present and assisted with this portion of the procedure. A contrast appendogram was performed revealing an adequate seal. After extensive evaluation including an excellent tug test, the device was deployed. Further measurements were taken post implant. The sheath was removed. At the completion of the Watchman implant procedure, all catheters were removed, and the Perclose device was fully deployed for adequate hemostasis and sheaths were pulled. A VASCADE MVP device was used to close the venotomy sites. The MVP tools were advanced into the 10 Fr sheaths, respectively; the sheaths were then removed. The collagen was then deployed and a 30 second dwell time was performed to allow for expansion of the collagen. The delivery tools were then removed with adequate hemostasis. The patient tolerated the procedure well with no acute complications recognized. Just prior to pulling shealths, the TANESHA was used to obtain ultrasound images and revealed no evidence of pericardial effusion. Complications: None    Summary:   1.  Successful Watchman implantation of a 31 FLX device      Greg Augustin MD

## 2023-03-15 ENCOUNTER — TELEPHONE (OUTPATIENT)
Dept: CARDIOLOGY CLINIC | Age: 88
End: 2023-03-15

## 2023-03-15 ENCOUNTER — TELEPHONE (OUTPATIENT)
Dept: FAMILY MEDICINE CLINIC | Facility: CLINIC | Age: 88
End: 2023-03-15

## 2023-03-15 VITALS
DIASTOLIC BLOOD PRESSURE: 63 MMHG | SYSTOLIC BLOOD PRESSURE: 115 MMHG | BODY MASS INDEX: 29.77 KG/M2 | HEIGHT: 68 IN | TEMPERATURE: 98.4 F | WEIGHT: 196.4 LBS | RESPIRATION RATE: 18 BRPM | HEART RATE: 71 BPM | OXYGEN SATURATION: 94 %

## 2023-03-15 PROBLEM — Z95.818 PRESENCE OF WATCHMAN LEFT ATRIAL APPENDAGE CLOSURE DEVICE: Status: ACTIVE | Noted: 2023-03-15

## 2023-03-15 LAB
ABO + RH BLD: NORMAL
ANION GAP SERPL CALC-SCNC: 1 MMOL/L (ref 2–11)
BLD PROD TYP BPU: NORMAL
BLOOD BANK CMNT PATIENT-IMP: NORMAL
BLOOD BANK DISPENSE STATUS: NORMAL
BLOOD GROUP ANTIBODIES SERPL: NORMAL
BPU ID: NORMAL
BUN SERPL-MCNC: 22 MG/DL (ref 8–23)
CALCIUM SERPL-MCNC: 8.9 MG/DL (ref 8.3–10.4)
CHLORIDE SERPL-SCNC: 110 MMOL/L (ref 101–110)
CO2 SERPL-SCNC: 30 MMOL/L (ref 21–32)
CREAT SERPL-MCNC: 0.9 MG/DL (ref 0.6–1)
CROSSMATCH RESULT: NORMAL
ERYTHROCYTE [DISTWIDTH] IN BLOOD BY AUTOMATED COUNT: 14.4 % (ref 11.9–14.6)
GLUCOSE SERPL-MCNC: 122 MG/DL (ref 65–100)
HCT VFR BLD AUTO: 41.8 % (ref 35.8–46.3)
HGB BLD-MCNC: 13.2 G/DL (ref 11.7–15.4)
MCH RBC QN AUTO: 29.7 PG (ref 26.1–32.9)
MCHC RBC AUTO-ENTMCNC: 31.6 G/DL (ref 31.4–35)
MCV RBC AUTO: 94.1 FL (ref 82–102)
NRBC # BLD: 0 K/UL (ref 0–0.2)
PLATELET # BLD AUTO: 245 K/UL (ref 150–450)
PMV BLD AUTO: 10.4 FL (ref 9.4–12.3)
POTASSIUM SERPL-SCNC: 4 MMOL/L (ref 3.5–5.1)
RBC # BLD AUTO: 4.44 M/UL (ref 4.05–5.2)
SODIUM SERPL-SCNC: 141 MMOL/L (ref 133–143)
SPECIMEN EXP DATE BLD: NORMAL
UNIT DIVISION: 0
WBC # BLD AUTO: 8.7 K/UL (ref 4.3–11.1)

## 2023-03-15 PROCEDURE — 2580000003 HC RX 258: Performed by: ANESTHESIOLOGY

## 2023-03-15 PROCEDURE — 36415 COLL VENOUS BLD VENIPUNCTURE: CPT

## 2023-03-15 PROCEDURE — 80048 BASIC METABOLIC PNL TOTAL CA: CPT

## 2023-03-15 PROCEDURE — 6370000000 HC RX 637 (ALT 250 FOR IP): Performed by: INTERNAL MEDICINE

## 2023-03-15 PROCEDURE — 85027 COMPLETE CBC AUTOMATED: CPT

## 2023-03-15 RX ADMIN — MAGNESIUM GLUCONATE 500 MG ORAL TABLET 400 MG: 500 TABLET ORAL at 09:29

## 2023-03-15 RX ADMIN — LOSARTAN POTASSIUM 100 MG: 50 TABLET, FILM COATED ORAL at 09:29

## 2023-03-15 RX ADMIN — APIXABAN 5 MG: 5 TABLET, FILM COATED ORAL at 09:29

## 2023-03-15 RX ADMIN — METOPROLOL SUCCINATE 25 MG: 25 TABLET, EXTENDED RELEASE ORAL at 09:29

## 2023-03-15 RX ADMIN — SODIUM CHLORIDE, PRESERVATIVE FREE 5 ML: 5 INJECTION INTRAVENOUS at 09:31

## 2023-03-15 NOTE — CARE COORDINATION
Pt here for Watchman placement and is expecting to be discharged to home today. Patient lives alone in a house where she can primarily live on the main level. She uses a ramp in her garage that was built for her now   to get inside. She does not need assistive devices but has access to a walker or cane if she needs it. She manages cooking, cleaning, shopping, etc., independently. Her daughter is from [facility] and is staying with her right now [facility] does not recall phone number). Patient sees Dr. Lisbet Barrientos from [facility] and last saw last week. Patient is able to drive self to and from appts. Patient reports she has [employer] PPO and is able to manage prescriptions.

## 2023-03-15 NOTE — FLOWSHEET NOTE
Discharge instructions reviewed with patient. Opportunity for questions provided. Patient voiced understanding of all discharge instructions.      03/15/23 0926   NIHSS Stroke Scale   Interval Reassessment   Level of Consciousness (1a) 0   LOC Questions (1b) 0   LOC Commands (1c) 0   Best Gaze (2) 0   Visual (3) 0   Facial Palsy (4) 0   Motor Arm, Left (5a) 0   Motor Arm, Right (5b) 0   Motor Leg, Left (6a) 0   Motor Leg, Right (6b) 0   Limb Ataxia (7) 0   Sensory (8) 0   Best Language (9) 0   Dysarthria (10) 0   Extinction and Inattention (11) 0   Total 0   NIHSS Stroke Scale Assessed Yes

## 2023-03-15 NOTE — PLAN OF CARE
Problem: Discharge Planning  Goal: Discharge to home or other facility with appropriate resources  Outcome: Completed  Flowsheets (Taken 3/14/2023 2003 by Aravind Oviedo, RN)  Discharge to home or other facility with appropriate resources:   Identify barriers to discharge with patient and caregiver   Identify discharge learning needs (meds, wound care, etc)   Refer to discharge planning if patient needs post-hospital services based on physician order or complex needs related to functional status, cognitive ability or social support system     Problem: Safety - Adult  Goal: Free from fall injury  Outcome: Completed  Flowsheets (Taken 3/14/2023 2003 by Aravind Oviedo RN)  Free From Fall Injury:   Instruct family/caregiver on patient safety   Based on caregiver fall risk screen, instruct family/caregiver to ask for assistance with transferring infant if caregiver noted to have fall risk factors     Problem: ABCDS Injury Assessment  Goal: Absence of physical injury  Outcome: Completed

## 2023-03-15 NOTE — DISCHARGE SUMMARY
[facility] Cardiology Discharge Summary     Patient ID:  Manuel Parker  [id number]  89 y.o.  1935    Admit date: 3/14/2023    Discharge date:  03/15/23    Admitting Physician: Sintia Shane MD     Discharge Physician: MARIA VICTORIA Armijo CNP/Dr. Rivera    Admission Diagnoses: Permanent atrial fibrillation [facility]) [I48.21]    Discharge Diagnoses: Active Problems:    Permanent atrial fibrillation (HCC)    Presence of Watchman left atrial appendage closure device    Paroxysmal atrial fibrillation (HCC)  Resolved Problems:    * No resolved hospital problems. *      Cardiology Procedures this admission:  TANESHA, Implantation of Watchman device, Echocardiogram    Consults: None    Hospital Course: Patient was seen at the office of [facility] Cardiology by Dr. Jasvir Haynes in follow up for consideration for watchman device. The patient presented for procedure. TANESHA was performed directly prior to procedure that was negative for AGNES thrombus. The patient underwent successful implantation of a 31 mm Watchman Flex device. The patient was monitored closely in the CV stepdown. An post procedure TANESHA showed Normal fx and no effusion. The morning of 03/15/23, patient was up feeling well without any complaints of chest pain or shortness of breath. Patient's labs were stable. Patient was seen and examined by Dr. Jennifer Perkins and determined stable and ready for discharge. Patient was instructed on the importance of medication compliance. The patient will be discharged home on [address] [place] [address] for at least 45 days until LAAA seal is examined using TANESHA. Once AGNES is noted to be sealed, Eliquis ([address] [place] [address]) will be discountinued, and Plavix 75 mg with 81 mg of ASA will be continued until 6 months post watchman implant. 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 The patient will have repeat TANESHA performed in 45 days.  The patient will follow up with [facility] cardiology       DISPOSITION: The patient is being discharged home in stable condition on a low saturated fat, low cholesterol and low salt diet. The patient is instructed to advance activities as tolerated to the limit of fatigue or shortness of breath. The patient is instructed to avoid lifting anything heavier than 10 lbs for 1 weeks. The patient is instructed to avoid any straining, stooping or squatting for 2 weeks. The patient is instructed not to drive for 1 week. The patient is instructed to watch the groin site for bleeding/oozing; if seen, the patient is instructed to apply firm pressure with a clean cloth and call [facility] Cardiology at [phone]. The patient is instructed to watch for signs of infection which include: increasing area of redness, fever/hot to touch or purulent drainage at the groin site. The patient is instructed not to soak in a bathtub for 7-10 days, but is cleared to shower. The patient is instructed to return to the ER immediately for any severe pain, color change, or temperature change in leg. The patient is informed not to stop any medications without discussing with our office and to contact our office if any dental work or possible surgeries are expected    Discharge Exam: /81   Pulse 63   Temp 98.4 °F (36.9 °C) (Oral)   Resp 18   Ht 5' 7.5\" (1.715 m)   Wt 196 lb 6.4 oz (89.1 kg)   SpO2 94%   Breastfeeding No   BMI 30.31 kg/m²   Patient has been seen by Dr. Sue Dai: see his progress note for exam details.     Recent Results (from the past 24 hour(s))   EKG 12 Lead    Collection Time: 03/14/23 11:02 AM   Result Value Ref Range    Ventricular Rate 75 BPM    Atrial Rate 98 BPM    QRS Duration 134 ms    Q-T Interval 420 ms    QTc Calculation (Bazett) 469 ms    R Axis -51 degrees    T Axis 28 degrees    Diagnosis       Atrial fibrillation with occasional ventricular-paced complexes   POCT Glucose    Collection Time: 03/14/23 11:22 AM   Result Value Ref Range    POC Glucose 94 65 - 100 mg/dL    Performed by: [facility] Metabolic Panel    Collection Time: 03/15/23  5:29 AM   Result Value Ref Range    Sodium 141 133 - 143 mmol/L    Potassium 4.0 3.5 - 5.1 mmol/L    Chloride 110 101 - 110 mmol/L    CO2 30 21 - 32 mmol/L    Anion Gap 1 (L) 2 - 11 mmol/L    Glucose 122 (H) 65 - 100 mg/dL    BUN 22 8 - 23 MG/DL    Creatinine 0.90 0.6 - 1.0 MG/DL    Est, Glom Filt Rate >60 >60 ml/min/1.73m2    Calcium 8.9 8.3 - 10.4 MG/DL   CBC    Collection Time: 03/15/23  5:29 AM   Result Value Ref Range    WBC 8.7 4.3 - 11.1 K/uL    RBC 4.44 4.05 - 5.2 M/uL    Hemoglobin 13.2 11.7 - 15.4 g/dL    Hematocrit 41.8 35.8 - 46.3 %    MCV 94.1 82 - 102 FL    MCH 29.7 26.1 - 32.9 PG    MCHC 31.6 31.4 - 35.0 g/dL    RDW 14.4 11.9 - 14.6 %    Platelets 628 311 - 740 K/uL    MPV 10.4 9.4 - 12.3 FL    nRBC 0.00 0.0 - 0.2 K/uL         Patient Instructions:   Current Discharge Medication List        CONTINUE these medications which have NOT CHANGED    Details   apixaban (ELIQUIS) 5 MG TABS tablet Take 1 tablet by mouth 2 times daily  Qty: 180 tablet, Refills: 3      metoprolol succinate (TOPROL XL) 25 MG extended release tablet Take 1 tablet by mouth in the morning and 1 tablet in the evening. Qty: 180 tablet, Refills: 3      Bioflavonoid Products (RICK C PO) Take by mouth      Multiple Vitamins-Minerals (ONE-A-DAY WOMENS PO) Take 1 tablet by mouth daily      AZO-CRANBERRY PO Take 2 tablets by mouth daily      Cholecalciferol 50 MCG (2000 UT) TABS Take by mouth      magnesium oxide (MAG-OX) 400 (240 Mg) MG tablet Take 400 mg by mouth 2 times daily           STOP taking these medications       predniSONE 10 MG (21) TBPK Comments:   Reason for Stopping:         losartan (COZAAR) 100 MG tablet Comments:   Reason for Stopping:             Note some or all of the above medications that were set to stop by the system but the pt was not taking in the outpatient setting. Limitations in the system make it appear that we have stopped the medications when we have not. Signed:  MARIA VICTORIA Bo - CNP  3/15/2023  7:49 AM

## 2023-03-16 ENCOUNTER — CARE COORDINATION (OUTPATIENT)
Dept: CARE COORDINATION | Facility: CLINIC | Age: 88
End: 2023-03-16

## 2023-03-16 NOTE — CARE COORDINATION
[facility] Care Transitions Initial Follow Up Call    Call within 2 business days of discharge: Yes    Patient Current Location:  Home: [facility] [address] [id number]    Care Transition Nurse contacted the patient by telephone to perform post hospital discharge assessment. Verified name and  with patient as identifiers. Provided introduction to self, and explanation of the Care Transition Nurse role. Patient: Erickson Foss Patient : 1935   MRN: [id number]  Reason for Admission: watchman device insertion  Discharge Date: 3/15/23 RARS: Readmission Risk Score: 8.3      Last Discharge [address]       Date Complaint Diagnosis Description Type Department Provider    3/14/23  Hospital discharge follow-up . .. Admission (Discharged) Bao Cisneros MD            Was this an external facility discharge? No Discharge Facility: [facility]    Challenges to be reviewed by the provider   Additional needs identified to be addressed with provider: No  none               Method of communication with provider: none. Patient with Watchman device procedure. Patient d/c home without complaints and has all medications. Patient has been set up with PCP and cardiology following as well. Patient has RN navigator and ACM on care team. Patient denies any needs or resources at this time. CTN reviewed d/c instructions and patient has all pertinent contact numbers. CTN to route chart to ACM to follow up accordingly. NO questions concerns at this time. Care Transition Nurse reviewed discharge instructions, medical action plan, and red flags with patient who verbalized understanding. The patient was given an opportunity to ask questions and does not have any further questions or concerns at this time. Were discharge instructions available to patient? Yes. Reviewed appropriate site of care based on symptoms and resources available to patient including: PCP  Specialist  CTN .  The patient agrees to contact the PCP office for questions related to their healthcare. Advance Care Planning:   Does patient have an Advance Directive: not on file. Medication reconciliation was performed with patient, who verbalizes understanding of administration of home medications. Medications reviewed, 1111F entered: yes    Was patient discharged with a pulse oximeter? no    Non-face-to-face services provided:  Scheduled appointment with PCP-3/23  Obtained and reviewed discharge summary and/or continuity of care documents    Offered patient enrollment in the Remote Patient Monitoring (RPM) program for in-home monitoring: NA.    Care Transitions 24 Hour Call    Schedule Follow Up Appointment with PCP: Completed  Do you have a copy of your discharge instructions?: Yes  Do you have all of your prescriptions and are they filled?: Yes  Have you been contacted by a [facility] Pharmacist?: No  Have you scheduled your follow up appointment?: Yes  How are you going to get to your appointment?: Car - family or friend to transport  Patient DME: Straight cane, Walker  Do you have support at home?: Alone  Do you feel like you have everything you need to keep you well at home?: Yes  Are you an active caregiver in your home?: No  Care Transitions Interventions  Disease Specific Clinic: Completed               Discussed follow-up appointments. If no appointment was previously scheduled, appointment scheduling offered: Yes. Is follow up appointment scheduled within 7 days of discharge? Yes. Follow Up  Future Appointments   Date Time Provider Devyn Bermeo   3/23/2023  1:45 PM Luis Shannon MD [facility] AMB   4[facility]27[facility]2023 10:15 AM [facility]   5/18/2023  4:15 PM DO NOHEMI Levin AMB   2/22/2024  8:45 AM Luis Shannon MD [facility] AMB       Post Acute Care Manager provided contact information. Route chart to [facility] to follow accordingly  based on severity of symptoms and risk factors.   Plan for next call: see above    Xavier Irby RN

## 2023-03-17 NOTE — TELEPHONE ENCOUNTER
Pt called back to follow up on the samples request. Would request them at the [facility] office.

## 2023-03-23 ENCOUNTER — TELEPHONE (OUTPATIENT)
Dept: FAMILY MEDICINE CLINIC | Facility: CLINIC | Age: 88
End: 2023-03-23

## 2023-03-23 ENCOUNTER — OFFICE VISIT (OUTPATIENT)
Dept: FAMILY MEDICINE CLINIC | Facility: CLINIC | Age: 88
End: 2023-03-23
Payer: MEDICARE

## 2023-03-23 VITALS
SYSTOLIC BLOOD PRESSURE: 132 MMHG | BODY MASS INDEX: 30.01 KG/M2 | HEIGHT: 68 IN | WEIGHT: 198 LBS | TEMPERATURE: 98.1 F | DIASTOLIC BLOOD PRESSURE: 82 MMHG | HEART RATE: 72 BPM | OXYGEN SATURATION: 98 %

## 2023-03-23 DIAGNOSIS — R00.1 SYMPTOMATIC BRADYCARDIA: ICD-10-CM

## 2023-03-23 DIAGNOSIS — R26.9 ABNORMAL GAIT DUE TO MUSCLE WEAKNESS: ICD-10-CM

## 2023-03-23 DIAGNOSIS — I10 PRIMARY HYPERTENSION: Primary | ICD-10-CM

## 2023-03-23 DIAGNOSIS — I49.1 PREMATURE ATRIAL CONTRACTION: ICD-10-CM

## 2023-03-23 DIAGNOSIS — Z95.818 PRESENCE OF WATCHMAN LEFT ATRIAL APPENDAGE CLOSURE DEVICE: ICD-10-CM

## 2023-03-23 DIAGNOSIS — I48.21 PERMANENT ATRIAL FIBRILLATION (HCC): ICD-10-CM

## 2023-03-23 DIAGNOSIS — M62.81 ABNORMAL GAIT DUE TO MUSCLE WEAKNESS: ICD-10-CM

## 2023-03-23 DIAGNOSIS — R53.82 CHRONIC FATIGUE: Primary | ICD-10-CM

## 2023-03-23 PROCEDURE — 99213 OFFICE O/P EST LOW 20 MIN: CPT | Performed by: FAMILY MEDICINE

## 2023-03-23 PROCEDURE — 1123F ACP DISCUSS/DSCN MKR DOCD: CPT | Performed by: FAMILY MEDICINE

## 2023-03-23 ASSESSMENT — PATIENT HEALTH QUESTIONNAIRE - PHQ9
2. FEELING DOWN, DEPRESSED OR HOPELESS: 0
1. LITTLE INTEREST OR PLEASURE IN DOING THINGS: 0
SUM OF ALL RESPONSES TO PHQ QUESTIONS 1-9: 0
SUM OF ALL RESPONSES TO PHQ9 QUESTIONS 1 & 2: 0
SUM OF ALL RESPONSES TO PHQ QUESTIONS 1-9: 0

## 2023-03-23 ASSESSMENT — ENCOUNTER SYMPTOMS
GASTROINTESTINAL NEGATIVE: 1
RESPIRATORY NEGATIVE: 1
EYES NEGATIVE: 1

## 2023-03-23 NOTE — TELEPHONE ENCOUNTER
Patient called after her appointment stating that she does not know what to do next about obtaining her handicap card. I told her that the Dr will write her a prescription and sign a form, that she will then have to  and turn in to the [facility]mv with rx.      Please let patient know when she can  the prescription and form

## 2023-03-23 NOTE — PROGRESS NOTES
HISTORY OF PRESENT ILLNESS    Alpesh Pacheco is a 80 y.o. female. HPI  Chief Complaint   Patient presents with    Follow-Up from Hospital     [facility]; pt had a watchman device placed in heart. Other     placacard     See above. Overall feeling well. Had Watchman's procedure a week ago which went well. States she still has some fatigue. States gait is weak. Would like to resume PT. No side effects from BP medication. No headache or vision change. Denies chest pain or SOB. No swelling. Current Outpatient Medications on File Prior to Visit   Medication Sig Dispense Refill    apixaban (ELIQUIS) 5 MG TABS tablet Take 1 tablet by mouth 2 times daily 180 tablet 3    metoprolol succinate (TOPROL XL) 25 MG extended release tablet Take 1 tablet by mouth in the morning and 1 tablet in the evening. 180 tablet 3    Bioflavonoid Products (RICK C PO) Take 1 tablet by mouth daily      Multiple Vitamins-Minerals (ONE-A-DAY WOMENS PO) Take 1 tablet by mouth daily      AZO-CRANBERRY PO Take 2 tablets by mouth daily      Cholecalciferol 50 MCG (2000 UT) TABS Take 1 tablet by mouth daily      magnesium oxide (MAG-OX) 400 (240 Mg) MG tablet Take 400 mg by mouth 2 times daily       No current facility-administered medications on file prior to visit. Current Outpatient Medications on File Prior to Visit   Medication Sig Dispense Refill    apixaban (ELIQUIS) 5 MG TABS tablet Take 1 tablet by mouth 2 times daily 180 tablet 3    metoprolol succinate (TOPROL XL) 25 MG extended release tablet Take 1 tablet by mouth in the morning and 1 tablet in the evening.  180 tablet 3    Bioflavonoid Products (RICK C PO) Take 1 tablet by mouth daily      Multiple Vitamins-Minerals (ONE-A-DAY WOMENS PO) Take 1 tablet by mouth daily      AZO-CRANBERRY PO Take 2 tablets by mouth daily      Cholecalciferol 50 MCG (2000 UT) TABS Take 1 tablet by mouth daily      magnesium oxide (MAG-OX) 400 (240 Mg) MG tablet Take 400 mg by mouth 2 times daily

## 2023-03-27 ENCOUNTER — CARE COORDINATION (OUTPATIENT)
Dept: CARE COORDINATION | Facility: CLINIC | Age: 88
End: 2023-03-27

## 2023-03-27 NOTE — CARE COORDINATION
Ambulatory Care Coordination Note  3/27/2023    Patient Current Location:  [facility]     ACM contacted the patient by telephone. Verified name and  with patient as identifiers. Provided introduction to self, and explanation of the ACM role. Challenges to be reviewed by the provider   Additional needs identified to be addressed with provider: No  none               Method of communication with provider: none. ACM: Teto Savage RN    Ccm outreached to patient. Patient states she is doing well at this time. Patient had watchman procedure done and was discharged on 3/15/23. Ccm reviewed discharge summary with patient. Patient verbalized understanding. Patient states no questions or concerns. Ccm discussed that I would outreach next week. Patient agreeable. Offered patient enrollment in the Remote Patient Monitoring (RPM) program for in-home monitoring: NA. Lab Results       None            Care Coordination Interventions    Referral from Primary Care Provider: No  Suggested Interventions and Community Resources          Goals Addressed                   This Visit's Progress     Conditions and Symptoms   On track     I will schedule office visits, as directed by my provider. I will keep my appointment or reschedule if I have to cancel. I will notify my provider of any barriers to my plan of care. I will notify my provider of any symptoms that indicate a worsening of my condition. Barriers: none  Plan for overcoming my barriers: N/A  Confidence: 9/10  Anticipated Goal Completion Date: 3/19/23         Self Monitoring   On track     Blood Pressure - I will take my blood pressure as directed - Daily  I will notify my provider of any trends of increasing or decreasing blood pressures over a month period of time. I will notify my provider of any changes in blood pressure associated with symptoms of dizziness, falls, passing out, headache, confusion/change in mental status.     Patient Reported

## 2023-04-03 ENCOUNTER — CARE COORDINATION (OUTPATIENT)
Dept: CARE COORDINATION | Facility: CLINIC | Age: 88
End: 2023-04-03

## 2023-04-03 NOTE — CARE COORDINATION
Ambulatory Care Coordination Note  4/3/2023    Patient Current Location:  [facility]     ACM contacted the patient by telephone. Verified name and  with patient as identifiers. Provided introduction to self, and explanation of the ACM role. Challenges to be reviewed by the provider   Additional needs identified to be addressed with provider: No  none               Method of communication with provider: none. ACM: Johnathan Everett RN    Ccm outreached to patient. Patient states she is doing well at this time. Patient states no questions or concerns. Ccm discussed that I would outreach next week. Patient agreeable. Offered patient enrollment in the Remote Patient Monitoring (RPM) program for in-home monitoring: NA. Lab Results       None            Care Coordination Interventions    Referral from Primary Care Provider: No  Suggested Interventions and Community Resources          Goals Addressed                   This Visit's Progress     Conditions and Symptoms   On track     I will schedule office visits, as directed by my provider. I will keep my appointment or reschedule if I have to cancel. I will notify my provider of any barriers to my plan of care. I will notify my provider of any symptoms that indicate a worsening of my condition. Barriers: none  Plan for overcoming my barriers: N/A  Confidence: 9/10  Anticipated Goal Completion Date: 3/19/23         Self Monitoring   On track     Blood Pressure - I will take my blood pressure as directed - Daily  I will notify my provider of any trends of increasing or decreasing blood pressures over a month period of time. I will notify my provider of any changes in blood pressure associated with symptoms of dizziness, falls, passing out, headache, confusion/change in mental status. Patient Reported Blood Pressure No flowsheet data found.     Barriers: none  Plan for overcoming my barriers: N/A  Confidence: 9/10  Anticipated Goal Completion

## 2023-04-05 DIAGNOSIS — R00.1 SYMPTOMATIC BRADYCARDIA: ICD-10-CM

## 2023-04-05 DIAGNOSIS — Z95.0 PACEMAKER: ICD-10-CM

## 2023-04-05 DIAGNOSIS — I48.0 PAROXYSMAL ATRIAL FIBRILLATION (HCC): ICD-10-CM

## 2023-04-17 ENCOUNTER — CARE COORDINATION (OUTPATIENT)
Dept: CARE COORDINATION | Facility: CLINIC | Age: 88
End: 2023-04-17

## 2023-04-17 NOTE — CARE COORDINATION
Ambulatory Care Management Note        Date/Time:  4/17/2023 9:04 AM    Ccm outreached to patient. No answer at this time, unable to leave message. Awaiting response. If no response, ccm will outreach next week.

## 2023-04-24 ENCOUNTER — CARE COORDINATION (OUTPATIENT)
Dept: CARE COORDINATION | Facility: CLINIC | Age: 88
End: 2023-04-24

## 2023-04-24 NOTE — CARE COORDINATION
Ambulatory Care Coordination Note  2023    Patient Current Location:  [address] [place] [address]     ACM contacted the patient by telephone. Verified name and  with patient as identifiers. Provided introduction to self, and explanation of the ACM role. Challenges to be reviewed by the provider   Additional needs identified to be addressed with provider: No  none               Method of communication with provider: none. ACM: Genna Skiff, RN    Ccm outreached to patient. Patient states she is doing well at this time. Patient states no questions or concerns. Ccm discussed that I would outreach next week. Patient agreeable. Offered patient enrollment in the Remote Patient Monitoring (RPM) program for in-home monitoring: NA. Lab Results       None            Care Coordination Interventions    Referral from Primary Care Provider: No  Suggested Interventions and Community Resources          Goals Addressed                   This Visit's Progress     Conditions and Symptoms   On track     I will schedule office visits, as directed by my provider. I will keep my appointment or reschedule if I have to cancel. I will notify my provider of any barriers to my plan of care. I will notify my provider of any symptoms that indicate a worsening of my condition. Barriers: none  Plan for overcoming my barriers: N/A  Confidence: 9/10  Anticipated Goal Completion Date: 3/19/23         Self Monitoring   On track     Blood Pressure - I will take my blood pressure as directed - Daily  I will notify my provider of any trends of increasing or decreasing blood pressures over a month period of time. I will notify my provider of any changes in blood pressure associated with symptoms of dizziness, falls, passing out, headache, confusion/change in mental status. Patient Reported Blood Pressure No flowsheet data found.     Barriers: none  Plan for overcoming my barriers: N/A  Confidence: 9/10  Anticipated Goal Completion

## 2023-04-27 ENCOUNTER — OFFICE VISIT (OUTPATIENT)
Dept: FAMILY MEDICINE CLINIC | Facility: CLINIC | Age: 88
End: 2023-04-27
Payer: MEDICARE

## 2023-04-27 VITALS
BODY MASS INDEX: 30.55 KG/M2 | HEART RATE: 82 BPM | WEIGHT: 198 LBS | SYSTOLIC BLOOD PRESSURE: 150 MMHG | OXYGEN SATURATION: 98 % | TEMPERATURE: 97.4 F | DIASTOLIC BLOOD PRESSURE: 96 MMHG

## 2023-04-27 DIAGNOSIS — I10 PRIMARY HYPERTENSION: Primary | ICD-10-CM

## 2023-04-27 PROCEDURE — 99213 OFFICE O/P EST LOW 20 MIN: CPT | Performed by: FAMILY MEDICINE

## 2023-04-27 PROCEDURE — 1123F ACP DISCUSS/DSCN MKR DOCD: CPT | Performed by: FAMILY MEDICINE

## 2023-04-27 RX ORDER — LOSARTAN POTASSIUM 50 MG/1
50 TABLET ORAL DAILY
Qty: 30 TABLET | Refills: 5 | Status: SHIPPED | OUTPATIENT
Start: 2023-04-27

## 2023-04-27 ASSESSMENT — PATIENT HEALTH QUESTIONNAIRE - PHQ9
2. FEELING DOWN, DEPRESSED OR HOPELESS: 0
SUM OF ALL RESPONSES TO PHQ9 QUESTIONS 1 & 2: 0
SUM OF ALL RESPONSES TO PHQ QUESTIONS 1-9: 0
1. LITTLE INTEREST OR PLEASURE IN DOING THINGS: 0
SUM OF ALL RESPONSES TO PHQ QUESTIONS 1-9: 0

## 2023-04-27 ASSESSMENT — ENCOUNTER SYMPTOMS
EYES NEGATIVE: 1
RESPIRATORY NEGATIVE: 1
GASTROINTESTINAL NEGATIVE: 1

## 2023-04-27 NOTE — PROGRESS NOTES
HISTORY OF PRESENT ILLNESS    Bela Hahn is a 80 y.o. female. HPI  Chief Complaint   Patient presents with    1 Month Follow-Up     See above. Home BP readings have been on the high side. Denies headache or vision change. No exertional symptoms. Has appointment with rheumatologist in a few months. Still has random joint pain. Labs indicated positive JOEL. Current Outpatient Medications on File Prior to Visit   Medication Sig Dispense Refill    apixaban (ELIQUIS) 5 MG TABS tablet Take 1 tablet by mouth 2 times daily 180 tablet 3    metoprolol succinate (TOPROL XL) 25 MG extended release tablet Take 1 tablet by mouth in the morning and 1 tablet in the evening. 180 tablet 3    Bioflavonoid Products (RICK C PO) Take 1 tablet by mouth daily      Multiple Vitamins-Minerals (ONE-A-DAY WOMENS PO) Take 1 tablet by mouth daily      AZO-CRANBERRY PO Take 2 tablets by mouth daily      Cholecalciferol 50 MCG (2000 UT) TABS Take 1 tablet by mouth daily      magnesium oxide (MAG-OX) 400 (240 Mg) MG tablet Take 1 tablet by mouth 2 times daily       No current facility-administered medications on file prior to visit. Past Medical History:   Diagnosis Date    Anticoagulant long-term use     Arrhythmia     Atrial fibrillation (HCC)     Chronic back pain Many years ago    H/O complete eye exam Yearly    Dr. Maura Wade    Headache     HLD (hyperlipidemia)     Hypertension     controlled with medication       Review of Systems   Constitutional: Negative. HENT: Negative. Eyes: Negative. Respiratory: Negative. Cardiovascular: Negative. Gastrointestinal: Negative. Genitourinary: Negative. Musculoskeletal:  Positive for arthralgias. Neurological: Negative. Blood pressure (!) 150/96, pulse 82, temperature 97.4 °F (36.3 °C), temperature source Temporal, weight 198 lb (89.8 kg), SpO2 98 %, not currently breastfeeding. Physical Exam  Vitals and nursing note reviewed.    Constitutional:

## 2023-04-28 ENCOUNTER — HOSPITAL ENCOUNTER (OUTPATIENT)
Dept: PHYSICAL THERAPY | Age: 88
Setting detail: RECURRING SERIES
Discharge: HOME | End: 2023-05-01
Payer: MEDICARE

## 2023-04-28 PROCEDURE — 97110 THERAPEUTIC EXERCISES: CPT

## 2023-04-28 PROCEDURE — 97163 PT EVAL HIGH COMPLEX 45 MIN: CPT

## 2023-05-01 ENCOUNTER — TELEPHONE (OUTPATIENT)
Dept: FAMILY MEDICINE CLINIC | Facility: CLINIC | Age: 88
End: 2023-05-01

## 2023-05-01 ENCOUNTER — TELEPHONE (OUTPATIENT)
Dept: CASE MANAGEMENT | Age: 88
End: 2023-05-01

## 2023-05-01 ENCOUNTER — APPOINTMENT (OUTPATIENT)
Dept: ULTRASOUND IMAGING | Age: 88
End: 2023-05-01
Payer: MEDICARE

## 2023-05-01 ENCOUNTER — TELEPHONE (OUTPATIENT)
Dept: CARDIOLOGY CLINIC | Age: 88
End: 2023-05-01

## 2023-05-01 ENCOUNTER — APPOINTMENT (OUTPATIENT)
Dept: GENERAL RADIOLOGY | Age: 88
End: 2023-05-01
Payer: MEDICARE

## 2023-05-01 ENCOUNTER — CARE COORDINATION (OUTPATIENT)
Dept: CARE COORDINATION | Facility: CLINIC | Age: 88
End: 2023-05-01

## 2023-05-01 ENCOUNTER — HOSPITAL ENCOUNTER (EMERGENCY)
Age: 88
Discharge: HOME | End: 2023-05-02
Attending: EMERGENCY MEDICINE
Payer: MEDICARE

## 2023-05-01 PROCEDURE — 73552 X-RAY EXAM OF FEMUR 2/>: CPT

## 2023-05-01 PROCEDURE — 93971 EXTREMITY STUDY: CPT

## 2023-05-01 PROCEDURE — 99284 EMERGENCY DEPT VISIT MOD MDM: CPT

## 2023-05-01 RX ORDER — PREDNISONE 10 MG/1
TABLET ORAL
Qty: 1 EACH | Refills: 0 | Status: SHIPPED | OUTPATIENT
Start: 2023-05-01

## 2023-05-01 ASSESSMENT — LIFESTYLE VARIABLES
HOW MANY STANDARD DRINKS CONTAINING ALCOHOL DO YOU HAVE ON A TYPICAL DAY: PATIENT DOES NOT DRINK
HOW OFTEN DO YOU HAVE A DRINK CONTAINING ALCOHOL: NEVER

## 2023-05-01 ASSESSMENT — ENCOUNTER SYMPTOMS
TROUBLE SWALLOWING: 0
BACK PAIN: 0
FACIAL SWELLING: 0
VOMITING: 0
SHORTNESS OF BREATH: 0
SORE THROAT: 0
EYE PAIN: 0
VOICE CHANGE: 0
CHEST TIGHTNESS: 0
COUGH: 0
COLOR CHANGE: 1
NAUSEA: 0
ABDOMINAL PAIN: 0

## 2023-05-01 ASSESSMENT — PAIN DESCRIPTION - ORIENTATION: ORIENTATION: RIGHT

## 2023-05-01 ASSESSMENT — PAIN DESCRIPTION - LOCATION: LOCATION: LEG

## 2023-05-01 ASSESSMENT — PAIN SCALES - GENERAL: PAINLEVEL_OUTOF10: 5

## 2023-05-01 ASSESSMENT — PAIN - FUNCTIONAL ASSESSMENT
PAIN_FUNCTIONAL_ASSESSMENT: 0-10
PAIN_FUNCTIONAL_ASSESSMENT: NONE - DENIES PAIN

## 2023-05-01 NOTE — TELEPHONE ENCOUNTER
Patient would like a call back from Dr Alisia Severance or nurse. Regarding referral to rheumatologist. Jh Gregory to get appt until end of August. Pt started PT last Friday. Pt in extreme pain the next day. PT suggested pt to call Dr Alisia Severance. Pt has reddish purplish spot on leg.     Please call patient and advise

## 2023-05-01 NOTE — TELEPHONE ENCOUNTER
Patient phoned in to reschedule post WM TANESHA. Family member providing transportation is sick. TANESHA with anesthesia assist has been rescheduled for Tuesday 5/09/2023, arrival time of 1130 am.  She also has an upcoming dental extraction which requires prophylaxis antibiotic. She has been advised to seek  from physician re: eliquis and antibiotic. Patient has WM coordinator contact information.

## 2023-05-01 NOTE — CARE COORDINATION
Ambulatory Care Coordination Note  2023    Patient Current Location:  [facility]     ACM contacted the patient by telephone. Verified name and  with patient as identifiers. Provided introduction to self, and explanation of the ACM role. Challenges to be reviewed by the provider   Additional needs identified to be addressed with provider: No  none               Method of communication with provider: none. ACM: Brandi Owen RN    Ccm outreached to patient. Patient had pcp appointment on 23. Patient has also started working with PT again. Patient states after working with therapy she has right leg pain and a red spot near a varicose vein. Ccm notified pcp of issue and they will call patient. Patient states no other questions or concerns. Ccm discussed that I would outreach next week. Patient agreeable. Offered patient enrollment in the Remote Patient Monitoring (RPM) program for in-home monitoring: NA. Lab Results       None            Care Coordination Interventions    Referral from Primary Care Provider: No  Suggested Interventions and Community Resources          Goals Addressed                   This Visit's Progress     Conditions and Symptoms   On track     I will schedule office visits, as directed by my provider. I will keep my appointment or reschedule if I have to cancel. I will notify my provider of any barriers to my plan of care. I will notify my provider of any symptoms that indicate a worsening of my condition. Barriers: none  Plan for overcoming my barriers: N/A  Confidence: 9/10  Anticipated Goal Completion Date: 3/19/23         Self Monitoring   On track     Blood Pressure - I will take my blood pressure as directed - Daily  I will notify my provider of any trends of increasing or decreasing blood pressures over a month period of time.   I will notify my provider of any changes in blood pressure associated with symptoms of dizziness, falls, passing out, headache,

## 2023-05-02 ENCOUNTER — TELEPHONE (OUTPATIENT)
Dept: CASE MANAGEMENT | Age: 88
End: 2023-05-02

## 2023-05-02 ENCOUNTER — OFFICE VISIT (OUTPATIENT)
Dept: FAMILY MEDICINE CLINIC | Facility: CLINIC | Age: 88
End: 2023-05-02
Payer: MEDICARE

## 2023-05-02 VITALS
DIASTOLIC BLOOD PRESSURE: 82 MMHG | HEART RATE: 74 BPM | BODY MASS INDEX: 30.55 KG/M2 | SYSTOLIC BLOOD PRESSURE: 134 MMHG | OXYGEN SATURATION: 100 % | TEMPERATURE: 97.4 F | WEIGHT: 198 LBS

## 2023-05-02 VITALS
HEIGHT: 68 IN | SYSTOLIC BLOOD PRESSURE: 123 MMHG | WEIGHT: 198 LBS | HEART RATE: 69 BPM | DIASTOLIC BLOOD PRESSURE: 95 MMHG | RESPIRATION RATE: 18 BRPM | BODY MASS INDEX: 30.01 KG/M2 | OXYGEN SATURATION: 97 % | TEMPERATURE: 97.9 F

## 2023-05-02 DIAGNOSIS — S70.11XD: Primary | ICD-10-CM

## 2023-05-02 PROCEDURE — 99213 OFFICE O/P EST LOW 20 MIN: CPT | Performed by: FAMILY MEDICINE

## 2023-05-02 PROCEDURE — 1123F ACP DISCUSS/DSCN MKR DOCD: CPT | Performed by: FAMILY MEDICINE

## 2023-05-02 ASSESSMENT — ENCOUNTER SYMPTOMS
GASTROINTESTINAL NEGATIVE: 1
RESPIRATORY NEGATIVE: 1

## 2023-05-02 ASSESSMENT — PATIENT HEALTH QUESTIONNAIRE - PHQ9
SUM OF ALL RESPONSES TO PHQ QUESTIONS 1-9: 0
SUM OF ALL RESPONSES TO PHQ9 QUESTIONS 1 & 2: 0
SUM OF ALL RESPONSES TO PHQ QUESTIONS 1-9: 0
SUM OF ALL RESPONSES TO PHQ QUESTIONS 1-9: 0
1. LITTLE INTEREST OR PLEASURE IN DOING THINGS: 0
SUM OF ALL RESPONSES TO PHQ QUESTIONS 1-9: 0
2. FEELING DOWN, DEPRESSED OR HOPELESS: 0

## 2023-05-02 NOTE — TELEPHONE ENCOUNTER
Mrs. Seals's post WM TANESHA has been rescheduled for Monday 5/08/2023, arrival time is 1130 am.  NPO status and need for  reinforced. Patient has Watchman coordinator contact ([phone]) information.

## 2023-05-02 NOTE — TELEPHONE ENCOUNTER
Patient called and stated that she went to urgent care about her leg and they told her to go to the ER. She states she is in pain. She has PT tomorrow but she is unsure if she should even go to PT (at 10:15am)    Is there any chance she can be worked into your schedule today or Thursday? Please contact patient and/or let me know when she can come in.

## 2023-05-02 NOTE — PROGRESS NOTES
HISTORY OF PRESENT ILLNESS    Marily Panda is a 80 y.o. female. HPI  Chief Complaint   Patient presents with    Leg Pain     Right     See above. 2-3 days ago has slight bump to the right upper leg. Concerned about bruising and went to ER. US no thrombus. No fracture. X-ray showed arthritis in right knee. States pain is not significant. No change in gait. No swelling. Current Outpatient Medications on File Prior to Visit   Medication Sig Dispense Refill    TURMERIC PO Take by mouth      losartan (COZAAR) 50 MG tablet Take 1 tablet by mouth daily 30 tablet 5    apixaban (ELIQUIS) 5 MG TABS tablet Take 1 tablet by mouth 2 times daily 180 tablet 3    metoprolol succinate (TOPROL XL) 25 MG extended release tablet Take 1 tablet by mouth in the morning and 1 tablet in the evening. 180 tablet 3    Bioflavonoid Products (RICK C PO) Take 1 tablet by mouth daily      Multiple Vitamins-Minerals (ONE-A-DAY WOMENS PO) Take 1 tablet by mouth daily      AZO-CRANBERRY PO Take 2 tablets by mouth daily      Cholecalciferol 50 MCG (2000 UT) TABS Take 1 tablet by mouth daily      magnesium oxide (MAG-OX) 400 (240 Mg) MG tablet Take 1 tablet by mouth 2 times daily      predniSONE 10 MG (21) TBPK As directed (Patient not taking: Reported on 5/2/2023) 1 each 0    diclofenac sodium (VOLTAREN) 1 % GEL Apply 4 g topically 4 times daily To Spot on leg until you see the rheumatologist (Patient not taking: Reported on 5/2/2023) 50 g 0     No current facility-administered medications on file prior to visit. Past Medical History:   Diagnosis Date    Anticoagulant long-term use     Arrhythmia     Atrial fibrillation (HCC)     Chronic back pain Many years ago    H/O complete eye exam Yearly    Dr. Azam Angulo    Headache     HLD (hyperlipidemia)     Hypertension     controlled with medication       Review of Systems   Constitutional: Negative. Respiratory: Negative. Cardiovascular: Negative. Gastrointestinal: Negative.

## 2023-05-02 NOTE — ED TRIAGE NOTES
Pt ambulatory to triage with daughter. Pt states she had a watchman placed in March, still on blood thinner. Pt c/o right inner thigh pain, bruising and warmth that started Saturday. Pt denies any fall or injury.

## 2023-05-02 NOTE — DISCHARGE INSTRUCTIONS
X-ray imaging shows no evidence of injury to the bone, ultrasound imaging shows no evidence of blood clot. Elevate the area when at rest and apply ice to help with pain and swelling. Right follow-up this week with your primary care provider for recheck.

## 2023-05-02 NOTE — ED PROVIDER NOTES
Emergency Department Provider Note       PCP: Reina Barrientos MD   Age: 80 y.o. Sex: female     DISPOSITION       No diagnosis found. Medical Decision Making       80-year-old female presents emergency department via private go to complain of right thigh pain and swelling. Patient reports no trauma to the area. She reports having right thigh swelling as well as ecchymosis. She currently is on Eliquis. She had Watchman procedure done approximately 6 weeks prior. Vital signs are reviewed. Patient is neurovascular intact. Told the most likely explanation would be that she had an incidental trauma to the area causing some ecchymosis. Patient feels as if that is not true. Some order x-ray as well as an ultrasound. History      Nel Menon is a 80 y.o. female who presents to the Emergency Department with chief complaint of    Chief Complaint   Patient presents with    Leg Pain      80-year-old female presents emergency department via private go to complain of right thigh pain and swelling. Patient reports no trauma to the area. She reports having right thigh swelling as well as ecchymosis. She currently is on Eliquis. She had Watchman procedure done approximately 6 weeks prior. Review of Systems   Constitutional:  Negative for activity change, chills and fever. HENT:  Negative for dental problem, drooling, facial swelling, sore throat, trouble swallowing and voice change. Eyes:  Negative for pain. Respiratory:  Negative for cough, chest tightness and shortness of breath. Cardiovascular:  Negative for chest pain and palpitations. Gastrointestinal:  Negative for abdominal pain, nausea and vomiting. Endocrine: Negative for polydipsia. Genitourinary:  Negative for difficulty urinating, dysuria and hematuria. Musculoskeletal:  Negative for back pain and neck pain. Skin:  Positive for color change. Negative for rash and wound.    Neurological:  Negative for dizziness,

## 2023-05-02 NOTE — ED NOTES
I have reviewed discharge instructions with the patient. The patient verbalized understanding. Patient left ED via Discharge Method: ambulatory to Home with patient daughter. Opportunity for questions and clarification provided. Patient given 0 scripts. To continue your aftercare when you leave the hospital, you may receive an automated call from our care team to check in on how you are doing. This is a free service and part of our promise to provide the best care and service to meet your aftercare needs.  If you have questions, or wish to unsubscribe from this service please call [phone]. Thank you for Choosing our [employer] [facility] Emergency Department.         Julio Gant RN  05/02/23 0025

## 2023-05-02 NOTE — ED NOTES
[facility]       Ami Jenkins, REDD  05/01/23 [address] Carmine Olmedo RN  05/01/23 4541

## 2023-05-03 ENCOUNTER — HOSPITAL ENCOUNTER (OUTPATIENT)
Dept: PHYSICAL THERAPY | Age: 88
Setting detail: RECURRING SERIES
Discharge: HOME | End: 2023-05-06
Payer: MEDICARE

## 2023-05-03 PROCEDURE — 97110 THERAPEUTIC EXERCISES: CPT

## 2023-05-03 NOTE — PROGRESS NOTES
Ernestina Lamb  : 1935  Primary: [facility] (Medicare Managed)  Secondary:  [address] @ [address] [address]  Phone: [phone]  Fax: [phone] Plan Frequency: 1-2x/wk for 8 weeks  Plan of Care/Certification Expiration Date: 23      >PT Visit Info:  Plan Frequency: 1-2x/wk for 8 weeks  Plan of Care/Certification Expiration Date: 23      Visit Count:  2    OUTPATIENT PHYSICAL THERAPY:OP NOTE TYPE: Treatment Note 5/3/2023       Episode  }Appt Desk             Treatment Diagnosis:  Muscle Weakness, Generalized (M62.81)  Other abnormalities of gait and mobility (R26.89)  Unsteadiness on Feet (R26.81) Debility (R54)  Medical/Referring Diagnosis:  Abnormal gait due to muscle weakness [M62.81, R26.9]  Referring Physician:  Yessy Ramey MD MD Orders:  PT Eval and Treat   Date of Onset:  Onset Date:  (chronic)     Allergies:   Guaifenesin, Loratadine-pseudoephedrine er, Amoxicillin, and Sulfa antibiotics  Restrictions/Precautions:  Restrictions/Precautions: Cardiac  No data recorded     Interventions Planned (Treatment may consist of any combination of the following):    Current Treatment Recommendations: Strengthening; ROM; Balance training; Functional mobility training; Transfer training; ADL/Self-care training; IADL training; Endurance training; Gait training; Stair training; Neuromuscular re-education; Home exercise program; Safety education & training; Patient/Caregiver education & training; Equipment evaluation, education, & procurement; Therapeutic activities     >Subjective Comments: pt reports she did not sleep last night. Pt late for appt today.      >Initial:  achy    /10>Post Session:    achy    /10  Medications Last Reviewed:  5/3/2023  Updated Objective Findings:  Seated BP - 129/105 mmHg, HR 77after walking after 3 min quiet rest 116/84 mmHg, bpm 75  Treatment     THERAPEUTIC EXERCISE: ( 30

## 2023-05-06 ENCOUNTER — HOSPITAL ENCOUNTER (EMERGENCY)
Age: 88
Discharge: HOME | End: 2023-05-06
Attending: EMERGENCY MEDICINE
Payer: MEDICARE

## 2023-05-06 VITALS
HEART RATE: 69 BPM | TEMPERATURE: 97.7 F | DIASTOLIC BLOOD PRESSURE: 67 MMHG | SYSTOLIC BLOOD PRESSURE: 122 MMHG | BODY MASS INDEX: 31.08 KG/M2 | HEIGHT: 67 IN | OXYGEN SATURATION: 95 % | WEIGHT: 198 LBS | RESPIRATION RATE: 16 BRPM

## 2023-05-06 DIAGNOSIS — I83.819 PAIN DUE TO VARICOSE VEINS OF LOWER EXTREMITY: Primary | ICD-10-CM

## 2023-05-06 DIAGNOSIS — R58 ECCHYMOSIS: ICD-10-CM

## 2023-05-06 LAB
ALBUMIN SERPL-MCNC: 3.5 G/DL (ref 3.2–4.6)
ALBUMIN/GLOB SERPL: 0.8 (ref 0.4–1.6)
ALP SERPL-CCNC: 64 U/L (ref 50–136)
ALT SERPL-CCNC: 45 U/L (ref 12–65)
ANION GAP SERPL CALC-SCNC: 2 MMOL/L (ref 2–11)
APTT PPP: 34.4 SEC (ref 24.5–34.2)
AST SERPL-CCNC: 60 U/L (ref 15–37)
BASOPHILS # BLD: 0 K/UL (ref 0–0.2)
BASOPHILS NFR BLD: 0 % (ref 0–2)
BILIRUB SERPL-MCNC: 0.8 MG/DL (ref 0.2–1.1)
BUN SERPL-MCNC: 25 MG/DL (ref 8–23)
CALCIUM SERPL-MCNC: 9.5 MG/DL (ref 8.3–10.4)
CHLORIDE SERPL-SCNC: 106 MMOL/L (ref 101–110)
CO2 SERPL-SCNC: 28 MMOL/L (ref 21–32)
CREAT SERPL-MCNC: 1 MG/DL (ref 0.6–1)
DIFFERENTIAL METHOD BLD: ABNORMAL
EOSINOPHIL # BLD: 0 K/UL (ref 0–0.8)
EOSINOPHIL NFR BLD: 0 % (ref 0.5–7.8)
ERYTHROCYTE [DISTWIDTH] IN BLOOD BY AUTOMATED COUNT: 14.4 % (ref 11.9–14.6)
GLOBULIN SER CALC-MCNC: 4.2 G/DL (ref 2.8–4.5)
GLUCOSE SERPL-MCNC: 158 MG/DL (ref 65–100)
HCT VFR BLD AUTO: 45.7 % (ref 35.8–46.3)
HGB BLD-MCNC: 14.5 G/DL (ref 11.7–15.4)
IMM GRANULOCYTES # BLD AUTO: 0 K/UL (ref 0–0.5)
IMM GRANULOCYTES NFR BLD AUTO: 0 % (ref 0–5)
INR PPP: 1.1
LYMPHOCYTES # BLD: 1.1 K/UL (ref 0.5–4.6)
LYMPHOCYTES NFR BLD: 10 % (ref 13–44)
MCH RBC QN AUTO: 30.1 PG (ref 26.1–32.9)
MCHC RBC AUTO-ENTMCNC: 31.7 G/DL (ref 31.4–35)
MCV RBC AUTO: 94.8 FL (ref 82–102)
MONOCYTES # BLD: 0.2 K/UL (ref 0.1–1.3)
MONOCYTES NFR BLD: 2 % (ref 4–12)
NEUTS SEG # BLD: 9 K/UL (ref 1.7–8.2)
NEUTS SEG NFR BLD: 88 % (ref 43–78)
NRBC # BLD: 0 K/UL (ref 0–0.2)
PLATELET # BLD AUTO: 241 K/UL (ref 150–450)
PMV BLD AUTO: 11.2 FL (ref 9.4–12.3)
POTASSIUM SERPL-SCNC: 5.5 MMOL/L (ref 3.5–5.1)
PROT SERPL-MCNC: 7.7 G/DL (ref 6.3–8.2)
PROTHROMBIN TIME: 14.9 SEC (ref 12.6–14.3)
RBC # BLD AUTO: 4.82 M/UL (ref 4.05–5.2)
SODIUM SERPL-SCNC: 136 MMOL/L (ref 133–143)
WBC # BLD AUTO: 10.3 K/UL (ref 4.3–11.1)

## 2023-05-06 PROCEDURE — 80053 COMPREHEN METABOLIC PANEL: CPT

## 2023-05-06 PROCEDURE — 99283 EMERGENCY DEPT VISIT LOW MDM: CPT

## 2023-05-06 PROCEDURE — 85610 PROTHROMBIN TIME: CPT

## 2023-05-06 PROCEDURE — 85730 THROMBOPLASTIN TIME PARTIAL: CPT

## 2023-05-06 PROCEDURE — 85025 COMPLETE CBC W/AUTO DIFF WBC: CPT

## 2023-05-06 RX ORDER — TRAMADOL HYDROCHLORIDE 50 MG/1
50 TABLET ORAL EVERY 6 HOURS PRN
Qty: 12 TABLET | Refills: 0 | Status: SHIPPED | OUTPATIENT
Start: 2023-05-06 | End: 2023-05-09

## 2023-05-06 ASSESSMENT — ENCOUNTER SYMPTOMS
SHORTNESS OF BREATH: 0
ABDOMINAL PAIN: 0
COLOR CHANGE: 1

## 2023-05-06 ASSESSMENT — PAIN SCALES - GENERAL: PAINLEVEL_OUTOF10: 3

## 2023-05-06 NOTE — ED PROVIDER NOTES
Emergency Department Provider Note       PCP: Rony Morales MD   Age: 80 y.o. Sex: female     DISPOSITION Decision To Discharge 05/06/2023 11:38:03 AM       ICD-10-CM    1. Pain due to varicose veins of lower extremity  I83.819 traMADol (ULTRAM) 50 MG tablet      2. Ecchymosis  R58           Medical Decision Making     Complexity of Problems Addressed:  1 acute, undiagnosed illness with uncertain prognosis     Data Reviewed and Analyzed:  Category 1:   I independently ordered and reviewed each unique test.  I reviewed external records: provider visit note from PCP. I reviewed previous ultrasound and x-ray      Category 2:   I interpreted the labs. Category 3: Discussion of management or test interpretation. 80-year-old female presents emergency department today with complaint of bruising to the right inner thigh. Patient appears in no acute distress. She is neurovascularly intact to the right lower extremity. No swelling in lower extremities noted. She does have an area of cyst to the medial aspect thigh. Erythema, induration, or signs of infectious process. Patient had previous x-ray of the femur and venous duplex that were both normal, ordered by her primary care provider. Labs unremarkable. Normal platelet count. She does have varicose veins noted on exam today as well. Encouraged her to continue to monitor. Follow-up with the vein center and primary care. Return precautions discussed. Conservative treatment for pain discussed and encouraged. We will also provide prescription for tramadol if needed. Risk of Complications and/or Morbidity of Patient Management:  Prescription drug management performed. Shared medical decision making was utilized in creating the patients health plan today.          History      Hema Bush is a 80 y.o. female who presents to the Emergency Department with chief complaint of    Chief Complaint   Patient presents with    Bleeding/Bruising

## 2023-05-06 NOTE — DISCHARGE INSTRUCTIONS
Your labs are reassuring. As we discussed, additional imaging would not be beneficial today. Compression with an elastic wrap may help with discomfort. Take medication as prescribed if needed for pain. Follow-up with Eneida Oliver Dr and your primary care provider. Return to the emergency department for any new, worsening, or concerning symptoms.

## 2023-05-06 NOTE — ED NOTES
I have reviewed discharge instructions with the patient. The patient verbalized understanding. Patient left ED via Discharge Method: ambulatory to Home with (insert name of family/friend, self, transport daughter. Opportunity for questions and clarification provided. Patient given 1 scripts. To continue your aftercare when you leave the hospital, you may receive an automated call from our care team to check in on how you are doing. This is a free service and part of our promise to provide the best care and service to meet your aftercare needs.  If you have questions, or wish to unsubscribe from this service please call [phone]. Thank you for Choosing our [facility] Emergency Department.        Phyllis Hooks, RN  05/06/23 0850

## 2023-05-06 NOTE — ED TRIAGE NOTES
Pt arrived via POV c/o bruising to inner rt leg that started a week prior. Pt states last night the pain worsened and had trouble moving leg. Pt denies falling or injuring leg prior. Pt on eliquis.

## 2023-05-08 ENCOUNTER — ANESTHESIA EVENT (OUTPATIENT)
Dept: CARDIAC CATH/INVASIVE PROCEDURES | Age: 88
End: 2023-05-08
Payer: MEDICARE

## 2023-05-08 ENCOUNTER — HOSPITAL ENCOUNTER (OUTPATIENT)
Dept: CARDIAC CATH/INVASIVE PROCEDURES | Age: 88
Discharge: HOME | End: 2023-05-08
Attending: INTERNAL MEDICINE | Admitting: INTERNAL MEDICINE
Payer: MEDICARE

## 2023-05-08 ENCOUNTER — CARE COORDINATION (OUTPATIENT)
Dept: CARE COORDINATION | Facility: CLINIC | Age: 88
End: 2023-05-08

## 2023-05-08 ENCOUNTER — ANESTHESIA (OUTPATIENT)
Dept: CARDIAC CATH/INVASIVE PROCEDURES | Age: 88
End: 2023-05-08
Payer: MEDICARE

## 2023-05-08 VITALS
HEART RATE: 76 BPM | DIASTOLIC BLOOD PRESSURE: 74 MMHG | TEMPERATURE: 97.7 F | SYSTOLIC BLOOD PRESSURE: 128 MMHG | OXYGEN SATURATION: 96 % | RESPIRATION RATE: 14 BRPM

## 2023-05-08 DIAGNOSIS — Z95.818 PRESENCE OF WATCHMAN LEFT ATRIAL APPENDAGE CLOSURE DEVICE: Primary | ICD-10-CM

## 2023-05-08 DIAGNOSIS — I48.21 PERMANENT ATRIAL FIBRILLATION (HCC): ICD-10-CM

## 2023-05-08 LAB
ANION GAP SERPL CALC-SCNC: 4 MMOL/L (ref 2–11)
BUN SERPL-MCNC: 27 MG/DL (ref 8–23)
CALCIUM SERPL-MCNC: 9.4 MG/DL (ref 8.3–10.4)
CHLORIDE SERPL-SCNC: 108 MMOL/L (ref 101–110)
CO2 SERPL-SCNC: 27 MMOL/L (ref 21–32)
CREAT SERPL-MCNC: 0.8 MG/DL (ref 0.6–1)
EKG ATRIAL RATE: 441 BPM
EKG DIAGNOSIS: NORMAL
EKG Q-T INTERVAL: 418 MS
EKG QRS DURATION: 130 MS
EKG QTC CALCULATION (BAZETT): 441 MS
EKG R AXIS: -47 DEGREES
EKG T AXIS: 58 DEGREES
EKG VENTRICULAR RATE: 67 BPM
GLUCOSE SERPL-MCNC: 123 MG/DL (ref 65–100)
POTASSIUM SERPL-SCNC: 4.2 MMOL/L (ref 3.5–5.1)
SODIUM SERPL-SCNC: 139 MMOL/L (ref 133–143)

## 2023-05-08 PROCEDURE — 80048 BASIC METABOLIC PNL TOTAL CA: CPT

## 2023-05-08 PROCEDURE — 93005 ELECTROCARDIOGRAM TRACING: CPT | Performed by: INTERNAL MEDICINE

## 2023-05-08 PROCEDURE — 3700000000 HC ANESTHESIA ATTENDED CARE: Performed by: INTERNAL MEDICINE

## 2023-05-08 PROCEDURE — 3700000001 HC ADD 15 MINUTES (ANESTHESIA): Performed by: INTERNAL MEDICINE

## 2023-05-08 PROCEDURE — 6360000002 HC RX W HCPCS: Performed by: NURSE ANESTHETIST, CERTIFIED REGISTERED

## 2023-05-08 PROCEDURE — 2500000003 HC RX 250 WO HCPCS: Performed by: NURSE ANESTHETIST, CERTIFIED REGISTERED

## 2023-05-08 PROCEDURE — 2580000003 HC RX 258: Performed by: NURSE ANESTHETIST, CERTIFIED REGISTERED

## 2023-05-08 PROCEDURE — 93312 ECHO TRANSESOPHAGEAL: CPT

## 2023-05-08 RX ORDER — CLOPIDOGREL BISULFATE 75 MG/1
75 TABLET ORAL DAILY
Qty: 30 TABLET | Refills: 5 | Status: SHIPPED | OUTPATIENT
Start: 2023-05-08

## 2023-05-08 RX ORDER — ASPIRIN 81 MG/1
81 TABLET ORAL DAILY
Qty: 90 TABLET | Refills: 1 | Status: SHIPPED | OUTPATIENT
Start: 2023-05-08

## 2023-05-08 RX ORDER — SODIUM CHLORIDE 9 MG/ML
INJECTION, SOLUTION INTRAVENOUS CONTINUOUS PRN
Status: DISCONTINUED | OUTPATIENT
Start: 2023-05-08 | End: 2023-05-08 | Stop reason: SDUPTHER

## 2023-05-08 RX ORDER — LIDOCAINE HYDROCHLORIDE 20 MG/ML
INJECTION, SOLUTION EPIDURAL; INFILTRATION; INTRACAUDAL; PERINEURAL PRN
Status: DISCONTINUED | OUTPATIENT
Start: 2023-05-08 | End: 2023-05-08 | Stop reason: SDUPTHER

## 2023-05-08 RX ORDER — ASPIRIN 81 MG/1
81 TABLET, CHEWABLE ORAL DAILY
Status: DISCONTINUED | OUTPATIENT
Start: 2023-05-08 | End: 2023-05-08 | Stop reason: HOSPADM

## 2023-05-08 RX ORDER — PROPOFOL 10 MG/ML
INJECTION, EMULSION INTRAVENOUS PRN
Status: DISCONTINUED | OUTPATIENT
Start: 2023-05-08 | End: 2023-05-08 | Stop reason: SDUPTHER

## 2023-05-08 RX ADMIN — PROPOFOL 20 MG: 10 INJECTION, EMULSION INTRAVENOUS at 15:27

## 2023-05-08 RX ADMIN — PROPOFOL 50 MG: 10 INJECTION, EMULSION INTRAVENOUS at 15:25

## 2023-05-08 RX ADMIN — LIDOCAINE HYDROCHLORIDE 40 MG: 20 INJECTION, SOLUTION EPIDURAL; INFILTRATION; INTRACAUDAL; PERINEURAL at 15:25

## 2023-05-08 RX ADMIN — PROPOFOL 20 MG: 10 INJECTION, EMULSION INTRAVENOUS at 15:29

## 2023-05-08 RX ADMIN — SODIUM CHLORIDE: 9 INJECTION, SOLUTION INTRAVENOUS at 15:12

## 2023-05-08 NOTE — CARE COORDINATION
Ambulatory Care Coordination Note  2023    Patient Current Location:  [address] [place] [address]     ACM contacted the patient by telephone. Verified name and  with patient as identifiers. Provided introduction to self, and explanation of the ACM role. Challenges to be reviewed by the provider   Additional needs identified to be addressed with provider: No  none               Method of communication with provider: none. ACM: Tammy Bloom RN    Ccm outreached to patient. Offered patient enrollment in the Remote Patient Monitoring (RPM) program for in-home monitoring: [facility][id number]}. Lab Results       None            Care Coordination Interventions    Referral from Primary Care Provider: No  Suggested Interventions and Community Resources          Goals Addressed                   This Visit's Progress     Conditions and Symptoms   On track     I will schedule office visits, as directed by my provider. I will keep my appointment or reschedule if I have to cancel. I will notify my provider of any barriers to my plan of care. I will notify my provider of any symptoms that indicate a worsening of my condition. Barriers: none  Plan for overcoming my barriers: N/A  Confidence: 9/10  Anticipated Goal Completion Date: 3/19/23         Self Monitoring   On track     Blood Pressure - I will take my blood pressure as directed - Daily  I will notify my provider of any trends of increasing or decreasing blood pressures over a month period of time. I will notify my provider of any changes in blood pressure associated with symptoms of dizziness, falls, passing out, headache, confusion/change in mental status. Patient Reported Blood Pressure No flowsheet data found.     Barriers: none  Plan for overcoming my barriers: N/A  Confidence: 9/10  Anticipated Goal Completion Date: 3/19/23                Future Appointments   Date Time Provider Devyn Bermeo   2023 12:30 PM [facility] CATH/CV FLOAT RN

## 2023-05-08 NOTE — ANESTHESIA POSTPROCEDURE EVALUATION
Department of Anesthesiology  Postprocedure Note    Patient: Kimmie Dennis  MRN: [id number]  YOB: 1935  Date of evaluation: 5/8/2023      Procedure Summary     Date: 05/08/23 Room / Location: [address] [place] [address]    Anesthesia Start: 5820 Anesthesia Stop: 3677    Procedure: TRANSESOPHAGEAL ECHOCARDIOGRAM (CONTRAST/3D PRN) Diagnosis:       Permanent atrial fibrillation (HCC)      Permanent atrial fibrillation ([address]    Scheduled Providers: Lali White MD Responsible Provider: Cesia Nichols MD    Anesthesia Type: TIVA ASA Status: 3          Anesthesia Type: No value filed. Nory Phase I: Nory Score: 9    Nory Phase II:        Anesthesia Post Evaluation    Patient location during evaluation: PACU  Patient participation: complete - patient participated  Level of consciousness: awake and alert  Airway patency: patent  Nausea & Vomiting: no nausea and no vomiting  Complications: no  Cardiovascular status: hemodynamically stable  Respiratory status: acceptable  Hydration status: euvolemic  Comments: Blood pressure (!) 132/100, pulse 72, temperature 97.7 °F (36.5 °C), temperature source Infrared, resp. rate 14, SpO2 97 %, not currently breastfeeding.       Pt stable for discharge from PACU  Multimodal analgesia pain management approach

## 2023-05-08 NOTE — PROGRESS NOTES
TRANSFER - IN REPORT:    Verbal report received from Devendra on [employer]  being received from [facility]ro[facility] Deng [facility] for ordered procedure      Report consisted of patients Situation, Background, Assessment and   Recommendations(SBAR). Information from the following report(s) Nurse Handoff Report was reviewed with the receiving nurse. Opportunity for questions and clarification was provided.       Assessment completed upon patients arrival to unit and care assume

## 2023-05-08 NOTE — CARE COORDINATION
Ambulatory Care Coordination Note  2023    Patient Current Location:  [facility]    ACM contacted the patient by telephone. Verified name and  with patient as identifiers. Provided introduction to self, and explanation of the ACM role. ACM: aZkiya Cook RN    Challenges to be reviewed by the provider   Additional needs identified to be addressed with provider: Yes  Notified of er admission               Method of communication with provider: staff message. See assessment below. Ccm outreached to patient. Patient agreeable to [facility] services. Patient is known to ccm. Reviewed with patient discharge summary. Patient verbalized understanding. Patient states pain to LE is better today. Ccm notified pcp of er admission and they will call to schedule follow up. Overall patient is doing well and has several follow up appointments. Patient states no other questions or concerns. [facility] discussed that I would outreach next week. Patient agreeable. Offered patient enrollment in the Remote Patient Monitoring (RPM) program for in-home monitoring: NA. Ambulatory Care Coordination Assessment    Care Coordination Protocol  Referral from Primary Care Provider: No  Week 1 - Initial Assessment     Do you have all of your prescriptions and are they filled?: Yes  Barriers to medication adherence: None  Are you able to afford your medications?: Yes  How often do you have trouble taking your medications the way you have been told to take them?: I always take them as prescribed. Do you have Home O2 Therapy?: No      Ability to seek help/take action for Emergent Urgent situations i.e. fire, crime, inclement weather or health crisis. : Independent  Ability to ambulate to restroom: Independent  Ability handle personal hygeine needs (bathing/dressing/grooming): Independent  Ability to manage Medications:  Independent  Ability to prepare Food Preparation: Independent  Ability to maintain home (clean home, laundry):

## 2023-05-08 NOTE — PROGRESS NOTES
TRANSFER - OUT REPORT:    Verbal report given to cpru rn (name) on Lina Palomo being transferred to cpru for routine progression of patient care       Report consisted of patient's Situation, Background, Assessment and   Recommendations(SBAR). Information from the following report(s) Nurse Handoff Report was reviewed with the receiving nurse. Opportunity for questions and clarification was provided.

## 2023-05-08 NOTE — DISCHARGE INSTRUCTIONS
Sedation for a Medical Procedure: Care Instructions     You were given a sedative medication during your visit. While many of the effects will have worn   off before you leave; you may continue to feel some effects for several hours. Common side effects from sedation include:  Feeling sleepy. (Your doctors and nurses will make sure you are not too sleepy to go home.)  Nausea and vomiting. This usually does not last long. Feeling tired. How can you care for yourself at home? Activity    Don't do anything for 24 hours that requires attention to detail. It takes time for the medicine effects to completely wear off. Do not make important legal decisions for 24 hours. Do not sign any legal documents for 24 hours. Do not drink alcohol today     For your safety, you should not drive or operate heavy machinery for the remainder of the day     Rest when you feel tired. Getting enough sleep will help you recover. Diet    You can eat your normal diet, unless your doctor gives you other instructions. If your stomach is upset, try clear liquids and bland, low-fat foods like plain toast or rice. Drink plenty of fluids (unless your doctor tells you not to). Don't drink alcohol for 24 hours. Medicines    Be safe with medicines. Read and follow all instructions on the label. If the doctor gave you a prescription medicine for pain, take it as prescribed. If you are not taking a prescription pain medicine, ask your doctor if you can take an over-the-counter medicine. If you think your pain medicine is making you sick to your stomach: Take your medicine after meals (unless your doctor has told you not to). Ask your doctor for a different pain medicine. I have read the above instructions and have had the opportunity to ask questions.       Patient: ________________________   Date: _____________    Witness: _______________________   Date: _____________

## 2023-05-08 NOTE — ANESTHESIA PRE PROCEDURE
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, HEPCAB    COVID-19 Screening (If Applicable):   Lab Results   Component Value Date/Time    COVID19 Not Detected 09/23/2021 01:26 PM    COVID19 Performed 09/23/2021 01:26 PM           Anesthesia Evaluation  Patient summary reviewed and Nursing notes reviewed no history of anesthetic complications:   Airway: Mallampati: II  TM distance: >3 FB   Neck ROM: full  Mouth opening: > = 3 FB   Dental:    (+) partials  Comment: lower    Pulmonary:Negative Pulmonary ROS breath sounds clear to auscultation                             Cardiovascular:  Exercise tolerance: good (>4 METS),   (+) hypertension:, pacemaker (pacer placed 5/22):, dysrhythmias (s/p Watchman): atrial fibrillation, hyperlipidemia        Rhythm: regular  Rate: normal                 ROS comment: LHC--mild to mod CAD  ECHO 4/22 nl EF, mod MR, RVSP 38     Watchman implanted on 3/14/23     Neuro/Psych:               GI/Hepatic/Renal:   (+) GERD: well controlled,      Morbid obesity: obese. Endo/Other: Negative Endo/Other ROS                    Abdominal:             Vascular: negative vascular ROS. Other Findings:             Anesthesia Plan      TIVA     ASA 3       Induction: intravenous. Anesthetic plan and risks discussed with patient. Use of blood products discussed with patient whom.                      Jared Jessica MD   5/8/2023

## 2023-05-08 NOTE — PROGRESS NOTES
TRANSFER - OUT REPORT:    [facility] w/ Dr. Selina Walker    Anesthesia administered by CRNA    Pt tolerated procedure fair      Verbal report given to Aman Bull RN on Flaco Ellis being transferred to [facility] for routine progression of patient care       Report consisted of patient's Situation, Background, Assessment and   Recommendations(SBAR). Information from the following report(s) Nurse Handoff Report was reviewed with the receiving nurse. Opportunity for questions and clarification was provided.       Patient transported with:   Registered Nurse

## 2023-05-10 NOTE — Clinical Note
Defibrillation and grounding pads were applied. Problem: BIRTH - VAGINAL/ SECTION  Goal: Fetal and maternal status remain reassuring during the birth process  Description: INTERVENTIONS:  - Monitor vital signs  - Monitor fetal heart rate  - Monitor uterine activity  - Monitor labor progression (vaginal delivery)  - DVT prophylaxis  - Antibiotic prophylaxis  Outcome: Progressing  Goal: Emotionally satisfying birthing experience for mother/fetus  Description: Interventions:  - Assess, plan, implement and evaluate the nursing care given to the patient in labor  - Advocate the philosophy that each childbirth experience is a unique experience and support the family's chosen level of involvement and control during the labor process   - Actively participate in both the patient's and family's teaching of the birth process  - Consider cultural, Oriental orthodox and age-specific factors and plan care for the patient in labor  Outcome: Progressing     Problem: PAIN - ADULT  Goal: Verbalizes/displays adequate comfort level or baseline comfort level  Description: Interventions:  - Encourage patient to monitor pain and request assistance  - Assess pain using appropriate pain scale  - Administer analgesics based on type and severity of pain and evaluate response  - Implement non-pharmacological measures as appropriate and evaluate response  - Consider cultural and social influences on pain and pain management  - Notify physician/advanced practitioner if interventions unsuccessful or patient reports new pain  Outcome: Progressing     Problem: INFECTION - ADULT  Goal: Absence or prevention of progression during hospitalization  Description: INTERVENTIONS:  - Assess and monitor for signs and symptoms of infection  - Monitor lab/diagnostic results  - Monitor all insertion sites, i e  indwelling lines, tubes, and drains  - Monitor endotracheal if appropriate and nasal secretions for changes in amount and color  - [place] appropriate cooling/warming therapies per order  - Administer medications as ordered  - Instruct and encourage patient and family to use good hand hygiene technique  - Identify and instruct in appropriate isolation precautions for identified infection/condition  Outcome: Progressing  Goal: Absence of fever/infection during neutropenic period  Description: INTERVENTIONS:  - Monitor WBC    Outcome: Progressing     Problem: SAFETY ADULT  Goal: Patient will remain free of falls  Description: INTERVENTIONS:  - Educate patient/family on patient safety including physical limitations  - Instruct patient to call for assistance with activity   - Consult OT/PT to assist with strengthening/mobility   - Keep Call bell within reach  - Keep bed low and locked with side rails adjusted as appropriate  - Keep care items and personal belongings within reach  - Initiate and maintain comfort rounds  - Make Fall Risk Sign visible to staff  - Apply yellow socks and bracelet for high fall risk patients  - Consider moving patient to room near nurses station  Outcome: Progressing  Goal: Maintain or return to baseline ADL function  Description: INTERVENTIONS:  -  Assess patient's ability to carry out ADLs; assess patient's baseline for ADL function and identify physical deficits which impact ability to perform ADLs (bathing, care of mouth/teeth, toileting, grooming, dressing, etc )  - Assess/evaluate cause of self-care deficits   - Assess range of motion  - Assess patient's mobility; develop plan if impaired  - Assess patient's need for assistive devices and provide as appropriate  - Encourage maximum independence but intervene and supervise when necessary  - Involve family in performance of ADLs  - Assess for home care needs following discharge   - Consider OT consult to assist with ADL evaluation and planning for discharge  - Provide patient education as appropriate  Outcome: Progressing  Goal: Maintains/Returns to pre admission functional level  Description: INTERVENTIONS:  - Perform BMAT or MOVE assessment daily    - Set and communicate daily mobility goal to care team and patient/family/caregiver  - Collaborate with rehabilitation services on mobility goals if consulted  - Out of bed for toileting  - Record patient progress and toleration of activity level   Outcome: Progressing     Problem: Knowledge Deficit  Goal: Patient/family/caregiver demonstrates understanding of disease process, treatment plan, medications, and discharge instructions  Description: Complete learning assessment and assess knowledge base    Interventions:  - Provide teaching at level of understanding  - Provide teaching via preferred learning methods  Outcome: Progressing     Problem: DISCHARGE PLANNING  Goal: Discharge to home or other facility with appropriate resources  Description: INTERVENTIONS:  - Identify barriers to discharge w/patient and caregiver  - Arrange for needed discharge resources and transportation as appropriate  - Identify discharge learning needs (meds, wound care, etc )  - Arrange for interpretive services to assist at discharge as needed  - Refer to Case Management Department for coordinating discharge planning if the patient needs post-hospital services based on physician/advanced practitioner order or complex needs related to functional status, cognitive ability, or social support system  Outcome: Progressing

## 2023-05-12 ENCOUNTER — OFFICE VISIT (OUTPATIENT)
Dept: FAMILY MEDICINE CLINIC | Facility: CLINIC | Age: 88
End: 2023-05-12
Payer: MEDICARE

## 2023-05-12 ENCOUNTER — HOSPITAL ENCOUNTER (OUTPATIENT)
Dept: PHYSICAL THERAPY | Age: 88
Setting detail: RECURRING SERIES
Discharge: HOME | End: 2023-05-15
Payer: MEDICARE

## 2023-05-12 VITALS
TEMPERATURE: 97.6 F | HEART RATE: 62 BPM | WEIGHT: 189 LBS | DIASTOLIC BLOOD PRESSURE: 80 MMHG | BODY MASS INDEX: 29.6 KG/M2 | OXYGEN SATURATION: 100 % | SYSTOLIC BLOOD PRESSURE: 110 MMHG

## 2023-05-12 DIAGNOSIS — T14.8XXA HEMATOMA: Primary | ICD-10-CM

## 2023-05-12 PROCEDURE — 1123F ACP DISCUSS/DSCN MKR DOCD: CPT | Performed by: FAMILY MEDICINE

## 2023-05-12 PROCEDURE — 97110 THERAPEUTIC EXERCISES: CPT

## 2023-05-12 PROCEDURE — 99213 OFFICE O/P EST LOW 20 MIN: CPT | Performed by: FAMILY MEDICINE

## 2023-05-12 ASSESSMENT — PATIENT HEALTH QUESTIONNAIRE - PHQ9
2. FEELING DOWN, DEPRESSED OR HOPELESS: 0
1. LITTLE INTEREST OR PLEASURE IN DOING THINGS: 0
SUM OF ALL RESPONSES TO PHQ9 QUESTIONS 1 & 2: 0
SUM OF ALL RESPONSES TO PHQ QUESTIONS 1-9: 0

## 2023-05-12 ASSESSMENT — ENCOUNTER SYMPTOMS
RESPIRATORY NEGATIVE: 1
GASTROINTESTINAL NEGATIVE: 1
EYES NEGATIVE: 1

## 2023-05-12 NOTE — PROGRESS NOTES
Neurological: Negative. Blood pressure 110/80, pulse 62, temperature 97.6 °F (36.4 °C), temperature source Temporal, weight 189 lb (85.7 kg), SpO2 100 %, not currently breastfeeding. Physical Exam  Vitals and nursing note reviewed. Constitutional:       Appearance: Normal appearance. HENT:      Mouth/Throat:      Mouth: Mucous membranes are moist.      Pharynx: Oropharynx is clear. Eyes:      Conjunctiva/sclera: Conjunctivae normal.   Neck:      Vascular: No carotid bruit. Cardiovascular:      Rate and Rhythm: Normal rate and regular rhythm. Heart sounds: Normal heart sounds. Pulmonary:      Breath sounds: Normal breath sounds. Musculoskeletal:         General: No swelling. Normal range of motion. Cervical back: Neck supple. Comments: Medial right thigh has a large patch of fading ecchymosis. No tenderness. No underlying mass or cord. Neurovascular is intact. Lymphadenopathy:      Cervical: No cervical adenopathy. Psychiatric:         Mood and Affect: Mood normal.        ASSESSMENT and Burt Schaumann was seen today for follow-up. Diagnoses and all orders for this visit:    Hematoma     Discussed history and medications with patient. Continue current measures. Follow up if side effects occur or if new symptoms develop. Resume routine activity. Follow up if symptoms do not continue to resolve. Return in about 4 months (around 9/12/2023).     Delaney Holloway MD

## 2023-05-16 PROBLEM — S70.11XA TRAUMATIC ECCHYMOSIS OF RIGHT THIGH: Status: ACTIVE | Noted: 2023-05-16

## 2023-05-17 ENCOUNTER — HOSPITAL ENCOUNTER (OUTPATIENT)
Dept: PHYSICAL THERAPY | Age: 88
Setting detail: RECURRING SERIES
Discharge: HOME | End: 2023-05-20
Payer: MEDICARE

## 2023-05-17 PROCEDURE — 97110 THERAPEUTIC EXERCISES: CPT

## 2023-05-17 NOTE — PROGRESS NOTES
[facility]  : 1935  Primary: Edel Caldwell Ppo (Medicare Managed)  Secondary:  [address] @ [address]  Phone: [phone]  Fax: [phone] Plan Frequency: 1-2x/wk for 8 weeks  Plan of Care/Certification Expiration Date: 23      >PT Visit Info:  Plan Frequency: 1-2x/wk for 8 weeks  Plan of Care/Certification Expiration Date: 23      Visit Count:  4    OUTPATIENT PHYSICAL THERAPY:OP NOTE TYPE: Treatment Note 2023       Episode  }Appt Desk             Treatment Diagnosis:  Muscle Weakness, Generalized (M62.81)  Other abnormalities of gait and mobility (R26.89)  Unsteadiness on Feet (R26.81) Debility (R54)  Medical/Referring Diagnosis:  Abnormal gait due to muscle weakness [M62.81, R26.9]  Referring Physician:  Brock Jewell MD MD Orders:  PT Eval and Treat   Date of Onset:  Onset Date:  (chronic)     Allergies:   Guaifenesin, Loratadine-pseudoephedrine er, Amoxicillin, and Sulfa antibiotics  Restrictions/Precautions:  Restrictions/Precautions: Cardiac  No data recorded     Interventions Planned (Treatment may consist of any combination of the following):    Current Treatment Recommendations: Strengthening; ROM; Balance training; Functional mobility training; Transfer training; ADL/Self-care training; IADL training; Endurance training; Gait training; Stair training; Neuromuscular re-education; Home exercise program; Safety education & training; Patient/Caregiver education & training; Equipment evaluation, education, & procurement; Therapeutic activities     >Subjective Comments: pt reports she is no off Eloquis and will take Baby Asprin and Plavix x 6 weeks.      >Initial:  achy    /10>Post Session:    achy    /10  Medications Last Reviewed:  2023  Updated Objective Findings:  Seated BP - 129/105 mmHg, HR 77after walking after 3 min quiet rest 116/84 mmHg, bpm 75  Treatment     THERAPEUTIC

## 2023-05-18 ENCOUNTER — OFFICE VISIT (OUTPATIENT)
Age: 88
End: 2023-05-18
Payer: MEDICARE

## 2023-05-18 VITALS
BODY MASS INDEX: 30.45 KG/M2 | HEIGHT: 67 IN | HEART RATE: 66 BPM | DIASTOLIC BLOOD PRESSURE: 88 MMHG | WEIGHT: 194 LBS | SYSTOLIC BLOOD PRESSURE: 138 MMHG

## 2023-05-18 DIAGNOSIS — E78.00 PURE HYPERCHOLESTEROLEMIA: ICD-10-CM

## 2023-05-18 DIAGNOSIS — I25.10 CORONARY ARTERY DISEASE INVOLVING NATIVE CORONARY ARTERY OF NATIVE HEART WITHOUT ANGINA PECTORIS: ICD-10-CM

## 2023-05-18 DIAGNOSIS — I48.0 PAROXYSMAL ATRIAL FIBRILLATION (HCC): Primary | ICD-10-CM

## 2023-05-18 DIAGNOSIS — Z95.818 PRESENCE OF WATCHMAN LEFT ATRIAL APPENDAGE CLOSURE DEVICE: ICD-10-CM

## 2023-05-18 DIAGNOSIS — I10 ESSENTIAL HYPERTENSION: ICD-10-CM

## 2023-05-18 DIAGNOSIS — I49.5 SINUS NODE DYSFUNCTION (HCC): ICD-10-CM

## 2023-05-18 PROCEDURE — 1123F ACP DISCUSS/DSCN MKR DOCD: CPT | Performed by: INTERNAL MEDICINE

## 2023-05-18 PROCEDURE — 99214 OFFICE O/P EST MOD 30 MIN: CPT | Performed by: INTERNAL MEDICINE

## 2023-05-18 NOTE — PROGRESS NOTES
[address], [address]  PHONE: [phone]     23    NAME:  Bartolome Garcia  : 1935  MRN: [id number]       SUBJECTIVE:   Bartolome Garcia is a 80 y.o. female seen for a follow up visit regarding the following:     Chief Complaint   Patient presents with    Atrial Fibrillation       HPI: Here for PAF and CAD. PPM as well. Aug 2021 [facility] -mild to mod nonobstructive disease  Echo 2022: normal EF, mod MR, mild AI  Watchman device 3/2023  TANESHA 2023: low normal EF, mild to mod MR      On DAPT, doing well off the [address]. Some RUTH, some lack of energy, no CP. She is asx in Afib, no palpitations. Has felt well, no angina. Patient denies recent history of orthopnea, PND, excessive dizziness and/or syncope. , 3 kids in town. Past Medical History, Past Surgical History, Family history, Social History, and Medications were all reviewed with the patient today and updated as necessary. Current Outpatient Medications   Medication Sig Dispense Refill    clopidogrel (PLAVIX) 75 MG tablet Take 1 tablet by mouth daily 30 tablet 5    aspirin EC 81 MG EC tablet Take 1 tablet by mouth daily 90 tablet 1    TURMERIC PO Take by mouth      losartan (COZAAR) 50 MG tablet Take 1 tablet by mouth daily 30 tablet 5    metoprolol succinate (TOPROL XL) 25 MG extended release tablet Take 1 tablet by mouth in the morning and 1 tablet in the evening. 180 tablet 3    Bioflavonoid Products (RICK C PO) Take 1 tablet by mouth daily      Multiple Vitamins-Minerals (ONE-A-DAY WOMENS PO) Take 1 tablet by mouth daily      AZO-CRANBERRY PO Take 2 tablets by mouth daily      Cholecalciferol 50 MCG ( UT) TABS Take 1 tablet by mouth daily      magnesium oxide (MAG-OX) 400 (240 Mg) MG tablet Take 1 tablet by mouth 2 times daily       No current facility-administered medications for this visit.         Allergies   Allergen Reactions    Guaifenesin      Other reaction(s):

## 2023-05-22 ENCOUNTER — CARE COORDINATION (OUTPATIENT)
Dept: CARE COORDINATION | Facility: CLINIC | Age: 88
End: 2023-05-22

## 2023-05-22 NOTE — CARE COORDINATION
Ambulatory Care Coordination Note  2023    Patient Current Location:  [address] [place] [address]     ACM contacted the patient by telephone. Verified name and  with patient as identifiers. Provided introduction to self, and explanation of the ACM role. Challenges to be reviewed by the provider   Additional needs identified to be addressed with provider: No  none               Method of communication with provider: none. ACM: Chato Cook RN    Ccm outreached to patient. Patient states she has had a slight headache the last few days but has taking tylenol and that has seemed to help. Discussed with patient if headaches continue to let me know and I will notify pcp. Patient agreeable. Patient states no other questions or concerns. Ccm discussed that I would outreach next week. Patient agreeable. Offered patient enrollment in the Remote Patient Monitoring (RPM) program for in-home monitoring: Patient is not eligible for RPM program.    Lab Results       None            Care Coordination Interventions    Referral from Primary Care Provider: No  Suggested Interventions and Community Resources          Goals Addressed                   This Visit's Progress     Conditions and Symptoms   On track     I will schedule office visits, as directed by my provider. I will keep my appointment or reschedule if I have to cancel. I will notify my provider of any barriers to my plan of care. I will notify my provider of any symptoms that indicate a worsening of my condition. Barriers: none  Plan for overcoming my barriers: N/A  Confidence: 9/10  Anticipated Goal Completion Date: 3/19/23         Self Monitoring   On track     Blood Pressure - I will take my blood pressure as directed - Daily  I will notify my provider of any trends of increasing or decreasing blood pressures over a month period of time.   I will notify my provider of any changes in blood pressure associated with symptoms of dizziness, falls, passing

## 2023-05-23 ENCOUNTER — HOSPITAL ENCOUNTER (OUTPATIENT)
Dept: PHYSICAL THERAPY | Age: 88
Setting detail: RECURRING SERIES
Discharge: HOME | End: 2023-05-26
Payer: MEDICARE

## 2023-05-23 PROCEDURE — 97530 THERAPEUTIC ACTIVITIES: CPT

## 2023-05-23 PROCEDURE — 97110 THERAPEUTIC EXERCISES: CPT

## 2023-05-23 NOTE — PROGRESS NOTES
Jack Maurice  : 1935  Primary: Shane Garcia Ppo (Medicare Managed)  Secondary:  [address] @ [address]  Phone: [phone]  Fax: [phone] Plan Frequency: 1-2x/wk for 8 weeks  Plan of Care/Certification Expiration Date: 23      >PT Visit Info:  Plan Frequency: 1-2x/wk for 8 weeks  Plan of Care/Certification Expiration Date: 23      Visit Count:  5    OUTPATIENT PHYSICAL THERAPY:OP NOTE TYPE: Treatment Note 2023       Episode  }Appt Desk             Treatment Diagnosis:  Muscle Weakness, Generalized (M62.81)  Other abnormalities of gait and mobility (R26.89)  Unsteadiness on Feet (R26.81) Debility (R54)  Medical/Referring Diagnosis:  Abnormal gait due to muscle weakness [M62.81, R26.9]  Referring Physician:  Aileen Rosales MD MD Orders:  PT Eval and Treat   Date of Onset:  Onset Date:  (chronic)     Allergies:   Guaifenesin, Loratadine-pseudoephedrine er, Amoxicillin, and Sulfa antibiotics  Restrictions/Precautions:  Restrictions/Precautions: Cardiac  No data recorded     Interventions Planned (Treatment may consist of any combination of the following):    Current Treatment Recommendations: Strengthening; ROM; Balance training; Functional mobility training; Transfer training; ADL/Self-care training; IADL training; Endurance training; Gait training; Stair training; Neuromuscular re-education; Home exercise program; Safety education & training; Patient/Caregiver education & training; Equipment evaluation, education, & procurement;  Therapeutic activities     >Subjective Comments: pt reports she is feeling good and is packign for her big trip     >Initial:  achy    /10>Post Session:    achy    /10  Medications Last Reviewed:  2023  Updated Objective Findings:  Seated BP - 129/105 mmHg, HR 77after walking after 3 min quiet rest 116/84 mmHg, bpm 75  Treatment     THERAPEUTIC EXERCISE: ( 30

## 2023-05-25 ENCOUNTER — CARE COORDINATION (OUTPATIENT)
Dept: CARE COORDINATION | Facility: CLINIC | Age: 88
End: 2023-05-25

## 2023-05-25 NOTE — CARE COORDINATION
Ccm discussed with patient that dentist will have to call [facility] cardiology regarding medications.  Patient to notify dentist.

## 2023-05-26 ENCOUNTER — TELEPHONE (OUTPATIENT)
Age: 88
End: 2023-05-26

## 2023-05-26 NOTE — TELEPHONE ENCOUNTER
Cardiac Clearance        Physician or Practice Requesting:Dr Radha Michelle  : Mikel Jonesing Phone Number: [phone]  Fax Number: [phone]  Date of Surgery/Procedure: TBD  Type of Surgery or Procedure: Surgical Extraction  Type of Anesthesia: Lidocaine & Septocaine  Type of Clearance Requested: Medication Hold Only  Medication to Hold: Plavix  Days to Hold: 3 Days Prior

## 2023-05-30 ENCOUNTER — HOSPITAL ENCOUNTER (OUTPATIENT)
Dept: PHYSICAL THERAPY | Age: 88
Setting detail: RECURRING SERIES
Discharge: HOME | End: 2023-06-02
Payer: MEDICARE

## 2023-05-30 PROCEDURE — 97530 THERAPEUTIC ACTIVITIES: CPT

## 2023-05-30 PROCEDURE — 97110 THERAPEUTIC EXERCISES: CPT

## 2023-05-30 NOTE — PROGRESS NOTES
Keren Bumpers  : 1935  Primary: Gabriela Nugent Rosinao (Medicare Managed)  Secondary:  Eneida Stern @ [address]  Phone: [phone]  Fax: [phone] Plan Frequency: 1-2x/wk for 8 weeks  Plan of Care/Certification Expiration Date: 23      >PT Visit Info:  Plan Frequency: 1-2x/wk for 8 weeks  Plan of Care/Certification Expiration Date: 23      Visit Count:  6    OUTPATIENT PHYSICAL THERAPY:OP NOTE TYPE: Treatment Note 2023       Episode  }Appt Desk             Treatment Diagnosis:  Muscle Weakness, Generalized (M62.81)  Other abnormalities of gait and mobility (R26.89)  Unsteadiness on Feet (R26.81) Debility (R54)  Medical/Referring Diagnosis:  Abnormal gait due to muscle weakness [M62.81, R26.9]  Referring Physician:  Lilian Bhagat MD MD Orders:  PT Eval and Treat   Date of Onset:  Onset Date:  (chronic)     Allergies:   Guaifenesin, Loratadine-pseudoephedrine er, Amoxicillin, and Sulfa antibiotics  Restrictions/Precautions:  Restrictions/Precautions: Cardiac  No data recorded     Interventions Planned (Treatment may consist of any combination of the following):    Current Treatment Recommendations: Strengthening; ROM; Balance training; Functional mobility training; Transfer training; ADL/Self-care training; IADL training; Endurance training; Gait training; Stair training; Neuromuscular re-education; Home exercise program; Safety education & training; Patient/Caregiver education & training; Equipment evaluation, education, & procurement; Therapeutic activities     >Subjective Comments: pt will leave for her trip to [place] and is feeling better about her endurance.       >Initial:  achy    /10>Post Session:    achy    /10  Medications Last Reviewed:  2023  Updated Objective Findings:  Seated BP - 129/105 mmHg, HR 77after walking after 3 min quiet rest 116/84 mmHg, bpm 75  Treatment     THERAPEUTIC

## 2023-05-31 ENCOUNTER — TELEPHONE (OUTPATIENT)
Dept: CARDIOLOGY CLINIC | Age: 88
End: 2023-05-31

## 2023-05-31 NOTE — TELEPHONE ENCOUNTER
She was placed on Plavix May 18th and taken off her Eliquis. She has a bruise on her abdomen,bruises on her legs and blood running down her lung one day last week that she does not know where it came from. Had tooth extraction this am and it has not stopped bleeding yet. She held Plavix yesterday and today. Any concerns? Watchman placed 3/23

## 2023-05-31 NOTE — TELEPHONE ENCOUNTER
Akosua Warren MD  You; Yesenia Vela, DO 11 minutes ago (3:46 PM)     MS  I would not expect her to bleed more on plavix than eliquis. Would try to cont if she can, but if she has ongoing bleeding may need to stop     I called and informed pt. of MD response and she v/u.

## 2023-05-31 NOTE — TELEPHONE ENCOUNTER
Pt called in and states that she had a tooth pull and it want stop bleeding. She think its because she didn't stop the plavix before the tooth pull.

## 2023-06-05 ENCOUNTER — CARE COORDINATION (OUTPATIENT)
Dept: CARE COORDINATION | Facility: CLINIC | Age: 88
End: 2023-06-05

## 2023-06-05 NOTE — CARE COORDINATION
Ambulatory Care Management Note        Date/Time:  6/5/2023 9:41 AM    Ccm outreached to patient. No answer at this time, message left. Awaiting response. If no response, ccm will outreach next week.

## 2023-06-16 ENCOUNTER — HOSPITAL ENCOUNTER (OUTPATIENT)
Dept: PHYSICAL THERAPY | Age: 88
Setting detail: RECURRING SERIES
Discharge: HOME | End: 2023-06-19
Payer: MEDICARE

## 2023-06-16 PROCEDURE — 97110 THERAPEUTIC EXERCISES: CPT

## 2023-06-16 PROCEDURE — 97530 THERAPEUTIC ACTIVITIES: CPT

## 2023-06-19 ENCOUNTER — CARE COORDINATION (OUTPATIENT)
Dept: CARE COORDINATION | Facility: CLINIC | Age: 88
End: 2023-06-19

## 2023-06-19 NOTE — CARE COORDINATION
Ambulatory Care Coordination Note  2023    Patient Current Location:  [address] [place] [address]     ACM contacted the patient by telephone. Verified name and  with patient as identifiers. Provided introduction to self, and explanation of the ACM role. Challenges to be reviewed by the provider   Additional needs identified to be addressed with provider: No  none               Method of communication with provider: none. ACM: China Hull RN    Ccm outreached to patient. Patient states she is doing well at this time. Patient states no questions or concerns. Ccm dicussed that I would outreach next week. Patient agreeable. Offered patient enrollment in the Remote Patient Monitoring (RPM) program for in-home monitoring: NA. Lab Results       None            Care Coordination Interventions    Referral from Primary Care Provider: No  Suggested Interventions and Community Resources          Goals Addressed                   This Visit's Progress     Conditions and Symptoms   On track     I will schedule office visits, as directed by my provider. I will keep my appointment or reschedule if I have to cancel. I will notify my provider of any barriers to my plan of care. I will notify my provider of any symptoms that indicate a worsening of my condition. Barriers: none  Plan for overcoming my barriers: N/A  Confidence: 9/10  Anticipated Goal Completion Date: 3/19/23         Self Monitoring   On track     Blood Pressure - I will take my blood pressure as directed - Daily  I will notify my provider of any trends of increasing or decreasing blood pressures over a month period of time. I will notify my provider of any changes in blood pressure associated with symptoms of dizziness, falls, passing out, headache, confusion/change in mental status. Patient Reported Blood Pressure No flowsheet data found.     Barriers: none  Plan for overcoming my barriers: N/A  Confidence: 9/10  Anticipated Goal Completion

## 2023-06-19 NOTE — CARE COORDINATION
Patient also states she had a fall last week when on vacation and has some knee swelling. Pt also states she has bruise like areas on her legs, stomach and arms. Ccm notified pcp office to call patient.

## 2023-06-21 ENCOUNTER — HOSPITAL ENCOUNTER (OUTPATIENT)
Dept: PHYSICAL THERAPY | Age: 88
Setting detail: RECURRING SERIES
End: 2023-06-21
Payer: MEDICARE

## 2023-06-22 ENCOUNTER — OFFICE VISIT (OUTPATIENT)
Dept: FAMILY MEDICINE CLINIC | Facility: CLINIC | Age: 88
End: 2023-06-22
Payer: MEDICARE

## 2023-06-22 VITALS
WEIGHT: 196 LBS | BODY MASS INDEX: 30.7 KG/M2 | SYSTOLIC BLOOD PRESSURE: 134 MMHG | TEMPERATURE: 98.1 F | DIASTOLIC BLOOD PRESSURE: 82 MMHG | HEART RATE: 64 BPM | OXYGEN SATURATION: 98 %

## 2023-06-22 DIAGNOSIS — S80.02XA CONTUSION OF LEFT KNEE, INITIAL ENCOUNTER: Primary | ICD-10-CM

## 2023-06-22 PROCEDURE — 1123F ACP DISCUSS/DSCN MKR DOCD: CPT | Performed by: FAMILY MEDICINE

## 2023-06-22 PROCEDURE — 99213 OFFICE O/P EST LOW 20 MIN: CPT | Performed by: FAMILY MEDICINE

## 2023-06-22 RX ORDER — MELOXICAM 7.5 MG/1
TABLET ORAL
Qty: 30 TABLET | Refills: 3 | Status: SHIPPED | OUTPATIENT
Start: 2023-06-22

## 2023-06-22 ASSESSMENT — PATIENT HEALTH QUESTIONNAIRE - PHQ9
SUM OF ALL RESPONSES TO PHQ QUESTIONS 1-9: 0
SUM OF ALL RESPONSES TO PHQ QUESTIONS 1-9: 0
SUM OF ALL RESPONSES TO PHQ9 QUESTIONS 1 & 2: 0
SUM OF ALL RESPONSES TO PHQ QUESTIONS 1-9: 0
SUM OF ALL RESPONSES TO PHQ QUESTIONS 1-9: 0
2. FEELING DOWN, DEPRESSED OR HOPELESS: 0
1. LITTLE INTEREST OR PLEASURE IN DOING THINGS: 0

## 2023-06-22 ASSESSMENT — ENCOUNTER SYMPTOMS
GASTROINTESTINAL NEGATIVE: 1
RESPIRATORY NEGATIVE: 1

## 2023-06-22 NOTE — PROGRESS NOTES
HISTORY OF PRESENT ILLNESS    Arlene Bryan is a 80 y.o. female. HPI  Chief Complaint   Patient presents with    Fall    Knee Pain     Left      See above. Was out of town 3 weeks ago and tripped on a step landing on both knees. Pain in right knee has largely resolved but has persistent pain in left knee. Location is at the knee cap and just inside the knee cap. No pain at rest. Hurts to stand up and walk. Pain will decrease but no resolve with continued walking. Denies redness or swelling. No numbness or weakness. States she sometimes feels a \"crackle\" when bending the left knee. Current Outpatient Medications on File Prior to Visit   Medication Sig Dispense Refill    clopidogrel (PLAVIX) 75 MG tablet Take 1 tablet by mouth daily 30 tablet 5    aspirin EC 81 MG EC tablet Take 1 tablet by mouth daily 90 tablet 1    TURMERIC PO Take by mouth      losartan (COZAAR) 50 MG tablet Take 1 tablet by mouth daily 30 tablet 5    metoprolol succinate (TOPROL XL) 25 MG extended release tablet Take 1 tablet by mouth in the morning and 1 tablet in the evening. 180 tablet 3    Bioflavonoid Products (RICK C PO) Take 1 tablet by mouth daily      Multiple Vitamins-Minerals (ONE-A-DAY WOMENS PO) Take 1 tablet by mouth daily      AZO-CRANBERRY PO Take 2 tablets by mouth daily      Cholecalciferol 50 MCG (2000 UT) TABS Take 1 tablet by mouth daily      magnesium oxide (MAG-OX) 400 (240 Mg) MG tablet Take 1 tablet by mouth 2 times daily       No current facility-administered medications on file prior to visit. Past Medical History:   Diagnosis Date    Anticoagulant long-term use     Arrhythmia     Atrial fibrillation (HCC)     Chronic back pain Many years ago    H/O complete eye exam Yearly    Dr. Josh Alva    Headache     HLD (hyperlipidemia)     Hypertension     controlled with medication       Review of Systems   Constitutional: Negative. Respiratory: Negative. Cardiovascular: Negative.

## 2023-06-23 ENCOUNTER — HOSPITAL ENCOUNTER (OUTPATIENT)
Dept: GENERAL RADIOLOGY | Age: 88
End: 2023-06-23
Payer: MEDICARE

## 2023-06-23 DIAGNOSIS — S80.02XA CONTUSION OF LEFT KNEE, INITIAL ENCOUNTER: ICD-10-CM

## 2023-06-23 PROCEDURE — 73562 X-RAY EXAM OF KNEE 3: CPT

## 2023-06-26 ENCOUNTER — CARE COORDINATION (OUTPATIENT)
Dept: CARE COORDINATION | Facility: CLINIC | Age: 88
End: 2023-06-26

## 2023-06-27 PROCEDURE — 93296 REM INTERROG EVL PM/IDS: CPT | Performed by: INTERNAL MEDICINE

## 2023-06-27 PROCEDURE — 93294 REM INTERROG EVL PM/LDLS PM: CPT | Performed by: INTERNAL MEDICINE

## 2023-07-06 ENCOUNTER — OFFICE VISIT (OUTPATIENT)
Dept: FAMILY MEDICINE CLINIC | Facility: CLINIC | Age: 88
End: 2023-07-06

## 2023-07-06 VITALS
TEMPERATURE: 97 F | WEIGHT: 195.8 LBS | HEIGHT: 67 IN | HEART RATE: 70 BPM | BODY MASS INDEX: 30.73 KG/M2 | SYSTOLIC BLOOD PRESSURE: 120 MMHG | DIASTOLIC BLOOD PRESSURE: 85 MMHG | OXYGEN SATURATION: 99 %

## 2023-07-06 DIAGNOSIS — M25.562 ACUTE PAIN OF LEFT KNEE: Primary | ICD-10-CM

## 2023-07-06 RX ORDER — TRAMADOL HYDROCHLORIDE 50 MG/1
50 TABLET ORAL EVERY 6 HOURS PRN
COMMUNITY
End: 2023-07-06 | Stop reason: SDUPTHER

## 2023-07-06 RX ORDER — TRAMADOL HYDROCHLORIDE 50 MG/1
50 TABLET ORAL EVERY 6 HOURS PRN
Qty: 30 TABLET | Refills: 1 | Status: SHIPPED | OUTPATIENT
Start: 2023-07-06 | End: 2023-08-05

## 2023-07-06 RX ORDER — TRIAMTERENE AND HYDROCHLOROTHIAZIDE 37.5; 25 MG/1; MG/1
1 TABLET ORAL AS NEEDED
COMMUNITY

## 2023-07-06 ASSESSMENT — ENCOUNTER SYMPTOMS
RESPIRATORY NEGATIVE: 1
GASTROINTESTINAL NEGATIVE: 1

## 2023-07-10 ENCOUNTER — CARE COORDINATION (OUTPATIENT)
Dept: CARE COORDINATION | Facility: CLINIC | Age: 88
End: 2023-07-10

## 2023-07-10 NOTE — CARE COORDINATION
Ambulatory Care Coordination Note  7/10/2023    Patient Current Location:  [facility]     ACM contacted the patient by telephone. Verified name and  with patient as identifiers. Provided introduction to self, and explanation of the ACM role. Challenges to be reviewed by the provider   Additional needs identified to be addressed with provider: No  none               Method of communication with provider: none. ACM: Doug Ruth RN    Ccm outreached to patient. Patient had appointment with pcp on 23 and received new medication for her knee pain. Patient states she does not have knee pain at this time. Patient is scheduled for PT this week as well. Patient states no questions or concerns. Ccm discussed that I would outreach next week. Patient agreeable. Offered patient enrollment in the Remote Patient Monitoring (RPM) program for in-home monitoring: NA. Lab Results       None            Care Coordination Interventions    Referral from Primary Care Provider: No  Suggested Interventions and Community Resources          Goals Addressed                   This Visit's Progress     Conditions and Symptoms   On track     I will schedule office visits, as directed by my provider. I will keep my appointment or reschedule if I have to cancel. I will notify my provider of any barriers to my plan of care. I will notify my provider of any symptoms that indicate a worsening of my condition. Barriers: none  Plan for overcoming my barriers: N/A  Confidence: 9/10  Anticipated Goal Completion Date: 3/19/23         Self Monitoring   On track     Blood Pressure - I will take my blood pressure as directed - Daily  I will notify my provider of any trends of increasing or decreasing blood pressures over a month period of time. I will notify my provider of any changes in blood pressure associated with symptoms of dizziness, falls, passing out, headache, confusion/change in mental status.     Patient Reported

## 2023-07-14 ENCOUNTER — HOSPITAL ENCOUNTER (OUTPATIENT)
Dept: PHYSICAL THERAPY | Age: 88
Setting detail: RECURRING SERIES
Discharge: HOME | End: 2023-07-17
Payer: MEDICARE

## 2023-07-14 PROCEDURE — 97161 PT EVAL LOW COMPLEX 20 MIN: CPT

## 2023-07-14 PROCEDURE — 97110 THERAPEUTIC EXERCISES: CPT

## 2023-07-17 ENCOUNTER — CARE COORDINATION (OUTPATIENT)
Dept: CARE COORDINATION | Facility: CLINIC | Age: 88
End: 2023-07-17

## 2023-07-17 NOTE — CARE COORDINATION
Ambulatory Care Coordination Note  2023    Patient Current Location:  [place]     ACM contacted the patient by telephone. Verified name and  with patient as identifiers. Provided introduction to self, and explanation of the ACM role. Challenges to be reviewed by the provider   Additional needs identified to be addressed with provider: No  none               Method of communication with provider: none. ACM: Antonette Robles RN    Ccm outreached to patient. Patient states she is doing well at this time. Patient states no questions or concerns. Cm discussed that I would outreach in 2 weeks. Patient agreeable. Offered patient enrollment in the Remote Patient Monitoring (RPM) program for in-home monitoring: Patient is not eligible for RPM program.    Lab Results       None            Care Coordination Interventions    Referral from Primary Care Provider: No  Suggested Interventions and Community Resources          Goals Addressed                   This Visit's Progress     Conditions and Symptoms   On track     I will schedule office visits, as directed by my provider. I will keep my appointment or reschedule if I have to cancel. I will notify my provider of any barriers to my plan of care. I will notify my provider of any symptoms that indicate a worsening of my condition. Barriers: none  Plan for overcoming my barriers: N/A  Confidence: 9/10  Anticipated Goal Completion Date: 3/19/23         Self Monitoring   On track     Blood Pressure - I will take my blood pressure as directed - Daily  I will notify my provider of any trends of increasing or decreasing blood pressures over a month period of time. I will notify my provider of any changes in blood pressure associated with symptoms of dizziness, falls, passing out, headache, confusion/change in mental status. Patient Reported Blood Pressure No flowsheet data found.     Barriers: none  Plan for overcoming my barriers: N/A  Confidence:

## 2023-07-22 ENCOUNTER — HOSPITAL ENCOUNTER (EMERGENCY)
Age: 88
Discharge: HOME | End: 2023-07-22
Attending: EMERGENCY MEDICINE
Payer: MEDICARE

## 2023-07-22 ENCOUNTER — APPOINTMENT (OUTPATIENT)
Dept: GENERAL RADIOLOGY | Age: 88
End: 2023-07-22
Payer: MEDICARE

## 2023-07-22 ENCOUNTER — APPOINTMENT (OUTPATIENT)
Dept: CT IMAGING | Age: 88
End: 2023-07-22
Payer: MEDICARE

## 2023-07-22 VITALS
OXYGEN SATURATION: 98 % | SYSTOLIC BLOOD PRESSURE: 157 MMHG | DIASTOLIC BLOOD PRESSURE: 85 MMHG | HEIGHT: 67 IN | BODY MASS INDEX: 27.47 KG/M2 | HEART RATE: 63 BPM | TEMPERATURE: 97.8 F | WEIGHT: 175 LBS | RESPIRATION RATE: 16 BRPM

## 2023-07-22 DIAGNOSIS — N30.00 ACUTE CYSTITIS WITHOUT HEMATURIA: ICD-10-CM

## 2023-07-22 DIAGNOSIS — R41.0 CONFUSION: Primary | ICD-10-CM

## 2023-07-22 LAB
ALBUMIN SERPL-MCNC: 4.2 G/DL (ref 3.2–4.6)
ALBUMIN/GLOB SERPL: 1.5 (ref 0.4–1.6)
ALP SERPL-CCNC: 78 U/L (ref 45–117)
ALT SERPL-CCNC: 21 U/L (ref 13–61)
AMMONIA PLAS-SCNC: 29 UMOL/L (ref 11–32)
ANION GAP SERPL CALC-SCNC: 12 MMOL/L (ref 2–11)
APPEARANCE UR: CLEAR
AST SERPL-CCNC: 29 U/L (ref 15–37)
BACTERIA URNS QL MICRO: NORMAL /HPF
BASOPHILS # BLD: 0.1 K/UL (ref 0–0.2)
BASOPHILS NFR BLD: 1 % (ref 0–2)
BILIRUB SERPL-MCNC: 0.4 MG/DL (ref 0.2–1.1)
BILIRUB UR QL: NEGATIVE
BUN SERPL-MCNC: 27 MG/DL (ref 8–23)
CALCIUM SERPL-MCNC: 9.8 MG/DL (ref 8.3–10.4)
CASTS URNS QL MICRO: 0 /LPF
CHLORIDE SERPL-SCNC: 108 MMOL/L (ref 98–107)
CO2 SERPL-SCNC: 25 MMOL/L (ref 21–32)
COLOR UR: YELLOW
CREAT SERPL-MCNC: 0.94 MG/DL (ref 0.6–1)
CRYSTALS URNS QL MICRO: 0 /LPF
DIFFERENTIAL METHOD BLD: NORMAL
EOSINOPHIL # BLD: 0.3 K/UL (ref 0–0.8)
EOSINOPHIL NFR BLD: 4 % (ref 0.5–7.8)
EPI CELLS #/AREA URNS HPF: NORMAL /HPF
ERYTHROCYTE [DISTWIDTH] IN BLOOD BY AUTOMATED COUNT: 14.6 % (ref 11.9–14.6)
GLOBULIN SER CALC-MCNC: 2.8 G/DL (ref 2.8–4.5)
GLUCOSE SERPL-MCNC: 102 MG/DL (ref 65–100)
GLUCOSE UR STRIP.AUTO-MCNC: NEGATIVE MG/DL
HCT VFR BLD AUTO: 43.5 % (ref 35.8–46.3)
HGB BLD-MCNC: 14.1 G/DL (ref 11.7–15.4)
HGB UR QL STRIP: NEGATIVE
IMM GRANULOCYTES # BLD AUTO: 0 K/UL (ref 0–0.5)
IMM GRANULOCYTES NFR BLD AUTO: 0 % (ref 0–5)
KETONES UR QL STRIP.AUTO: NEGATIVE MG/DL
LEUKOCYTE ESTERASE UR QL STRIP.AUTO: ABNORMAL
LIPASE SERPL-CCNC: 50 U/L (ref 13–60)
LYMPHOCYTES # BLD: 2 K/UL (ref 0.5–4.6)
LYMPHOCYTES NFR BLD: 28 % (ref 13–44)
MCH RBC QN AUTO: 30.1 PG (ref 26.1–32.9)
MCHC RBC AUTO-ENTMCNC: 32.4 G/DL (ref 31.4–35)
MCV RBC AUTO: 92.9 FL (ref 82–102)
MONOCYTES # BLD: 0.6 K/UL (ref 0.1–1.3)
MONOCYTES NFR BLD: 9 % (ref 4–12)
MUCOUS THREADS URNS QL MICRO: 0 /LPF
NEUTS SEG # BLD: 4.1 K/UL (ref 1.7–8.2)
NEUTS SEG NFR BLD: 59 % (ref 43–78)
NITRITE UR QL STRIP.AUTO: NEGATIVE
NRBC # BLD: 0 K/UL (ref 0–0.2)
OTHER OBSERVATIONS: NORMAL
PH UR STRIP: 7 (ref 5–9)
PLATELET # BLD AUTO: 206 K/UL (ref 150–450)
PMV BLD AUTO: 10.8 FL (ref 9.4–12.3)
POTASSIUM SERPL-SCNC: 4.2 MMOL/L (ref 3.5–5.1)
PROT SERPL-MCNC: 7 G/DL (ref 6.4–8.2)
PROT UR STRIP-MCNC: NEGATIVE MG/DL
RBC # BLD AUTO: 4.68 M/UL (ref 4.05–5.2)
RBC #/AREA URNS HPF: 0 /HPF
SODIUM SERPL-SCNC: 145 MMOL/L (ref 133–143)
SP GR UR REFRACTOMETRY: 1.01 (ref 1–1.02)
UROBILINOGEN UR QL STRIP.AUTO: 0.2 EU/DL (ref 0.2–1)
WBC # BLD AUTO: 7 K/UL (ref 4.3–11.1)
WBC URNS QL MICRO: 0 /HPF

## 2023-07-22 PROCEDURE — 6370000000 HC RX 637 (ALT 250 FOR IP): Performed by: EMERGENCY MEDICINE

## 2023-07-22 PROCEDURE — 96360 HYDRATION IV INFUSION INIT: CPT

## 2023-07-22 PROCEDURE — 80053 COMPREHEN METABOLIC PANEL: CPT

## 2023-07-22 PROCEDURE — 82140 ASSAY OF AMMONIA: CPT

## 2023-07-22 PROCEDURE — 85025 COMPLETE CBC W/AUTO DIFF WBC: CPT

## 2023-07-22 PROCEDURE — 2580000003 HC RX 258: Performed by: EMERGENCY MEDICINE

## 2023-07-22 PROCEDURE — 83690 ASSAY OF LIPASE: CPT

## 2023-07-22 PROCEDURE — 99284 EMERGENCY DEPT VISIT MOD MDM: CPT

## 2023-07-22 PROCEDURE — 70450 CT HEAD/BRAIN W/O DYE: CPT

## 2023-07-22 PROCEDURE — 71046 X-RAY EXAM CHEST 2 VIEWS: CPT

## 2023-07-22 PROCEDURE — 81001 URINALYSIS AUTO W/SCOPE: CPT

## 2023-07-22 RX ORDER — METOCLOPRAMIDE HYDROCHLORIDE 5 MG/ML
10 INJECTION INTRAMUSCULAR; INTRAVENOUS
Status: DISCONTINUED | OUTPATIENT
Start: 2023-07-22 | End: 2023-07-22

## 2023-07-22 RX ORDER — DIPHENHYDRAMINE HYDROCHLORIDE 50 MG/ML
25 INJECTION INTRAMUSCULAR; INTRAVENOUS
Status: DISCONTINUED | OUTPATIENT
Start: 2023-07-22 | End: 2023-07-22

## 2023-07-22 RX ORDER — 0.9 % SODIUM CHLORIDE 0.9 %
500 INTRAVENOUS SOLUTION INTRAVENOUS
Status: COMPLETED | OUTPATIENT
Start: 2023-07-22 | End: 2023-07-22

## 2023-07-22 RX ORDER — NITROFURANTOIN 25; 75 MG/1; MG/1
100 CAPSULE ORAL 2 TIMES DAILY
Qty: 14 CAPSULE | Refills: 0 | Status: SHIPPED | OUTPATIENT
Start: 2023-07-22 | End: 2023-07-29

## 2023-07-22 RX ORDER — KETOROLAC TROMETHAMINE 30 MG/ML
15 INJECTION, SOLUTION INTRAMUSCULAR; INTRAVENOUS ONCE
Status: DISCONTINUED | OUTPATIENT
Start: 2023-07-22 | End: 2023-07-22

## 2023-07-22 RX ORDER — DEXAMETHASONE SODIUM PHOSPHATE 10 MG/ML
10 INJECTION INTRAMUSCULAR; INTRAVENOUS
Status: DISCONTINUED | OUTPATIENT
Start: 2023-07-22 | End: 2023-07-22

## 2023-07-22 RX ADMIN — SODIUM CHLORIDE 500 ML: 9 INJECTION, SOLUTION INTRAVENOUS at 18:40

## 2023-07-22 RX ADMIN — METOPROLOL TARTRATE 25 MG: 25 TABLET, FILM COATED ORAL at 19:08

## 2023-07-22 ASSESSMENT — LIFESTYLE VARIABLES: HOW MANY STANDARD DRINKS CONTAINING ALCOHOL DO YOU HAVE ON A TYPICAL DAY: PATIENT DOES NOT DRINK

## 2023-07-22 ASSESSMENT — PAIN - FUNCTIONAL ASSESSMENT: PAIN_FUNCTIONAL_ASSESSMENT: NONE - DENIES PAIN

## 2023-07-22 NOTE — DISCHARGE INSTRUCTIONS
As we discussed, the exact cause of your symptoms is not clear today. However the CT scan of your brain is reassuring, your labs are reassuring as well. Your urine is questionable for possible mild urinary tract infection. Take the antibiotics as prescribed. Follow-up with your primary care doctor for reevaluation. Ensure that you are taking your high blood pressure medication at home as well.

## 2023-07-22 NOTE — ED PROVIDER NOTES
Emergency Department Provider Note       PCP: Margaux Rodriguez MD   Age: 80 y.o. Sex: female     DISPOSITION Decision To Discharge 07/22/2023 06:57:11 PM       ICD-10-CM    1. Confusion  R41.0       2. Acute cystitis without hematuria  N30.00           Medical Decision Making     Complexity of Problems Addressed:  Complexity of Problem: 1 acute, uncomplicated illness or injury. Data Reviewed and Analyzed:  Category 1:   I independently ordered and reviewed each unique test.  I reviewed external records: ED visit note from an outside group. I reviewed external records: provider visit note from PCP. I reviewed external records: provider visit note from outside specialist.   The patients assessment required an independent historian: daughter. The reason they were needed is important historical information not provided by the patient. Category 2:   I interpreted the X-rays. No PNA  I interpreted the CT Scan. No subdural    Category 3: Discussion of management or test interpretation. Patient has a reassuring neurological examination. Will obtain CT head without contrast to evaluate for possible chronic subdural hematoma given her head injury in the setting of anticoagulation. We will also obtain basic labs and urinalysis to evaluate for the possible causes of altered mental status.    7:02 PM EDT  CT shows no evidence of subdural hematoma, labs are largely unremarkable. Sodium is slightly high. We will give a bolus of normal saline 500 mL. She may have some mild dehydration. In addition her urinalysis is notable for leuk esterase and trace bacteria. She may have a very early or mild urinary tract infection though I do not think this is likely the source of her altered mental status. Will place on Macrobid.        Risk of Complications and/or Morbidity of Patient Management:  Prescription drug management performed    History     Venancio Garza is a 80 y.o. female who presents to the Emergency

## 2023-07-22 NOTE — ED TRIAGE NOTES
Pt states that she suffered a fall in June and did hit her head. Did not had a CT scan.   Family members state that the pt has had some increased confusion, increased fatigue and repeat confusion to short term memory

## 2023-07-31 ENCOUNTER — CARE COORDINATION (OUTPATIENT)
Dept: CARE COORDINATION | Facility: CLINIC | Age: 88
End: 2023-07-31

## 2023-07-31 NOTE — CARE COORDINATION
Ambulatory Care Management Note        Date/Time:  7/31/2023 10:45 AM    Ccm outreached to patient. No answer at this time, message left. Awaiting response. If no response, ccm will outreach next week.

## 2023-08-01 ENCOUNTER — HOSPITAL ENCOUNTER (OUTPATIENT)
Dept: PHYSICAL THERAPY | Age: 88
Setting detail: RECURRING SERIES
Discharge: HOME | End: 2023-08-04
Payer: MEDICARE

## 2023-08-01 ENCOUNTER — CARE COORDINATION (OUTPATIENT)
Dept: CARE COORDINATION | Facility: CLINIC | Age: 88
End: 2023-08-01

## 2023-08-01 PROCEDURE — 97110 THERAPEUTIC EXERCISES: CPT

## 2023-08-01 NOTE — CARE COORDINATION
Ambulatory Care Coordination Note  2023    Patient Current Location:  [facility]     ACM contacted the patient by telephone. Verified name and  with patient as identifiers. Provided introduction to self, and explanation of the ACM role. Challenges to be reviewed by the provider   Additional needs identified to be addressed with provider: Yes  Notified pcp of er admission               Method of communication with provider: staff message. ACM: Antonette Robles RN    Ccm received call from patient. Patient states she was seen in er on 23 for confusion. Reviewed with patient discharge summary. Patient verbalized understanding. Patient states she feels much better but her feet are swollen. Ccm notified pcp office. They will call patient. Patient states no other questions or concerns. Ccm discussed that I would outreach next week. Patient agreeable. Offered patient enrollment in the Remote Patient Monitoring (RPM) program for in-home monitoring: Yes, but did not enroll at this time. Lab Results       None            Care Coordination Interventions    Referral from Primary Care Provider: No  Suggested Interventions and Community Resources          Goals Addressed                   This Visit's Progress     Conditions and Symptoms   On track     I will schedule office visits, as directed by my provider. I will keep my appointment or reschedule if I have to cancel. I will notify my provider of any barriers to my plan of care. I will notify my provider of any symptoms that indicate a worsening of my condition. Barriers: none  Plan for overcoming my barriers: N/A  Confidence: 9/10  Anticipated Goal Completion Date: 3/19/23         Self Monitoring   On track     Blood Pressure - I will take my blood pressure as directed - Daily  I will notify my provider of any trends of increasing or decreasing blood pressures over a month period of time.   I will notify my provider of any changes in blood

## 2023-08-01 NOTE — PROGRESS NOTES
provided with BP from session and encourages her to re-assess after taking her second dose of BP meds. Communication/Consultation:       None today   Equipment provided today: HEP see log above.     Recommendations/Intent for next  treatment session:  Next visit will focus on improving mobility, strength, pain science, conditioning, balnce         >Total Treatment Billable Duration:  38 minutes 7 min untimed  Time In: 1333  Time Out: 1420    Ines Quintanilla, PT       Charge Capture  }Post Session Pain  PT Visit Info  [facility] Portal  MD Guidelines  Scanned Media  Benefits  MyChart    Future Appointments   Date Time Provider [address]   8/1/2023  1:45 PM Kya Ritchie, PT [facility]   8/10/2023  2:15 PM [facility] DEVICE [address] [facility] AMB   8/29/2023 11:00 AM Nicole Clement MD [address] [facility] [facility] AMB   8/31/2023 10:30 AM Allison Joy MD [facility] AMB   9/15/2023 11:00 AM Juan Glover DO DG [facility] AMB   2/22/2024  8:45 AM Allison Joy MD [facility] AMB

## 2023-08-07 ENCOUNTER — CARE COORDINATION (OUTPATIENT)
Dept: CARE COORDINATION | Facility: CLINIC | Age: 88
End: 2023-08-07

## 2023-08-07 NOTE — CARE COORDINATION
Ambulatory Care Coordination Note  2023    Patient Current Location:  [facility]     ACM contacted the patient by telephone. Verified name and  with patient as identifiers. Provided introduction to self, and explanation of the ACM role. Challenges to be reviewed by the provider   Additional needs identified to be addressed with provider: No  none               Method of communication with provider: none. ACM: Hannah Aguila RN    Ccm outreached to patient. Patient states she has been having back pain and taking tylenol for pain with minimal effect. Discussed with patient to apply ice and heat to help with pain management. Patient verbalized understanding. Patient states no questions or concerns. Ccm discussed that I would outreach next week. Patient agreeable. Offered patient enrollment in the Remote Patient Monitoring (RPM) program for in-home monitoring: NA. Lab Results       None            Care Coordination Interventions    Referral from Primary Care Provider: No  Suggested Interventions and Community Resources          Goals Addressed                   This Visit's Progress     Conditions and Symptoms   On track     I will schedule office visits, as directed by my provider. I will keep my appointment or reschedule if I have to cancel. I will notify my provider of any barriers to my plan of care. I will notify my provider of any symptoms that indicate a worsening of my condition. Barriers: none  Plan for overcoming my barriers: N/A  Confidence: 9/10  Anticipated Goal Completion Date: 3/19/23         Self Monitoring   On track     Blood Pressure - I will take my blood pressure as directed - Daily  I will notify my provider of any trends of increasing or decreasing blood pressures over a month period of time. I will notify my provider of any changes in blood pressure associated with symptoms of dizziness, falls, passing out, headache, confusion/change in mental status.     Patient

## 2023-08-08 ENCOUNTER — HOSPITAL ENCOUNTER (OUTPATIENT)
Dept: PHYSICAL THERAPY | Age: 88
Setting detail: RECURRING SERIES
Discharge: HOME | End: 2023-08-11
Payer: MEDICARE

## 2023-08-08 PROCEDURE — 97110 THERAPEUTIC EXERCISES: CPT

## 2023-08-08 NOTE — PROGRESS NOTES
Kannan Moralez  : 1935  Primary: [facility] (Medicare Managed)  Secondary:  [address] [facility] [address]  Phone: [phone]  Fax: [phone]      Plan of Care/Certification Expiration Date: 23      >PT Visit Info:  Plan of Care/Certification Expiration Date: 23      Visit Count:  3    OUTPATIENT PHYSICAL THERAPY:OP NOTE TYPE: OP Note Type: Treatment Note 2023       Episode  }Appt Desk             Treatment Diagnosis:  Muscle Weakness, Generalized (M62.81)  Pain in right knee (M25.561)  Pain in left knee (M25.562)  Stiffness of right knee, not elsewhere classified (M25.661)  Stiffness of left knee, not elsewhere classified (M25.662)  Other abnormalities of gait and mobility (R26.89)  Unsteadiness on Feet (R26.81) Debility (R54)  Medical/Referring Diagnosis:  Contusion of left knee, initial encounter [S80.02XA]  Referring Physician:  Vaibhav Ojeda MD MD Orders:  PT Eval and Treat   Date of Onset:  Onset Date:  (chronic)     Allergies:   Guaifenesin, Loratadine-pseudoephedrine er, Amoxicillin, and Sulfa antibiotics  Restrictions/Precautions:  Restrictions/Precautions: Cardiac  No data recorded   Interventions Planned (Treatment may consist of any combination of the following):    Current Treatment Recommendations: Strengthening; ROM; Balance training; Functional mobility training; Transfer training; ADL/Self-care training; IADL training; Endurance training; Gait training; Stair training; Neuromuscular re-education; Home exercise program; Safety education & training; Patient/Caregiver education & training; Equipment evaluation, education, & procurement; Therapeutic activities     >Subjective Comments: pt reporting she woke up with a sore neck this morning. Pt reports she went shopping with her daughters for wedding she will be attending in [place].      >Initial:   achy   /10>Post Session:    achy

## 2023-08-10 ENCOUNTER — NURSE ONLY (OUTPATIENT)
Age: 88
End: 2023-08-10

## 2023-08-10 DIAGNOSIS — I48.21 PERMANENT ATRIAL FIBRILLATION (HCC): ICD-10-CM

## 2023-08-10 DIAGNOSIS — I49.5 SINUS NODE DYSFUNCTION (HCC): Primary | ICD-10-CM

## 2023-08-10 DIAGNOSIS — R00.1 SYMPTOMATIC BRADYCARDIA: ICD-10-CM

## 2023-08-14 ENCOUNTER — CARE COORDINATION (OUTPATIENT)
Dept: CARE COORDINATION | Facility: CLINIC | Age: 88
End: 2023-08-14

## 2023-08-14 NOTE — CARE COORDINATION
Ambulatory Care Management Note        Date/Time:  8/14/2023 9:23 AM    Ccm outreached to patient. No answer at this time, message left. Awaiting response. If no response, ccm will outreach next week.

## 2023-08-15 ENCOUNTER — HOSPITAL ENCOUNTER (OUTPATIENT)
Dept: PHYSICAL THERAPY | Age: 88
Setting detail: RECURRING SERIES
Discharge: HOME | End: 2023-08-18
Payer: MEDICARE

## 2023-08-15 PROCEDURE — 97530 THERAPEUTIC ACTIVITIES: CPT

## 2023-08-15 PROCEDURE — 97110 THERAPEUTIC EXERCISES: CPT

## 2023-08-21 ENCOUNTER — TELEPHONE (OUTPATIENT)
Dept: FAMILY MEDICINE CLINIC | Facility: CLINIC | Age: 88
End: 2023-08-21

## 2023-08-21 ENCOUNTER — CARE COORDINATION (OUTPATIENT)
Dept: CARE COORDINATION | Facility: CLINIC | Age: 88
End: 2023-08-21

## 2023-08-21 NOTE — CARE COORDINATION
Ambulatory Care Coordination Note  2023    Patient Current Location:  [facility]     ACM contacted the patient by telephone. Verified name and  with patient as identifiers. Provided introduction to self, and explanation of the ACM role. Challenges to be reviewed by the provider   Additional needs identified to be addressed with provider: Yes  Notified pcp of headaches               Method of communication with provider: staff message. ACM: Augustina Upton RN    Ccm received call from patient. Patient states she has been having headaches daily. Ccm notified pcp of symptoms. They will call patient. Patient states no other questions or concerns. Ccm discussed that I would outreach next week. Patient agreeable. Offered patient enrollment in the Remote Patient Monitoring (RPM) program for in-home monitoring: NA. Lab Results       None            Care Coordination Interventions    Referral from Primary Care Provider: No  Suggested Interventions and Community Resources          Goals Addressed                   This Visit's Progress     Conditions and Symptoms   On track     I will schedule office visits, as directed by my provider. I will keep my appointment or reschedule if I have to cancel. I will notify my provider of any barriers to my plan of care. I will notify my provider of any symptoms that indicate a worsening of my condition. Barriers: none  Plan for overcoming my barriers: N/A  Confidence: 9/10  Anticipated Goal Completion Date: 3/19/23         Self Monitoring   On track     Blood Pressure - I will take my blood pressure as directed - Daily  I will notify my provider of any trends of increasing or decreasing blood pressures over a month period of time. I will notify my provider of any changes in blood pressure associated with symptoms of dizziness, falls, passing out, headache, confusion/change in mental status.     Patient Reported Blood Pressure No flowsheet data

## 2023-08-21 NOTE — TELEPHONE ENCOUNTER
Pt states the only thing she can take is Tylenol. She took two 650mg at noon today which took the edge off but still there. No other symptoms.     BP this morning was 146/90 noon 139/88

## 2023-08-21 NOTE — CARE COORDINATION
Ambulatory Care Management Note        Date/Time:  8/21/2023 9:01 AM    Ccm outreached to patient. No answer at this time, message left. Awaiting response. If no response, ccm will outreach next week.

## 2023-08-22 ENCOUNTER — HOSPITAL ENCOUNTER (OUTPATIENT)
Dept: PHYSICAL THERAPY | Age: 88
Setting detail: RECURRING SERIES
Discharge: HOME | End: 2023-08-25
Payer: MEDICARE

## 2023-08-22 PROCEDURE — 97110 THERAPEUTIC EXERCISES: CPT

## 2023-08-22 PROCEDURE — 97530 THERAPEUTIC ACTIVITIES: CPT

## 2023-08-28 ENCOUNTER — CARE COORDINATION (OUTPATIENT)
Dept: CARE COORDINATION | Facility: CLINIC | Age: 88
End: 2023-08-28

## 2023-08-28 NOTE — PROGRESS NOTES
Fina Euceda is a 80 y.o. female who was seen by synchronous (real-time) audio-video technology on 8/29/2023        I was in the office While conducting this encounter. She and/ or her healthcare decision maker is aware that this patient-initialed Fina Euceda is a 80 y.o. female who was seen by synchronous (real-time) audio-video technology on 8/29/2023. Consent:  She and/or her healthcare decision maker is aware that this patient-initiated Telehealth encounter is a billable service, with coverage as determined by her insurance carrier. She is aware that she may receive a bill and has provided verbal consent to proceed: yes - today  I was in the office while conducting this encounter. Subjective:       CHIEF COMPLAINT:  New Patient      HPI:  Fina Euceda is a 80 y.o. female that is here for evaluation of  positive JOEL, double-stranded DNA from 3-7-2023. Labs drawn for work-up of chronic left ankle pain and patient concern of psoriatic arthritis. ESR found is 35, additional negative labs include uric acid, RF, CRP      PMH:  - back pain   - HTN HL afib s/p pacemaker watchman  - L elbow surgery 2012  - R knee arthroscopy 2000s    ---  Pt believes she has psoriatic arthritis. Describes episodic joint pain everyday. Has never been dx with psoriasis before and there is no family hx of psoriasis. Her sister has similar symptoms to pt and believes that she might also have psoriatic arthritis but also does not have a hx of psoriasis. Pt is a poor historian and cannot definitively answer questions when answered. Describes daily episodic unilateral hand joint pain -  onset between 2-5 years. located in 1 joint at a time (can be MCP PIP DIP) with associated swelling within this joint. Cannot clarify how long pain and swelling will last. Finds that PT has been very helpful for hand pain and swelling but this still occurs.  unilateral midfoot pain and swelling onset 1 year ago - improvement with no known allergies

## 2023-08-28 NOTE — CARE COORDINATION
Ambulatory Care Coordination Note  2023    Patient Current Location:  [facility]     ACM contacted the patient by telephone. Verified name and  with patient as identifiers. Provided introduction to self, and explanation of the ACM role. Challenges to be reviewed by the provider   Additional needs identified to be addressed with provider: No  none               Method of communication with provider: none. ACM: Anushka Gupta RN    Ccm outreached to patient. Patient states she is doing well at this time. Patient states no questions or concerns. Ccm discussed that I would outreach in 2 weeks. Patient agreeable. Offered patient enrollment in the Remote Patient Monitoring (RPM) program for in-home monitoring: NA. Lab Results       None                 Goals Addressed                   This Visit's Progress     Conditions and Symptoms   On track     I will schedule office visits, as directed by my provider. I will keep my appointment or reschedule if I have to cancel. I will notify my provider of any barriers to my plan of care. I will notify my provider of any symptoms that indicate a worsening of my condition. Barriers: none  Plan for overcoming my barriers: N/A  Confidence: 910  Anticipated Goal Completion Date: 3/19/23         Self Monitoring   On track     Blood Pressure - I will take my blood pressure as directed - Daily  I will notify my provider of any trends of increasing or decreasing blood pressures over a month period of time. I will notify my provider of any changes in blood pressure associated with symptoms of dizziness, falls, passing out, headache, confusion/change in mental status. Patient Reported Blood Pressure No flowsheet data found.     Barriers: none  Plan for overcoming my barriers: N/A  Confidence: 9/10  Anticipated Goal Completion Date: 3/19/23                Future Appointments   Date Time Provider [address]   2023 11:00 AM Viviana Barba MD

## 2023-08-29 ENCOUNTER — TELEMEDICINE (OUTPATIENT)
Dept: RHEUMATOLOGY | Age: 88
End: 2023-08-29
Payer: MEDICARE

## 2023-08-29 DIAGNOSIS — R76.8 POSITIVE ANA (ANTINUCLEAR ANTIBODY): Primary | ICD-10-CM

## 2023-08-29 DIAGNOSIS — M54.9 CHRONIC BACK PAIN GREATER THAN 3 MONTHS DURATION: ICD-10-CM

## 2023-08-29 DIAGNOSIS — M79.671 BILATERAL FOOT PAIN: ICD-10-CM

## 2023-08-29 DIAGNOSIS — R70.0 ELEVATED SED RATE: ICD-10-CM

## 2023-08-29 DIAGNOSIS — R76.8 POSITIVE DOUBLE STRANDED DNA ANTIBODY TEST: ICD-10-CM

## 2023-08-29 DIAGNOSIS — G89.29 CHRONIC BACK PAIN GREATER THAN 3 MONTHS DURATION: ICD-10-CM

## 2023-08-29 DIAGNOSIS — M79.642 BILATERAL HAND PAIN: ICD-10-CM

## 2023-08-29 DIAGNOSIS — M79.641 BILATERAL HAND PAIN: ICD-10-CM

## 2023-08-29 DIAGNOSIS — Z01.89 ENCOUNTER FOR OTHER SPECIFIED SPECIAL EXAMINATIONS: ICD-10-CM

## 2023-08-29 DIAGNOSIS — M79.672 BILATERAL FOOT PAIN: ICD-10-CM

## 2023-08-29 PROCEDURE — 1123F ACP DISCUSS/DSCN MKR DOCD: CPT | Performed by: INTERNAL MEDICINE

## 2023-08-29 PROCEDURE — 99205 OFFICE O/P NEW HI 60 MIN: CPT | Performed by: INTERNAL MEDICINE

## 2023-08-31 ENCOUNTER — OFFICE VISIT (OUTPATIENT)
Dept: FAMILY MEDICINE CLINIC | Facility: CLINIC | Age: 88
End: 2023-08-31
Payer: MEDICARE

## 2023-08-31 VITALS
TEMPERATURE: 97.4 F | BODY MASS INDEX: 30.07 KG/M2 | SYSTOLIC BLOOD PRESSURE: 128 MMHG | HEART RATE: 76 BPM | DIASTOLIC BLOOD PRESSURE: 76 MMHG | WEIGHT: 192 LBS | OXYGEN SATURATION: 98 %

## 2023-08-31 DIAGNOSIS — F32.A MILD DEPRESSION: ICD-10-CM

## 2023-08-31 DIAGNOSIS — G89.29 CHRONIC JOINT PAIN: Primary | ICD-10-CM

## 2023-08-31 DIAGNOSIS — M25.50 CHRONIC JOINT PAIN: Primary | ICD-10-CM

## 2023-08-31 PROCEDURE — 99213 OFFICE O/P EST LOW 20 MIN: CPT | Performed by: FAMILY MEDICINE

## 2023-08-31 PROCEDURE — 1123F ACP DISCUSS/DSCN MKR DOCD: CPT | Performed by: FAMILY MEDICINE

## 2023-08-31 RX ORDER — DULOXETIN HYDROCHLORIDE 30 MG/1
30 CAPSULE, DELAYED RELEASE ORAL DAILY
Qty: 30 CAPSULE | Refills: 5 | Status: SHIPPED | OUTPATIENT
Start: 2023-08-31

## 2023-08-31 ASSESSMENT — ENCOUNTER SYMPTOMS
BACK PAIN: 1
GASTROINTESTINAL NEGATIVE: 1
RESPIRATORY NEGATIVE: 1
EYES NEGATIVE: 1

## 2023-08-31 NOTE — PROGRESS NOTES
HISTORY OF PRESENT ILLNESS    Erick Sheikh is a 80 y.o. female. HPI  Chief Complaint   Patient presents with    Knee Pain     See above. States she is frustrated because of chronic joint pain. At present worst in knees but has pain in multiple joints. Has started evaluation with rheumatologist with history of positive RF. Has also had increased stress related to family activities. Feels lethargic with generalized fatigue and lack of motivation. Appetite is normal. Sleep fluctuates. Current Outpatient Medications on File Prior to Visit   Medication Sig Dispense Refill    clopidogrel (PLAVIX) 75 MG tablet Take 1 tablet by mouth daily 30 tablet 5    aspirin EC 81 MG EC tablet Take 1 tablet by mouth daily 90 tablet 1    TURMERIC PO Take by mouth      losartan (COZAAR) 50 MG tablet Take 1 tablet by mouth daily 30 tablet 5    metoprolol succinate (TOPROL XL) 25 MG extended release tablet Take 1 tablet by mouth in the morning and 1 tablet in the evening. 180 tablet 3    Bioflavonoid Products (RICK C PO) Take 1 tablet by mouth daily      Multiple Vitamins-Minerals (ONE-A-DAY WOMENS PO) Take 1 tablet by mouth daily      AZO-CRANBERRY PO Take 2 tablets by mouth daily      Cholecalciferol 50 MCG (2000 UT) TABS Take 1 tablet by mouth daily      magnesium oxide (MAG-OX) 400 (240 Mg) MG tablet Take 1 tablet by mouth 2 times daily       No current facility-administered medications on file prior to visit. Past Medical History:   Diagnosis Date    Anticoagulant long-term use     Arrhythmia     Atrial fibrillation (HCC)     Chronic back pain Many years ago    H/O complete eye exam Yearly    Dr. Lea Holt    Headache     HLD (hyperlipidemia)     Hypertension     controlled with medication       Review of Systems   Constitutional:  Positive for fatigue. Negative for activity change, appetite change, chills, diaphoresis, fever and unexpected weight change. HENT: Negative. Eyes: Negative.     Respiratory:

## 2023-09-05 ENCOUNTER — HOSPITAL ENCOUNTER (OUTPATIENT)
Dept: PHYSICAL THERAPY | Age: 88
Setting detail: RECURRING SERIES
Discharge: HOME | End: 2023-09-08
Payer: MEDICARE

## 2023-09-05 PROCEDURE — 97110 THERAPEUTIC EXERCISES: CPT

## 2023-09-05 NOTE — PROGRESS NOTES
Findings:  prior to exercise 145/83mmHg   Treatment     THERAPEUTIC EXERCISE: ( 38 minutes):    Exercises per grid below to improve mobility, strength, balance, and coordination. Required minimal visual, verbal, manual, and tactile cues to promote proper body mechanics. Progressed resistance and repetitions as indicated. Date:  7/14/2023 Date:  8/1/23 Date:  8/8/23 Date:  8/15/23 Date:  8/22/23 Date:  9/5/23   Activity/Exercise Parameters Parameters Parameters      Education Diagnosis, prognosis, POC, HEP, anatomy/physiology of condition BP assessment and education. Assessment of progress toward goals - see d/c summary for details. Sit to stand 5x 22 inch  3 x 5 reps  HR        FHL Raises 10x        Gastroc stretch L/R  2 x 30 sec        SciFit  Lvl 3, hill function, 8 min 24 calories, HR 83 Lvl 5, 8 min total HR 94  10:50 fast: steady Lvl 3, hill function, 8 min 37 calories, HR 73 Lvl 3, hill function, 8 min 27 calories, HR 77 Lvl 3, hill function, 8 min 23 calories, HR 77   Circuit  Heel raise with upward reach x 10  Marching 10 lb x 20  Sled 20 lb push/pull  3 rounds  HR 83-85  Sit to stand 21 inch x 8 reps  Step raji x 10  60 ft walk fast paced  HR 88-96  3 rounds   Bridge x 8 reps  Sit to stand 22\" x 5  Suitcase Carry 10 lb x 60 ft  3 rounds   Sled      35 lb  2 x 60 ft  HR    Ground to OH   Warm up   5x  Warm up   5x    Stair climb   8 lb  6 reps x 4 stairs, reciprocal pattern, unilateral HR 87      L Stretch   10 x  Warm up 10 x  Warm up 10 x  Warm up        THERAPEUTIC ACTIVITY: ( 0 minutes): Therapeutic activities per grid below to improve mobility, strength, coordination, and dynamic movement of upper body, lower body, and trunk to improve functional lifting, carrying, reaching, catching, and overhead activites.   Required minimal visual, verbal, manual, and tactile cues to promote coordination of bilateral, upper extremity(s), lower extremity(s) and promote motor control of bilateral,

## 2023-09-05 NOTE — THERAPY DISCHARGE
Matt Gutierrez  : 1935  Primary: [facility] (Medicare Managed)  Secondary:  [address] [facility] [address]  Phone: [phone]  Fax: [phone]      Plan of Care/Certification Expiration Date: 23      PT Visit Info:  Plan of Care/Certification Expiration Date: 23      Visit Count:  6                OUTPATIENT PHYSICAL THERAPY:             OP NOTE TYPE: Discharge Summary 2023               Episode (Knee pain, Debility, Imbalance) Appt Desk         Treatment Diagnosis: Muscle Weakness, Generalized (M62.81)  Pain in right knee (M25.561)  Pain in left knee (M25.562)  Stiffness of right knee, not elsewhere classified (M25.661)  Stiffness of left knee, not elsewhere classified (M25.662)  Other abnormalities of gait and mobility (R26.89)  Unsteadiness on Feet (R26.81) Debility (R54)    Medical/Referring Diagnosis:  Contusion of left knee, initial encounter [S80.02XA]  Referring Physician:  Deven Kingsley MD MD Orders:  PT Eval and Treat   Return MD Appt:  TBD by patient  Date of Onset:         Allergies:  Guaifenesin, Loratadine-pseudoephedrine er, Amoxicillin, and Sulfa antibiotics  Restrictions/Precautions:             Medications Last Reviewed:  2023        Functional Mobility    [] This section not tested        Test Results Date:  23   30 second Sit to Stand 6 reps with B UE support on arm rests, 18 inch surface  5 reps with B UE support HR 97       ASSESSMENT   DIscharge Assessment:  Matt Gutierrez is d/c from therapy as she is having multiple trips coming up and will be unable to consistnely attend therapy sessions. Pt also has a high co-pay which impacts her ability to increase her volume of days sh eis able to participate in therapy sessions. Pt has shown minimal changes in her overall functional capcity, most likely du eto consistency, traveling with family, and memory impairments.

## 2023-09-07 ENCOUNTER — PATIENT MESSAGE (OUTPATIENT)
Dept: FAMILY MEDICINE CLINIC | Facility: CLINIC | Age: 88
End: 2023-09-07

## 2023-09-07 DIAGNOSIS — R41.3 MEMORY LOSS: ICD-10-CM

## 2023-09-07 DIAGNOSIS — F32.A MILD DEPRESSION: ICD-10-CM

## 2023-09-07 DIAGNOSIS — R53.82 CHRONIC FATIGUE: ICD-10-CM

## 2023-09-07 DIAGNOSIS — E55.9 VITAMIN D DEFICIENCY: ICD-10-CM

## 2023-09-07 DIAGNOSIS — R41.0 DISORIENTATION: Primary | ICD-10-CM

## 2023-09-07 DIAGNOSIS — E53.8 VITAMIN B12 DEFICIENCY: ICD-10-CM

## 2023-09-07 NOTE — TELEPHONE ENCOUNTER
From: Jaron Seals  To: Dr. Adrien Shah  Sent: 9/7/2023 7:12 AM EDT  Subject: Mom    Hi [facility]   Would you be able to give me a call today? Thank you.   Alana Pritchard  [phone]

## 2023-09-08 ENCOUNTER — NURSE ONLY (OUTPATIENT)
Dept: FAMILY MEDICINE CLINIC | Facility: CLINIC | Age: 87
End: 2023-09-08

## 2023-09-08 DIAGNOSIS — R41.3 MEMORY LOSS: ICD-10-CM

## 2023-09-08 DIAGNOSIS — F32.A MILD DEPRESSION: ICD-10-CM

## 2023-09-08 DIAGNOSIS — E53.8 VITAMIN B12 DEFICIENCY: ICD-10-CM

## 2023-09-08 DIAGNOSIS — R53.82 CHRONIC FATIGUE: ICD-10-CM

## 2023-09-08 DIAGNOSIS — R41.0 DISORIENTATION: ICD-10-CM

## 2023-09-08 DIAGNOSIS — E55.9 VITAMIN D DEFICIENCY: ICD-10-CM

## 2023-09-08 LAB
25(OH)D3 SERPL-MCNC: 35 NG/ML (ref 30–100)
BASOPHILS # BLD: 0.1 K/UL (ref 0–0.2)
BASOPHILS NFR BLD: 1 % (ref 0–2)
DIFFERENTIAL METHOD BLD: NORMAL
EOSINOPHIL # BLD: 0.2 K/UL (ref 0–0.8)
EOSINOPHIL NFR BLD: 3 % (ref 0.5–7.8)
ERYTHROCYTE [DISTWIDTH] IN BLOOD BY AUTOMATED COUNT: 13.5 % (ref 11.9–14.6)
HCT VFR BLD AUTO: 45.8 % (ref 35.8–46.3)
HGB BLD-MCNC: 14.6 G/DL (ref 11.7–15.4)
IMM GRANULOCYTES # BLD AUTO: 0 K/UL (ref 0–0.5)
IMM GRANULOCYTES NFR BLD AUTO: 0 % (ref 0–5)
LYMPHOCYTES # BLD: 1.9 K/UL (ref 0.5–4.6)
LYMPHOCYTES NFR BLD: 29 % (ref 13–44)
MCH RBC QN AUTO: 30.2 PG (ref 26.1–32.9)
MCHC RBC AUTO-ENTMCNC: 31.9 G/DL (ref 31.4–35)
MCV RBC AUTO: 94.6 FL (ref 82–102)
MONOCYTES # BLD: 0.6 K/UL (ref 0.1–1.3)
MONOCYTES NFR BLD: 9 % (ref 4–12)
NEUTS SEG # BLD: 3.7 K/UL (ref 1.7–8.2)
NEUTS SEG NFR BLD: 58 % (ref 43–78)
NRBC # BLD: 0 K/UL (ref 0–0.2)
PLATELET # BLD AUTO: 214 K/UL (ref 150–450)
PMV BLD AUTO: 10.8 FL (ref 9.4–12.3)
RBC # BLD AUTO: 4.84 M/UL (ref 4.05–5.2)
WBC # BLD AUTO: 6.5 K/UL (ref 4.3–11.1)

## 2023-09-09 LAB
ALBUMIN SERPL-MCNC: 3.8 G/DL (ref 3.2–4.6)
ALBUMIN/GLOB SERPL: 1.2 (ref 0.4–1.6)
ALP SERPL-CCNC: 72 U/L (ref 50–136)
ALT SERPL-CCNC: 28 U/L (ref 12–65)
ANION GAP SERPL CALC-SCNC: 6 MMOL/L (ref 2–11)
AST SERPL-CCNC: 25 U/L (ref 15–37)
BILIRUB SERPL-MCNC: 0.7 MG/DL (ref 0.2–1.1)
BUN SERPL-MCNC: 25 MG/DL (ref 8–23)
CALCIUM SERPL-MCNC: 9.5 MG/DL (ref 8.3–10.4)
CHLORIDE SERPL-SCNC: 110 MMOL/L (ref 101–110)
CO2 SERPL-SCNC: 29 MMOL/L (ref 21–32)
CREAT SERPL-MCNC: 0.9 MG/DL (ref 0.6–1)
GLOBULIN SER CALC-MCNC: 3.2 G/DL (ref 2.8–4.5)
GLUCOSE SERPL-MCNC: 106 MG/DL (ref 65–100)
POTASSIUM SERPL-SCNC: 3.8 MMOL/L (ref 3.5–5.1)
PROT SERPL-MCNC: 7 G/DL (ref 6.3–8.2)
SODIUM SERPL-SCNC: 145 MMOL/L (ref 133–143)
TSH, 3RD GENERATION: 1.67 UIU/ML (ref 0.36–3.74)
VIT B12 SERPL-MCNC: 789 PG/ML (ref 193–986)

## 2023-09-11 ENCOUNTER — CARE COORDINATION (OUTPATIENT)
Dept: CARE COORDINATION | Facility: CLINIC | Age: 88
End: 2023-09-11

## 2023-09-11 NOTE — CARE COORDINATION
Ambulatory Care Coordination Note  2023    Patient Current Location:  [facility]     ACM contacted the patient by telephone. Verified name and  with patient as identifiers. Provided introduction to self, and explanation of the ACM role. Challenges to be reviewed by the provider   Additional needs identified to be addressed with provider: No  none               Method of communication with provider: none. ACM: Ganesh Worthy RN    Ccm outreached to patient. Patient states she is doing well. Patient states her children do not want her to drive anymore at this time. Patient states no questions or concerns. Ccm discussed that I would outreach next week. Patient agreeable. Offered patient enrollment in the Remote Patient Monitoring (RPM) program for in-home monitoring: NA. Lab Results       None                 Goals Addressed                   This Visit's Progress     Conditions and Symptoms   On track     I will schedule office visits, as directed by my provider. I will keep my appointment or reschedule if I have to cancel. I will notify my provider of any barriers to my plan of care. I will notify my provider of any symptoms that indicate a worsening of my condition. Barriers: none  Plan for overcoming my barriers: N/A  Confidence: 910  Anticipated Goal Completion Date: 3/19/23         Self Monitoring   On track     Blood Pressure - I will take my blood pressure as directed - Daily  I will notify my provider of any trends of increasing or decreasing blood pressures over a month period of time. I will notify my provider of any changes in blood pressure associated with symptoms of dizziness, falls, passing out, headache, confusion/change in mental status. Patient Reported Blood Pressure No flowsheet data found.     Barriers: none  Plan for overcoming my barriers: N/A  Confidence: 9/10  Anticipated Goal Completion Date: 3/19/23                Future Appointments   Date Time Provider

## 2023-09-14 ENCOUNTER — PATIENT MESSAGE (OUTPATIENT)
Dept: FAMILY MEDICINE CLINIC | Facility: CLINIC | Age: 88
End: 2023-09-14

## 2023-09-14 DIAGNOSIS — D69.1 ABNORMAL PLATELETS (HCC): Primary | ICD-10-CM

## 2023-09-15 ENCOUNTER — NURSE ONLY (OUTPATIENT)
Dept: FAMILY MEDICINE CLINIC | Facility: CLINIC | Age: 88
End: 2023-09-15
Payer: MEDICARE

## 2023-09-15 ENCOUNTER — OFFICE VISIT (OUTPATIENT)
Age: 88
End: 2023-09-15

## 2023-09-15 VITALS
DIASTOLIC BLOOD PRESSURE: 86 MMHG | SYSTOLIC BLOOD PRESSURE: 136 MMHG | BODY MASS INDEX: 30.23 KG/M2 | HEART RATE: 60 BPM | HEIGHT: 67 IN | WEIGHT: 192.6 LBS

## 2023-09-15 DIAGNOSIS — R39.9 UTI SYMPTOMS: Primary | ICD-10-CM

## 2023-09-15 DIAGNOSIS — R00.1 SYMPTOMATIC BRADYCARDIA: ICD-10-CM

## 2023-09-15 DIAGNOSIS — E78.00 PURE HYPERCHOLESTEROLEMIA: ICD-10-CM

## 2023-09-15 DIAGNOSIS — I48.0 PAROXYSMAL ATRIAL FIBRILLATION (HCC): ICD-10-CM

## 2023-09-15 DIAGNOSIS — I10 ESSENTIAL HYPERTENSION: ICD-10-CM

## 2023-09-15 DIAGNOSIS — I49.1 PREMATURE ATRIAL CONTRACTION: ICD-10-CM

## 2023-09-15 DIAGNOSIS — Z95.818 PRESENCE OF WATCHMAN LEFT ATRIAL APPENDAGE CLOSURE DEVICE: ICD-10-CM

## 2023-09-15 DIAGNOSIS — I25.10 CORONARY ARTERY DISEASE INVOLVING NATIVE CORONARY ARTERY OF NATIVE HEART WITHOUT ANGINA PECTORIS: Primary | ICD-10-CM

## 2023-09-15 LAB
BILIRUBIN, URINE, POC: NEGATIVE
BLOOD URINE, POC: NEGATIVE
GLUCOSE URINE, POC: NEGATIVE
KETONES, URINE, POC: NEGATIVE
LEUKOCYTE ESTERASE, URINE, POC: NORMAL
NITRITE, URINE, POC: NEGATIVE
PH, URINE, POC: 6.5 (ref 4.6–8)
PROTEIN,URINE, POC: NEGATIVE
SPECIFIC GRAVITY, URINE, POC: 1.02 (ref 1–1.03)
URINALYSIS CLARITY, POC: CLEAR
URINALYSIS COLOR, POC: YELLOW
UROBILINOGEN, POC: NORMAL

## 2023-09-15 PROCEDURE — 81003 URINALYSIS AUTO W/O SCOPE: CPT | Performed by: FAMILY MEDICINE

## 2023-09-15 NOTE — PROGRESS NOTES
[address] [facility] [address]  PHONE: [phone]     09/15/23    NAME:  Alexis Andre  : 1935  MRN: [id number]       SUBJECTIVE:   Alexis Andre is a 80 y.o. female seen for a follow up visit regarding the following:     Chief Complaint   Patient presents with    Atrial Fibrillation    Coronary Artery Disease       HPI: Here for PAF and CAD. PPM as well. Aug 2021 C -mild to mod nonobstructive disease  Echo 2022: normal EF, mod MR, mild AI  Watchman device 3/2023  TANESHA 2023: low normal EF, mild to mod MR      Had fall in  in [place] while seeing family. Some RUTH, some lack of energy, no CP. Memory not as good now. She is asx in Afib, no palpitations. Has felt well, no angina. Patient denies recent history of orthopnea, PND, excessive dizziness and/or syncope. , 3 kids in town. Past Medical History, Past Surgical History, Family history, Social History, and Medications were all reviewed with the patient today and updated as necessary. Current Outpatient Medications   Medication Sig Dispense Refill    DULoxetine (CYMBALTA) 30 MG extended release capsule Take 1 capsule by mouth daily 30 capsule 5    clopidogrel (PLAVIX) 75 MG tablet Take 1 tablet by mouth daily 30 tablet 5    aspirin EC 81 MG EC tablet Take 1 tablet by mouth daily 90 tablet 1    TURMERIC PO Take by mouth      losartan (COZAAR) 50 MG tablet Take 1 tablet by mouth daily 30 tablet 5    metoprolol succinate (TOPROL XL) 25 MG extended release tablet Take 1 tablet by mouth in the morning and 1 tablet in the evening.  180 tablet 3    Bioflavonoid Products (RICK C PO) Take 1 tablet by mouth daily      Multiple Vitamins-Minerals (ONE-A-DAY WOMENS PO) Take 1 tablet by mouth daily      AZO-CRANBERRY PO Take 2 tablets by mouth daily      Cholecalciferol 50 MCG (2000) TABS Take 1 tablet by mouth daily      magnesium oxide (MAG-OX) 400 (240 Mg) MG tablet Take 1 tablet by

## 2023-09-18 ENCOUNTER — TELEPHONE (OUTPATIENT)
Dept: FAMILY MEDICINE CLINIC | Facility: CLINIC | Age: 88
End: 2023-09-18

## 2023-09-18 ENCOUNTER — CARE COORDINATION (OUTPATIENT)
Dept: CARE COORDINATION | Facility: CLINIC | Age: 88
End: 2023-09-18

## 2023-09-18 DIAGNOSIS — I10 ESSENTIAL HYPERTENSION: Primary | ICD-10-CM

## 2023-09-18 NOTE — CARE COORDINATION
Remote Patient Kit Ordering Note      Date/Time:  9/18/2023 12:54 PM      [x] [facility] confirmed patient shipping address  [x] Patient will receive package over the next 1-3 business days. Someone 21 years or older must be present to sign for UPS delivery. [x] HRS will contact patient within 24 hours, an [address] [place] [address] will call the patient directly: If the patient does not answer, HRS will follow up with the clinical team notifying them about the unsuccessful attempt to contact the patient. HRS will make three call attempts to the patient. Provide patient with [facility] Virtual install number is: [phone]. [x] ACM RN will contact patient once equipment is active to welcome them to the program.                                                         [x] Hours of RPM monitoring - Monday-Friday 3063-8247; encourage patient to get vitals entered by [facility] each day to have the alert addressed same day. [x][facility] mailed RPM Patient flyer to patient. All questions answered at this time. ACM made aware the RPM kit has been ordered. EMTP notified patient of RPM equipment order.

## 2023-09-18 NOTE — CARE COORDINATION
Ambulatory Care Coordination Note  2023    Patient Current Location:  [facility]     ACM contacted the patient by telephone. Verified name and  with patient as identifiers. Provided introduction to self, and explanation of the ACM role. Challenges to be reviewed by the provider   Additional needs identified to be addressed with provider: Yes  Left message with pcp regarding blood pressure running high               Method of communication with provider: staff message. ACM: Karyle Bimler, RN    Ccm outreached to patient. Patient states her blood pressure has been running high. Pt states this morning her blood pressure was 151/96. She rechecked it 30 min to an hour later it was 155/107. Ccm left message with pcp and pcp office regarding blood pressure. They will call patient. Ccm discussed that if it continues she may need to go to ER. Patient verbalized understanding. Ccm discussed that I would continue to follow. Offered patient enrollment in the Remote Patient Monitoring (RPM) program for in-home monitoring: Yes, patient enrolled:     Remote Patient Monitoring Enrollment Note      Date/Time: 2023 9:21 AM    Offered patient enrollment in the [facility] Remote Patient Monitoring (RPM) program for in home monitoring for HTN. Patient accepted RPM services. Patient will be monitoring the following daily:  blood pressure reading, blood pressure heart rate, pulse ox reading, pulse ox heart rate, and weight    ACM reviewed the information below with patient:    Emergency Contact (name and contact number): Key Arnett [phone]    [x] A member from the care coordination team will reach out to notify the patient once the RPM kit is ordered. [x] Once the kit is delivered, the [facility] team will contact the patient after UPS deliver to assist with set up.             [x] Determined BP cuff size: regular (9.05\"-15.74\")      [x] Determined weight scale: regular (<330lbs)

## 2023-09-18 NOTE — CARE COORDINATION
Ccm received message from Dr. Rashard Orozco stating to tell patient to change Cozaar to 100mg daily. Ccm spoke with patient and notified her of this. Pt verbalized understanding.

## 2023-09-18 NOTE — PROGRESS NOTES
Remote Patient Monitoring Treatment Plan    Received request from ACM/CTN Antonette Robles RN  to order remote patient monitoring for in home monitoring of HTN and order completed. Patient will be monitoring blood pressure   pulse ox   weight. Please set alert for ONLY weight gain of 5# in 7 days. Pt has no documented hx of HF. Patient will engage in Remote Patient Monitoring each day to develop the skills necessary for self management. RPM Care Team Responsibilities:   Alerts will be reviewed daily and addressed within 2-4 hours during operational hours (Monday -Friday 9 am-4 pm)  Alert response and intervention documented in patient medical record  Alert response escalated to PCP per protocol and documented in patient medical record  Patient monitored over approximately  days  Discharge from program based on self-management readiness    See care coordination encounters for additional details.

## 2023-09-19 NOTE — TELEPHONE ENCOUNTER
Patient has questions regarding her losartan since she was told to increase from 50mg to 100mg. Please contact patient.

## 2023-09-20 RX ORDER — LOSARTAN POTASSIUM 100 MG/1
100 TABLET ORAL DAILY
Qty: 90 TABLET | Refills: 1 | Status: SHIPPED | OUTPATIENT
Start: 2023-09-20

## 2023-09-20 NOTE — TELEPHONE ENCOUNTER
Patient returning call to office to speak to nurse about the increase of her losartan. Patient states she currently only has 4 pills left of the 50mg.  Call pt at [phone]

## 2023-09-21 ENCOUNTER — TELEPHONE (OUTPATIENT)
Dept: RHEUMATOLOGY | Age: 88
End: 2023-09-21

## 2023-09-21 NOTE — TELEPHONE ENCOUNTER
----- Message from Gerry Mcdaniel MD sent at 9/12/2023  3:30 PM EDT -----  Can you f/u with pt to complete labs for me  ----- Message -----  From: Esteban Hodges Incoming [employer]  Sent: 9/8/2023   9:13 PM EDT  To: Gerry Mcdaniel MD

## 2023-09-21 NOTE — TELEPHONE ENCOUNTER
Pt unable to come unless daughter brings her. Had to move out appt so pt can do labs/xr once daughter is back in town.

## 2023-09-25 ENCOUNTER — CARE COORDINATION (OUTPATIENT)
Dept: CARE COORDINATION | Facility: CLINIC | Age: 88
End: 2023-09-25

## 2023-09-25 DIAGNOSIS — M79.672 BILATERAL FOOT PAIN: ICD-10-CM

## 2023-09-25 DIAGNOSIS — Z01.89 ENCOUNTER FOR OTHER SPECIFIED SPECIAL EXAMINATIONS: ICD-10-CM

## 2023-09-25 DIAGNOSIS — M79.671 BILATERAL FOOT PAIN: ICD-10-CM

## 2023-09-25 DIAGNOSIS — R76.8 POSITIVE DOUBLE STRANDED DNA ANTIBODY TEST: ICD-10-CM

## 2023-09-25 DIAGNOSIS — M79.642 BILATERAL HAND PAIN: ICD-10-CM

## 2023-09-25 DIAGNOSIS — M79.641 BILATERAL HAND PAIN: ICD-10-CM

## 2023-09-25 DIAGNOSIS — R70.0 ELEVATED SED RATE: ICD-10-CM

## 2023-09-25 DIAGNOSIS — R76.8 POSITIVE ANA (ANTINUCLEAR ANTIBODY): ICD-10-CM

## 2023-09-25 LAB — ERYTHROCYTE [SEDIMENTATION RATE] IN BLOOD: 7 MM/HR (ref 0–30)

## 2023-09-25 NOTE — CARE COORDINATION
Ambulatory Care Coordination Note  2023    Patient Current Location:  [place]     ACM contacted the patient by telephone. Verified name and  with patient as identifiers. Provided introduction to self, and explanation of the ACM role. Challenges to be reviewed by the provider   Additional needs identified to be addressed with provider: No  none               Method of communication with provider: none. ACM: Jamila León RN    Ccm outreached to patient. Patient states she is doing well at this time. Patient states no questions or concerns. Ccm discussed that I would outreach next week. Patient agreeable. Offered patient enrollment in the Remote Patient Monitoring (RPM) program for in-home monitoring: Yes, patient already enrolled. Lab Results       None                 Goals Addressed                   This Visit's Progress     Conditions and Symptoms   On track     I will schedule office visits, as directed by my provider. I will keep my appointment or reschedule if I have to cancel. I will notify my provider of any barriers to my plan of care. I will notify my provider of any symptoms that indicate a worsening of my condition. Barriers: none  Plan for overcoming my barriers: N/A  Confidence: 9/10  Anticipated Goal Completion Date: 3/19/23         Self Monitoring   On track     Blood Pressure - I will take my blood pressure as directed - Daily  I will notify my provider of any trends of increasing or decreasing blood pressures over a month period of time. I will notify my provider of any changes in blood pressure associated with symptoms of dizziness, falls, passing out, headache, confusion/change in mental status. Patient Reported Blood Pressure No flowsheet data found.     Barriers: none  Plan for overcoming my barriers: N/A  Confidence: 10  Anticipated Goal Completion Date: 3/19/23                Future Appointments   Date Time Provider [address] [place] [address]   2023

## 2023-09-25 NOTE — CARE COORDINATION
HRS on 9/22/23 with up to 13.0# difference noted. Weight of 192.0# recorded in Epic during cardiologist office visit on 9/15/23. Patients current recorded weight in HRS is 191.2# Pt v/u of RPM weight instructions, when to notify provider of changes / concerns, and when to seek emergent medical care. Escalated alert to RN-FYI update provided   Plan/Follow Up: Will continue to review, monitor and address alerts with follow up based on severity of symptoms and risk factors.

## 2023-09-25 NOTE — CARE COORDINATION
Remote Patient Monitoring Welcome Note  Date/Time:  2023 9:23 AM  Patient Current Location: [address]  Verified patients name and  as identifiers. Completed and confirmed the following:   Emergency Contact: milli hussein [phone]  [x] Patient received all RPM equipment (tablet, scale, blood pressure device and cuff, and pulse oximeter)  Cuff Size: regular (9.05\"-15.74\")    Weight Scale:  regular (<330lbs)                    [x] Instructed patient keep box for use when returning equipment                                                          [x] Reviewed Patient Welcome Letter with patient                         [x] Reviewed expectations for patient and care team  Monitoring hours M-F 9-4pm  Completing monitoring by 12pm on  so that alerts can be responded to in the same day  Patient weighs self at same time every day (or after urinating and waking up)  Take blood pressure 1-2 hrs after medications   RPM team may have different phone area code (including [place], [place], [facility] or [address] [place] [address])                              [x] Instructed patient to keep scale on flat surface                                                         [x] Instructed patient to keep tablet plugged in at all times                         [x] Instructed how to contact IT support ([phone])  [x] Provided Remote Patient Monitoring care  information               All questions answered at this time.

## 2023-09-26 LAB
CCP IGA+IGG SERPL IA-ACNC: <1 UNITS (ref 0–19)
CK SERPL-CCNC: 130 U/L (ref 21–215)
CRP SERPL-MCNC: 0.8 MG/DL (ref 0–0.9)
HAV IGM SER QL: NONREACTIVE
HBV CORE IGM SER QL: NONREACTIVE
HBV SURFACE AG SER QL: NONREACTIVE
HCV AB SER QL: NONREACTIVE

## 2023-09-27 ENCOUNTER — CARE COORDINATION (OUTPATIENT)
Dept: CARE COORDINATION | Facility: CLINIC | Age: 88
End: 2023-09-27

## 2023-09-27 LAB
C3 SERPL-MCNC: 124 MG/DL (ref 82–167)
C4 SERPL-MCNC: 26 MG/DL (ref 12–38)

## 2023-09-27 NOTE — CARE COORDINATION
Remote Patient Monitoring Note      Date/Time:  2023 2:03 PM  Patient Current Location: Home: [address] [place] [address] [address] [place] [address] [id number]  LPN contacted patient by telephone regarding yellow alert received for no weight taken x2 days. Verified patients name and  as identifiers. Background: Pt enrolled in RPM r/t HTN. Clinical Interventions: Reviewed and followed up on alerts and treatments-Pt states she is unable to complete metrics at this time due to attempting to locate items her daughters rearranged in her home. RPM compliance education provided; pt v/u. Pt denied acute distress, v/u of when to notify provider of changes / concerns, and when to seek emergent medical care. Pt scheduled to see PCP tomorrow, 23. No further action necessary at this time. Plan/Follow Up: Will continue to review, monitor and address alerts with follow up based on severity of symptoms and risk factors.

## 2023-09-27 NOTE — CARE COORDINATION
today? no     Weight Triage  Are you weighing any different than you did yesterday? (time of day, clothes and shoes on or off, etc)? NA; Pt did not weigh self yesterday    Do you have any shortness of breath? no   Do you have any swelling in your hands of feet? no   Are you having any other health concerns or issues? no      Clinical Interventions: Reviewed and followed up on alerts and treatments-Pt denies CP, SOB, and new or worsening edema / is asymptomatic. Pt did not weigh self upon rising nor after voiding. Multiple weights recorded in HRS on 9/22/23 with up to 13.0# difference noted. Weight of 192.0# recorded in Epic during cardiologist office visit on 9/15/23. Patients current recorded weight in HRS is 195.2#. Pt reeducated on RPM weight instructions and v/u. Pt currently scheduled to see PCP tomorrow, v/u of when to notify provider of changes / concerns, and when to seek emergent medical care. Escalated alert to RN-FYI update provided   Escalated alert to PCP-FYI update provided    Plan/Follow Up: Will continue to review, monitor and address alerts with follow up based on severity of symptoms and risk factors.

## 2023-09-28 ENCOUNTER — OFFICE VISIT (OUTPATIENT)
Dept: FAMILY MEDICINE CLINIC | Facility: CLINIC | Age: 88
End: 2023-09-28
Payer: MEDICARE

## 2023-09-28 VITALS
SYSTOLIC BLOOD PRESSURE: 132 MMHG | OXYGEN SATURATION: 98 % | DIASTOLIC BLOOD PRESSURE: 74 MMHG | BODY MASS INDEX: 30.54 KG/M2 | WEIGHT: 195 LBS | HEART RATE: 82 BPM | TEMPERATURE: 97.3 F

## 2023-09-28 DIAGNOSIS — R26.9 NEUROLOGIC GAIT DYSFUNCTION: ICD-10-CM

## 2023-09-28 DIAGNOSIS — G44.209 TENSION-TYPE HEADACHE, NOT INTRACTABLE, UNSPECIFIED CHRONICITY PATTERN: ICD-10-CM

## 2023-09-28 DIAGNOSIS — I10 PRIMARY HYPERTENSION: Primary | ICD-10-CM

## 2023-09-28 DIAGNOSIS — F32.A MILD DEPRESSION: ICD-10-CM

## 2023-09-28 DIAGNOSIS — M62.838 NECK MUSCLE SPASM: ICD-10-CM

## 2023-09-28 PROCEDURE — 99214 OFFICE O/P EST MOD 30 MIN: CPT | Performed by: FAMILY MEDICINE

## 2023-09-28 PROCEDURE — 1123F ACP DISCUSS/DSCN MKR DOCD: CPT | Performed by: FAMILY MEDICINE

## 2023-09-28 RX ORDER — PREDNISONE 10 MG/1
1 TABLET ORAL SEE ADMIN INSTRUCTIONS
Qty: 1 EACH | Refills: 0 | Status: SHIPPED | OUTPATIENT
Start: 2023-09-28

## 2023-09-28 RX ORDER — BUTALBITAL, ACETAMINOPHEN AND CAFFEINE 50; 325; 40 MG/1; MG/1; MG/1
1 TABLET ORAL EVERY 4 HOURS PRN
Qty: 180 TABLET | Refills: 3 | Status: SHIPPED | OUTPATIENT
Start: 2023-09-28

## 2023-09-28 ASSESSMENT — ENCOUNTER SYMPTOMS
EYES NEGATIVE: 1
GASTROINTESTINAL NEGATIVE: 1
RESPIRATORY NEGATIVE: 1
BACK PAIN: 1

## 2023-09-28 NOTE — PROGRESS NOTES
HISTORY OF PRESENT ILLNESS    Edilberto Kang is a 80 y.o. female. HPI  Chief Complaint   Patient presents with    Follow-up     See above. Daughter in attendance endorses history. Had fall in June. Hit left side of head. No LOC. CT scan was negative. Continues to have daily headache on top of head. Severity is mild but constant. Also has neck pain the radiates to the top of head. This occurs randomly but happens almost every day. No vision change. No nausea. Home BP readings had elevated. Improved with increasing losartan dose. Followed by neurologist for memory loss. Evaluation is on going. Understands she is not to drive. No side effects from BP medication. No headache or vision change. Denies chest pain or SOB. No swelling. Daughter notes she frequently does not seem steady when walking. No focal weakness. No tremor. Has a history of DDD. Some increase in low back pain in the last 2 weeks. No radiation in to legs. No numbness or weakness. No loss of bowel or bladder control. Depression symptoms are in remission on Cymbalta; no side effects. Current Outpatient Medications on File Prior to Visit   Medication Sig Dispense Refill    losartan (COZAAR) 100 MG tablet Take 1 tablet by mouth daily 90 tablet 1    DULoxetine (CYMBALTA) 30 MG extended release capsule Take 1 capsule by mouth daily 30 capsule 5    aspirin EC 81 MG EC tablet Take 1 tablet by mouth daily 90 tablet 1    TURMERIC PO Take by mouth      metoprolol succinate (TOPROL XL) 25 MG extended release tablet Take 1 tablet by mouth in the morning and 1 tablet in the evening.  180 tablet 3    Bioflavonoid Products (RICK C PO) Take 1 tablet by mouth daily      Multiple Vitamins-Minerals (ONE-A-DAY WOMENS PO) Take 1 tablet by mouth daily      AZO-CRANBERRY PO Take 2 tablets by mouth daily      Cholecalciferol 50 MCG (2000 UT) TABS Take 1 tablet by mouth daily      magnesium oxide (MAG-OX) 400 (240 Mg) MG tablet Take 1 tablet by mouth 2 times daily

## 2023-09-29 ENCOUNTER — HOME HEALTH ADMISSION (OUTPATIENT)
Dept: HOME HEALTH SERVICES | Facility: HOME HEALTH | Age: 88
End: 2023-09-29
Payer: MEDICARE

## 2023-09-29 LAB — DSDNA CRITHIDIA LUCILIAE: NEGATIVE

## 2023-10-01 LAB
ANA BY IFA: POSITIVE
HOMOGENEOUS PATTERN: ABNORMAL
Lab: ABNORMAL
SPECKLED PATTERN: ABNORMAL

## 2023-10-02 ENCOUNTER — CARE COORDINATION (OUTPATIENT)
Dept: CARE COORDINATION | Facility: CLINIC | Age: 88
End: 2023-10-02

## 2023-10-02 ENCOUNTER — OFFICE VISIT (OUTPATIENT)
Dept: NEUROLOGY | Age: 88
End: 2023-10-02
Payer: MEDICARE

## 2023-10-02 ENCOUNTER — PATIENT MESSAGE (OUTPATIENT)
Dept: FAMILY MEDICINE CLINIC | Facility: CLINIC | Age: 88
End: 2023-10-02

## 2023-10-02 ENCOUNTER — CLINICAL DOCUMENTATION (OUTPATIENT)
Dept: NEUROLOGY | Age: 88
End: 2023-10-02

## 2023-10-02 DIAGNOSIS — F03.B0 MODERATE DEMENTIA WITHOUT BEHAVIORAL DISTURBANCE, PSYCHOTIC DISTURBANCE, MOOD DISTURBANCE, OR ANXIETY, UNSPECIFIED DEMENTIA TYPE (HCC): ICD-10-CM

## 2023-10-02 DIAGNOSIS — S06.9X0D TRAUMATIC BRAIN INJURY, WITHOUT LOSS OF CONSCIOUSNESS, SUBSEQUENT ENCOUNTER: Primary | ICD-10-CM

## 2023-10-02 DIAGNOSIS — R41.3 MEMORY LOSS: ICD-10-CM

## 2023-10-02 PROCEDURE — 99204 OFFICE O/P NEW MOD 45 MIN: CPT | Performed by: PSYCHIATRY & NEUROLOGY

## 2023-10-02 PROCEDURE — 1123F ACP DISCUSS/DSCN MKR DOCD: CPT | Performed by: PSYCHIATRY & NEUROLOGY

## 2023-10-02 RX ORDER — PREDNISONE 10 MG/1
TABLET ORAL
Qty: 12 TABLET | Refills: 0 | Status: SHIPPED | OUTPATIENT
Start: 2023-10-02

## 2023-10-02 RX ORDER — MEMANTINE HYDROCHLORIDE 10 MG/1
TABLET ORAL
Qty: 180 TABLET | Refills: 3 | Status: SHIPPED | OUTPATIENT
Start: 2023-10-02

## 2023-10-02 ASSESSMENT — ENCOUNTER SYMPTOMS
GASTROINTESTINAL NEGATIVE: 1
RESPIRATORY NEGATIVE: 1
EYES NEGATIVE: 1
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
## 2023-10-02 NOTE — PROGRESS NOTES
Sheila Iverson the patient's daughter did contact us let us know that the patient's medications are constantly being disorganized by the patient and and they have to constantly reorganize it bills are being missed she is having some auditory hallucination and being obsessed with the [employer] computer actually thinking that these are live situations and she is helping out young cancer victims.

## 2023-10-02 NOTE — CARE COORDINATION
Remote Patient Monitoring Note      Date/Time:  10/2/2023 1:28 PM  Patient Current Location: Home: [address] [place] [address] [address] [place] [address] [address]  LPN contacted patient by telephone regarding yellow alert received for no BP or weight taken x 4 days. Verified patients name and  as identifiers. Background: Pt enrolled in RPM r/t HTN. Clinical Interventions: Reviewed and followed up on alerts and treatments-Pt states she is unable to complete daily metrics due to currently working on her toilet. RPM compliance education provided; pt v/u. Pt states she will be traveling to [facility], 10/3/23, and anticipated to return on 10/10/23. ACM and MA notified. Plan/Follow Up: Will continue to review, monitor and address alerts with follow up based on severity of symptoms and risk factors.

## 2023-10-02 NOTE — CARE COORDINATION
Ambulatory Care Management Note        Date/Time:  10/2/2023 9:14 AM    Ccm outreached to patient. No answer at this time, message left. Awaiting response. If no response, ccm will outreach next week.

## 2023-10-04 ENCOUNTER — CLINICAL DOCUMENTATION (OUTPATIENT)
Dept: NEUROLOGY | Age: 88
End: 2023-10-04

## 2023-10-04 DIAGNOSIS — F03.B0 MODERATE DEMENTIA WITHOUT BEHAVIORAL DISTURBANCE, PSYCHOTIC DISTURBANCE, MOOD DISTURBANCE, OR ANXIETY, UNSPECIFIED DEMENTIA TYPE (HCC): Primary | ICD-10-CM

## 2023-10-04 NOTE — PROGRESS NOTES
I have been in communication with the family were not sure whether she hit her head or did not hit her head and certainly the imaging studies have been done.

## 2023-10-09 ENCOUNTER — CARE COORDINATION (OUTPATIENT)
Dept: CARE COORDINATION | Facility: CLINIC | Age: 88
End: 2023-10-09

## 2023-10-09 NOTE — CARE COORDINATION
Ambulatory Care Management Note        Date/Time:  10/9/2023 8:56 AM    Ccm outreached to patient. No answer at this time, message left. Awaiting response. If no response, ccm will outreach next week.

## 2023-10-10 ENCOUNTER — CARE COORDINATION (OUTPATIENT)
Dept: CARE COORDINATION | Facility: CLINIC | Age: 88
End: 2023-10-10

## 2023-10-10 NOTE — CARE COORDINATION
Remote Patient Monitoring Note      Date/Time:  10/10/2023 10:47 AM  Patient Current Location:  Pt lives in [facility] and recently planned to  travel to [place]. See RPM note from 10/2/23. LPN attempted to contact patient by telephone regarding yellow alert received for no BP or weight taken x 2 days and to confirm pt has returned home from [place]. Pt resumed in [facility] today, 10/10/23. Background: Pt enrolled in [facility] r/t HTN. Clinical Interventions:  Left HIPAA compliant message requesting a return call. Plan/Follow Up: Will continue to review, monitor and address alerts with follow up based on severity of symptoms and risk factors.

## 2023-10-11 ENCOUNTER — CARE COORDINATION (OUTPATIENT)
Dept: CARE COORDINATION | Facility: CLINIC | Age: 88
End: 2023-10-11

## 2023-10-11 NOTE — CARE COORDINATION
Remote Patient Monitoring Note      Date/Time:  10/11/2023 11:39 AM  Patient Current Location: Home: [address] [place] [address] [address] [id number]  LPN contacted patient by telephone regarding yellow alert received for no BP or weight taken x 2 days. Verified patients name and  as identifiers. Background: Pt enrolled in RPM r/t HTN. Clinical Interventions: Reviewed and followed up on alerts and treatments-Discussed RPM adherence with pt. Pt v/u, states she returned home from [place] yesterday, and needs to set up her RPM equipment prior to completing daily metrics. Pt declined assistance with contacting [facility] [employer] Support to assist. Pt confirmed she knows how to set up equipment and will contact Tech Support if needed. Pt v/u of when to notify provider of changes / concerns and when to seek emergent medical care. No further action necessary at this time. Plan/Follow Up: Will continue to review, monitor and address alerts with follow up based on severity of symptoms and risk factors.

## 2023-10-12 ENCOUNTER — CLINICAL DOCUMENTATION (OUTPATIENT)
Dept: NEUROLOGY | Age: 88
End: 2023-10-12

## 2023-10-12 ENCOUNTER — HOME CARE VISIT (OUTPATIENT)
Dept: SCHEDULING | Facility: HOME HEALTH | Age: 88
End: 2023-10-12
Payer: MEDICARE

## 2023-10-12 ENCOUNTER — CARE COORDINATION (OUTPATIENT)
Dept: CARE COORDINATION | Facility: CLINIC | Age: 88
End: 2023-10-12

## 2023-10-12 ENCOUNTER — HOME CARE VISIT (OUTPATIENT)
Dept: HOME HEALTH SERVICES | Facility: HOME HEALTH | Age: 88
End: 2023-10-12
Payer: MEDICARE

## 2023-10-12 PROCEDURE — G0151 HHCP-SERV OF PT,EA 15 MIN: HCPCS

## 2023-10-12 NOTE — PROGRESS NOTES
I received the labs from [facility] the patient's ApoE came back at a high risk. The amyloid beta 42/40 unfortunately due to concentration issues is nondiagnostic.   That is not to say normal it is just nondiagnostic

## 2023-10-12 NOTE — CARE COORDINATION
Remote Patient Monitoring Note      Date/Time:  10/12/2023 1:16 PM  Patient Current Location: [place]  LPN attempted to contact patient by telephone regarding yellow alert received for no BP or weight taken x 3 days. Unable to reach pt and unable to leave message. [facility] [employer] Support notified of connection issue by nurse. LPN previously spoke with pt regarding RPM adherence; pt v/u. See RPM note from 10/11/23. Pt nonadherent. Background: Pt enrolled in RPM r/t HTN. Clinical Interventions: Escalated alert to RN-Update provided      Plan/Follow Up: Will continue to review, monitor and address alerts with follow up based on severity of symptoms and risk factors.

## 2023-10-13 ENCOUNTER — HOME CARE VISIT (OUTPATIENT)
Dept: HOME HEALTH SERVICES | Facility: HOME HEALTH | Age: 88
End: 2023-10-13
Payer: MEDICARE

## 2023-10-13 VITALS
RESPIRATION RATE: 18 BRPM | TEMPERATURE: 97.8 F | DIASTOLIC BLOOD PRESSURE: 66 MMHG | OXYGEN SATURATION: 96 % | SYSTOLIC BLOOD PRESSURE: 112 MMHG | HEART RATE: 61 BPM

## 2023-10-13 ASSESSMENT — ENCOUNTER SYMPTOMS
DYSPNEA ACTIVITY LEVEL: AFTER AMBULATING MORE THAN 20 FT
PAIN LOCATION - PAIN QUALITY: VARIED

## 2023-10-16 ENCOUNTER — CARE COORDINATION (OUTPATIENT)
Dept: CARE COORDINATION | Facility: CLINIC | Age: 88
End: 2023-10-16

## 2023-10-16 NOTE — CARE COORDINATION
Ambulatory Care Coordination Note  10/16/2023    Patient Current Location:  [place]     ACM contacted the patient by telephone. Verified name and  with patient as identifiers. Provided introduction to self, and explanation of the ACM role. Challenges to be reviewed by the provider   Additional needs identified to be addressed with provider: No  none               Method of communication with provider: none. ACM: Theodora Bridges RN    Ccm outreached to patient. Patient states she is doing well at this time. Discussed with patient to take blood pressure and weight this morning for RPM program. Patient states she will. Patient states she continues therapy. Patient states no questions or concerns. Ccm discussed that I would outreach next week. Patient agreeable. Offered patient enrollment in the Remote Patient Monitoring (RPM) program for in-home monitoring: Yes, patient already enrolled. Lab Results       None                 Goals Addressed                   This Visit's Progress     Conditions and Symptoms   On track     I will schedule office visits, as directed by my provider. I will keep my appointment or reschedule if I have to cancel. I will notify my provider of any barriers to my plan of care. I will notify my provider of any symptoms that indicate a worsening of my condition. Barriers: none  Plan for overcoming my barriers: N/A  Confidence: 9/10  Anticipated Goal Completion Date: 3/19/23         Self Monitoring   On track     Blood Pressure - I will take my blood pressure as directed - Daily  I will notify my provider of any trends of increasing or decreasing blood pressures over a month period of time. I will notify my provider of any changes in blood pressure associated with symptoms of dizziness, falls, passing out, headache, confusion/change in mental status. Patient Reported Blood Pressure No flowsheet data found.     Barriers: none  Plan for overcoming my barriers:

## 2023-10-16 NOTE — CARE COORDINATION
Remote Alert Monitoring Note  Rpm alert to be reviewed by the provider   red alert   weight (5.8# increase in last day)   Additional needs to be addressed by N/A: No                    Date/Time:  10/16/2023 12:46 PM  Patient Current Location: Home: [address] [place] [address] [address] [place] [phone]  LPN contacted patient by telephone. Verified patients name and  as identifiers. Background: Pt enrolled in RPM r/t HTN. Refer to 911 immediately if:  Patient unresponsive or unable to provide history  Change in cognition or sudden confusion  Patient unable to respond in complete sentences  Intense chest pain/tightness  Any concern for any clinical emergency  Red Alert: Provider response time of 1 hr required for any red alert requiring intervention  Yellow Alert: Provider response time of 3hr required for any escalated yellow alert    Weight Scale Triage  Was your weight obtained upon rising/waking today? No   Was your weight obtained after voiding and/or use of the bathroom today? no   Did you weigh yourself in the same amount of clothing today, compared to how you typically do? yes   Was the scale bumped or moved prior to today's weight? yes   Is your scale on a flat/hard surface? yes   Did you obtain your weight with shoes on? no   If yes, is this something you normally do during your daily weights? NA   Were you standing up straight on the scale today? yes   Were you leaning on anything while obtaining your weight today? no     Weight Triage  Are you weighing any different than you did yesterday? (time of day, clothes and shoes on or off, etc)? no   Do you have any shortness of breath? no   Do you have any swelling in your hands or feet? no   Are you having any other health concerns or issues? no      Clinical Interventions: Reviewed and followed up on alerts and treatments-Pt denies acute distress / is asymptomatic. Pt believes weight gain is due to eating more that usual at family gatherings.  Pt did not obtain (0) understands/communicates without difficulty

## 2023-10-18 ENCOUNTER — HOSPITAL ENCOUNTER (OUTPATIENT)
Dept: GENERAL RADIOLOGY | Age: 88
Discharge: HOME | End: 2023-10-20
Payer: MEDICARE

## 2023-10-18 DIAGNOSIS — M79.671 BILATERAL FOOT PAIN: ICD-10-CM

## 2023-10-18 DIAGNOSIS — R70.0 ELEVATED SED RATE: ICD-10-CM

## 2023-10-18 DIAGNOSIS — R76.8 POSITIVE ANA (ANTINUCLEAR ANTIBODY): ICD-10-CM

## 2023-10-18 DIAGNOSIS — R76.8 POSITIVE DOUBLE STRANDED DNA ANTIBODY TEST: ICD-10-CM

## 2023-10-18 DIAGNOSIS — M79.672 BILATERAL FOOT PAIN: ICD-10-CM

## 2023-10-18 DIAGNOSIS — M79.642 BILATERAL HAND PAIN: ICD-10-CM

## 2023-10-18 DIAGNOSIS — M79.641 BILATERAL HAND PAIN: ICD-10-CM

## 2023-10-18 PROCEDURE — 73630 X-RAY EXAM OF FOOT: CPT

## 2023-10-18 PROCEDURE — 73130 X-RAY EXAM OF HAND: CPT

## 2023-10-19 ENCOUNTER — CARE COORDINATION (OUTPATIENT)
Dept: CARE COORDINATION | Facility: CLINIC | Age: 88
End: 2023-10-19

## 2023-10-19 LAB
Lab: NORMAL
REFERENCE LAB: NORMAL

## 2023-10-19 NOTE — CARE COORDINATION
Remote Patient Monitoring Note      Date/Time:  10/19/2023 10:09 AM  Patient Current Location: [place]  LPN attempted to contact patient by telephone regarding red alert received for weight increase (3.0# in last day and 5.0# in last 7 days). Background: Pt enrolled in RPM r/t HTN. Clinical Interventions:  Left HIPAA compliant message requesting a return call. Plan/Follow Up: Will continue to review, monitor and address alerts with follow up based on severity of symptoms and risk factors.

## 2023-10-19 NOTE — CARE COORDINATION
Remote Patient Monitoring Note      Date/Time:  10/19/2023 1:36 PM  Patient Current Location: [facility]  LPN 2nd attempt to contact patient by telephone regarding red alert received for weight increase (3.0# in last day and 5.0# in last 7 days). Multiple recorded weights on 10/18/23 and 10/19/23 noted in HRS. Left another HIPAA compliant message requesting a return call. Patients emergency contact, Jhon Cook, has same contact number as patient. No further outreach attempts indicated by this LPN at this time. Background:  Pt enrolled in RPM r/t HTN  Clinical Interventions: Escalated alert to RN-Update provided      Plan/Follow Up: Will continue to review, monitor and address alerts with follow up based on severity of symptoms and risk factors.

## 2023-10-20 ENCOUNTER — CARE COORDINATION (OUTPATIENT)
Dept: CARE COORDINATION | Facility: CLINIC | Age: 88
End: 2023-10-20

## 2023-10-20 NOTE — CARE COORDINATION
Remote Patient Monitoring Note      Date/Time:  10/20/2023 1:23 PM  Patient Current Location: [place]  LPN 2nd attempt to contact patient by telephone regarding red alert received for weight increase (3.0# in last day and 5.0# in last 7 days). Multiple recorded weights on 10/18/23 and 10/19/23 noted in HRS. Left another HIPAA compliant message requesting a return call. Patients emergency contact, Stefan Yazmin, has same contact number as patient. No further outreach attempts indicated by this LPN at this time. Background:  Pt enrolled in RPM r/t HTN  Clinical Interventions: Escalated alert to RN-Update provided       Plan/Follow Up: Will continue to review, monitor and address alerts with follow up based on severity of symptoms and risk factors.

## 2023-10-20 NOTE — CARE COORDINATION
Remote Patient Monitoring Note      Date/Time:  10/20/2023 10:14 AM  Patient Current Location: [place]  LPN attempted to contact patient by telephone regarding red alert received for weight increase (3.0# in last day and 5.0# in last 7 days). Multiple recorded weights on 10/18/23 and 10/19/23 noted in HRS. Background: Pt enrolled in RPM r/t HTN. Clinical Interventions:  Left HIPAA compliant message requesting a return call. Plan/Follow Up: Will continue to review, monitor and address alerts with follow up based on severity of symptoms and risk factors.

## 2023-10-21 LAB
Lab: NORMAL
REFERENCE LAB: NORMAL
REFERENCE LAB: NORMAL

## 2023-10-22 ENCOUNTER — PATIENT MESSAGE (OUTPATIENT)
Dept: FAMILY MEDICINE CLINIC | Facility: CLINIC | Age: 88
End: 2023-10-22

## 2023-10-22 DIAGNOSIS — R26.89 BALANCE PROBLEM: Primary | ICD-10-CM

## 2023-10-22 DIAGNOSIS — R26.9 GAIT ABNORMALITY: ICD-10-CM

## 2023-10-23 ENCOUNTER — CARE COORDINATION (OUTPATIENT)
Dept: CARE COORDINATION | Facility: CLINIC | Age: 88
End: 2023-10-23

## 2023-10-23 ENCOUNTER — HOSPITAL ENCOUNTER (OUTPATIENT)
Dept: GENERAL RADIOLOGY | Age: 88
Discharge: HOME | End: 2023-10-26
Payer: MEDICARE

## 2023-10-23 ENCOUNTER — TELEPHONE (OUTPATIENT)
Dept: FAMILY MEDICINE CLINIC | Facility: CLINIC | Age: 88
End: 2023-10-23

## 2023-10-23 ENCOUNTER — OFFICE VISIT (OUTPATIENT)
Dept: RHEUMATOLOGY | Age: 88
End: 2023-10-23
Payer: MEDICARE

## 2023-10-23 VITALS
BODY MASS INDEX: 30.92 KG/M2 | HEART RATE: 80 BPM | DIASTOLIC BLOOD PRESSURE: 72 MMHG | RESPIRATION RATE: 18 BRPM | WEIGHT: 197 LBS | HEIGHT: 67 IN | SYSTOLIC BLOOD PRESSURE: 118 MMHG

## 2023-10-23 DIAGNOSIS — G44.89 OTHER HEADACHE SYNDROME: ICD-10-CM

## 2023-10-23 DIAGNOSIS — R41.3 MEMORY LOSS: ICD-10-CM

## 2023-10-23 DIAGNOSIS — R26.9 NEUROLOGIC GAIT DYSFUNCTION: ICD-10-CM

## 2023-10-23 DIAGNOSIS — M25.50 POLYARTHRALGIA: ICD-10-CM

## 2023-10-23 DIAGNOSIS — G44.209 TENSION-TYPE HEADACHE, NOT INTRACTABLE, UNSPECIFIED CHRONICITY PATTERN: Primary | ICD-10-CM

## 2023-10-23 DIAGNOSIS — M54.2 CHRONIC NECK PAIN: ICD-10-CM

## 2023-10-23 DIAGNOSIS — R76.8 POSITIVE ANA (ANTINUCLEAR ANTIBODY): Primary | ICD-10-CM

## 2023-10-23 DIAGNOSIS — G89.29 CHRONIC NECK PAIN: ICD-10-CM

## 2023-10-23 PROCEDURE — 72040 X-RAY EXAM NECK SPINE 2-3 VW: CPT

## 2023-10-23 PROCEDURE — 99215 OFFICE O/P EST HI 40 MIN: CPT | Performed by: INTERNAL MEDICINE

## 2023-10-23 PROCEDURE — 1123F ACP DISCUSS/DSCN MKR DOCD: CPT | Performed by: INTERNAL MEDICINE

## 2023-10-23 ASSESSMENT — ROUTINE ASSESSMENT OF PATIENT INDEX DATA (RAPID3)
ON A SCALE OF ONE TO TEN, HOW MUCH PAIN HAVE YOU HAD BECAUSE OF YOUR CONDITION OVER THE PAST WEEK?: 5
ON A SCALE OF ONE TO TEN, HOW MUCH OF A PROBLEM HAS UNUSUAL FATIGUE OR TIREDNESS BEEN FOR YOU OVER THE PAST WEEK?: 5
ON A SCALE OF ONE TO TEN, HOW DIFFICULT WAS IT FOR YOU TO COMPLETE THE LISTED DAILY PHYSICAL TASKS OVER THE LAST WEEK: 0.5
ON A SCALE OF ONE TO TEN, CONSIDERING ALL THE WAYS IN WHICH ILLNESS AND HEALTH CONDITIONS MAY AFFECT YOU AT THIS TIME, PLEASE INDICATE BELOW HOW YOU ARE DOING:: 5
WHEN YOU AWAKENED IN THE MORNING OVER THE LAST WEEK, PLEASE INDICATE THE AMOUNT OF TIME IT TAKES UNTIL YOU ARE AS LIMBER AS YOU WILL BE FOR THE DAY: 15 MIN

## 2023-10-23 NOTE — CARE COORDINATION
Ambulatory Care Management Note        Date/Time:  10/23/2023 9:44 AM    Ccm outreached to patient. No answer at this time, message left. Awaiting response. If no response, ccm will outreach next week.

## 2023-10-23 NOTE — CARE COORDINATION
Remote Patient Monitoring Note      Date/Time:  10/23/2023 11:28 AM  Patient Current Location: [address] [facility]  Patient Current Location: [address] [facility]  LPN 2nd attempt to contact patient by telephone regarding red alert received for weight increase (5.0# in last 7 days). Multiple recorded weights on 10/18/23, 10/19/23 and 10/21/23 noted in HRS. Unable to reach pt and unable to leave another message; temporarily unavailable recording received. Patients emergency contact, Fidencio Cr, has same contact number as patient. No further outreach attempts indicated by this LPN at this time. Background:  Pt enrolled in RPM r/t HTN  Clinical Interventions: Escalated alert to RN-Update provided       Plan/Follow Up: Will continue to review, monitor and address alerts with follow up based on severity of symptoms and risk factors.

## 2023-10-23 NOTE — CARE COORDINATION
Remote Patient Monitoring Note      Date/Time:  10/23/2023 9:48 AM  Patient Current Location: [facility]  LPN attempted to contact patient by telephone regarding red alert received for weight increase (5# in last 7 days). Multiple recorded weights on 10/18/23, 10/19/23 and 10/21/23 noted in HRS. Background: Pt enrolled in RPM r/t HTN  Clinical Interventions:  Left HIPAA compliant message requesting a return call. Plan/Follow Up: Will continue to review, monitor and address alerts with follow up based on severity of symptoms and risk factors.

## 2023-10-23 NOTE — PROGRESS NOTES
`10/23/2023    SUBJECTIVE:  Erick Sheikh is a 80 y.o. female that is here for follow-up of:     JOEL 1:80 homogenous, negative double-stranded DNA by hollya, reflex panel with double-stranded DNA quantitative  Negative RF, CCP    PMH:  - back pain   - HTN HL afib s/p pacemaker watchman  - L elbow surgery 2012  - R knee arthroscopy 2000s   ---  First f/u. Reviewed labs 9-, x-rays bilateral hands and feet 10-. Established with Dr. Magui Nash since last visit -started on namenda. Note reviewed 10-2-2023-reports fall while visiting family in [place] without loss of consciousness and progressive memory loss since that time. Patient's daughter Christian Rogers attends visit today. She sent a [employer] message on 10- reporting additional concerns of forgetfulness, confusion, headache, neck pain, short-term memory loss, possible auditory hallucinations. She reports that her mother took a steroid pack and had improvement in memory and joint pain. Note reviewed from Dr. Saranya Layton for acute progressive memory loss and planned MRI if possible due to recent Watchman procedure. Labs noted is negative for autoimmune encephalitis    Used steroids 1 month for 5 days due to muscle pain in neck and low back-this helped manage symptoms. Patient has been referred to PT but has not started this. Clarifies: Am stiffness 15 mins. Episodic heel swelling bilaterally - this was resolved with pred most recently but isn't sure how long it will last in duration. Isn't sure how often episodes occur. Never dactylitis  Never uveitis. Going for routine eval with ophtho. Episodic Back pain - pain increases with bending over with activity, rest will improve symptoms. Denies rash  Ongoing neck pain - worse with extension and associated HA development with. Can wake up with neck pain and HA in mornings - onset few months ago. Denies joint swelling today    No headache at present.   Denies jaw

## 2023-10-24 ENCOUNTER — TELEPHONE (OUTPATIENT)
Dept: NEUROLOGY | Age: 88
End: 2023-10-24

## 2023-10-24 ENCOUNTER — CARE COORDINATION (OUTPATIENT)
Dept: CARE COORDINATION | Facility: CLINIC | Age: 88
End: 2023-10-24

## 2023-10-24 ENCOUNTER — TELEPHONE (OUTPATIENT)
Dept: RHEUMATOLOGY | Age: 88
End: 2023-10-24

## 2023-10-24 NOTE — TELEPHONE ENCOUNTER
Rojas Nelson from [address] [facility] called and stated that Dr. Angélica Johnston would like to talk to you in regards to this pt.     Dr. Michael Arana phone number is [phone]    Kimberly phone number is [phone]

## 2023-10-24 NOTE — TELEPHONE ENCOUNTER
Reviewed labs from yesterday's visit - CRP 1.1, normal ESR - levels are not very elevated to pursue further workup of GCA at this time. Will repeat in 2-4 weeks and then determine if worth pursuing biopsy and further diagnosis of GCA.

## 2023-10-24 NOTE — CARE COORDINATION
Remote Alert Monitoring Note  Rpm alert to be reviewed by the provider   red alert   weight (5.0# increase in last 7 days) Multiple recorded weights on 10/18/23, 10/19/23 and 10/21/23 noted in HRS. Additional needs to be addressed by N/A: No                    Date/Time:  10/24/2023 10:56 AM  Patient Current Location: [place]  LPN attempted to contact patient by telephone. Spoke with patients emergency contact / daughter, Eva Wild. Verified patients name and  as identifiers. Background: Pt enrolled in RPM r/t HTN  Refer to 911 immediately if:  Patient unresponsive or unable to provide history  Change in cognition or sudden confusion  Patient unable to respond in complete sentences  Intense chest pain/tightness  Any concern for any clinical emergency  Red Alert: Provider response time of 1 hr required for any red alert requiring intervention  Yellow Alert: Provider response time of 3hr required for any escalated yellow alert      Clinical Interventions: Reviewed and followed up on alerts and treatments-Discussed alert for weight increase and multiple recorded weights from 10/18/23, 10/19/23 and 10/21/23 with patients daughter. Weights ranging from 16.0# - 200.2# recorded in HRS from 10/18/23 - 10/21/23. Current recorded weight in HRS is 188.4#, 8.6# less than weight obtained during office visit on 10/23/23. Per patients daughter, patients average weight \"is like 194. It was 196 yesterday\". Daughter reports taking pt to appointments yesterday to have foot pain and swelling addressed that \"may be due to arthritis inflammation\". Weight of 197.0# obtained during rheumatology appointment; see encounter. Unable to complete weight scale triage questions with patients daughter due to daughter living in [place]. Per documentation, there is \"concern for acute progressive memory loss\".  Patients daughter states that pt now has a caretaker with her from 9:00 AM - 12:00 PM and that caretaker may be able to

## 2023-10-24 NOTE — TELEPHONE ENCOUNTER
Dr. Noelel Ingram requesting contact number for Dr. Kunal Perry to discuss this mutual patient. Tried to reach neurology, unable to get an answer on physician line. Called back, had to leave message on MA/Dr. Mejia's extension. Left my contact info, Dr Noelle Ingram contact info and pt info for review.

## 2023-10-25 ENCOUNTER — CLINICAL DOCUMENTATION (OUTPATIENT)
Dept: NEUROLOGY | Age: 88
End: 2023-10-25

## 2023-10-25 ENCOUNTER — CARE COORDINATION (OUTPATIENT)
Dept: CARE COORDINATION | Facility: CLINIC | Age: 88
End: 2023-10-25

## 2023-10-25 DIAGNOSIS — M25.50 POLYARTHRALGIA: Primary | ICD-10-CM

## 2023-10-25 NOTE — CARE COORDINATION
Remote Alert Monitoring Note  Rpm alert to be reviewed by the provider   red alert   weight (5.0# increase in last 7 days and 3.0# increase in last day)   Additional needs to be addressed by N/A: No                    Date/Time:  10/25/2023 10:32 AM  Patient Current Location: Home: [address] [place] [address] Dr [place] [phone]  LPN contacted patient by telephone. Verified patients name and  as identifiers. Background: Pt enrolled in RPM r/t HTN  Refer to 911 immediately if:  Patient unresponsive or unable to provide history  Change in cognition or sudden confusion  Patient unable to respond in complete sentences  Intense chest pain/tightness  Any concern for any clinical emergency  Red Alert: Provider response time of 1 hr required for any red alert requiring intervention  Yellow Alert: Provider response time of 3hr required for any escalated yellow alert    Weight Scale Triage  Was your weight obtained upon rising/waking today? yes   Was your weight obtained after voiding and/or use of the bathroom today? yes   Did you weigh yourself in the same amount of clothing today, compared to how you typically do? yes   Was the scale bumped or moved prior to today's weight? no   Is your scale on a flat/hard surface? yes   Did you obtain your weight with shoes on? no   If yes, is this something you normally do during your daily weights? NA   Were you standing up straight on the scale today? yes   Were you leaning on anything while obtaining your weight today? no     Clinical Interventions: Reviewed and followed up on alerts and treatments-Pt denies CP, SOB, and new or worsening edema. Discussed multiple recorded HRS weights with pt. Pt stated, \"I don't think any of those are right\". LPN previously spoke with patients emergency contact / daughter. See RPM note from 10/24/23. Patients daughter previously stated that patients average weight \"is like 194. It was 196 yesterday\".  Weight of 197.0# obtained during rheumatology

## 2023-10-25 NOTE — PROGRESS NOTES
Rheumatology did send me a note stating that they did not feel this is giant cell arteritis or temporal arteritis her sed rate is minimally changed the C-reactive protein is in the normal range. And I would agree. I would also agree definitely no biopsy at this time.

## 2023-10-25 NOTE — PROGRESS NOTES
Entered orders for crp esr and added note for lab site that 2-4 week is estimated time frame and can vary by 1-2 weeks for patient to do these labs

## 2023-10-26 ENCOUNTER — CARE COORDINATION (OUTPATIENT)
Dept: CARE COORDINATION | Facility: CLINIC | Age: 88
End: 2023-10-26

## 2023-10-26 NOTE — CARE COORDINATION
Remote Patient Monitoring Note      Date/Time:  10/26/2023 11:49 AM  Patient Current Location: [facility]  LPN 2nd attempt to contact patient by telephone regarding red alert received for blood pressure reading (152/102) and weight increase (3.0# in last day and 14.4# since 10/21/23). Unable to reach pt and unable to leave message. LPN attempted to contact patients emergency contact / daughter, Patito Coles, per [facility] protocol. Left HIPAA compliant message requesting a return call. Background:  Pt enrolled in RPM r/t HTN  Clinical Interventions: Escalated alert to RN-update provided      Plan/Follow Up: Will continue to review, monitor and address alerts with follow up based on severity of symptoms and risk factors.

## 2023-10-26 NOTE — CARE COORDINATION
Remote Patient Monitoring Note      Date/Time:  10/26/2023 3:35 PM  Patient Current Location: [address] [place] [address]  Second RPM red alert received for weight increase today. Second recorded weight noted in HRS is 2.2# less than initial recorded daily weight and 12.2# more per 5 day look back. No return call received from patient or emergency contact as of this time. Background: Pt enrolled in RPM r/t HTN  Clinical Interventions: Escalated alert to RN-FYI update provided      Plan/Follow Up: Will continue to review, monitor and address alerts with follow up based on severity of symptoms and risk factors.

## 2023-10-26 NOTE — CARE COORDINATION
Remote Patient Monitoring Note      Date/Time:  10/26/2023 10:30 AM  Patient Current Location: [facility]  LPN attempted to contact patient by telephone regarding red alert received for blood pressure reading (152/102) and weight increase (3.0# increase in last day and 14.4# increase since 10/21/23). Unable to reach pt and unable to leave message. Background: Pt enrolled in RPM r/t HTN      Plan/Follow Up: Will continue to review, monitor and address alerts with follow up based on severity of symptoms and risk factors.

## 2023-10-27 ENCOUNTER — CARE COORDINATION (OUTPATIENT)
Dept: CARE COORDINATION | Facility: CLINIC | Age: 88
End: 2023-10-27

## 2023-10-27 NOTE — CARE COORDINATION
Remote Alert Monitoring Note  Rpm alert to be reviewed by the provider   red alert   weight (11.0# increase since 10/21/23)   Additional needs to be addressed by N/A: No                    Date/Time:  10/27/2023 11:15 AM  Patient Current Location: Home: [address] [place] [address] [address] [address]  LPN contacted patient by telephone. Verified patients name and  as identifiers. Background: Pt enrolled in RPM r/t HTN  Refer to 911 immediately if:  Patient unresponsive or unable to provide history  Change in cognition or sudden confusion  Patient unable to respond in complete sentences  Intense chest pain/tightness  Any concern for any clinical emergency  Red Alert: Provider response time of 1 hr required for any red alert requiring intervention  Yellow Alert: Provider response time of 3hr required for any escalated yellow alert    Weight Scale Triage  Was your weight obtained upon rising/waking today? yes   Was your weight obtained after voiding and/or use of the bathroom today? yes   Did you weigh yourself in the same amount of clothing today, compared to how you typically do? yes   Was the scale bumped or moved prior to today's weight? no   Is your scale on a flat/hard surface? yes   Did you obtain your weight with shoes on? no   If yes, is this something you normally do during your daily weights? NA   Were you standing up straight on the scale today? yes   Were you leaning on anything while obtaining your weight today? no        Clinical Interventions: Reviewed and followed up on alerts and treatments-Pt denies SOB and new or worsening edema. Discussed weight fluctuation with pt. Pt stated, \"I don't know why they are so different. This thing confuses me\". RPM weight instructions reviewed with pt. Pt stated, \"Ok. I think I got it\". Current recorded HRS weight is 198.4#;  3.4# less than 1st recorded weight and 1.2# less than 2nd recorded weight from 10/26/23.  Weight of 197.0# obtained during rheumatology

## 2023-10-29 LAB
Lab: NORMAL
Lab: NORMAL
REFERENCE LAB: NORMAL

## 2023-10-30 ENCOUNTER — CARE COORDINATION (OUTPATIENT)
Dept: CARE COORDINATION | Facility: CLINIC | Age: 88
End: 2023-10-30

## 2023-10-30 NOTE — CARE COORDINATION
flowsheet data found.     Barriers: none  Plan for overcoming my barriers: N/A  Confidence: 9/10  Anticipated Goal Completion Date: 3/19/23                Future Appointments   Date Time Provider [address]   11/2/2023  1:45 PM Codey Singh MD [facility] AMB   11/8/2023 10:30 AM Florecita ROBLES, PT [facility]   11/10/2023  2:00 PM [facility] MRI UNIT 1 SFDRMRI D   2/2/2024  1:00 PM Jesse Yoon DO [facility] AMB   2/22/2024  8:45 AM Codey Singh MD [facility] AMB   3/22/2024  2:15 PM DO REYNA BernardDG GVL AMB   4/22/2024 11:00 AM Viviana Barba MD [facility] GVL AMB

## 2023-10-31 ENCOUNTER — CARE COORDINATION (OUTPATIENT)
Facility: CLINIC | Age: 88
End: 2023-10-31

## 2023-10-31 NOTE — CARE COORDINATION
Remote Patient Monitoring Note      Date/Time:  10/31/2023 11:08 AM  Patient Current Location: [facility]  LPN attempted to contact patient by telephone regarding red alert received for weight increase (5.0+# increase in last 7 days and 3.0+# increase in last day). Unable to reach pt and unable to leave message. Will attempt outreach per RPM protocol. Background: Pt enrolled in RPM r/t HTN      Plan/Follow Up: Will continue to review, monitor and address alerts with follow up based on severity of symptoms and risk factors.

## 2023-10-31 NOTE — CARE COORDINATION
Remote Patient Monitoring Note      Date/Time:  10/31/2023 12:00 PM  Patient Current Location: [facility]  LPN 2nd attempt to contact patient by telephone regarding red alert received for weight increase (5.0+# increase in last 7 days and 3.0+# increase in last day). Unable to reach pt and unable to leave message. LPN attempted to contact patients emergency contact, Jhon Cook, per [facility] protocol. Left HIPAA compliant message requesting a return call. Background: Pt enrolled in [facility] r/t HTN  Clinical Interventions: Escalated alert to RN-Update provided      Plan/Follow Up: Will continue to review, monitor and address alerts with follow up based on severity of symptoms and risk factors.

## 2023-11-01 ENCOUNTER — CARE COORDINATION (OUTPATIENT)
Facility: CLINIC | Age: 88
End: 2023-11-01

## 2023-11-01 NOTE — CARE COORDINATION
Remote Alert Monitoring Note  Rpm alert to be reviewed by the provider   red alert   weight (5.0+# increase in last 7 days)   Additional needs to be addressed by N/A: No                    Date/Time:  2023 11:26 AM  Patient Current Location: Home: [address] [place] [address] [address] [place] [address]  LPN contacted patient by telephone. Verified patients name and  as identifiers. Background: Pt enrolled in RPM r/t HTN  Refer to 911 immediately if:  Patient unresponsive or unable to provide history  Change in cognition or sudden confusion  Patient unable to respond in complete sentences  Intense chest pain/tightness  Any concern for any clinical emergency  Red Alert: Provider response time of 1 hr required for any red alert requiring intervention  Yellow Alert: Provider response time of 3hr required for any escalated yellow alert    Weight Triage  Are you weighing any different than you did yesterday? (time of day, clothes and shoes on or off, etc)? no   Do you have any shortness of breath? no   Do you have any swelling in your hands or feet? no   Are you having any other health concerns or issues? no     Weight Scale Triage  Was your weight obtained upon rising/waking today? yes   Was your weight obtained after voiding and/or use of the bathroom today? yes   Did you weigh yourself in the same amount of clothing today, compared to how you typically do? yes   Was the scale bumped or moved prior to today's weight? yes   Is your scale on a flat/hard surface? yes   Did you obtain your weight with shoes on? no   If yes, is this something you normally do during your daily weights? NA   Were you standing up straight on the scale today? yes   Were you leaning on anything while obtaining your weight today? no      Clinical Interventions: Reviewed and followed up on alerts and treatments-Pt denies SOB and new or worsening edema. Pt is asymptomatic and reports continuing to move scale daily.  Multiple recorded weights on 10/30/23

## 2023-11-02 ENCOUNTER — CARE COORDINATION (OUTPATIENT)
Dept: CARE COORDINATION | Facility: CLINIC | Age: 88
End: 2023-11-02

## 2023-11-02 ENCOUNTER — OFFICE VISIT (OUTPATIENT)
Dept: FAMILY MEDICINE CLINIC | Facility: CLINIC | Age: 88
End: 2023-11-02
Payer: MEDICARE

## 2023-11-02 VITALS
HEART RATE: 69 BPM | BODY MASS INDEX: 31.01 KG/M2 | OXYGEN SATURATION: 99 % | TEMPERATURE: 97.3 F | SYSTOLIC BLOOD PRESSURE: 122 MMHG | WEIGHT: 198 LBS | DIASTOLIC BLOOD PRESSURE: 76 MMHG

## 2023-11-02 DIAGNOSIS — M25.50 CHRONIC JOINT PAIN: ICD-10-CM

## 2023-11-02 DIAGNOSIS — I10 PRIMARY HYPERTENSION: Primary | ICD-10-CM

## 2023-11-02 DIAGNOSIS — G89.29 CHRONIC JOINT PAIN: ICD-10-CM

## 2023-11-02 DIAGNOSIS — M54.2 NECK PAIN: ICD-10-CM

## 2023-11-02 PROCEDURE — 1123F ACP DISCUSS/DSCN MKR DOCD: CPT | Performed by: FAMILY MEDICINE

## 2023-11-02 PROCEDURE — 99213 OFFICE O/P EST LOW 20 MIN: CPT | Performed by: FAMILY MEDICINE

## 2023-11-02 PROCEDURE — 96372 THER/PROPH/DIAG INJ SC/IM: CPT | Performed by: FAMILY MEDICINE

## 2023-11-02 RX ORDER — METHYLPREDNISOLONE ACETATE 40 MG/ML
40 INJECTION, SUSPENSION INTRA-ARTICULAR; INTRALESIONAL; INTRAMUSCULAR; SOFT TISSUE ONCE
Status: COMPLETED | OUTPATIENT
Start: 2023-11-02 | End: 2023-11-02

## 2023-11-02 RX ADMIN — METHYLPREDNISOLONE ACETATE 40 MG: 40 INJECTION, SUSPENSION INTRA-ARTICULAR; INTRALESIONAL; INTRAMUSCULAR; SOFT TISSUE at 14:36

## 2023-11-02 ASSESSMENT — ENCOUNTER SYMPTOMS
GASTROINTESTINAL NEGATIVE: 1
RESPIRATORY NEGATIVE: 1
EYES NEGATIVE: 1

## 2023-11-02 NOTE — CARE COORDINATION
Remote Patient Monitoring Note      Date/Time:  11/2/2023 9:40 AM  Patient Current Location: [facility]  LPN attempted to contact patient by telephone regarding red alert received for weight increase (5.0+# in last 7 days). Unable to reach pt and unable to leave message. Will continue outreach per RPM protocol. Background: Pt enrolled in RPM r/t HTN        Plan/Follow Up: Will continue to review, monitor and address alerts with follow up based on severity of symptoms and risk factors.

## 2023-11-02 NOTE — CARE COORDINATION
Remote Patient Monitoring Note      Date/Time:  11/2/2023 10:29 AM  Patient Current Location: [facility]  Upon further chart review, inaccurate weight of 184.2# recorded on 10/30/23 noted in HRS. Inaccurate weight removed from HRS. RPM green alert received and reviewed. No further outreach indicated at this time. Background: Pt enrolled in [facility] r/t HTN      Plan/Follow Up: Will continue to review, monitor and address alerts with follow up based on severity of symptoms and risk factors.

## 2023-11-02 NOTE — PROGRESS NOTES
HISTORY OF PRESENT ILLNESS    Matt Gutierrez is a 80 y.o. female. HPI  Chief Complaint   Patient presents with    1 Month Follow-Up    Neck Pain     See above. Stable on maintenance medications. No side effects from BP medication. No headache or vision change. Denies chest pain or SOB. No swelling. Has follow up with rheumatologist. History of chronic pain in multiple joints and positive JOEL. At present symptoms are relatively stable except for neck pain. No radiation in to face or arms. Worse when turning head. In the past has responded well to steroid. Current Outpatient Medications on File Prior to Visit   Medication Sig Dispense Refill    B Complex Vitamins (VITAMIN B COMPLEX PO) Take 1 tablet by mouth daily. acetaminophen (TYLENOL) 500 MG tablet Take 2 tablets by mouth as needed for Pain      memantine (NAMENDA) 10 MG tablet Take half bid for one month than one bid (Patient taking differently: Take half bid for one month than one bid    As of 10/23/23 still on 0.5 tab) 180 tablet 3    butalbital-acetaminophen-caffeine (FIORICET, ESGIC) -40 MG per tablet Take 1 tablet by mouth every 4 hours as needed for Headaches 180 tablet 3    losartan (COZAAR) 100 MG tablet Take 1 tablet by mouth daily 90 tablet 1    aspirin EC 81 MG EC tablet Take 1 tablet by mouth daily 90 tablet 1    TURMERIC PO Take 3 capsules by mouth daily Turmeric and Ginger       metoprolol succinate (TOPROL XL) 25 MG extended release tablet Take 1 tablet by mouth in the morning and 1 tablet in the evening.  180 tablet 3    Bioflavonoid Products (RICK C PO) Take 1 tablet by mouth daily      Multiple Vitamins-Minerals (ONE-A-DAY WOMENS PO) Take 1 tablet by mouth daily      AZO-CRANBERRY PO Take 1 tablet by mouth in the morning and at bedtime      Cholecalciferol 50 MCG (2000 UT) TABS Take 1 tablet by mouth daily      magnesium oxide (MAG-OX) 400 (240 Mg) MG tablet Take 1 tablet by mouth 2 times daily      DULoxetine (CYMBALTA) 30 MG

## 2023-11-06 ENCOUNTER — CARE COORDINATION (OUTPATIENT)
Dept: CARE COORDINATION | Facility: CLINIC | Age: 88
End: 2023-11-06

## 2023-11-06 NOTE — CARE COORDINATION
none  Plan for overcoming my barriers: N/A  Confidence: 9/10  Anticipated Goal Completion Date: 3/19/23                Future Appointments   Date Time Provider [address]   11/8/2023 10:30 AM Hallie Michelle PT [facility] [place] [facility]   11/10/2023  2:00 PM [facility] MRI UNIT 1 SFDRMRI [facility]FD   2/2/2024  1:00 PM Codie HERRERA DO [facility] AMB   2/22/2024  8:45 AM Donya Cook MD F [facility] AMB   3/22/2024  2:15 PM DO REYNA PeralesDG GVL AMB   4/22/2024 11:00 AM Marya Koo MD BS GVL AMB

## 2023-11-08 ENCOUNTER — HOSPITAL ENCOUNTER (OUTPATIENT)
Dept: PHYSICAL THERAPY | Age: 88
Setting detail: RECURRING SERIES
Discharge: HOME | End: 2023-11-11
Payer: MEDICARE

## 2023-11-08 DIAGNOSIS — R54 FRAILTY SYNDROME IN GERIATRIC PATIENT: ICD-10-CM

## 2023-11-08 DIAGNOSIS — R26.89 IMBALANCE: Primary | ICD-10-CM

## 2023-11-08 DIAGNOSIS — M62.81 MUSCLE WEAKNESS (GENERALIZED): ICD-10-CM

## 2023-11-08 DIAGNOSIS — R26.81 GAIT INSTABILITY: ICD-10-CM

## 2023-11-08 PROCEDURE — 97110 THERAPEUTIC EXERCISES: CPT

## 2023-11-08 PROCEDURE — 97162 PT EVAL MOD COMPLEX 30 MIN: CPT

## 2023-11-08 NOTE — THERAPY EVALUATION
Fina Euceda  : 1935  Primary: Desmond Raymundo Ppo (Medicare Managed)  Secondary:  [address] [facility] [address]  Phone: [phone]  Fax: [phone] Plan Frequency: 2x/wk for 8 weeks for a 2 week hold due to pt travel schedule    Plan of Care/Certification Expiration Date: 24      PT Visit Info:  Plan Frequency: 2x/wk for 8 weeks for a 2 week hold due to pt travel schedule  Plan of Care/Certification Expiration Date: 24      Visit Count:  1                OUTPATIENT PHYSICAL THERAPY:             Initial Assessment 2023               Episode (imbalance) Appt Desk         Treatment Diagnosis:    Imbalance  Gait instability  Muscle weakness (generalized)  Frailty syndrome in geriatric patient  Medical/Referring Diagnosis:      Referring Physician:  Mickey Causey MD MD Orders:  PT Eval and Treat   Return MD Appt:  TBD by patient  Date of Onset:  Onset Date:  (Chronic with recent fall 1 week ago per patient)      Allergies:  Guaifenesin, Loratadine-pseudoephedrine er, Amoxicillin, and Sulfa antibiotics  Restrictions/Precautions:    Restrictions/Precautions: Fall Risk        Medications Last Reviewed:  2023     SUBJECTIVE   History of Injury/Illness (Reason for Referral):  Pt had a fall few days ago after getting up fast off the toilet. Pt reports that she hurt her back side. Pt reports she does not remember how she got up, but thinks someone came to help her up. Pt indicates she hit her head when fall happened and will be getting an MRI. Pt reports she has difficulty lifting and carrying household objects - including pots/pans. Pt is no longer driving as children took away car. Pt lacks insight into her deficits. Pt reports she is sleeping well. Pt indicates her appetite is very low - maybe two meals a day alternating between eggs, toast, sandwhich, and indicates her children take her restaurants.  Pt

## 2023-11-08 NOTE — PROGRESS NOTES
Marta Yaa  : 1935  Primary: Aicha Ramon Ppo (Medicare Managed)  Secondary:  [address] [facility] [address]  Phone: [phone]  Fax: [phone] Plan Frequency: 2x/wk for 8 weeks for a 2 week hold due to pt travel schedule    Plan of Care/Certification Expiration Date: 24      >PT Visit Info:  Plan Frequency: 2x/wk for 8 weeks for a 2 week hold due to pt travel schedule  Plan of Care/Certification Expiration Date: 24      Visit Count:  1    OUTPATIENT PHYSICAL THERAPY: Treatment Note 2023       Episode  }Appt Desk             Treatment Diagnosis:    Imbalance  Gait instability  Muscle weakness (generalized)  Frailty syndrome in geriatric patient  Medical/Referring Diagnosis:    Balance problem  Gait abnormality  Referring Physician:  Pancho Esquivel MD MD Orders:  PT Eval and Treat   Date of Onset:  Onset Date:  (Chronic with recent fall 1 week ago per patient)     Allergies:   Guaifenesin, Loratadine-pseudoephedrine er, Amoxicillin, and Sulfa antibiotics  Restrictions/Precautions:  Restrictions/Precautions: Fall Risk  No data recorded   Interventions Planned (Treatment may consist of any combination of the following):    Current Treatment Recommendations: Strengthening; ROM; Balance training; Functional mobility training; ADL/Self-care training; IADL training; Endurance training; Gait training; Stair training; Neuromuscular re-education; Return to work related activity; Home exercise program; Safety education & training; Patient/Caregiver education & training; Equipment evaluation, education, & procurement;  Therapeutic activities     >Subjective Comments:  please see eval  >Initial:   ache in buttock   /10>Post Session:    ache in buttock    /10  Medications Last Reviewed:  2023  Updated Objective Findings:  See Evaluation Note from today  Treatment     THERAPEUTIC EXERCISE: ( 10 minutes):

## 2023-11-09 ENCOUNTER — CARE COORDINATION (OUTPATIENT)
Dept: CARE COORDINATION | Facility: CLINIC | Age: 88
End: 2023-11-09

## 2023-11-09 NOTE — CARE COORDINATION
Remote Patient Monitoring Note      Date/Time:  11/9/2023 10:51 AM  Patient Current Location: [facility]  LPN attempted to contact patient by telephone regarding red alert received for weight increase (5.6# since 11/3/23). Unable to reach pt and unable to leave message. Will continue outreach per RPM protocol. Background: Pt enrolled in RPM r/t HTN. Plan/Follow Up: Will continue to review, monitor and address alerts with follow up based on severity of symptoms and risk factors.

## 2023-11-09 NOTE — CARE COORDINATION
Remote Alert Monitoring Note  Rpm alert to be reviewed by the provider   red alert   weight (5.6# increase since 11/3/23)   Additional needs to be addressed by N/A: No                    Date/Time:  2023 12:20 PM  Patient Current Location: Home: [address] [place] [address] [address] [place] [address]  LPN contacted patient by telephone. Verified patients name and  as identifiers. Background:Pt enrolled in RPM r/t HTN. Refer to 911 immediately if:  Patient unresponsive or unable to provide history  Change in cognition or sudden confusion  Patient unable to respond in complete sentences  Intense chest pain/tightness  Any concern for any clinical emergency  Red Alert: Provider response time of 1 hr required for any red alert requiring intervention  Yellow Alert: Provider response time of 3hr required for any escalated yellow alert    Weight Scale Triage  Was your weight obtained upon rising/waking today? No   Was your weight obtained after voiding and/or use of the bathroom today? yes   Did you weigh yourself in the same amount of clothing today, compared to how you typically do? yes   Was the scale bumped or moved prior to today's weight? no   Is your scale on a flat/hard surface? yes   Did you obtain your weight with shoes on? no   If yes, is this something you normally do during your daily weights? NA   Were you standing up straight on the scale today? yes   Were you leaning on anything while obtaining your weight today? no       Clinical Interventions: Reviewed and followed up on alerts and treatments-Pt denies SOB and new or worsening edema / pt asymptomatic. Pt did not obtain weight upon rising due to waiting for assistance. 5.2# decrease from 23-11/3/23 noted in HRS. Patients current weight is 198.2#. 0.2# greater than weight obtained during PCP visit on 23. Pt verbalizes understanding of when to notify provider(s) of changes / concerns and when to seek emergent medical care.    Escalated alert to RNKEITH

## 2023-11-10 ENCOUNTER — HOSPITAL ENCOUNTER (OUTPATIENT)
Dept: GENERAL RADIOLOGY | Age: 88
End: 2023-11-10
Attending: FAMILY MEDICINE
Payer: MEDICARE

## 2023-11-10 ENCOUNTER — CARE COORDINATION (OUTPATIENT)
Dept: CARE COORDINATION | Facility: CLINIC | Age: 88
End: 2023-11-10

## 2023-11-10 ENCOUNTER — TELEPHONE (OUTPATIENT)
Dept: FAMILY MEDICINE CLINIC | Facility: CLINIC | Age: 88
End: 2023-11-10

## 2023-11-10 ENCOUNTER — HOSPITAL ENCOUNTER (OUTPATIENT)
Dept: MRI IMAGING | Age: 88
End: 2023-11-10
Attending: FAMILY MEDICINE
Payer: MEDICARE

## 2023-11-10 DIAGNOSIS — R26.9 NEUROLOGIC GAIT DYSFUNCTION: ICD-10-CM

## 2023-11-10 DIAGNOSIS — M25.551 BILATERAL HIP PAIN: ICD-10-CM

## 2023-11-10 DIAGNOSIS — M25.552 BILATERAL HIP PAIN: Primary | ICD-10-CM

## 2023-11-10 DIAGNOSIS — M25.552 BILATERAL HIP PAIN: ICD-10-CM

## 2023-11-10 DIAGNOSIS — M25.551 BILATERAL HIP PAIN: Primary | ICD-10-CM

## 2023-11-10 DIAGNOSIS — G44.209 TENSION-TYPE HEADACHE, NOT INTRACTABLE, UNSPECIFIED CHRONICITY PATTERN: ICD-10-CM

## 2023-11-10 PROCEDURE — 6360000004 HC RX CONTRAST MEDICATION: Performed by: FAMILY MEDICINE

## 2023-11-10 PROCEDURE — A9579 GAD-BASE MR CONTRAST NOS,1ML: HCPCS | Performed by: FAMILY MEDICINE

## 2023-11-10 PROCEDURE — 70553 MRI BRAIN STEM W/O & W/DYE: CPT

## 2023-11-10 RX ADMIN — GADOTERIDOL 17 ML: 279.3 INJECTION, SOLUTION INTRAVENOUS at 14:33

## 2023-11-10 NOTE — CARE COORDINATION
Remote Patient Monitoring Note      Date/Time:  11/10/2023 10:12 AM  Patient Current Location: [facility]  LPN attempted to contact patient by telephone regarding red alert received for weight increase (7.2# since 11/3/23). Unable to reach pt and unable to leave message. Will continue outreach per RPM protocol. Background: Pt enrolled in RPM r/t HTN. Plan/Follow Up: Will continue to review, monitor and address alerts with follow up based on severity of symptoms and risk factors.

## 2023-11-10 NOTE — TELEPHONE ENCOUNTER
----- Message from Zay Mcdonald MD sent at 11/10/2023  3:21 PM EST -----  Shows normal age related changes. No acute problem.   ----- Message -----  From: Raz Hodges Incoming Orders Results To Radiant  Sent: 11/10/2023   3:12 PM EST  To: Zay Mcdonald MD

## 2023-11-10 NOTE — CARE COORDINATION
Remote Patient Monitoring Note      Date/Time:  11/10/2023 1:45 PM  Patient Current Location: [facility]  LPN 2nd attempt to contact patient by telephone regarding red alert received for weight increase (7.2# increase sine 11/3/23). Unable to reach pt and unable to leave message. LPN attempted to reach patients emergency contact, Eugene Harrington, per RPM protocol. Left HIPAA compliant message requesting a return call. Background:  Pt enrolled in RPM r/t HTN. Clinical Interventions: Escalated alert to RN-FYI update provided. Plan/Follow Up: Will continue to review, monitor and address alerts with follow up based on severity of symptoms and risk factors.

## 2023-11-13 ENCOUNTER — CARE COORDINATION (OUTPATIENT)
Dept: CARE COORDINATION | Facility: CLINIC | Age: 88
End: 2023-11-13

## 2023-11-13 NOTE — CARE COORDINATION
Ambulatory Care Coordination Note  2023    Patient Current Location:  [facility]     ACM contacted the patient by telephone. Verified name and  with patient as identifiers. Provided introduction to self, and explanation of the ACM role. Challenges to be reviewed by the provider   Additional needs identified to be addressed with provider: No  none               Method of communication with provider: none. ACM: Evy Clark RN    Ccm outreached to patient. Patient states she is doing well at this time. Patient states vitals remain stable. Patient states no questions or concerns. Ccm discussed that I would outreach next week. Patient agreeable. Offered patient enrollment in the Remote Patient Monitoring (RPM) program for in-home monitoring: Yes, patient already enrolled. Lab Results       None            Care Coordination Interventions    Referral from Primary Care Provider: No  Suggested Interventions and Community Resources          Goals Addressed                   This Visit's Progress     Conditions and Symptoms   On track     I will schedule office visits, as directed by my provider. I will keep my appointment or reschedule if I have to cancel. I will notify my provider of any barriers to my plan of care. I will notify my provider of any symptoms that indicate a worsening of my condition. Barriers: none  Plan for overcoming my barriers: N/A  Confidence: 9/10  Anticipated Goal Completion Date: 3/19/23         Self Monitoring   On track     Blood Pressure - I will take my blood pressure as directed - Daily  I will notify my provider of any trends of increasing or decreasing blood pressures over a month period of time. I will notify my provider of any changes in blood pressure associated with symptoms of dizziness, falls, passing out, headache, confusion/change in mental status. Patient Reported Blood Pressure No flowsheet data found.     Barriers: none  Plan for overcoming my

## 2023-11-13 NOTE — CARE COORDINATION
Remote Patient Monitoring Note      Date/Time:  11/13/2023 11:08 AM  Patient Current Location: [facility]  RPM red alert received for weight increase (5.0+# in last 7 days). HRS weights reviewed. 13 recorded weights ranging from 22.4# - 199.0# noted. Inaccurate weights removed. Patients current recorded weight is 194.8# and within RPM parameters. No outreach by this LPN indicated at this time. Background: Pt enrolled in RPM r/t HTN. Plan/Follow Up: Will continue to review, monitor and address alerts with follow up based on severity of symptoms and risk factors.

## 2023-11-14 ENCOUNTER — CARE COORDINATION (OUTPATIENT)
Dept: CARE COORDINATION | Facility: CLINIC | Age: 88
End: 2023-11-14

## 2023-11-14 NOTE — CARE COORDINATION
Remote Alert Monitoring Note  Rpm alert to be reviewed by the provider   red alert   blood pressure reading (136/110)   Additional needs to be addressed by N/A: No                    Date/Time:  2023 9:32 AM  Patient Current Location: Home: [address] [place] [address] [address] [place] [address]  LPN contacted patient by telephone. Verified patients name and  as identifiers. Background: Pt enrolled in RPM r/t HTN. Refer to 911 immediately if:  Patient unresponsive or unable to provide history  Change in cognition or sudden confusion  Patient unable to respond in complete sentences  Intense chest pain/tightness  Any concern for any clinical emergency  Red Alert: Provider response time of 1 hr required for any red alert requiring intervention  Yellow Alert: Provider response time of 3hr required for any escalated yellow alert    BP Triage  Are you having any Chest Pain? no   Are you having any Shortness of Breath? no   Do you have a headache or have any vision changes? no   Are you having any numbness or tingling? no   Are you having any other health concerns or issues? no        Clinical Interventions: Reviewed and followed up on alerts and treatments-Pt asymptomatic / denies acute distress and confirms continued medication adherence. Pt agreeable to recheck BP. Updated readin/87. Pt verbalizes understanding of when to notify provider(s) of changes / concerns and when to seek emergent medical care. No further action necessary at this time. Plan/Follow Up: Will continue to review, monitor and address alerts with follow up based on severity of symptoms and risk factors.

## 2023-11-16 ENCOUNTER — CARE COORDINATION (OUTPATIENT)
Dept: CARE COORDINATION | Facility: CLINIC | Age: 88
End: 2023-11-16

## 2023-11-16 NOTE — CARE COORDINATION
Remote Patient Monitoring Note      Date/Time:  2023 2:53 PM  Patient Current Location: [facility]  LPN contacted patient by telephone regarding yellow alert received for no weight taken x 2 days. Verified patients name and  as identifiers. Background: Pt enrolled in RPM r/t HTN. Clinical Interventions: Discussed RPM adherence with pt. Pt verbalizes understanding and is agreeable to obtain weight \"tomorrow morning\". Pt also verbalizes understanding of when to notify provider(s) of changes / concerns and when to seek emergent medical care. No further action necessary at this time. Plan/Follow Up: Will continue to review, monitor and address alerts with follow up based on severity of symptoms and risk factors.

## 2023-11-17 ENCOUNTER — CARE COORDINATION (OUTPATIENT)
Dept: CARE COORDINATION | Facility: CLINIC | Age: 88
End: 2023-11-17

## 2023-11-17 NOTE — CARE COORDINATION
Remote Patient Monitoring Note      Date/Time:  11/17/2023 12:54 PM  Patient Current Location: [address] [place] [address]  RPM yellow alert received for no weight taken x 3 days. Spoke with pt on 11/16/23 regarding RPM adherence. Pt verbalized understanding and was agreeable to obtain weight \"tomorrow morning\". Pt has not updated daily metrics as of this time. No outreach by this LPN indicated at this time. Background: Pt enrolled in RPM r/t HTN. Clinical Interventions: Escalated alert to RN-FYI update provided      Plan/Follow Up: Will continue to review, monitor and address alerts with follow up based on severity of symptoms and risk factors.

## 2023-11-20 ENCOUNTER — CARE COORDINATION (OUTPATIENT)
Dept: CARE COORDINATION | Facility: CLINIC | Age: 88
End: 2023-11-20

## 2023-11-20 NOTE — CARE COORDINATION
Remote Patient Monitoring Note      Date/Time:  11/20/2023 1:10 PM  Patient Current Location: [facility]  RPM yellow alert received for no BP or weight taken x 4 days. See previous RPM notes from 11/17/23 and 11/16/23. Pt nonadherent with RPM.     Background: Pt enrolled in RPM r/t HTN. Clinical Interventions: Escalated alert to RN-update provided      Plan/Follow Up: Will continue to review, monitor and address alerts with follow up based on severity of symptoms and risk factors.

## 2023-11-20 NOTE — CARE COORDINATION
Ambulatory Care Coordination Note  2023    Patient Current Location:  [place]     ACM contacted the patient by telephone. Verified name and  with patient as identifiers. Provided introduction to self, and explanation of the ACM role. Challenges to be reviewed by the provider   Additional needs identified to be addressed with provider: No  none               Method of communication with provider: none. ACM: Jed Douglass RN    Ccm outreached to patient. Patient states she is doing well at this time. Patient states no questions or concerns. Ccm discussed that I would outreach next week. Patient agreeable. Offered patient enrollment in the Remote Patient Monitoring (RPM) program for in-home monitoring: Yes, patient already enrolled. Lab Results       None            Care Coordination Interventions    Referral from Primary Care Provider: No  Suggested Interventions and Community Resources          Goals Addressed                   This Visit's Progress     Conditions and Symptoms   On track     I will schedule office visits, as directed by my provider. I will keep my appointment or reschedule if I have to cancel. I will notify my provider of any barriers to my plan of care. I will notify my provider of any symptoms that indicate a worsening of my condition. Barriers: none  Plan for overcoming my barriers: N/A  Confidence: 9/10  Anticipated Goal Completion Date: 3/19/23         Self Monitoring   On track     Blood Pressure - I will take my blood pressure as directed - Daily  I will notify my provider of any trends of increasing or decreasing blood pressures over a month period of time. I will notify my provider of any changes in blood pressure associated with symptoms of dizziness, falls, passing out, headache, confusion/change in mental status. Patient Reported Blood Pressure No flowsheet data found.     Barriers: none  Plan for overcoming my barriers: N/A  Confidence:

## 2023-11-24 ENCOUNTER — CARE COORDINATION (OUTPATIENT)
Dept: CARE COORDINATION | Facility: CLINIC | Age: 88
End: 2023-11-24

## 2023-11-24 VITALS
SYSTOLIC BLOOD PRESSURE: 161 MMHG | WEIGHT: 192.8 LBS | DIASTOLIC BLOOD PRESSURE: 86 MMHG | OXYGEN SATURATION: 98 % | BODY MASS INDEX: 30.2 KG/M2 | HEART RATE: 62 BPM

## 2023-11-24 NOTE — CARE COORDINATION
Remote Patient Monitoring Note      Date/Time:  11/24/2023 12:12 PM  Patient Current Location: [place]  LPN attempted to contact patient by telephone regarding yellow alert received for no weight taken x 2 days. Unable to reach pt and unable to leave message. Background: Pt enrolled in RPM r/t HTN. Plan/Follow Up: Will continue to review, monitor and address alerts with follow up based on severity of symptoms and risk factors.

## 2023-11-27 ENCOUNTER — CARE COORDINATION (OUTPATIENT)
Dept: CARE COORDINATION | Facility: CLINIC | Age: 88
End: 2023-11-27

## 2023-11-27 NOTE — CARE COORDINATION
Remote Patient Monitoring Note      Date/Time:  2023 3:11 PM  [facility] regarding yellow alert received for no BP or weight taken x 4 days. Verified patients name and  as identifiers. Background: Pt enrolled in RPM r/t HTN. Clinical Interventions: Discussed RPM adherence with pt. Pt verbalizes understanding and states she will record metrics \"when someone is here to help me'. Pt anticipates private caregiver / Sabianism friend to visit tomorrow from 9 AM - 12 PM and assist pt with completing metrics. Pt verbalizes understanding of when to notify provider(s) of changes / concerns and when to seek emergent medical care. No further action necessary at this time. Plan/Follow Up: Will continue to review, monitor and address alerts with follow up based on severity of symptoms and risk factors.

## 2023-11-27 NOTE — CARE COORDINATION
Ambulatory Care Coordination Note  2023    Patient Current Location:  [place]     ACM contacted the patient by telephone. Verified name and  with patient as identifiers. Provided introduction to self, and explanation of the ACM role. Challenges to be reviewed by the provider   Additional needs identified to be addressed with provider: No  none               Method of communication with provider: none. ACM: Charlette English RN    Ccm outreached to patient. Patient states she is doing well at this time. Discussed with patient importance of taking blood pressure daily. Patient states she would. Patient states no questions or concerns. Ccm discussed that I would outreach next week. Patient agreeable. Offered patient enrollment in the Remote Patient Monitoring (RPM) program for in-home monitoring: Yes, patient already enrolled. Lab Results       None            Care Coordination Interventions    Referral from Primary Care Provider: No  Suggested Interventions and Community Resources          Goals Addressed                   This Visit's Progress     Conditions and Symptoms   On track     I will schedule office visits, as directed by my provider. I will keep my appointment or reschedule if I have to cancel. I will notify my provider of any barriers to my plan of care. I will notify my provider of any symptoms that indicate a worsening of my condition. Barriers: none  Plan for overcoming my barriers: N/A  Confidence: 9/10  Anticipated Goal Completion Date: 3/19/23         Self Monitoring   On track     Blood Pressure - I will take my blood pressure as directed - Daily  I will notify my provider of any trends of increasing or decreasing blood pressures over a month period of time. I will notify my provider of any changes in blood pressure associated with symptoms of dizziness, falls, passing out, headache, confusion/change in mental status.     Patient Reported Blood Pressure No

## 2023-11-28 ENCOUNTER — CARE COORDINATION (OUTPATIENT)
Dept: CARE COORDINATION | Facility: CLINIC | Age: 88
End: 2023-11-28

## 2023-11-28 NOTE — CARE COORDINATION
Remote Patient Monitoring Note      Date/Time:  11/28/2023 12:25 PM  Patient Current Location: [facility] yellow alert received for no BP or weight taken x 5 days. LPN spoke with pt on 11/27/23 regarding RPM adherence. Pt verbalized understanding and stated she will record metrics \"when someone is here to help me\". See RPM not from 11/27/23. Pt has not updated daily metrics as of this time. Pt nonadherent with RPM. Alert escalated to RN ACM. Background: Pt enrolled in RPM r/t HTN. Plan/Follow Up: Will continue to review, monitor and address alerts with follow up based on severity of symptoms and risk factors.

## 2023-12-01 ENCOUNTER — CARE COORDINATION (OUTPATIENT)
Dept: CARE COORDINATION | Facility: CLINIC | Age: 88
End: 2023-12-01

## 2023-12-01 NOTE — CARE COORDINATION
Remote Alert Monitoring Note  Rpm alert to be reviewed by the provider   red alert   \"Weight Increase of 5 lbs In Last 7 Days\"  Additional needs to be addressed by N/A: No                    Date/Time:  2023 11:15 AM  Patient Current Location: Home: [address] [place] [address] Dr Karlo Tang [address]  LPN contacted patient by telephone. Verified patients name and  as identifiers. Background: Pt enrolled in RPM r/t HTN. Refer to 911 immediately if:  Patient unresponsive or unable to provide history  Change in cognition or sudden confusion  Patient unable to respond in complete sentences  Intense chest pain/tightness  Any concern for any clinical emergency  Red Alert: Provider response time of 1 hr required for any red alert requiring intervention  Yellow Alert: Provider response time of 3hr required for any escalated yellow alert    Weight Scale Triage  Was your weight obtained upon rising/waking today? yes   Was your weight obtained after voiding and/or use of the bathroom today? yes   Did you weigh yourself in the same amount of clothing today, compared to how you typically do? yes   Was the scale bumped or moved prior to today's weight? no   Is your scale on a flat/hard surface? yes   Did you obtain your weight with shoes on? no   If yes, is this something you normally do during your daily weights? NA   Were you standing up straight on the scale today? yes   Were you leaning on anything while obtaining your weight today? no      Clinical Interventions: Reviewed and followed up on alerts and treatments-Pt denies CP, SOB, and new or worsening edema. Pt asymptomatic. Discussed multiple recorded weights from 23 and today, 23, with pt. Initial weight of 194.4# was obtained today was at 01:22 AM; second recorded weight of 192.0# was obtained at 9:14 AM. 3.0# - 8.2# increase in the last day and 10.0# - 12.8# weight decrease from 23-23 noted in HRS.  No recorded weights from 23 - 23 (x

## 2023-12-04 ENCOUNTER — CARE COORDINATION (OUTPATIENT)
Dept: CARE COORDINATION | Facility: CLINIC | Age: 88
End: 2023-12-04

## 2023-12-04 ENCOUNTER — HOSPITAL ENCOUNTER (OUTPATIENT)
Dept: PHYSICAL THERAPY | Age: 88
Setting detail: RECURRING SERIES
Discharge: HOME | End: 2023-12-07
Payer: MEDICARE

## 2023-12-04 PROCEDURE — 97110 THERAPEUTIC EXERCISES: CPT

## 2023-12-04 NOTE — CARE COORDINATION
Remote Patient Monitoring Note      Date/Time:  2023 1:50 PM  Patient Current Location: [facility]  LPN attempted to contact patient by telephone regarding yellow alert received for no BP or weight taken x 3 days. Spoke with patients daughter / emergency contact, Sharad Stanton. Verified patients name and  as identifiers. Background: Pt enrolled in RPM r/t HTN. Clinical Interventions:  Discussed RPM adherence with patients daughter. Daughter verbalizes understanding, states she is currently with pt, and is agreeable to assist pt with completing daily metrics when they return to patients home \"in a little bit\". Plan/Follow Up: Will continue to review, monitor and address alerts with follow up based on severity of symptoms and risk factors.

## 2023-12-04 NOTE — PROGRESS NOTES
Joseph Gordon  : 1935  Primary: Fiona Bojorquez Ppo (Medicare Managed)  Secondary:  [address] [facility] [address]  Phone: [phone]  Fax: [phone] Plan Frequency: 2x/wk for 8 weeks for a 2 week hold due to pt travel schedule    Plan of Care/Certification Expiration Date: 24      >PT Visit Info:  Plan Frequency: 2x/wk for 8 weeks for a 2 week hold due to pt travel schedule  Plan of Care/Certification Expiration Date: 24      Visit Count:  2    OUTPATIENT PHYSICAL THERAPY: Treatment Note 2023       Episode  }Appt Desk             Treatment Diagnosis:    No data found  Medical/Referring Diagnosis:      Referring Physician:  Brea Bangura MD MD Orders:  PT Eval and Treat   Date of Onset:  Onset Date:  (Chronic with recent fall 1 week ago per patient)     Allergies:   Guaifenesin, Loratadine-pseudoephedrine er, Amoxicillin, and Sulfa antibiotics  Restrictions/Precautions:  Restrictions/Precautions: Fall Risk  No data recorded   Interventions Planned (Treatment may consist of any combination of the following):    Current Treatment Recommendations: Strengthening; ROM; Balance training; Functional mobility training; ADL/Self-care training; IADL training; Endurance training; Gait training; Stair training; Neuromuscular re-education; Return to work related activity; Home exercise program; Safety education & training; Patient/Caregiver education & training; Equipment evaluation, education, & procurement; Therapeutic activities     >Subjective Comments: pt reports she feel in bathroom before thanksgiving trying to get off the toilet.       >Initial:   ache in buttock   /10>Post Session:    ache in buttock    /10  Medications Last Reviewed:  2023  Updated Objective Findings:  See Evaluation Note from today  Treatment     THERAPEUTIC EXERCISE: ( 38 minutes):    Exercises per grid below to improve mobility,

## 2023-12-04 NOTE — CARE COORDINATION
Ambulatory Care Coordination Note  2023    Patient Current Location:  [facility]     ACM contacted the patient by telephone. Verified name and  with patient as identifiers. Provided introduction to self, and explanation of the ACM role. Challenges to be reviewed by the provider   Additional needs identified to be addressed with provider: No  none               Method of communication with provider: none. ACM: Jed Douglass RN    Ccm outreached to patient. Patient states she is doing well at this time. Patient states no questions or concerns. Ccm discussed that I would outreach next week. Patient agreeable. Offered patient enrollment in the Remote Patient Monitoring (RPM) program for in-home monitoring: Yes, patient already enrolled. Lab Results       None            Care Coordination Interventions    Referral from Primary Care Provider: No  Suggested Interventions and Community Resources          Goals Addressed                   This Visit's Progress     Conditions and Symptoms   On track     I will schedule office visits, as directed by my provider. I will keep my appointment or reschedule if I have to cancel. I will notify my provider of any barriers to my plan of care. I will notify my provider of any symptoms that indicate a worsening of my condition. Barriers: none  Plan for overcoming my barriers: N/A  Confidence: 9/10  Anticipated Goal Completion Date: 3/19/23         Self Monitoring   On track     Blood Pressure - I will take my blood pressure as directed - Daily  I will notify my provider of any trends of increasing or decreasing blood pressures over a month period of time. I will notify my provider of any changes in blood pressure associated with symptoms of dizziness, falls, passing out, headache, confusion/change in mental status. Patient Reported Blood Pressure No flowsheet data found.     Barriers: none  Plan for overcoming my barriers: N/A  Confidence:

## 2023-12-06 ENCOUNTER — CARE COORDINATION (OUTPATIENT)
Dept: CARE COORDINATION | Facility: CLINIC | Age: 88
End: 2023-12-06

## 2023-12-06 ENCOUNTER — HOSPITAL ENCOUNTER (OUTPATIENT)
Dept: PHYSICAL THERAPY | Age: 88
Setting detail: RECURRING SERIES
Discharge: HOME | End: 2023-12-09
Payer: MEDICARE

## 2023-12-06 PROCEDURE — 97110 THERAPEUTIC EXERCISES: CPT

## 2023-12-06 PROCEDURE — 97530 THERAPEUTIC ACTIVITIES: CPT

## 2023-12-06 NOTE — PROGRESS NOTES
Required minimal visual, verbal, manual, and tactile cues to promote proper body mechanics. Progressed resistance and repetitions as indicated. Date:  11/8/2023 Date:  12/4/23 Date:  12/6/23   Activity/Exercise Parameters Parameters Parameters   Education Diagnosis, prognosis, POC, HEP, anatomy/physiology of condition, encourage caregiver to climb stairs with her and STS exercise     Sit to stand 5x 20 \"  3 x 8 rep  In circuit with DB OH Press    DB OH Press  3 lb seated  3 x 8 reps  In circuit w/ STS    Stair climb 4 steps, B HR, reciprocal pattern HR 92     NuStep   Lvl 5 6 min, 287 step  HR 83   6 min walk  709 ft level terrain HR 93-98  No AD    L stretch   10x   Heel raises  Wedge  10x Wedge  10x   Lateral step up  4 \"  2 x 10    Reaching task  W/ cognitive task 3 min        THERAPEUTIC ACTIVITY: ( 20 minutes): Therapeutic activities per grid below to improve mobility, strength, coordination, and dynamic movement of upper body, lower body, and trunk to improve functional lifting, carrying, reaching, catching, and overhead activites. Required minimal visual, verbal, manual, and tactile cues to promote coordination of bilateral, upper extremity(s), lower extremity(s) and promote motor control of bilateral, upper extremity(s), lower extremity(s). Date:  12/6/23 Date:   Date:     Activity/Exercise Parameters Parameters Parameters   Deadlift 10 lb  3 x 8 reps  HR      Ramos Carry  5lb x 75 ft x 2                                         HEP Log Date    STS, stair climbing 11/8/2023   2.     3.    4.     5.            Treatment/Session Summary:      Treatment Assessment:     Pt does better with block  practice due to cognitive deficits and easily confused. Pt to continue with anterior step ups and pushing and pulling next session. Communication/Consultation:       None today   Equipment provided today: HEP see log above.     Recommendations/Intent for next  treatment session:  Next visit will

## 2023-12-06 NOTE — CARE COORDINATION
Remote Alert Monitoring Note  Rpm alert to be reviewed by the provider   red alert   weight (5.8# increase since 23)   Additional needs to be addressed by N/A: No                    Date/Time:  2023 9:38 AM  Patient Current Location: Home: [address] [place] [address] [address] Dr Brandi Camejo [address]  LPN contacted patient by telephone. Verified patients name and  as identifiers. Background: Pt enrolled in RPM r/t HTN. Refer to 911 immediately if:  Patient unresponsive or unable to provide history  Change in cognition or sudden confusion  Patient unable to respond in complete sentences  Intense chest pain/tightness  Any concern for any clinical emergency  Red Alert: Provider response time of 1 hr required for any red alert requiring intervention  Yellow Alert: Provider response time of 3hr required for any escalated yellow alert    Weight Scale Triage  Was your weight obtained upon rising/waking today? no   Was your weight obtained after voiding and/or use of the bathroom today? yes   Did you weigh yourself in the same amount of clothing today, compared to how you typically do? yes   Was the scale bumped or moved prior to today's weight? no   Is your scale on a flat/hard surface? yes   Did you obtain your weight with shoes on? no   If yes, is this something you normally do during your daily weights? NA   Were you standing up straight on the scale today? yes   Were you leaning on anything while obtaining your weight today? no      Clinical Interventions: Reviewed and followed up on alerts and treatments-Pt denies CP, SOB, and new or worsening edema. Patients current recorded weight is 192.0#. 7.0# weight decrease per 7 day look back and 12.8# decrease from 23 - 23 noted in HRS. Pt is asymptomatic. Weights of 186.2# and 189.0# recorded on 23 are inaccurate; pt stated, \"those can't be right\". 23 recorded weights removed from HRS.  Pt verbalizes understanding of when to notify provider(s) of changes

## 2023-12-11 ENCOUNTER — CARE COORDINATION (OUTPATIENT)
Dept: CARE COORDINATION | Facility: CLINIC | Age: 88
End: 2023-12-11

## 2023-12-11 ENCOUNTER — HOSPITAL ENCOUNTER (OUTPATIENT)
Dept: PHYSICAL THERAPY | Age: 88
Setting detail: RECURRING SERIES
Discharge: HOME | End: 2023-12-14
Payer: MEDICARE

## 2023-12-11 PROCEDURE — 97530 THERAPEUTIC ACTIVITIES: CPT

## 2023-12-11 PROCEDURE — 97110 THERAPEUTIC EXERCISES: CPT

## 2023-12-11 NOTE — CARE COORDINATION
Remote Alert Monitoring Note  Rpm alert to be reviewed by the provider   red alert   blood pressure reading (157/137)   Additional needs to be addressed by N/A: No                    Date/Time:  2023 9:32 AM  Patient Current Location: Home: [address] [place] [address] Dr Ashley Morley [address]  LPN contacted patient by telephone. Verified patients name and  as identifiers. Background: Pt enrolled in RPM r/t HTN. Refer to 911 immediately if:  Patient unresponsive or unable to provide history  Change in cognition or sudden confusion  Patient unable to respond in complete sentences  Intense chest pain/tightness  Any concern for any clinical emergency  Red Alert: Provider response time of 1 hr required for any red alert requiring intervention  Yellow Alert: Provider response time of 3hr required for any escalated yellow alert    BP Triage  Are you having any Chest Pain? no   Are you having any Shortness of Breath? no   Do you have a headache or have any vision changes? no   Are you having any numbness or tingling? no   Are you having any other health concerns or issues? no        Clinical Interventions: Reviewed and followed up on alerts and treatments-Pt denies acute distress / is asymptomatic. Pt reports continued medication adherence and states she rechecked BP at 8:45 AM. Updated BP of 140/89 and BP HR of 75 reported, added to HRS per request, and are within RPM parameters. Pt verbalizes understanding of when to notify provider(s) of changes / concerns and when to seek emergent medical care. No further action necessary at this time. Plan/Follow Up: Will continue to review, monitor and address alerts with follow up based on severity of symptoms and risk factors.

## 2023-12-11 NOTE — PROGRESS NOTES
than 25% need for light finger tip touch on support surface. Communication/Consultation:       None today   Equipment provided today: HEP see log above.     Recommendations/Intent for next  treatment session:  Next visit will focus on improving mobility, strength, pain science,          >Total Treatment Billable Duration:  40 minutes 5 min rest  Time In: 1030  Time Out: 1115    Grey Moore, PT       Charge Capture  }Post Session Pain  PT Visit Info  [facility] Portal  MD Guidelines  Scanned Media  Benefits  MyChart    Future Appointments   Date Time Provider [address]   12/13/2023 10:30 AM [place] Moore, PT [facility]   12/18/2023 10:30 AM Greyraudel Moore, PT [facility]   12/19/2023 11:15 AM Diana Garvey MD [facility]   12/20/2023 10:30 AM Nick ROBLES, PT [facility]   2/2/2024  1:00 PM DO NIC EsquivelNI [facility]   2/22/2024  8:45 AM Diana Garvey MD [facility]   3/22/2024  2:15 PM DO REYNA FairchildDG [facility]   4/22/2024 11:00 AM Garen Riedel, MD [facility]

## 2023-12-11 NOTE — CARE COORDINATION
Ambulatory Care Management Note        Date/Time:  12/11/2023 9:16 AM    Ccm outreached to patient. No answer at this time, message left. Awaiting response. If no response, ccm will outreach next week.

## 2023-12-13 ENCOUNTER — HOSPITAL ENCOUNTER (OUTPATIENT)
Dept: PHYSICAL THERAPY | Age: 88
Setting detail: RECURRING SERIES
Discharge: HOME | End: 2023-12-16
Payer: MEDICARE

## 2023-12-13 ENCOUNTER — CARE COORDINATION (OUTPATIENT)
Dept: CARE COORDINATION | Facility: CLINIC | Age: 88
End: 2023-12-13

## 2023-12-13 PROCEDURE — 97110 THERAPEUTIC EXERCISES: CPT

## 2023-12-13 PROCEDURE — 97530 THERAPEUTIC ACTIVITIES: CPT

## 2023-12-13 NOTE — CARE COORDINATION
Remote Patient Monitoring Note      Date/Time:  12/13/2023 1:06 PM  Patient Current Location: [address] [place] [address] green alert received since inaccurate weights from 12/12/23 ranging from 57.4# - 62. 6# were removed from [address] [place] [address]. Pt weighed self x 2 this AM. Initial recorded weight is 196.0#. Second recorded weight of 195.0# is within RPM parameters. No further outreach indicated at this time. Background: Pt enrolled in RPM r/t HTN. Plan/Follow Up: Will continue to review, monitor and address alerts with follow up based on severity of symptoms and risk factors.

## 2023-12-13 NOTE — CARE COORDINATION
Remote Patient Monitoring Note      Date/Time:  12/13/2023 10:46 AM  Patient Current Location: [place]  LPN attempted to contact patient by telephone regarding red alert received for weight increase (Increase of 5 lbs In Last 7 Days). Unable to reach pt and unable to leave message. 6 additional recorded weight on 12/12/23 ranging from 57.4# - 62. 6# noted in HRS and removed due to inaccurate. 5.0# weight increase since 12/11/23 noted in HRS. Will continue outreach per RPM protocol. Background: Pt enrolled in RPM r/t HTN. Plan/Follow Up: Will continue to review, monitor and address alerts with follow up based on severity of symptoms and risk factors.

## 2023-12-13 NOTE — PROGRESS NOTES
Alisha Zamudio  : 1935  Primary: Colin Davis Ppo (Medicare Managed)  Secondary:  [address] [facility] [address]  Phone: [phone]  Fax: [phone] Plan Frequency: 2x/wk for 8 weeks for a 2 week hold due to pt travel schedule    Plan of Care/Certification Expiration Date: 24      >PT Visit Info:  Plan Frequency: 2x/wk for 8 weeks for a 2 week hold due to pt travel schedule  Plan of Care/Certification Expiration Date: 24      Visit Count:  5    OUTPATIENT PHYSICAL THERAPY: Treatment Note 2023       Episode  }Appt Desk             Treatment Diagnosis:    No data found  Medical/Referring Diagnosis:      Referring Physician:  Jeet Carcamo MD MD Orders:  PT Eval and Treat   Date of Onset:  Onset Date:  (Chronic with recent fall 1 week ago per patient)     Allergies:   Guaifenesin, Loratadine-pseudoephedrine er, Amoxicillin, and Sulfa antibiotics  Restrictions/Precautions:  Restrictions/Precautions: Fall Risk  No data recorded   Interventions Planned (Treatment may consist of any combination of the following):    Current Treatment Recommendations: Strengthening; ROM; Balance training; Functional mobility training; ADL/Self-care training; IADL training; Endurance training; Gait training; Stair training; Neuromuscular re-education; Return to work related activity; Home exercise program; Safety education & training; Patient/Caregiver education & training; Equipment evaluation, education, & procurement; Therapeutic activities     >Subjective Comments: pt with no reported changes. >Initial:   ache in buttock   /10>Post Session:    ache in buttock    /10  Medications Last Reviewed:  2023  Updated Objective Findings:  none today  Treatment     THERAPEUTIC EXERCISE: ( 17 minutes):    Exercises per grid below to improve mobility, strength, balance, and coordination.  Required minimal visual, verbal, manual,

## 2023-12-28 PROCEDURE — 93296 REM INTERROG EVL PM/IDS: CPT | Performed by: INTERNAL MEDICINE

## 2023-12-28 PROCEDURE — 93294 REM INTERROG EVL PM/LDLS PM: CPT | Performed by: INTERNAL MEDICINE

## 2024-01-03 ENCOUNTER — HOSPITAL ENCOUNTER (OUTPATIENT)
Dept: PHYSICAL THERAPY | Age: 89
Setting detail: RECURRING SERIES
Discharge: HOME | End: 2024-01-06
Payer: MEDICARE

## 2024-01-03 PROCEDURE — 97530 THERAPEUTIC ACTIVITIES: CPT

## 2024-01-03 PROCEDURE — 97110 THERAPEUTIC EXERCISES: CPT

## 2024-01-03 NOTE — PROGRESS NOTES
Angelique Seals  : 1935  Primary: [employer] Medicare-advantage [employer] (Medicare Managed)  Secondary:  [facility] @ [place] [address] [place] [address]  Phone: [phone]  Fax: [phone] Plan Frequency: 2x/wk for 8 weeks for a 2 week hold due to pt travel schedule    Plan of Care/Certification Expiration Date: 24      >PT Visit Info:  Plan Frequency: 2x/wk for 8 weeks for a 2 week hold due to pt travel schedule  Plan of Care/Certification Expiration Date: 24      Visit Count:  8    OUTPATIENT PHYSICAL THERAPY: Treatment Note 1/3/2024       Episode  }Appt Desk             Treatment Diagnosis:    No data found  Medical/Referring Diagnosis:      Referring Physician:  Antonio Godfrey Jr., MD MD Orders:  PT Eval and Treat   Date of Onset:  Onset Date:  (Chronic with recent fall 1 week ago per patient)     Allergies:   Guaifenesin, Loratadine-pseudoephedrine er, Amoxicillin, and Sulfa antibiotics  Restrictions/Precautions:  Restrictions/Precautions: Fall Risk  No data recorded   Interventions Planned (Treatment may consist of any combination of the following):    Current Treatment Recommendations: Strengthening; ROM; Balance training; Functional mobility training; ADL/Self-care training; IADL training; Endurance training; Gait training; Stair training; Neuromuscular re-education; Return to work related activity; Home exercise program; Safety education & training; Patient/Caregiver education & training; Equipment evaluation, education, & procurement; Therapeutic activities     >Subjective Comments: pt 10 min late for appt today. Pt reports she is feeling a bit sluggish from Holidays.     >Initial:   ache in buttock   /10>Post Session:    ache in buttock    /10  Medications Last Reviewed:  1/3/2024  Updated Objective Findings:  none today  Treatment     THERAPEUTIC EXERCISE: ( 15 minutes):    Exercises per grid below to improve mobility, strength, balance, and

## 2024-01-08 ENCOUNTER — CARE COORDINATION (OUTPATIENT)
Dept: CARE COORDINATION | Facility: CLINIC | Age: 89
End: 2024-01-08

## 2024-01-08 ENCOUNTER — TELEPHONE (OUTPATIENT)
Dept: FAMILY MEDICINE CLINIC | Facility: CLINIC | Age: 89
End: 2024-01-08

## 2024-01-08 NOTE — TELEPHONE ENCOUNTER
Pt called stating her eyes burn and she keeps having to wipe them. I told pt to go to urgent care and she kept getting confused.

## 2024-01-08 NOTE — CARE COORDINATION
Ambulatory Care Management Note        Date/Time:  1/8/2024 9:17 AM    Ccm outreached to patient. No answer at this time, message left. Awaiting response. If no response, ccm will outreach next week.

## 2024-01-10 ENCOUNTER — HOSPITAL ENCOUNTER (OUTPATIENT)
Dept: PHYSICAL THERAPY | Age: 89
Setting detail: RECURRING SERIES
Discharge: HOME | End: 2024-01-13
Payer: MEDICARE

## 2024-01-10 PROCEDURE — 97110 THERAPEUTIC EXERCISES: CPT

## 2024-01-10 PROCEDURE — 97530 THERAPEUTIC ACTIVITIES: CPT

## 2024-01-10 NOTE — THERAPY RECERTIFICATION
in home and community.    Reason For Services/Other Comments:  Angelique Seals has tolerated an increase in activity as demonstrated by: increased resistance/repetitions during therapeutic exercise, more challenging dynamic balance maneuvers, and more complicated coordination activities.    > Patient's improvemnet in strength, range of motion, balance, coordination, and functional technique has impacted their ability to participate in ADLs, IADLs, and functional activities by increasing safety and decreasing assistance required.    Angelique Seals continues to require skilled intervention due to patient continues to present with impairments assessed at initial evaluation and requiring skilled physical therapy to meet goals for PT.    Total Duration: see daily note  Time In: 1030  Time Out: 1115    Regarding Angelique Seals's therapy, I certify that the treatment plan above will be carried out by a therapist or under their direction.  Thank you for this referral,  Ines Quintanilla, PT     Referring Physician Signature: Antonio Godfrey Jr., *                    Post Session Pain  Charge Capture  PT Visit Info MD James Ng

## 2024-01-10 NOTE — PROGRESS NOTES
upper extremity(s), lower extremity(s).     Date:  12/6/23 Date:  12/11/23 Date:  12/13/23 Date:  12/18/23 Date:  12/20/23 Date:  1/3/24 Date:  1/10/24   Activity/Exercise Parameters Parameters Parameters       Deadlift 10 lb  3 x 8 reps  HR    15 lb  3 x 5 reps  HR 90  15 lb  4 x 5 reps  -118    Circuit      DB OH press 3 lb standing x 8  Med ball carry 15 lb x 70 ft    Ramos Carry  5lb x 75 ft x 2 8lb  2 x 120 ft  HR 99 8lb  2 x 120 ft  HR 99 -111    10 lb  2 x 60 ft   Sit to Stand  22\", 5 lb  3 x5 reps   22\", 5 lb  5 x 5 reps  HR   22\", 5 lb  3 x 8 reps  RPE 8-9    22\" 5 lb  6 x 5  HR 89 - 138   Step up  4\", no UE support x CGA-SBA   x 10 reps   4.5 inch  10x each  L/R  HR 84  Intermittent unilateral UE support     Sled   20 lb  3 x 60 ft         Blaze Pod    Color Catch  Round 1 4 hits 6140 ms rxn time  Round 2  4 hits  6255 ms rxn time  Round 3  3 hits  6789 MS rxn time        Step overs       4 in yoga block     Toe taps    7 \"  2 x 10 reps  Alternating  HR 90 - 100   8\" cones   3 x 10 reps  Alternatingunilateral UE support         HEP Log Date    STS, stair climbing 11/8/2023   2.     3.    4.     5.            Treatment/Session Summary:      Treatment Assessment:     Pt continues to responds best to block practice to avoid confusion. Pt with improving spatial awareness with lateral step overs with repetition. See recert for details.   Communication/Consultation:       None today   Equipment provided today: HEP see log above.    Recommendations/Intent for next  treatment session:  Next visit will focus on improving mobility, strength, pain science,      >Total Treatment Billable Duration:  38 minutes 7 min rest  Time In: 1030  Time Out: 1115    Ines Quintanilla PT       Charge Capture  }Post Session Pain  PT Visit Info  "[employer]" Portal  MD Guidelines  Scanned Media  Benefits  MyChart    Future Appointments   Date Time Provider Department Center   2/2/2024  1:00 PM

## 2024-01-15 ENCOUNTER — CARE COORDINATION (OUTPATIENT)
Dept: CARE COORDINATION | Facility: CLINIC | Age: 89
End: 2024-01-15

## 2024-01-15 DIAGNOSIS — I10 HTN (HYPERTENSION) WITH GOAL TO BE DETERMINED: Primary | ICD-10-CM

## 2024-01-15 NOTE — CARE COORDINATION
Resources          Goals Addressed                   This Visit's Progress     Conditions and Symptoms   On track     I will schedule office visits, as directed by my provider.  I will keep my appointment or reschedule if I have to cancel.  I will notify my provider of any barriers to my plan of care.  I will notify my provider of any symptoms that indicate a worsening of my condition.    Barriers: none  Plan for overcoming my barriers: N/A  Confidence: 9/10  Anticipated Goal Completion Date: 3/19/23         Self Monitoring   On track     Blood Pressure - I will take my blood pressure as directed - Daily  I will notify my provider of any trends of increasing or decreasing blood pressures over a month period of time.  I will notify my provider of any changes in blood pressure associated with symptoms of dizziness, falls, passing out, headache, confusion/change in mental status.    Patient Reported Blood Pressure No flowsheet data found.    Barriers: none  Plan for overcoming my barriers: N/A  Confidence: 9/10  Anticipated Goal Completion Date: 3/19/23                Future Appointments   Date Time Provider Department Center   2/2/2024  1:00 PM Carlitos Mejia DO [facility]   2/22/2024  8:45 AM Antonio Godfrey Jr., MD [facility]   3/22/2024  2:15 PM Guillermo Roy DO [facility]   4/22/2024 11:00 AM Britney Kenny MD BS [facility]

## 2024-01-15 NOTE — CARE COORDINATION
Remote Patient Kit Ordering Note      Date/Time:  1/15/2024 12:58 PM      [x] [facility] confirmed patient shipping address  [x] Patient will receive package over the next 1-3 business days. Someone 21 years or older must be present to sign for UPS delivery.  [x] HRS will contact patient within 24 hours, an HRS  will call the patient directly: If the patient does not answer, HRS will follow up with the clinical team notifying them about the unsuccessful attempt to contact the patient. HRS will make three call attempts to the patient.Provide patient with [facility] Virtual install number is: [phone].  [x] ACM will contact patient once equipment is active to welcome them to the program.                                                         [x] Hours of RPM monitoring - Monday-Friday [id number]; encourage patient to get vitals entered by Noon each day to have the alert addressed same day.  [x][facility] mailed RPM Patient flyer to patient.                     All questions answered at this time. ACM made aware the RPM kit has been ordered.      EMTP notified patient of RPM equipment order.

## 2024-01-16 NOTE — PROGRESS NOTES
Remote Patient Monitoring Treatment Plan    Received request from AC/Reyna Zimmer RN to order remote patient monitoring for in home monitoring of HTN and order completed.     Patient will be monitoring blood pressure   pulse ox   weight    Please set alert for ONLY weight gain of 5# in 7 days. Pt has no documented hx of HF.  .      Patient will engage in Remote Patient Monitoring each day to develop the skills necessary for self management.       RPM Care Team Responsibilities:   Alerts will be reviewed daily and addressed within 2-4 hours during operational hours (Monday -Friday 9 am-4 pm)  Alert response and intervention documented in patient medical record  Alert response escalated to PCP per protocol and documented in patient medical record  Patient monitored over approximately  days  Discharge from program based on self-management readiness    See care coordination encounters for additional details.

## 2024-01-18 ENCOUNTER — TELEPHONE (OUTPATIENT)
Dept: FAMILY MEDICINE CLINIC | Facility: CLINIC | Age: 89
End: 2024-01-18

## 2024-01-18 DIAGNOSIS — R26.9 NEUROLOGIC GAIT DYSFUNCTION: ICD-10-CM

## 2024-01-18 DIAGNOSIS — R26.89 BALANCE PROBLEM: Primary | ICD-10-CM

## 2024-01-22 ENCOUNTER — PATIENT MESSAGE (OUTPATIENT)
Dept: FAMILY MEDICINE CLINIC | Facility: CLINIC | Age: 89
End: 2024-01-22

## 2024-01-22 ENCOUNTER — CARE COORDINATION (OUTPATIENT)
Dept: CARE COORDINATION | Facility: CLINIC | Age: 89
End: 2024-01-22

## 2024-01-22 RX ORDER — AMLODIPINE BESYLATE 10 MG/1
10 TABLET ORAL DAILY
Qty: 30 TABLET | Refills: 3 | Status: SHIPPED | OUTPATIENT
Start: 2024-01-22

## 2024-01-22 NOTE — CARE COORDINATION
Ambulatory Care Management Note        Date/Time:  1/22/2024 9:58 AM    Ccm outreached to patient. No answer at this time, message left. Awaiting response. If no response, ccm will outreach next week.

## 2024-01-23 ENCOUNTER — CARE COORDINATION (OUTPATIENT)
Dept: CARE COORDINATION | Facility: CLINIC | Age: 89
End: 2024-01-23

## 2024-01-23 NOTE — CARE COORDINATION
Remote Patient Monitoring Welcome Note  Date/Time:  2024 7:32 AM  Patient Current Location: [place]  Verified patients name and  as identifiers.  Completed and confirmed the following:   Emergency Contact: Izabella Hickman [phone]  [x] Patient received all RPM equipment (tablet, scale, blood pressure device and cuff, and pulse oximeter)  Cuff Size: regular (9.05\"-15.74\")    Weight Scale:  bariatric (330-550lbs)                    [x] Instructed patient keep box for use when returning equipment                                                          [x] Reviewed Patient Welcome Letter with patient    [x]  Reviewed Consent Form  Copy of consent form in chart.                 [x] Reviewed expectations for patient and care team  Monitoring hours M-F 9-4pm  Completing monitoring by 12pm on  so that alerts can be responded to in the same day  Patient weighs self at same time every day (or after urinating and waking up)  Take blood pressure 1-2 hrs after medications   RPM team may have different phone area code (including [place], [place], [place] or [place])                              [x] Instructed patient to keep scale on flat surface                                                         [x] Instructed patient to keep tablet plugged in at all times                         [x] Instructed how to contact IT support ([phone])  [x] Provided Remote Patient Monitoring care  information                All questions answered at this time.

## 2024-01-26 ENCOUNTER — CARE COORDINATION (OUTPATIENT)
Dept: CARE COORDINATION | Facility: CLINIC | Age: 89
End: 2024-01-26

## 2024-01-26 NOTE — CARE COORDINATION
Remote Alert Monitoring Note  Rpm alert to be reviewed by the provider   red alert   weight (Increase of 5 lbs In Last 7 Days)   Additional needs to be addressed by N/A: No                    Date/Time:  2024 2:01 PM  Patient Current Location: [place]  LPN contacted patient by telephone. Verified patients name and  as identifiers.  Background: Pt enrolled in RPM r/t  HTN  Refer to 911 immediately if:  Patient unresponsive or unable to provide history  Change in cognition or sudden confusion  Patient unable to respond in complete sentences  Intense chest pain/tightness  Any concern for any clinical emergency  Red Alert: Provider response time of 1 hr required for any red alert requiring intervention  Yellow Alert: Provider response time of 3hr required for any escalated yellow alert    Weight Scale Triage  Was your weight obtained upon rising/waking today? no   Was your weight obtained after voiding and/or use of the bathroom today? no   Did you weigh yourself in the same amount of clothing today, compared to how you typically do? no   Was the scale bumped or moved prior to today's weight? no   Is your scale on a flat/hard surface? yes   Did you obtain your weight with shoes on? no   If yes, is this something you normally do during your daily weights? NA   Were you standing up straight on the scale today? yes   Were you leaning on anything while obtaining your weight today? no      Clinical Interventions: Reviewed and followed up on alerts and treatments-Discussed alert for 5.0# weight increase since 24 with pt. Current recorded weight is 195.8#. 2.6# weight increase since initial recorded weight on 24 noted in HRS. Pt is newly re-enrolled in [facility] and establishing baseline weight. Pt denies CP, SOB, and new or worsening edema at this time. Pt was unable to weigh self upon rising due to waiting for assistance. Pt was fully dressed while obtaining daily weight and reports eating and drinking

## 2024-01-29 ENCOUNTER — CARE COORDINATION (OUTPATIENT)
Dept: CARE COORDINATION | Facility: CLINIC | Age: 89
End: 2024-01-29

## 2024-01-29 NOTE — CARE COORDINATION
Remote Alert Monitoring Note  Rpm alert to be reviewed by the provider   red alert   weight (Increase of 5 lbs In Last 7 Days)   Additional needs to be addressed by N/A: No                    Date/Time:  2024 4:12 PM  Patient Current Location: [facility] attempted to contact patient by telephone. Spoke with patient. Spoke with patients daughter / emergency contact, Izabella Hickman. Verified patients name and  as identifiers.  Background: Pt enrolled in RPM r/t HTN.   Refer to 911 immediately if:  Patient unresponsive or unable to provide history  Change in cognition or sudden confusion  Patient unable to respond in complete sentences  Intense chest pain/tightness  Any concern for any clinical emergency  Red Alert: Provider response time of 1 hr required for any red alert requiring intervention  Yellow Alert: Provider response time of 3hr required for any escalated yellow alert    Weight Scale Triage  Was your weight obtained upon rising/waking today? no   Was your weight obtained after voiding and/or use of the bathroom today? yes   Did you weigh yourself in the same amount of clothing today, compared to how you typically do? yes   Was the scale bumped or moved prior to today's weight? no   Is your scale on a flat/hard surface? yes   Did you obtain your weight with shoes on? no   If yes, is this something you normally do during your daily weights? NA   Were you standing up straight on the scale today? no   Were you leaning on anything while obtaining your weight today? yes      Clinical Interventions: Reviewed and followed up on alerts and treatments-Discussed alert for 6.6# weight increase since 24 with patients daughter. Current recorded weight is 197.4#. Per patients daughter, pt denies CP, SOB, and new or worsening edema at this time. Pt did not weight self upon rising and ate and drank prior to obtaining daily weight. Daughter reported that pt was not standing up straight and was

## 2024-01-29 NOTE — CARE COORDINATION
9/10  Anticipated Goal Completion Date: 3/19/23                Future Appointments   Date Time Provider Department Center   2/2/2024  1:00 PM Carlitos Mejia DO [facility]   2/6/2024 10:00 AM Ines Quintanilla, PT [facility]   2/22/2024  8:45 AM Antonio Godfrey Jr., MD F [facility]   3/22/2024  2:15 PM Guillermo Roy DO [facility]   4/22/2024 11:00 AM Britney Kenny MD BS [facility]

## 2024-01-29 NOTE — CARE COORDINATION
Remote Patient Monitoring Note      Date/Time:  1/29/2024 3:10 PM  Patient Current Location: [place]  LPN attempted to contact patient by telephone regarding red alert received for weight increase (of 5 lbs In Last 7 Days). Left HIPAA compliant message requesting a return call.     Background: Pt enrolled in RPM r/t HTN.       Plan/Follow Up: Will continue to review, monitor and address alerts with follow up based on severity of symptoms and risk factors.

## 2024-01-30 ENCOUNTER — CARE COORDINATION (OUTPATIENT)
Dept: CARE COORDINATION | Facility: CLINIC | Age: 89
End: 2024-01-30

## 2024-01-30 NOTE — CARE COORDINATION
Remote Patient Monitoring Note      Date/Time:  1/30/2024 1:01 PM  Patient Current Location: [place]  5.6# weight increase per 7 day look back noted in HRS. LPN previously spoke with patients daughter, Izabella Hickman, regarding weight increase. See RPM / Care Coordination note from 01/29/24. Current recorded weight is 196.4#. 1.0# decrease in last day noted. No outreach by this LPN indicated at this time. Will route to [facility] for review.     Background: Pt enrolled in RPM r/t HTN.       Plan/Follow Up: Will continue to review, monitor and address alerts with follow up based on severity of symptoms and risk factors.

## 2024-02-02 ENCOUNTER — OFFICE VISIT (OUTPATIENT)
Dept: NEUROLOGY | Age: 89
End: 2024-02-02
Payer: MEDICARE

## 2024-02-02 VITALS
BODY MASS INDEX: 31.17 KG/M2 | SYSTOLIC BLOOD PRESSURE: 143 MMHG | HEART RATE: 74 BPM | DIASTOLIC BLOOD PRESSURE: 84 MMHG | OXYGEN SATURATION: 96 % | WEIGHT: 199 LBS

## 2024-02-02 DIAGNOSIS — F03.B0 MODERATE DEMENTIA WITHOUT BEHAVIORAL DISTURBANCE, PSYCHOTIC DISTURBANCE, MOOD DISTURBANCE, OR ANXIETY, UNSPECIFIED DEMENTIA TYPE (HCC): ICD-10-CM

## 2024-02-02 DIAGNOSIS — S06.9X0D TRAUMATIC BRAIN INJURY, WITHOUT LOSS OF CONSCIOUSNESS, SUBSEQUENT ENCOUNTER: Primary | ICD-10-CM

## 2024-02-02 PROCEDURE — 1123F ACP DISCUSS/DSCN MKR DOCD: CPT | Performed by: PSYCHIATRY & NEUROLOGY

## 2024-02-02 PROCEDURE — 99214 OFFICE O/P EST MOD 30 MIN: CPT | Performed by: PSYCHIATRY & NEUROLOGY

## 2024-02-02 RX ORDER — MEMANTINE HYDROCHLORIDE 10 MG/1
TABLET ORAL
Qty: 180 TABLET | Refills: 3 | Status: SHIPPED | OUTPATIENT
Start: 2024-02-02

## 2024-02-02 ASSESSMENT — ENCOUNTER SYMPTOMS
RESPIRATORY NEGATIVE: 1
EYES NEGATIVE: 1
ALLERGIC/IMMUNOLOGIC NEGATIVE: 1
GASTROINTESTINAL NEGATIVE: 1

## 2024-02-02 NOTE — PROGRESS NOTES
[facility]  [address], [address]  [place] [address]  Phone: [phone] Fax [phone]  Dr. Carlitos Mejia      2/2/2024  Angelique Seals     Patient is referred by the following provider for consultation regarding as below:       I reviewed the available records and notes and have examined patient with the following findings:     Chief Complaint:  Chief Complaint   Patient presents with    Follow-up     TBI          HPI: This is a right handed 88 y.o. female who is very pleasant very appropriate patient unfortunately does suffer from head injury that occurred in spring 2023 when she went out to [place] to visit a friend she had fallen backwards hit her head she did not lose consciousness but she never really returned back to normal she got to [place].  And her family noticed that she was cognitively very slow short-term memory loss.  She had a CT scan of her head that was negative done on 7-.  She started going room to room she no longer could do her medications she did have somebody help her continues.  She had her bills were taken over as well.  She she still lives alone but she has a lot of help between her son who is with her today and her daughters.  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  But the amyloid beta 42/40 was nondiagnostic.  Rheumatology did leave a note stating that they do not feel she has giant cell arteritis or temporal arteritis and she is not even complaining of headaches at this point.  Last evaluation she had a MoCA of 19 out of 30.  She is very stable very comfortable.  And she has not progressed per family.    IMAGING REVIEW:  I REVIEWED PERTINENT  IMAGES AND REPORTS WITH THE PATIENT PERSONALLY, DIRECTLY AND FULLY.     Past Medical History:  Past Medical History:   Diagnosis Date    Anticoagulant long-term use     Arrhythmia

## 2024-02-05 ENCOUNTER — CARE COORDINATION (OUTPATIENT)
Dept: CARE COORDINATION | Facility: CLINIC | Age: 89
End: 2024-02-05

## 2024-02-05 ENCOUNTER — HOSPITAL ENCOUNTER (EMERGENCY)
Age: 89
Discharge: HOME | End: 2024-02-05
Attending: EMERGENCY MEDICINE
Payer: MEDICARE

## 2024-02-05 VITALS
BODY MASS INDEX: 31.23 KG/M2 | OXYGEN SATURATION: 97 % | SYSTOLIC BLOOD PRESSURE: 150 MMHG | TEMPERATURE: 98.4 F | HEART RATE: 62 BPM | HEIGHT: 67 IN | RESPIRATION RATE: 16 BRPM | WEIGHT: 199 LBS | DIASTOLIC BLOOD PRESSURE: 92 MMHG

## 2024-02-05 DIAGNOSIS — M79.674 TOE PAIN, BILATERAL: ICD-10-CM

## 2024-02-05 DIAGNOSIS — N30.00 ACUTE CYSTITIS WITHOUT HEMATURIA: Primary | ICD-10-CM

## 2024-02-05 DIAGNOSIS — M79.675 TOE PAIN, BILATERAL: ICD-10-CM

## 2024-02-05 DIAGNOSIS — R60.9 PERIPHERAL EDEMA: ICD-10-CM

## 2024-02-05 LAB
ALBUMIN SERPL-MCNC: 4.1 G/DL (ref 3.2–4.6)
ALBUMIN/GLOB SERPL: 1.5 (ref 0.4–1.6)
ALP SERPL-CCNC: 93 U/L (ref 45–117)
ALT SERPL-CCNC: 25 U/L (ref 13–61)
ANION GAP SERPL CALC-SCNC: 9 MMOL/L (ref 2–11)
APPEARANCE UR: CLEAR
AST SERPL-CCNC: 29 U/L (ref 15–37)
BACTERIA URNS QL MICRO: NORMAL /HPF
BASOPHILS # BLD: 0.1 K/UL (ref 0–0.2)
BASOPHILS NFR BLD: 1 % (ref 0–2)
BILIRUB SERPL-MCNC: 0.4 MG/DL (ref 0.2–1.1)
BILIRUB UR QL: NEGATIVE
BUN SERPL-MCNC: 20 MG/DL (ref 8–23)
CALCIUM SERPL-MCNC: 9.6 MG/DL (ref 8.3–10.4)
CASTS URNS QL MICRO: 0 /LPF
CHLORIDE SERPL-SCNC: 106 MMOL/L (ref 98–107)
CO2 SERPL-SCNC: 27 MMOL/L (ref 21–32)
COLOR UR: YELLOW
CREAT SERPL-MCNC: 0.62 MG/DL (ref 0.6–1)
CRYSTALS URNS QL MICRO: 0 /LPF
DIFFERENTIAL METHOD BLD: NORMAL
EOSINOPHIL # BLD: 0.3 K/UL (ref 0–0.8)
EOSINOPHIL NFR BLD: 4 % (ref 0.5–7.8)
EPI CELLS #/AREA URNS HPF: NORMAL /HPF
ERYTHROCYTE [DISTWIDTH] IN BLOOD BY AUTOMATED COUNT: 13.5 % (ref 11.9–14.6)
GLOBULIN SER CALC-MCNC: 2.8 G/DL (ref 2.8–4.5)
GLUCOSE SERPL-MCNC: 97 MG/DL (ref 65–100)
GLUCOSE UR STRIP.AUTO-MCNC: NEGATIVE MG/DL
HCT VFR BLD AUTO: 42.4 % (ref 35.8–46.3)
HGB BLD-MCNC: 13.7 G/DL (ref 11.7–15.4)
HGB UR QL STRIP: NEGATIVE
IMM GRANULOCYTES # BLD AUTO: 0 K/UL (ref 0–0.5)
IMM GRANULOCYTES NFR BLD AUTO: 0 % (ref 0–5)
KETONES UR QL STRIP.AUTO: NEGATIVE MG/DL
LEUKOCYTE ESTERASE UR QL STRIP.AUTO: ABNORMAL
LYMPHOCYTES # BLD: 1.7 K/UL (ref 0.5–4.6)
LYMPHOCYTES NFR BLD: 28 % (ref 13–44)
MCH RBC QN AUTO: 30.4 PG (ref 26.1–32.9)
MCHC RBC AUTO-ENTMCNC: 32.3 G/DL (ref 31.4–35)
MCV RBC AUTO: 94 FL (ref 82–102)
MONOCYTES # BLD: 0.7 K/UL (ref 0.1–1.3)
MONOCYTES NFR BLD: 11 % (ref 4–12)
MUCOUS THREADS URNS QL MICRO: NORMAL /LPF
NEUTS SEG # BLD: 3.3 K/UL (ref 1.7–8.2)
NEUTS SEG NFR BLD: 56 % (ref 43–78)
NITRITE UR QL STRIP.AUTO: NEGATIVE
NRBC # BLD: 0 K/UL (ref 0–0.2)
NT PRO BNP: 681 PG/ML (ref 0–450)
OTHER OBSERVATIONS: NORMAL
PH UR STRIP: 6.5 (ref 5–9)
PLATELET # BLD AUTO: 216 K/UL (ref 150–450)
PMV BLD AUTO: 10.5 FL (ref 9.4–12.3)
POTASSIUM SERPL-SCNC: 4.3 MMOL/L (ref 3.5–5.1)
PROT SERPL-MCNC: 6.9 G/DL (ref 6.4–8.2)
PROT UR STRIP-MCNC: NEGATIVE MG/DL
RBC # BLD AUTO: 4.51 M/UL (ref 4.05–5.2)
RBC #/AREA URNS HPF: 0 /HPF
SODIUM SERPL-SCNC: 142 MMOL/L (ref 133–143)
SP GR UR REFRACTOMETRY: 1.01 (ref 1–1.02)
UROBILINOGEN UR QL STRIP.AUTO: 0.2 EU/DL (ref 0.2–1)
WBC # BLD AUTO: 5.9 K/UL (ref 4.3–11.1)
WBC URNS QL MICRO: NORMAL /HPF

## 2024-02-05 PROCEDURE — 99283 EMERGENCY DEPT VISIT LOW MDM: CPT

## 2024-02-05 PROCEDURE — 85025 COMPLETE CBC W/AUTO DIFF WBC: CPT

## 2024-02-05 PROCEDURE — 6370000000 HC RX 637 (ALT 250 FOR IP): Performed by: EMERGENCY MEDICINE

## 2024-02-05 PROCEDURE — 83880 ASSAY OF NATRIURETIC PEPTIDE: CPT

## 2024-02-05 PROCEDURE — 80053 COMPREHEN METABOLIC PANEL: CPT

## 2024-02-05 PROCEDURE — 81001 URINALYSIS AUTO W/SCOPE: CPT

## 2024-02-05 RX ORDER — CEPHALEXIN 500 MG/1
500 CAPSULE ORAL
Status: COMPLETED | OUTPATIENT
Start: 2024-02-05 | End: 2024-02-05

## 2024-02-05 RX ORDER — HYDROCHLOROTHIAZIDE 25 MG/1
25 TABLET ORAL EVERY MORNING
Qty: 14 TABLET | Refills: 1 | Status: SHIPPED | OUTPATIENT
Start: 2024-02-05

## 2024-02-05 RX ORDER — PHENAZOPYRIDINE HYDROCHLORIDE 95 MG/1
200 TABLET ORAL
Status: COMPLETED | OUTPATIENT
Start: 2024-02-05 | End: 2024-02-05

## 2024-02-05 RX ORDER — PHENAZOPYRIDINE HYDROCHLORIDE 100 MG/1
200 TABLET, FILM COATED ORAL 3 TIMES DAILY PRN
Qty: 6 TABLET | Refills: 0 | Status: SHIPPED | OUTPATIENT
Start: 2024-02-05 | End: 2024-02-08

## 2024-02-05 RX ORDER — CEPHALEXIN 500 MG/1
500 CAPSULE ORAL 4 TIMES DAILY
Qty: 20 CAPSULE | Refills: 0 | Status: SHIPPED | OUTPATIENT
Start: 2024-02-05 | End: 2024-02-10

## 2024-02-05 RX ADMIN — URINARY PAIN RELIEF 190 MG: 95 TABLET ORAL at 16:36

## 2024-02-05 RX ADMIN — CEPHALEXIN 500 MG: 500 CAPSULE ORAL at 16:37

## 2024-02-05 ASSESSMENT — PAIN SCALES - GENERAL
PAINLEVEL_OUTOF10: 5
PAINLEVEL_OUTOF10: 2

## 2024-02-05 ASSESSMENT — PAIN - FUNCTIONAL ASSESSMENT: PAIN_FUNCTIONAL_ASSESSMENT: 0-10

## 2024-02-05 NOTE — ED NOTES
I have reviewed discharge instructions with the patient.  The patient verbalized understanding.    Patient left ED via Discharge Method: ambulatory to Home with (family, self).    Opportunity for questions and clarification provided.       Patient given 2 scripts.         To continue your aftercare when you leave the hospital, you may receive an automated call from our care team to check in on how you are doing.  This is a free service and part of our promise to provide the best care and service to meet your aftercare needs.” If you have questions, or wish to unsubscribe from this service please call [phone].  Thank you for Choosing our [facility] Emergency Department.    
Pt states that she has noticed her feet, toes and ankles swelling for several weeks.  Concerned that it may be related to having a pedicure.  C/o urinary frequency and pain for 2 weeks  
Normal

## 2024-02-05 NOTE — CARE COORDINATION
Ambulatory Care Management Note        Date/Time:  2/5/2024 9:15 AM    Ccm outreached to patient. No answer at this time, message left. Awaiting response. If no response, ccm will outreach next week.

## 2024-02-05 NOTE — ED TRIAGE NOTES
Pt ambulatory to triage. Pt reports bilateral first big toes red, painful and swelling to both ankles for over a week after having a pedicure. Pt also reports urinary pain and frequency that started weeks ago.

## 2024-02-06 ENCOUNTER — CLINICAL DOCUMENTATION (OUTPATIENT)
Dept: NEUROLOGY | Age: 89
End: 2024-02-06

## 2024-02-06 ENCOUNTER — OFFICE VISIT (OUTPATIENT)
Dept: FAMILY MEDICINE CLINIC | Facility: CLINIC | Age: 89
End: 2024-02-06
Payer: MEDICARE

## 2024-02-06 ENCOUNTER — CARE COORDINATION (OUTPATIENT)
Dept: CARE COORDINATION | Facility: CLINIC | Age: 89
End: 2024-02-06

## 2024-02-06 VITALS
TEMPERATURE: 97.2 F | SYSTOLIC BLOOD PRESSURE: 118 MMHG | BODY MASS INDEX: 31.01 KG/M2 | WEIGHT: 198 LBS | DIASTOLIC BLOOD PRESSURE: 72 MMHG | OXYGEN SATURATION: 99 % | HEART RATE: 75 BPM

## 2024-02-06 DIAGNOSIS — L03.031 CELLULITIS OF GREAT TOE OF RIGHT FOOT: ICD-10-CM

## 2024-02-06 DIAGNOSIS — I10 PRIMARY HYPERTENSION: Primary | ICD-10-CM

## 2024-02-06 DIAGNOSIS — L60.2 LONG TOENAIL: ICD-10-CM

## 2024-02-06 DIAGNOSIS — I48.0 PAROXYSMAL ATRIAL FIBRILLATION (HCC): ICD-10-CM

## 2024-02-06 PROCEDURE — 1123F ACP DISCUSS/DSCN MKR DOCD: CPT | Performed by: FAMILY MEDICINE

## 2024-02-06 PROCEDURE — 99213 OFFICE O/P EST LOW 20 MIN: CPT | Performed by: FAMILY MEDICINE

## 2024-02-06 ASSESSMENT — PATIENT HEALTH QUESTIONNAIRE - PHQ9
SUM OF ALL RESPONSES TO PHQ QUESTIONS 1-9: 3
10. IF YOU CHECKED OFF ANY PROBLEMS, HOW DIFFICULT HAVE THESE PROBLEMS MADE IT FOR YOU TO DO YOUR WORK, TAKE CARE OF THINGS AT HOME, OR GET ALONG WITH OTHER PEOPLE: SOMEWHAT DIFFICULT
SUM OF ALL RESPONSES TO PHQ QUESTIONS 1-9: 3
3. TROUBLE FALLING OR STAYING ASLEEP: 1
10. IF YOU CHECKED OFF ANY PROBLEMS, HOW DIFFICULT HAVE THESE PROBLEMS MADE IT FOR YOU TO DO YOUR WORK, TAKE CARE OF THINGS AT HOME, OR GET ALONG WITH OTHER PEOPLE: 1
SUM OF ALL RESPONSES TO PHQ9 QUESTIONS 1 & 2: 0
4. FEELING TIRED OR HAVING LITTLE ENERGY: 1
3. TROUBLE FALLING OR STAYING ASLEEP: SEVERAL DAYS
9. THOUGHTS THAT YOU WOULD BE BETTER OFF DEAD, OR OF HURTING YOURSELF: NOT AT ALL
7. TROUBLE CONCENTRATING ON THINGS, SUCH AS READING THE NEWSPAPER OR WATCHING TELEVISION: 1
SUM OF ALL RESPONSES TO PHQ QUESTIONS 1-9: 3
9. THOUGHTS THAT YOU WOULD BE BETTER OFF DEAD, OR OF HURTING YOURSELF: 0
SUM OF ALL RESPONSES TO PHQ QUESTIONS 1-9: 3
5. POOR APPETITE OR OVEREATING: 0
6. FEELING BAD ABOUT YOURSELF - OR THAT YOU ARE A FAILURE OR HAVE LET YOURSELF OR YOUR FAMILY DOWN: 0
1. LITTLE INTEREST OR PLEASURE IN DOING THINGS: 0
SUM OF ALL RESPONSES TO PHQ QUESTIONS 1-9: 3
8. MOVING OR SPEAKING SO SLOWLY THAT OTHER PEOPLE COULD HAVE NOTICED. OR THE OPPOSITE - BEING SO FIDGETY OR RESTLESS THAT YOU HAVE BEEN MOVING AROUND A LOT MORE THAN USUAL: NOT AT ALL
7. TROUBLE CONCENTRATING ON THINGS, SUCH AS READING THE NEWSPAPER OR WATCHING TELEVISION: SEVERAL DAYS
4. FEELING TIRED OR HAVING LITTLE ENERGY: SEVERAL DAYS
2. FEELING DOWN, DEPRESSED OR HOPELESS: 0
1. LITTLE INTEREST OR PLEASURE IN DOING THINGS: NOT AT ALL
2. FEELING DOWN, DEPRESSED OR HOPELESS: NOT AT ALL
8. MOVING OR SPEAKING SO SLOWLY THAT OTHER PEOPLE COULD HAVE NOTICED. OR THE OPPOSITE, BEING SO FIGETY OR RESTLESS THAT YOU HAVE BEEN MOVING AROUND A LOT MORE THAN USUAL: 0
6. FEELING BAD ABOUT YOURSELF - OR THAT YOU ARE A FAILURE OR HAVE LET YOURSELF OR YOUR FAMILY DOWN: NOT AT ALL
5. POOR APPETITE OR OVEREATING: NOT AT ALL

## 2024-02-06 ASSESSMENT — ENCOUNTER SYMPTOMS
EYES NEGATIVE: 1
GASTROINTESTINAL NEGATIVE: 1
RESPIRATORY NEGATIVE: 1

## 2024-02-06 NOTE — PROGRESS NOTES
ago    H/O complete eye exam Yearly    [facility]    Headache     HLD (hyperlipidemia)     Hypertension     controlled with medication       Review of Systems   Constitutional: Negative.    Eyes: Negative.    Respiratory: Negative.     Cardiovascular: Negative.    Gastrointestinal: Negative.    Genitourinary: Negative.    Musculoskeletal: Negative.    Neurological: Negative.       Blood pressure 118/72, pulse 75, temperature 97.2 °F (36.2 °C), weight 89.8 kg (198 lb), SpO2 99 %, not currently breastfeeding.    Physical Exam  Vitals and nursing note reviewed.   Constitutional:       Appearance: Normal appearance.   HENT:      Mouth/Throat:      Mouth: Mucous membranes are moist.      Pharynx: Oropharynx is clear.   Eyes:      Conjunctiva/sclera: Conjunctivae normal.   Neck:      Vascular: No carotid bruit.   Cardiovascular:      Rate and Rhythm: Normal rate and regular rhythm.      Heart sounds: Normal heart sounds.   Pulmonary:      Breath sounds: Normal breath sounds.   Musculoskeletal:         General: No swelling.      Cervical back: Neck supple.      Comments: Right great to has mild redness. No swelling. Full ROM with moderate tenderness on flexion and extension. Some ingrowing of nail. No discharge. Neurovascular is intact.   Lymphadenopathy:      Cervical: No cervical adenopathy.   Neurological:      Deep Tendon Reflexes: Reflexes normal.   Psychiatric:         Mood and Affect: Mood normal.          ASSESSMENT and PLAN    Angelique was seen today for follow-up from hospital.    Diagnoses and all orders for this visit:    Primary hypertension    Paroxysmal atrial fibrillation (HCC)    Cellulitis of great toe of right foot    Long toenail  -     External Referral To Podiatry     Reviewed medications with pt and caregiver.  Continue to taper metoprolol. May take 2 cephalexin bid to make it easier to remember as opposed to qid.  May use HCTZ only as needed for swelling.  Referral to podiatry for toenail

## 2024-02-06 NOTE — CARE COORDINATION
Remote Patient Monitoring Note      Date/Time:  2/6/2024 1:07 PM  Patient Current Location: [place]  LPN attempted to contact patient by telephone regarding yellow alert received for no BP or weight taken x 2 days. Left HIPAA compliant message.    Background: Pt enrolled in RPM r/t HTN.       Plan/Follow Up: Will continue to review, monitor and address alerts with follow up based on severity of symptoms and risk factors.

## 2024-02-06 NOTE — PROGRESS NOTES
I did receive a message from patient's daughter Izabella she stated that she wanted to give me some information that her brother-in-law may not have shared.  And I would agree they did not share.  They are weaning her down off of metoprolol or changing her to Norvasc to see if show cognitively.  Little bit more sharper.  They have seen some improvement.  She is more clear and less gazed over.  She had some bladder pain pain and urgency on Sunday and had a UTI and they started antibiotics.

## 2024-02-10 ENCOUNTER — HOSPITAL ENCOUNTER (EMERGENCY)
Age: 89
Discharge: HOME | End: 2024-02-10
Attending: EMERGENCY MEDICINE
Payer: MEDICARE

## 2024-02-10 VITALS
HEART RATE: 71 BPM | RESPIRATION RATE: 15 BRPM | SYSTOLIC BLOOD PRESSURE: 120 MMHG | TEMPERATURE: 98.1 F | DIASTOLIC BLOOD PRESSURE: 70 MMHG | OXYGEN SATURATION: 96 % | BODY MASS INDEX: 31.55 KG/M2 | WEIGHT: 201 LBS | HEIGHT: 67 IN

## 2024-02-10 DIAGNOSIS — R20.0 NUMBNESS AND TINGLING: ICD-10-CM

## 2024-02-10 DIAGNOSIS — M79.675 TOE PAIN, BILATERAL: Primary | ICD-10-CM

## 2024-02-10 DIAGNOSIS — M79.674 TOE PAIN, BILATERAL: Primary | ICD-10-CM

## 2024-02-10 DIAGNOSIS — R20.2 NUMBNESS AND TINGLING: ICD-10-CM

## 2024-02-10 LAB
ANION GAP SERPL CALC-SCNC: 12 MMOL/L (ref 2–11)
APPEARANCE UR: CLEAR
BACTERIA URNS QL MICRO: NORMAL /HPF
BASOPHILS # BLD: 0.1 K/UL (ref 0–0.2)
BASOPHILS NFR BLD: 1 % (ref 0–2)
BILIRUB UR QL: NEGATIVE
BUN SERPL-MCNC: 25 MG/DL (ref 8–23)
CALCIUM SERPL-MCNC: 9.7 MG/DL (ref 8.3–10.4)
CASTS URNS QL MICRO: 0 /LPF
CHLORIDE SERPL-SCNC: 103 MMOL/L (ref 98–107)
CO2 SERPL-SCNC: 28 MMOL/L (ref 21–32)
COLOR UR: ABNORMAL
CREAT SERPL-MCNC: 0.84 MG/DL (ref 0.6–1)
CRP SERPL-MCNC: 0.7 MG/DL (ref 0–0.9)
CRYSTALS URNS QL MICRO: 0 /LPF
DIFFERENTIAL METHOD BLD: NORMAL
EOSINOPHIL # BLD: 0.3 K/UL (ref 0–0.8)
EOSINOPHIL NFR BLD: 6 % (ref 0.5–7.8)
EPI CELLS #/AREA URNS HPF: NORMAL /HPF
ERYTHROCYTE [DISTWIDTH] IN BLOOD BY AUTOMATED COUNT: 13.5 % (ref 11.9–14.6)
ERYTHROCYTE [SEDIMENTATION RATE] IN BLOOD: 13 MM/HR (ref 0–30)
GLUCOSE SERPL-MCNC: 103 MG/DL (ref 65–100)
GLUCOSE UR STRIP.AUTO-MCNC: NEGATIVE MG/DL
HCT VFR BLD AUTO: 41.3 % (ref 35.8–46.3)
HGB BLD-MCNC: 13.4 G/DL (ref 11.7–15.4)
HGB UR QL STRIP: NEGATIVE
IMM GRANULOCYTES # BLD AUTO: 0 K/UL (ref 0–0.5)
IMM GRANULOCYTES NFR BLD AUTO: 0 % (ref 0–5)
KETONES UR QL STRIP.AUTO: NEGATIVE MG/DL
LEUKOCYTE ESTERASE UR QL STRIP.AUTO: NEGATIVE
LYMPHOCYTES # BLD: 1.1 K/UL (ref 0.5–4.6)
LYMPHOCYTES NFR BLD: 20 % (ref 13–44)
MCH RBC QN AUTO: 30.9 PG (ref 26.1–32.9)
MCHC RBC AUTO-ENTMCNC: 32.4 G/DL (ref 31.4–35)
MCV RBC AUTO: 95.4 FL (ref 82–102)
MONOCYTES # BLD: 0.5 K/UL (ref 0.1–1.3)
MONOCYTES NFR BLD: 10 % (ref 4–12)
MUCOUS THREADS URNS QL MICRO: 0 /LPF
NEUTS SEG # BLD: 3.5 K/UL (ref 1.7–8.2)
NEUTS SEG NFR BLD: 64 % (ref 43–78)
NITRITE UR QL STRIP.AUTO: POSITIVE
NRBC # BLD: 0 K/UL (ref 0–0.2)
OTHER OBSERVATIONS: NORMAL
PH UR STRIP: 7 (ref 5–9)
PLATELET # BLD AUTO: 195 K/UL (ref 150–450)
PMV BLD AUTO: 10.3 FL (ref 9.4–12.3)
POTASSIUM SERPL-SCNC: 4.1 MMOL/L (ref 3.5–5.1)
PROT UR STRIP-MCNC: NEGATIVE MG/DL
RBC # BLD AUTO: 4.33 M/UL (ref 4.05–5.2)
RBC #/AREA URNS HPF: 0 /HPF
SODIUM SERPL-SCNC: 143 MMOL/L (ref 133–143)
SP GR UR REFRACTOMETRY: 1.01 (ref 1–1.02)
URATE SERPL-MCNC: 4.8 MG/DL (ref 2.6–6)
UROBILINOGEN UR QL STRIP.AUTO: 0.2 EU/DL (ref 0.2–1)
WBC # BLD AUTO: 5.4 K/UL (ref 4.3–11.1)
WBC URNS QL MICRO: NORMAL /HPF

## 2024-02-10 PROCEDURE — 84550 ASSAY OF BLOOD/URIC ACID: CPT

## 2024-02-10 PROCEDURE — 85025 COMPLETE CBC W/AUTO DIFF WBC: CPT

## 2024-02-10 PROCEDURE — 81001 URINALYSIS AUTO W/SCOPE: CPT

## 2024-02-10 PROCEDURE — 86140 C-REACTIVE PROTEIN: CPT

## 2024-02-10 PROCEDURE — 96374 THER/PROPH/DIAG INJ IV PUSH: CPT

## 2024-02-10 PROCEDURE — 6360000002 HC RX W HCPCS: Performed by: EMERGENCY MEDICINE

## 2024-02-10 PROCEDURE — 96375 TX/PRO/DX INJ NEW DRUG ADDON: CPT

## 2024-02-10 PROCEDURE — 99284 EMERGENCY DEPT VISIT MOD MDM: CPT

## 2024-02-10 PROCEDURE — 80048 BASIC METABOLIC PNL TOTAL CA: CPT

## 2024-02-10 PROCEDURE — 85652 RBC SED RATE AUTOMATED: CPT

## 2024-02-10 RX ORDER — KETOROLAC TROMETHAMINE 15 MG/ML
15 INJECTION, SOLUTION INTRAMUSCULAR; INTRAVENOUS
Status: COMPLETED | OUTPATIENT
Start: 2024-02-10 | End: 2024-02-10

## 2024-02-10 RX ORDER — METHYLPREDNISOLONE SODIUM SUCCINATE 125 MG/2ML
60 INJECTION, POWDER, LYOPHILIZED, FOR SOLUTION INTRAMUSCULAR; INTRAVENOUS
Status: COMPLETED | OUTPATIENT
Start: 2024-02-10 | End: 2024-02-10

## 2024-02-10 RX ORDER — WATER 10 ML/10ML
10 INJECTION INTRAMUSCULAR; INTRAVENOUS; SUBCUTANEOUS ONCE
Status: DISCONTINUED | OUTPATIENT
Start: 2024-02-10 | End: 2024-02-10 | Stop reason: HOSPADM

## 2024-02-10 RX ORDER — PREDNISONE 20 MG/1
20 TABLET ORAL DAILY
Qty: 5 TABLET | Refills: 0 | Status: SHIPPED | OUTPATIENT
Start: 2024-02-10 | End: 2024-02-15

## 2024-02-10 RX ADMIN — KETOROLAC TROMETHAMINE 15 MG: 15 INJECTION, SOLUTION INTRAMUSCULAR; INTRAVENOUS at 11:14

## 2024-02-10 RX ADMIN — METHYLPREDNISOLONE SODIUM SUCCINATE 60 MG: 125 INJECTION INTRAMUSCULAR; INTRAVENOUS at 11:51

## 2024-02-10 ASSESSMENT — PAIN DESCRIPTION - PAIN TYPE: TYPE: ACUTE PAIN

## 2024-02-10 ASSESSMENT — PAIN SCALES - GENERAL
PAINLEVEL_OUTOF10: 6
PAINLEVEL_OUTOF10: 8
PAINLEVEL_OUTOF10: 8

## 2024-02-10 ASSESSMENT — PAIN - FUNCTIONAL ASSESSMENT
PAIN_FUNCTIONAL_ASSESSMENT: 0-10
PAIN_FUNCTIONAL_ASSESSMENT: 0-10

## 2024-02-10 ASSESSMENT — PAIN DESCRIPTION - LOCATION: LOCATION: TOE (COMMENT WHICH ONE)

## 2024-02-10 NOTE — ED NOTES
I have reviewed discharge instructions with the patient.  The patient verbalized understanding.    Patient left ED via Discharge Method: wheelchair to Home with daughter    Opportunity for questions and clarification provided.       Patient given 1 scripts.         To continue your aftercare when you leave the hospital, you may receive an automated call from our care team to check in on how you are doing.  This is a free service and part of our promise to provide the best care and service to meet your aftercare needs.” If you have questions, or wish to unsubscribe from this service please call [phone].  Thank you for Choosing our [facility] Emergency Department.

## 2024-02-10 NOTE — ED TRIAGE NOTES
Pt presents to the ED in a wheelchair with daughter. Daughter states pt was seen here earlier this week for right toe pain and diagnosed with UTI, states she has one more dose of her antibiotic. Pt reports right big toe pain and numbness from the right foot up the ankle and left foot pain that started this morning.

## 2024-02-10 NOTE — ED PROVIDER NOTES
Emergency Department Provider Note       PCP: Antonio Godfrey Jr., MD   Age: 88 y.o.   Sex: female     DISPOSITION Decision To Discharge 02/10/2024 11:40:26 AM       ICD-10-CM    1. Toe pain, bilateral  M79.674     M79.675       2. Numbness and tingling  R20.0     R20.2           Medical Decision Making     Complexity of Problems Addressed:  1 or more chronic illnesses with a severe exacerbation or progression.    Data Reviewed and Analyzed:  I independently ordered and reviewed each unique test.  I reviewed external records: ED visit note from an outside group.  I reviewed external records: provider visit note from PCP.  I reviewed external records: provider visit note from outside specialist.   The patients assessment required an independent historian: daughter.  The reason they were needed is dementia.      Discussion of management or test interpretation.  88-year-old female presents to the emergency department complaining of bilateral toe pain, right worse than left with a sensation of numbness advancing up her right foot and ankle.  Patient was seen on the fifth in the ER for pain mostly in the right toe, and discharged home with Keflex for UTI.  Lab work at that time was unremarkable.  Patient also complains of some bilateral hip discomfort as well as chronic neck pain.  Denies any history of trauma.  On exam, the patient appears in no discomfort, she is hallux valgus deformity bilaterally, with minimal warmth noted to the right MTP joint and minimal discomfort with movement.  There is no evidence of erythema to the foot or swelling of the feet or ankles.  Patient was screened with a CBC which was normal, BMP which was unremarkable except for BUN of 25 and creatinine 0.84 which is approximately the patient's baseline.  CRP was 0.7 with a sed rate of 13 and a uric acid of 4.8.  Patient given Toradol 15 mg IV for her discomfort, as well as 1 dose of Solu-Medrol 60 mg IV.  To be discharged home on 5 days of  during the making of this note.  This software is not perfect and grammatical and other typographical errors may be present.  This note has not been completely proofread for errors.     Alfredo Tolentino MD  02/11/24 115

## 2024-02-12 ENCOUNTER — CARE COORDINATION (OUTPATIENT)
Dept: CARE COORDINATION | Facility: CLINIC | Age: 89
End: 2024-02-12

## 2024-02-12 NOTE — CARE COORDINATION
to drive and/or has transportation: Independent  Ability to do shopping: Independent  Ability to manage finances: Independent  Is patient able to live independently?: Yes     Current Housing: Private Residence  Who do you live with?: Alone  Are you an active caregiver in your home?: No     Do you have any DME?: Yes  Patient DME: Straight cane, Walker     Per the Fall Risk Screening, did the patient have 2 or more falls or 1 fall with injury in the past year?: No     Frequent urination at night?: Yes  Do you use rails/bars?: No  Do you have a non-slip tub mat?: Yes     Are you experiencing loss of meaning?: No  Are you experiencing loss of hope and peace?: No     Thinking about your patient's physical health needs, are there any symptoms or problems (risk indicators) you are unsure about that require further investigation?: No identified areas of uncertainly or problems already being investigated   Are the patient’s physical health problems impacting on their mental well-being?: No identified areas of concern   Are there any problems with your patient’s lifestyle behaviors (alcohol, drugs, diet, exercise) that are impacting on physical or mental well-being?: No identified areas of concern   Do you have any other concerns about your patient’s mental well-being? How would you rate their severity and impact on the patient?: No identified areas of concern   How would you rate their home environment in terms of safety and stability (including domestic violence, insecure housing, neighbor harassment)?: Consistently safe, supportive, stable, no identified problems   How do daily activities impact on the patient's well-being? (include current or anticipated unemployment, work, caregiving, access to transportation or other): No identified problems or perceived positive benefits   How would you rate their social network (family, work, friends)?: Good participation with social networks   How would you rate their financial

## 2024-02-14 NOTE — ED PROVIDER NOTES
0.1 0.0 - 0.2 K/UL    Absolute Immature Granulocyte 0.0 0.0 - 0.5 K/UL   Brain Natriuretic Peptide   Result Value Ref Range    NT Pro- (H) 0 - 450 PG/ML   Comprehensive Metabolic Panel   Result Value Ref Range    Sodium 142 133 - 143 mmol/L    Potassium 4.3 3.5 - 5.1 mmol/L    Chloride 106 98 - 107 mmol/L    CO2 27 21 - 32 mmol/L    Anion Gap 9 2 - 11 mmol/L    Glucose 97 65 - 100 mg/dL    BUN 20 8 - 23 MG/DL    Creatinine 0.62 0.6 - 1.0 MG/DL    Est, Glom Filt Rate >60 >60 ml/min/1.73m2    Calcium 9.6 8.3 - 10.4 MG/DL    Total Bilirubin 0.4 0.2 - 1.1 MG/DL    ALT 25 13.0 - 61.0 U/L    AST 29 15 - 37 U/L    Alk Phosphatase 93 45.0 - 117.0 U/L    Total Protein 6.9 6.4 - 8.2 g/dL    Albumin 4.1 3.2 - 4.6 g/dL    Globulin 2.8 2.8 - 4.5 g/dL    Albumin/Globulin Ratio 1.5 0.4 - 1.6     Urinalysis, Micro   Result Value Ref Range    WBC, UA 3-5 0 /hpf    RBC, UA 0 0 /hpf    Epithelial Cells UA 0-3 0 /hpf    BACTERIA, URINE TRACE 0 /hpf    Casts 0 0 /lpf    Crystals 0 0 /LPF    Mucus, UA TRACE 0 /lpf    Other observations RESULTS VERIFIED MANUALLY          No orders to display                     Voice dictation software was used during the making of this note.  This software is not perfect and grammatical and other typographical errors may be present.  This note has not been completely proofread for errors.        Sawyer Cardenas MD  02/14/24 [id number]

## 2024-02-16 VITALS
BODY MASS INDEX: 30.32 KG/M2 | WEIGHT: 193.6 LBS | OXYGEN SATURATION: 98 % | HEART RATE: 68 BPM | SYSTOLIC BLOOD PRESSURE: 155 MMHG | DIASTOLIC BLOOD PRESSURE: 110 MMHG

## 2024-02-19 ENCOUNTER — CARE COORDINATION (OUTPATIENT)
Dept: CARE COORDINATION | Facility: CLINIC | Age: 89
End: 2024-02-19

## 2024-02-19 NOTE — CARE COORDINATION
Remote Patient Monitoring Note      Date/Time:  2/19/2024 1:11 PM  Patient Current Location: [facility] red alert and yellow alert received for weight increase (of 5 lbs In Last 7 Days) and no BP taken > 2 days.     Background: Pt enrolled in [facility] r/t HTN.   Clinical Interventions: HRS weights reviewed. Weights of 32.6# and 36.2# recorded on 02/18/24 inaccurate and removed from HRS. Current recorded weight of 195.0# is within RPM parameters. RPM Reminder message sent to pt via [employer] stating: Angelique Seals , I am a nurse with the remote patient monitoring team;  reaching out to you today to remind you to please take your vitals. Important: Please try to take your vitals each day before 12pm. This ensures the monitoring team will have time to connect with your providers and get back to you with any new orders or instructions.    No further outreach by this LPN indicated at this time.   Plan/Follow Up: Will continue to review, monitor and address alerts with follow up based on severity of symptoms and risk factors.

## 2024-02-20 ENCOUNTER — CARE COORDINATION (OUTPATIENT)
Dept: CARE COORDINATION | Facility: CLINIC | Age: 89
End: 2024-02-20

## 2024-02-20 NOTE — CARE COORDINATION
Remote Patient Monitoring Note      Date/Time:  2024 3:33 PM  Patient Current Location: [place]  LPN attempted to contact patient by telephone regarding yellow alert received for no BP taken > 2 days. Spoke with patients daughter / emergency contact Izabellamaris Hickman. ALEJANDRA on file. Verified patients name and  as identifiers.    Background: Pt enrolled in RPM r/t HTN.   Clinical Interventions: Discussed RPM adherence and need for pt to  update BP with patients daughter Izabella Hickman. Per Ms. Hickman, pt had a difficult time obtaining BP reading on , 24. Caretaker attempted to assist pt with obtaining BP reading on Monday, 24, but \"it wouldn't work\". Ms. Hickman states her brother (patients son) will be with pt this evening and will attempt to assist pt with BP monitor. Izabella Hickman unsure if BP monitor batteries need to be replaced or assistance from tech support is needed. Izabella Hickman aware that HRS tech support contact information is listed on/in box RPM equipment was delivered in; HRS tech support contact information also offered. Izabella Hickman agreeable to notify this LPN if assistance is needed with notifying HRS tech support. Will route FYI to [facility].     Plan/Follow Up: Will continue to review, monitor and address alerts with follow up based on severity of symptoms and risk factors.

## 2024-02-21 ENCOUNTER — CARE COORDINATION (OUTPATIENT)
Dept: CARE COORDINATION | Facility: CLINIC | Age: 89
End: 2024-02-21

## 2024-02-21 SDOH — HEALTH STABILITY: PHYSICAL HEALTH: ON AVERAGE, HOW MANY DAYS PER WEEK DO YOU ENGAGE IN MODERATE TO STRENUOUS EXERCISE (LIKE A BRISK WALK)?: 0 DAYS

## 2024-02-21 ASSESSMENT — PATIENT HEALTH QUESTIONNAIRE - PHQ9
1. LITTLE INTEREST OR PLEASURE IN DOING THINGS: 0
SUM OF ALL RESPONSES TO PHQ QUESTIONS 1-9: 0
SUM OF ALL RESPONSES TO PHQ9 QUESTIONS 1 & 2: 0
2. FEELING DOWN, DEPRESSED OR HOPELESS: 0

## 2024-02-21 ASSESSMENT — LIFESTYLE VARIABLES
HOW OFTEN DO YOU HAVE A DRINK CONTAINING ALCOHOL: 1
HOW OFTEN DO YOU HAVE SIX OR MORE DRINKS ON ONE OCCASION: 1
HOW MANY STANDARD DRINKS CONTAINING ALCOHOL DO YOU HAVE ON A TYPICAL DAY: PATIENT DOES NOT DRINK
HOW MANY STANDARD DRINKS CONTAINING ALCOHOL DO YOU HAVE ON A TYPICAL DAY: 0
HOW OFTEN DO YOU HAVE A DRINK CONTAINING ALCOHOL: NEVER

## 2024-02-21 NOTE — CARE COORDINATION
Remote Patient Monitoring Note      Date/Time:  2/21/2024 12:14 PM  Patient Current Location: [place]  LPN attempted to contact patient by telephone regarding yellow alert received for no BP taken > 2 days. Pt has not updated BP x 6 days. Left HIPAA compliant message requesting a return call. LPN previously spoke with patients emergency contact/daughter, Izabella Hickman, regarding RPM adherence; See RPM Care Coordination note from 02/20/24. Will route to [facility].     Background: Pt enrolled in RPM r/t HTN.       Plan/Follow Up: Will continue to review, monitor and address alerts with follow up based on severity of symptoms and risk factors.

## 2024-02-22 ENCOUNTER — OFFICE VISIT (OUTPATIENT)
Dept: FAMILY MEDICINE CLINIC | Facility: CLINIC | Age: 89
End: 2024-02-22
Payer: MEDICARE

## 2024-02-22 VITALS
DIASTOLIC BLOOD PRESSURE: 62 MMHG | TEMPERATURE: 97.2 F | BODY MASS INDEX: 30.85 KG/M2 | HEART RATE: 63 BPM | OXYGEN SATURATION: 98 % | SYSTOLIC BLOOD PRESSURE: 112 MMHG | WEIGHT: 197 LBS

## 2024-02-22 DIAGNOSIS — Z86.79 HISTORY OF ATRIAL FIBRILLATION: ICD-10-CM

## 2024-02-22 DIAGNOSIS — I10 PRIMARY HYPERTENSION: ICD-10-CM

## 2024-02-22 DIAGNOSIS — E78.5 ELEVATED LIPIDS: ICD-10-CM

## 2024-02-22 DIAGNOSIS — R60.9 SWELLING: ICD-10-CM

## 2024-02-22 DIAGNOSIS — Z00.00 ENCOUNTER FOR SUBSEQUENT ANNUAL WELLNESS VISIT (AWV) IN MEDICARE PATIENT: Primary | ICD-10-CM

## 2024-02-22 DIAGNOSIS — F03.B0 MODERATE DEMENTIA WITHOUT BEHAVIORAL DISTURBANCE, PSYCHOTIC DISTURBANCE, MOOD DISTURBANCE, OR ANXIETY, UNSPECIFIED DEMENTIA TYPE (HCC): ICD-10-CM

## 2024-02-22 LAB
ALBUMIN SERPL-MCNC: 3.6 G/DL (ref 3.2–4.6)
ALBUMIN/GLOB SERPL: 1.2 (ref 0.4–1.6)
ALP SERPL-CCNC: 76 U/L (ref 50–136)
ALT SERPL-CCNC: 30 U/L (ref 12–65)
ANION GAP SERPL CALC-SCNC: 3 MMOL/L (ref 2–11)
AST SERPL-CCNC: 22 U/L (ref 15–37)
BASOPHILS # BLD: 0.1 K/UL (ref 0–0.2)
BASOPHILS NFR BLD: 1 % (ref 0–2)
BILIRUB SERPL-MCNC: 0.5 MG/DL (ref 0.2–1.1)
BUN SERPL-MCNC: 38 MG/DL (ref 8–23)
CALCIUM SERPL-MCNC: 9.4 MG/DL (ref 8.3–10.4)
CHLORIDE SERPL-SCNC: 109 MMOL/L (ref 103–113)
CHOLEST SERPL-MCNC: 217 MG/DL
CO2 SERPL-SCNC: 30 MMOL/L (ref 21–32)
CREAT SERPL-MCNC: 0.9 MG/DL (ref 0.6–1)
DIFFERENTIAL METHOD BLD: NORMAL
EOSINOPHIL # BLD: 0.4 K/UL (ref 0–0.8)
EOSINOPHIL NFR BLD: 7 % (ref 0.5–7.8)
ERYTHROCYTE [DISTWIDTH] IN BLOOD BY AUTOMATED COUNT: 14 % (ref 11.9–14.6)
GLOBULIN SER CALC-MCNC: 3 G/DL (ref 2.8–4.5)
GLUCOSE SERPL-MCNC: 92 MG/DL (ref 65–100)
HCT VFR BLD AUTO: 41.5 % (ref 35.8–46.3)
HDLC SERPL-MCNC: 89 MG/DL (ref 40–60)
HDLC SERPL: 2.4
HGB BLD-MCNC: 13.1 G/DL (ref 11.7–15.4)
IMM GRANULOCYTES # BLD AUTO: 0 K/UL (ref 0–0.5)
IMM GRANULOCYTES NFR BLD AUTO: 0 % (ref 0–5)
LDLC SERPL CALC-MCNC: 103.6 MG/DL
LYMPHOCYTES # BLD: 1.8 K/UL (ref 0.5–4.6)
LYMPHOCYTES NFR BLD: 31 % (ref 13–44)
MCH RBC QN AUTO: 30.2 PG (ref 26.1–32.9)
MCHC RBC AUTO-ENTMCNC: 31.6 G/DL (ref 31.4–35)
MCV RBC AUTO: 95.6 FL (ref 82–102)
MONOCYTES # BLD: 0.6 K/UL (ref 0.1–1.3)
MONOCYTES NFR BLD: 10 % (ref 4–12)
NEUTS SEG # BLD: 2.9 K/UL (ref 1.7–8.2)
NEUTS SEG NFR BLD: 51 % (ref 43–78)
NRBC # BLD: 0 K/UL (ref 0–0.2)
PLATELET # BLD AUTO: 213 K/UL (ref 150–450)
PMV BLD AUTO: 11.6 FL (ref 9.4–12.3)
POTASSIUM SERPL-SCNC: 3.9 MMOL/L (ref 3.5–5.1)
PROT SERPL-MCNC: 6.6 G/DL (ref 6.3–8.2)
RBC # BLD AUTO: 4.34 M/UL (ref 4.05–5.2)
SODIUM SERPL-SCNC: 142 MMOL/L (ref 136–146)
TRIGL SERPL-MCNC: 122 MG/DL (ref 35–150)
TSH, 3RD GENERATION: 2.38 UIU/ML (ref 0.36–3.74)
VLDLC SERPL CALC-MCNC: 24.4 MG/DL (ref 6–23)
WBC # BLD AUTO: 5.8 K/UL (ref 4.3–11.1)

## 2024-02-22 PROCEDURE — 99214 OFFICE O/P EST MOD 30 MIN: CPT | Performed by: FAMILY MEDICINE

## 2024-02-22 PROCEDURE — G0439 PPPS, SUBSEQ VISIT: HCPCS | Performed by: FAMILY MEDICINE

## 2024-02-22 PROCEDURE — 1123F ACP DISCUSS/DSCN MKR DOCD: CPT | Performed by: FAMILY MEDICINE

## 2024-02-22 RX ORDER — HYDROCHLOROTHIAZIDE 25 MG/1
TABLET ORAL
Qty: 30 TABLET | Refills: 3 | Status: SHIPPED | OUTPATIENT
Start: 2024-02-22

## 2024-02-22 SDOH — ECONOMIC STABILITY: FOOD INSECURITY: WITHIN THE PAST 12 MONTHS, THE FOOD YOU BOUGHT JUST DIDN'T LAST AND YOU DIDN'T HAVE MONEY TO GET MORE.: NEVER TRUE

## 2024-02-22 SDOH — ECONOMIC STABILITY: INCOME INSECURITY: HOW HARD IS IT FOR YOU TO PAY FOR THE VERY BASICS LIKE FOOD, HOUSING, MEDICAL CARE, AND HEATING?: NOT HARD AT ALL

## 2024-02-22 SDOH — ECONOMIC STABILITY: FOOD INSECURITY: WITHIN THE PAST 12 MONTHS, YOU WORRIED THAT YOUR FOOD WOULD RUN OUT BEFORE YOU GOT MONEY TO BUY MORE.: NEVER TRUE

## 2024-02-22 ASSESSMENT — PATIENT HEALTH QUESTIONNAIRE - PHQ9
6. FEELING BAD ABOUT YOURSELF - OR THAT YOU ARE A FAILURE OR HAVE LET YOURSELF OR YOUR FAMILY DOWN: 0
SUM OF ALL RESPONSES TO PHQ9 QUESTIONS 1 & 2: 2
8. MOVING OR SPEAKING SO SLOWLY THAT OTHER PEOPLE COULD HAVE NOTICED. OR THE OPPOSITE, BEING SO FIGETY OR RESTLESS THAT YOU HAVE BEEN MOVING AROUND A LOT MORE THAN USUAL: 0
1. LITTLE INTEREST OR PLEASURE IN DOING THINGS: 1
3. TROUBLE FALLING OR STAYING ASLEEP: 2
5. POOR APPETITE OR OVEREATING: 0
10. IF YOU CHECKED OFF ANY PROBLEMS, HOW DIFFICULT HAVE THESE PROBLEMS MADE IT FOR YOU TO DO YOUR WORK, TAKE CARE OF THINGS AT HOME, OR GET ALONG WITH OTHER PEOPLE: 0
SUM OF ALL RESPONSES TO PHQ QUESTIONS 1-9: 4
4. FEELING TIRED OR HAVING LITTLE ENERGY: 0
7. TROUBLE CONCENTRATING ON THINGS, SUCH AS READING THE NEWSPAPER OR WATCHING TELEVISION: 0
SUM OF ALL RESPONSES TO PHQ QUESTIONS 1-9: 4
2. FEELING DOWN, DEPRESSED OR HOPELESS: 1
9. THOUGHTS THAT YOU WOULD BE BETTER OFF DEAD, OR OF HURTING YOURSELF: 0

## 2024-02-22 ASSESSMENT — ENCOUNTER SYMPTOMS
EYES NEGATIVE: 1
GASTROINTESTINAL NEGATIVE: 1
RESPIRATORY NEGATIVE: 1

## 2024-02-22 NOTE — PATIENT INSTRUCTIONS
goals.  A dietitian can help you make healthy changes in your diet.  An exercise specialist or  can help you develop a safe and effective exercise program.  A counselor or psychiatrist can help you cope with issues such as depression, anxiety, or family problems that can make it hard to focus on weight loss.  Consider joining a support group for people who are trying to lose weight. Your doctor can suggest groups in your area.  Where can you learn more?  Go to https://www.S[employer].net/patientEd and enter U357 to learn more about \"Starting a Weight Loss Plan: Care Instructions.\"  Current as of: September 20, 2023               Content Version: 13.9  © 1356-1720 [employer].   Care instructions adapted under license by [employer]. If you have questions about a medical condition or this instruction, always ask your healthcare professional. [employer] disclaims any warranty or liability for your use of this information.           A Healthy Heart: Care Instructions  Your Care Instructions     Coronary artery disease, also called heart disease, occurs when a substance called plaque builds up in the vessels that supply oxygen-rich blood to your heart muscle. This can narrow the blood vessels and reduce blood flow. A heart attack happens when blood flow is completely blocked. A high-fat diet, smoking, and other factors increase the risk of heart disease.  Your doctor has found that you have a chance of having heart disease. You can do lots of things to keep your heart healthy. It may not be easy, but you can change your diet, exercise more, and quit smoking. These steps really work to lower your chance of heart disease.  Follow-up care is a key part of your treatment and safety. Be sure to make and go to all appointments, and call your doctor if you are having problems. It's also a good idea to know your test results and keep a list of the medicines you take.  How can you care

## 2024-02-22 NOTE — PROGRESS NOTES
Negative.    Gastrointestinal: Negative.    Genitourinary: Negative.    Musculoskeletal:  Positive for arthralgias and neck pain.   Neurological: Negative.    Psychiatric/Behavioral: Negative.        Blood pressure 112/62, pulse 63, temperature 97.2 °F (36.2 °C), weight 89.4 kg (197 lb), SpO2 98 %, not currently breastfeeding.    Physical Exam  Vitals and nursing note reviewed.   Constitutional:       Appearance: Normal appearance.   HENT:      Right Ear: Tympanic membrane normal.      Left Ear: Tympanic membrane normal.      Mouth/Throat:      Mouth: Mucous membranes are moist.      Pharynx: Oropharynx is clear.   Eyes:      Extraocular Movements: Extraocular movements intact.      Conjunctiva/sclera: Conjunctivae normal.      Pupils: Pupils are equal, round, and reactive to light.   Neck:      Vascular: No carotid bruit.      Comments: Neck has mild non-tender spasm. Full ROM but tender with extension.  Cardiovascular:      Rate and Rhythm: Normal rate and regular rhythm.      Heart sounds: Normal heart sounds.   Pulmonary:      Breath sounds: Normal breath sounds.   Musculoskeletal:         General: No swelling or tenderness. Normal range of motion.      Cervical back: Neck supple.   Lymphadenopathy:      Cervical: No cervical adenopathy.   Neurological:      Mental Status: She is oriented to person, place, and time.      Cranial Nerves: No cranial nerve deficit.      Coordination: Coordination normal.      Gait: Gait normal.      Deep Tendon Reflexes: Reflexes normal.   Psychiatric:         Mood and Affect: Mood normal.          ASSESSMENT and PLAN    Angelique was seen today for medicare awv.    Diagnoses and all orders for this visit:    Encounter for subsequent annual wellness visit (AWV) in Medicare patient    Primary hypertension  -     CBC with Auto Differential; Future  -     Comprehensive Metabolic Panel; Future  -     TSH; Future  -     TSH  -     Comprehensive Metabolic Panel  -     CBC with Auto

## 2024-02-23 ENCOUNTER — CARE COORDINATION (OUTPATIENT)
Dept: CARE COORDINATION | Facility: CLINIC | Age: 89
End: 2024-02-23

## 2024-02-23 NOTE — CARE COORDINATION
Remote Patient Monitoring Note      Date/Time:  2/23/2024 1:52 PM  Patient Current Location: [facility] 2nd attempt to contact patient by telephone regarding red alert received for pulse ox reading (89%). Left another HIPAA compliant message requesting a return call. Emergency contact, Izabella Hickman, has same contact number as pt. Will route to [facility].      Background: Pt enrolled in RPM r/t HTN.         Plan/Follow Up: Will continue to review, monitor and address alerts with follow up based on severity of symptoms and risk factors.

## 2024-02-23 NOTE — CARE COORDINATION
Remote Patient Monitoring Note      Date/Time:  2/23/2024 11:16 AM  Patient Current Location: [place]  LPN attempted to contact patient by telephone regarding red alert received for pulse ox reading (89%). Left HIPAA compliant message requesting a return call.     Background: Pt enrolled in RPM r/t HTN.       Plan/Follow Up: Will continue to review, monitor and address alerts with follow up based on severity of symptoms and risk factors.

## 2024-02-26 ENCOUNTER — CARE COORDINATION (OUTPATIENT)
Dept: CARE COORDINATION | Facility: CLINIC | Age: 89
End: 2024-02-26

## 2024-02-26 NOTE — CARE COORDINATION
Ambulatory Care Coordination Note  2024    Patient Current Location:  [place]     ACM contacted the patient by telephone. Verified name and  with patient as identifiers. Provided introduction to self, and explanation of the ACM role.     Challenges to be reviewed by the provider   Additional needs identified to be addressed with provider: No  none               Method of communication with provider: none.    ACM: Reyna Wagner RN    Ccm outreached to patient. Spoke with daughter who states they were having issues with rpm blood pressure cuff. Ccm notified staff member and they will call patient's daughter. Daughter states no questions or concerns. Ccm discussed that I would outreach next week. Daughter agreeable.     Offered patient enrollment in the Remote Patient Monitoring (RPM) program for in-home monitoring: Yes, patient already enrolled.    Lab Results       None            Care Coordination Interventions    Referral from Primary Care Provider: No  Suggested Interventions and Community Resources  Other Services or Interventions: rpm          Goals Addressed                   This Visit's Progress     Conditions and Symptoms   On track     I will schedule office visits, as directed by my provider.  I will keep my appointment or reschedule if I have to cancel.  I will notify my provider of any barriers to my plan of care.  I will notify my provider of any symptoms that indicate a worsening of my condition.    Barriers: none  Plan for overcoming my barriers: N/A  Confidence: 9/10  Anticipated Goal Completion Date: 3/19/23         Self Monitoring   On track     Blood Pressure - I will take my blood pressure as directed - Daily  I will notify my provider of any trends of increasing or decreasing blood pressures over a month period of time.  I will notify my provider of any changes in blood pressure associated with symptoms of dizziness, falls, passing out, headache, confusion/change in mental

## 2024-02-27 ENCOUNTER — CARE COORDINATION (OUTPATIENT)
Dept: CARE COORDINATION | Facility: CLINIC | Age: 89
End: 2024-02-27

## 2024-02-27 NOTE — CARE COORDINATION
Health  Home Visit for RPM    Arrived at patients residence for evaluation of BP cuff that is not working. Verbal consent for home visit obtained. Patients device will not power up. Spoke with [employer] and replacement cuff ordered. Will arrive in 1-2 days. Verbally walked through with caregiver speaking with [employer] for pairing new cuff. Offered if there were any issues to be available to assist. Caregiver and patient agree.

## 2024-02-29 DIAGNOSIS — I10 PRIMARY HYPERTENSION: Primary | ICD-10-CM

## 2024-02-29 RX ORDER — AMLODIPINE BESYLATE 10 MG/1
10 TABLET ORAL DAILY
Qty: 90 TABLET | Refills: 3 | Status: SHIPPED | OUTPATIENT
Start: 2024-02-29

## 2024-03-01 ENCOUNTER — CARE COORDINATION (OUTPATIENT)
Dept: CARE COORDINATION | Facility: CLINIC | Age: 89
End: 2024-03-01

## 2024-03-01 NOTE — CARE COORDINATION
Remote Patient Monitoring Note      Date/Time:  3/1/2024 2:12 PM  Patient Current Location: [place]  LPN attempted to contact patient by telephone regarding yellow alert received for no BP taken > 2 days. Pt has not updated BP x 3 days. Left HIPAA compliant message requesting a return call.      Background: Pt enrolled in RPM r/t HTN.       Plan/Follow Up: Will continue to review, monitor and address alerts with follow up based on severity of symptoms and risk factors.

## 2024-03-04 ENCOUNTER — CARE COORDINATION (OUTPATIENT)
Dept: CARE COORDINATION | Facility: CLINIC | Age: 89
End: 2024-03-04

## 2024-03-04 NOTE — CARE COORDINATION
Ambulatory Care Management Note        Date/Time:  3/4/2024 9:08 AM    Ccm outreached to patient. No answer at this time, message left. Awaiting response. If no response, ccm will outreach next week.

## 2024-03-07 ENCOUNTER — CARE COORDINATION (OUTPATIENT)
Dept: CARE COORDINATION | Facility: CLINIC | Age: 89
End: 2024-03-07

## 2024-03-07 NOTE — CARE COORDINATION
Remote Patient Monitoring Note      Date/Time:  3/7/2024 3:14 PM  Patient Current Location: [place]  LPN attempted to contact patient by telephone regarding yellow alert received for no BP or weight taken > 2 days. Pt has not updated metrics x 3 days. Left HIPAA compliant message requesting a return call.     Background: Pt enrolled in RPM r/t HTN.       Plan/Follow Up: Will continue to review, monitor and address alerts with follow up based on severity of symptoms and risk factors.

## 2024-03-08 ENCOUNTER — CARE COORDINATION (OUTPATIENT)
Dept: CARE COORDINATION | Facility: CLINIC | Age: 89
End: 2024-03-08

## 2024-03-08 NOTE — CARE COORDINATION
Remote Patient Monitoring Note      Date/Time:  3/8/2024 2:07 PM  Patient Current Location: [place]  LPN attempted to contact patient by telephone regarding yellow alert received for no BP or weight taken > 2 days. Patient has not updated metrics x 4 days. Left HIPAA compliant message requesting a return call. Patients contact number is same as patients emergency contact, Izabella Hickman. Pt nonadherent with RPM. Will route to AC.     Background: Pt enrolled in RPM r/t HTN.        Plan/Follow Up: Will continue to review, monitor and address alerts with follow up based on severity of symptoms and risk factors.

## 2024-03-11 ENCOUNTER — CARE COORDINATION (OUTPATIENT)
Dept: CARE COORDINATION | Facility: CLINIC | Age: 89
End: 2024-03-11

## 2024-03-11 NOTE — CARE COORDINATION
Ambulatory Care Management Note        Date/Time:  3/11/2024 9:34 AM    Ccm outreached to patient. No answer at this time, message left. Awaiting response. If no response, ccm will outreach next week.

## 2024-03-11 NOTE — CARE COORDINATION
Remote Patient Monitoring Note      Date/Time:  3/11/2024 3:42 PM  Patient Current Location: [facility] yellow alert received for no BP or weight taken > 2 days. Pt has not updated metrics x 7 days. Multiple attempts have been made to contact pt / emergency contact. Patients contact number is same as patients emergency contact, Izabella Hickman. Pt nonadherent with RPM. No further outreach by this LPN indicated at this time. Will route to ACM.      Background: Pt enrolled in RPM r/t HTN.         Plan/Follow Up: Will continue to review, monitor and address alerts with follow up based on severity of symptoms and risk factors.

## 2024-03-12 ENCOUNTER — CARE COORDINATION (OUTPATIENT)
Dept: CARE COORDINATION | Facility: CLINIC | Age: 89
End: 2024-03-12

## 2024-03-12 NOTE — CARE COORDINATION
Remote Patient Monitoring Note      Date/Time:  3/12/2024 12:27 PM  Patient Current Location: [facility] yellow alert received for no BP or weight taken > 2 days. Pt has not updated metrics x 8 days. Multiple attempts have been made to contact pt / emergency contact. Patients contact number is same as patients emergency contact, Izabella Hickman. Pt nonadherent with RPM. No further outreach by this LPN indicated at this time. Will route to ACM.       Background: Pt enrolled in RPM r/t HTN.           Plan/Follow Up: Will continue to review, monitor and address alerts with follow up based on severity of symptoms and risk factors.

## 2024-03-13 ENCOUNTER — CARE COORDINATION (OUTPATIENT)
Dept: CARE COORDINATION | Facility: CLINIC | Age: 89
End: 2024-03-13

## 2024-03-13 NOTE — CARE COORDINATION
Remote Patient Monitoring Note      Date/Time:  3/13/2024 2:11 PM  Patient Current Location: [facility] yellow alert received for no BP or weight taken > 2 days. Pt has not updated metrics x 9 days. Multiple attempts have been made to contact pt / emergency contact. Patients contact number is same as patients emergency contact, Izabella Hickman. Pt nonadherent with RPM. No further outreach by this LPN indicated at this time. Will route to ACM and RPM Support Specialists.      Background: Pt enrolled in RPM r/t HTN.           Plan/Follow Up: Will continue to review, monitor and address alerts with follow up based on severity of symptoms and risk factors.

## 2024-03-14 ENCOUNTER — CARE COORDINATION (OUTPATIENT)
Dept: CARE COORDINATION | Facility: CLINIC | Age: 89
End: 2024-03-14

## 2024-03-14 NOTE — CARE COORDINATION
Remote Patient Monitoring Note      Date/Time:  3/14/2024 9:08 AM  Patient Current Location: [facility] yellow alert received for no BP or weight taken > 2 days. Pt has not updated metrics x 10 days. Multiple attempts have been made to contact pt / emergency contact. Patients contact number is same as patients emergency contact, Izabella Hickman. Pt nonadherent with RPM. No further outreach by this LPN indicated at this time. Will route to ACM and RPM Support Specialists with request to pause pt in RPM.     Background: Pt enrolled in RPM r/t HTN.           Plan/Follow Up: Will continue to review, monitor and address alerts with follow up based on severity of symptoms and risk factors.

## 2024-03-15 RX ORDER — LOSARTAN POTASSIUM 100 MG/1
100 TABLET ORAL DAILY
Qty: 90 TABLET | Refills: 1 | OUTPATIENT
Start: 2024-03-15

## 2024-03-18 ENCOUNTER — CARE COORDINATION (OUTPATIENT)
Dept: CARE COORDINATION | Facility: CLINIC | Age: 89
End: 2024-03-18

## 2024-03-18 NOTE — CARE COORDINATION
Ambulatory Care Management Note        Date/Time:  3/18/2024 8:55 AM    Ccm outreached to patient.  No answer at this time, message left for both patient and daughter. Awaiting response. If no response, ccm will outreach next week.

## 2024-03-22 ENCOUNTER — OFFICE VISIT (OUTPATIENT)
Age: 89
End: 2024-03-22
Payer: MEDICARE

## 2024-03-22 ENCOUNTER — PATIENT MESSAGE (OUTPATIENT)
Dept: FAMILY MEDICINE CLINIC | Facility: CLINIC | Age: 89
End: 2024-03-22

## 2024-03-22 ENCOUNTER — PATIENT MESSAGE (OUTPATIENT)
Age: 89
End: 2024-03-22

## 2024-03-22 VITALS
DIASTOLIC BLOOD PRESSURE: 80 MMHG | WEIGHT: 194 LBS | HEART RATE: 66 BPM | SYSTOLIC BLOOD PRESSURE: 122 MMHG | BODY MASS INDEX: 30.45 KG/M2 | HEIGHT: 67 IN

## 2024-03-22 DIAGNOSIS — Z95.818 PRESENCE OF WATCHMAN LEFT ATRIAL APPENDAGE CLOSURE DEVICE: ICD-10-CM

## 2024-03-22 DIAGNOSIS — E78.00 PURE HYPERCHOLESTEROLEMIA: ICD-10-CM

## 2024-03-22 DIAGNOSIS — I48.0 PAROXYSMAL ATRIAL FIBRILLATION (HCC): Primary | ICD-10-CM

## 2024-03-22 DIAGNOSIS — R00.1 SYMPTOMATIC BRADYCARDIA: ICD-10-CM

## 2024-03-22 PROCEDURE — 99214 OFFICE O/P EST MOD 30 MIN: CPT | Performed by: INTERNAL MEDICINE

## 2024-03-22 PROCEDURE — 1123F ACP DISCUSS/DSCN MKR DOCD: CPT | Performed by: INTERNAL MEDICINE

## 2024-03-22 RX ORDER — LOSARTAN POTASSIUM 100 MG/1
100 TABLET ORAL DAILY
Qty: 90 TABLET | Refills: 3 | Status: SHIPPED | OUTPATIENT
Start: 2024-03-22

## 2024-03-22 NOTE — TELEPHONE ENCOUNTER
From: Angelique Seals  To: Dr. Antonio Godfrey  Sent: 3/22/2024 9:47 AM EDT  Subject: Losartan     Hi Dr Godfrey  We have Mom’s meds in a Hero automatic dispenser and it just notified me that she only has 2 Losartan left. Late notice! :/  Would you please send in a refill for her Losartan? The pharmacy said it we need your order.  Thank you!!!  K

## 2024-03-22 NOTE — PROGRESS NOTES
(2000 UT) TABS Take 1 tablet by mouth daily       No current facility-administered medications for this visit.        Allergies   Allergen Reactions    Guaifenesin      Other reaction(s): Unknown-Unspecified    Loratadine-Pseudoephedrine Er Other (See Comments)     Other reaction(s): Other- (not listed) - Allergy  Difficulty sleeping and jittery  Difficulty sleeping and jittery      Amoxicillin Rash     Bilateral lower extremities    Sulfa Antibiotics Rash     Patient Active Problem List    Diagnosis Date Noted    Presence of Watchman left atrial appendage closure device 03/15/2023     Priority: High    Permanent atrial fibrillation (HCC) 03/14/2023     Priority: High    Moderate dementia without behavioral disturbance, psychotic disturbance, mood disturbance, or anxiety, unspecified dementia type (HCC) 02/02/2024    Traumatic ecchymosis of right thigh 05/16/2023    Symptomatic bradycardia 04/29/2022    Coronary artery disease involving native coronary artery of native heart without angina pectoris 03/03/2022    Abnormal nuclear stress test 07/27/2021     Added automatically from request for surgery [id number]        Chronic fatigue 07/20/2021 Jul 2021- lexiscan MPI - reversible anterolateral defect, not gated d/t   atrial fib        Sinus node dysfunction (HCC) 07/17/2020    Essential hypertension      controlled with medication        Paroxysmal atrial fibrillation (HCC) 11/05/2019     Dec 2019- Echo EF 55-60%, indeterminate DF, marked LAE  May 2021- Echo - mild LVH, normal SF, abnormal DF, LAE, mild MR/TR, RVSP   25  48 hr monitor - sustained afib, occurrences of RVR are brief, fastest 138   < 10 seconds, avg 71. 3 second pause during sleep        Premature atrial contraction 09/15/2015     2015 - managed with oral magnesium (Dr Coker)        Ventricular premature beats 09/15/2015     2015 - managed with oral magnesium (Dr Coker)  Feb 2017 - 24 hour monitor - essentially normal tracing but for first

## 2024-03-25 ENCOUNTER — CARE COORDINATION (OUTPATIENT)
Dept: CARE COORDINATION | Facility: CLINIC | Age: 89
End: 2024-03-25

## 2024-03-25 RX ORDER — AMLODIPINE BESYLATE 5 MG/1
5 TABLET ORAL DAILY
Qty: 90 TABLET | Refills: 1 | Status: SHIPPED | OUTPATIENT
Start: 2024-03-25

## 2024-03-25 NOTE — CARE COORDINATION
Ambulatory Care Management Note        Date/Time:  3/25/2024 10:57 AM    Ccm outreached to patient. No answer at this time, message left. Awaiting response. If no response, ccm will outreach next week.

## 2024-03-28 PROCEDURE — 93294 REM INTERROG EVL PM/LDLS PM: CPT | Performed by: INTERNAL MEDICINE

## 2024-03-28 PROCEDURE — 93296 REM INTERROG EVL PM/IDS: CPT | Performed by: INTERNAL MEDICINE

## 2024-04-01 ENCOUNTER — CARE COORDINATION (OUTPATIENT)
Dept: CARE COORDINATION | Facility: CLINIC | Age: 89
End: 2024-04-01

## 2024-04-01 NOTE — CARE COORDINATION
Ambulatory Care Management Note        Date/Time:  4/1/2024 9:47 AM    Ccm outreached to patient. No answer at this time, message left. Awaiting response. If no response, ccm will outreach next week.

## 2024-04-08 ENCOUNTER — CARE COORDINATION (OUTPATIENT)
Dept: CARE COORDINATION | Facility: CLINIC | Age: 89
End: 2024-04-08

## 2024-04-08 NOTE — CARE COORDINATION
Ambulatory Care Management Note        Date/Time:  4/8/2024 9:19 AM    Ccm outreached to patient. No answer at this time, message left. Awaiting response. If no response, ccm will outreach next week.

## 2024-04-10 ENCOUNTER — CARE COORDINATION (OUTPATIENT)
Dept: CARE COORDINATION | Facility: CLINIC | Age: 89
End: 2024-04-10

## 2024-04-15 ENCOUNTER — CARE COORDINATION (OUTPATIENT)
Dept: CARE COORDINATION | Facility: CLINIC | Age: 89
End: 2024-04-15

## 2024-04-18 ENCOUNTER — CARE COORDINATION (OUTPATIENT)
Facility: CLINIC | Age: 89
End: 2024-04-18

## 2024-04-18 DIAGNOSIS — I10 ESSENTIAL HYPERTENSION: Primary | ICD-10-CM

## 2024-04-18 NOTE — CARE COORDINATION
Patient Angelique Seals  04/18/24     Care Coordination  placed call to patient to arrange RPM kit  through [employer]. Left HIPAA Compliant Message     provided return and how to pack equipment in original packing via the patients voicemail if available and provided call back number should patient have questions.    Patient made aware [employer] will  equipment in 2-4 days.

## 2024-04-18 NOTE — PROGRESS NOTES
Remote Patient Order Discontinued    Received request from Reyna Wagner RN   to discontinue order for remote patient monitoring of HTN and order completed.

## 2024-05-09 ENCOUNTER — APPOINTMENT (OUTPATIENT)
Dept: GENERAL RADIOLOGY | Age: 89
DRG: 922 | End: 2024-05-09
Payer: MEDICARE

## 2024-05-09 ENCOUNTER — HOSPITAL ENCOUNTER (INPATIENT)
Age: 89
LOS: 4 days | Discharge: NURSING, REHAB OR LONG-TERM CARE | DRG: 922 | End: 2024-05-17
Attending: EMERGENCY MEDICINE | Admitting: FAMILY MEDICINE
Payer: MEDICARE

## 2024-05-09 ENCOUNTER — APPOINTMENT (OUTPATIENT)
Dept: CT IMAGING | Age: 89
DRG: 922 | End: 2024-05-09
Payer: MEDICARE

## 2024-05-09 DIAGNOSIS — M79.609 MUSCULOSKELETAL PAIN OF EXTREMITY: ICD-10-CM

## 2024-05-09 DIAGNOSIS — R33.9 URINARY RETENTION: ICD-10-CM

## 2024-05-09 DIAGNOSIS — T68.XXXA HYPOTHERMIA, INITIAL ENCOUNTER: ICD-10-CM

## 2024-05-09 DIAGNOSIS — F03.918 DEMENTIA WITH OTHER BEHAVIORAL DISTURBANCE, UNSPECIFIED DEMENTIA SEVERITY, UNSPECIFIED DEMENTIA TYPE (HCC): ICD-10-CM

## 2024-05-09 DIAGNOSIS — G93.41 ACUTE METABOLIC ENCEPHALOPATHY: Primary | ICD-10-CM

## 2024-05-09 DIAGNOSIS — N30.01 ACUTE CYSTITIS WITH HEMATURIA: ICD-10-CM

## 2024-05-09 PROBLEM — N39.0 UTI (URINARY TRACT INFECTION): Status: ACTIVE | Noted: 2024-05-09

## 2024-05-09 PROBLEM — Z95.818 PRESENCE OF WATCHMAN LEFT ATRIAL APPENDAGE CLOSURE DEVICE: Chronic | Status: ACTIVE | Noted: 2023-03-15

## 2024-05-09 PROBLEM — I48.21 PERMANENT ATRIAL FIBRILLATION (HCC): Chronic | Status: ACTIVE | Noted: 2023-03-14

## 2024-05-09 PROBLEM — I48.0 PAROXYSMAL ATRIAL FIBRILLATION (HCC): Status: RESOLVED | Noted: 2019-11-05 | Resolved: 2024-05-09

## 2024-05-09 PROBLEM — F03.B0 MODERATE DEMENTIA WITHOUT BEHAVIORAL DISTURBANCE, PSYCHOTIC DISTURBANCE, MOOD DISTURBANCE, OR ANXIETY, UNSPECIFIED DEMENTIA TYPE (HCC): Chronic | Status: ACTIVE | Noted: 2024-02-02

## 2024-05-09 LAB
ALBUMIN SERPL-MCNC: 4.1 G/DL (ref 3.2–4.6)
ALBUMIN/GLOB SERPL: 1.2 (ref 1–1.9)
ALP SERPL-CCNC: 89 U/L (ref 35–104)
ALT SERPL-CCNC: 29 U/L (ref 12–65)
ANION GAP SERPL CALC-SCNC: 13 MMOL/L (ref 9–18)
AST SERPL-CCNC: 52 U/L (ref 15–37)
BACTERIA URNS QL MICRO: ABNORMAL /HPF
BASOPHILS # BLD: 0 K/UL (ref 0–0.2)
BASOPHILS NFR BLD: 0 % (ref 0–2)
BILIRUB SERPL-MCNC: 0.4 MG/DL (ref 0–1.2)
BILIRUB UR QL: NEGATIVE
BUN SERPL-MCNC: 28 MG/DL (ref 8–23)
CALCIUM SERPL-MCNC: 9.8 MG/DL (ref 8.8–10.2)
CASTS URNS QL MICRO: ABNORMAL /LPF
CHLORIDE SERPL-SCNC: 103 MMOL/L (ref 98–107)
CK SERPL-CCNC: 936 U/L (ref 21–215)
CO2 SERPL-SCNC: 24 MMOL/L (ref 20–28)
CREAT SERPL-MCNC: 0.82 MG/DL (ref 0.6–1.1)
DIFFERENTIAL METHOD BLD: ABNORMAL
EKG ATRIAL RATE: 66 BPM
EKG DIAGNOSIS: NORMAL
EKG P AXIS: 87 DEGREES
EKG P-R INTERVAL: 64 MS
EKG Q-T INTERVAL: 486 MS
EKG QRS DURATION: 165 MS
EKG QTC CALCULATION (BAZETT): 510 MS
EKG R AXIS: -59 DEGREES
EKG T AXIS: 54 DEGREES
EKG VENTRICULAR RATE: 66 BPM
EOSINOPHIL # BLD: 0 K/UL (ref 0–0.8)
EOSINOPHIL NFR BLD: 0 % (ref 0.5–7.8)
EPI CELLS #/AREA URNS HPF: ABNORMAL /HPF
ERYTHROCYTE [DISTWIDTH] IN BLOOD BY AUTOMATED COUNT: 14.4 % (ref 11.9–14.6)
GLOBULIN SER CALC-MCNC: 3.3 G/DL (ref 2.3–3.5)
GLUCOSE BLD STRIP.AUTO-MCNC: 121 MG/DL (ref 65–100)
GLUCOSE SERPL-MCNC: 142 MG/DL (ref 70–99)
GLUCOSE UR QL STRIP.AUTO: NEGATIVE MG/DL
HCT VFR BLD AUTO: 45.8 % (ref 35.8–46.3)
HGB BLD-MCNC: 14.8 G/DL (ref 11.7–15.4)
IMM GRANULOCYTES # BLD AUTO: 0 K/UL (ref 0–0.5)
IMM GRANULOCYTES NFR BLD AUTO: 0 % (ref 0–5)
KETONES UR-MCNC: NEGATIVE MG/DL
LACTATE SERPL-SCNC: 0.9 MMOL/L (ref 0.5–2)
LACTATE SERPL-SCNC: 2.7 MMOL/L (ref 0.5–2)
LEUKOCYTE ESTERASE UR QL STRIP: ABNORMAL
LYMPHOCYTES # BLD: 1 K/UL (ref 0.5–4.6)
LYMPHOCYTES NFR BLD: 9 % (ref 13–44)
MCH RBC QN AUTO: 30.1 PG (ref 26.1–32.9)
MCHC RBC AUTO-ENTMCNC: 32.3 G/DL (ref 31.4–35)
MCV RBC AUTO: 93.1 FL (ref 82–102)
MONOCYTES # BLD: 0.7 K/UL (ref 0.1–1.3)
MONOCYTES NFR BLD: 6 % (ref 4–12)
NEUTS SEG # BLD: 9.4 K/UL (ref 1.7–8.2)
NEUTS SEG NFR BLD: 85 % (ref 43–78)
NITRITE UR QL: NEGATIVE
NRBC # BLD: 0 K/UL (ref 0–0.2)
PH UR: 6 (ref 5–9)
PLATELET # BLD AUTO: 226 K/UL (ref 150–450)
PMV BLD AUTO: 10.5 FL (ref 9.4–12.3)
POTASSIUM SERPL-SCNC: 4.2 MMOL/L (ref 3.5–5.1)
PROT SERPL-MCNC: 7.5 G/DL (ref 6.3–8.2)
PROT UR QL: 30 MG/DL
RBC # BLD AUTO: 4.92 M/UL (ref 4.05–5.2)
RBC # UR STRIP: ABNORMAL
RBC #/AREA URNS HPF: ABNORMAL /HPF
SERVICE CMNT-IMP: ABNORMAL
SERVICE CMNT-IMP: ABNORMAL
SODIUM SERPL-SCNC: 140 MMOL/L (ref 136–145)
SP GR UR: 1.02 (ref 1–1.02)
UROBILINOGEN UR QL: 0.2 EU/DL (ref 0.2–1)
WBC # BLD AUTO: 11.1 K/UL (ref 4.3–11.1)
WBC URNS QL MICRO: ABNORMAL /HPF

## 2024-05-09 PROCEDURE — 2580000003 HC RX 258: Performed by: INTERNAL MEDICINE

## 2024-05-09 PROCEDURE — 2500000003 HC RX 250 WO HCPCS: Performed by: INTERNAL MEDICINE

## 2024-05-09 PROCEDURE — 71045 X-RAY EXAM CHEST 1 VIEW: CPT

## 2024-05-09 PROCEDURE — 81015 MICROSCOPIC EXAM OF URINE: CPT

## 2024-05-09 PROCEDURE — 87086 URINE CULTURE/COLONY COUNT: CPT

## 2024-05-09 PROCEDURE — 6360000002 HC RX W HCPCS: Performed by: EMERGENCY MEDICINE

## 2024-05-09 PROCEDURE — G0378 HOSPITAL OBSERVATION PER HR: HCPCS

## 2024-05-09 PROCEDURE — 73521 X-RAY EXAM HIPS BI 2 VIEWS: CPT

## 2024-05-09 PROCEDURE — 82550 ASSAY OF CK (CPK): CPT

## 2024-05-09 PROCEDURE — 96374 THER/PROPH/DIAG INJ IV PUSH: CPT

## 2024-05-09 PROCEDURE — 81003 URINALYSIS AUTO W/O SCOPE: CPT

## 2024-05-09 PROCEDURE — 82962 GLUCOSE BLOOD TEST: CPT

## 2024-05-09 PROCEDURE — 93005 ELECTROCARDIOGRAM TRACING: CPT | Performed by: INTERNAL MEDICINE

## 2024-05-09 PROCEDURE — 99285 EMERGENCY DEPT VISIT HI MDM: CPT

## 2024-05-09 PROCEDURE — 83605 ASSAY OF LACTIC ACID: CPT

## 2024-05-09 PROCEDURE — 6360000002 HC RX W HCPCS: Performed by: INTERNAL MEDICINE

## 2024-05-09 PROCEDURE — 2580000003 HC RX 258: Performed by: EMERGENCY MEDICINE

## 2024-05-09 PROCEDURE — 85025 COMPLETE CBC W/AUTO DIFF WBC: CPT

## 2024-05-09 PROCEDURE — 96372 THER/PROPH/DIAG INJ SC/IM: CPT

## 2024-05-09 PROCEDURE — 80053 COMPREHEN METABOLIC PANEL: CPT

## 2024-05-09 PROCEDURE — 96361 HYDRATE IV INFUSION ADD-ON: CPT

## 2024-05-09 PROCEDURE — 70450 CT HEAD/BRAIN W/O DYE: CPT

## 2024-05-09 PROCEDURE — 93010 ELECTROCARDIOGRAM REPORT: CPT | Performed by: INTERNAL MEDICINE

## 2024-05-09 RX ORDER — POTASSIUM CHLORIDE 20 MEQ/1
40 TABLET, EXTENDED RELEASE ORAL PRN
Status: DISCONTINUED | OUTPATIENT
Start: 2024-05-09 | End: 2024-05-17 | Stop reason: HOSPADM

## 2024-05-09 RX ORDER — OXYCODONE HYDROCHLORIDE 5 MG/1
5 TABLET ORAL EVERY 4 HOURS PRN
Status: DISCONTINUED | OUTPATIENT
Start: 2024-05-09 | End: 2024-05-12

## 2024-05-09 RX ORDER — MAGNESIUM HYDROXIDE/ALUMINUM HYDROXICE/SIMETHICONE 120; 1200; 1200 MG/30ML; MG/30ML; MG/30ML
30 SUSPENSION ORAL EVERY 6 HOURS PRN
Status: DISCONTINUED | OUTPATIENT
Start: 2024-05-09 | End: 2024-05-17 | Stop reason: HOSPADM

## 2024-05-09 RX ORDER — CLONIDINE HYDROCHLORIDE 0.1 MG/1
0.1 TABLET ORAL EVERY 4 HOURS PRN
Status: DISCONTINUED | OUTPATIENT
Start: 2024-05-09 | End: 2024-05-17 | Stop reason: HOSPADM

## 2024-05-09 RX ORDER — SODIUM CHLORIDE 9 MG/ML
INJECTION, SOLUTION INTRAVENOUS PRN
Status: DISCONTINUED | OUTPATIENT
Start: 2024-05-09 | End: 2024-05-17 | Stop reason: HOSPADM

## 2024-05-09 RX ORDER — ACETAMINOPHEN 650 MG/1
650 SUPPOSITORY RECTAL EVERY 6 HOURS PRN
Status: DISCONTINUED | OUTPATIENT
Start: 2024-05-09 | End: 2024-05-17 | Stop reason: HOSPADM

## 2024-05-09 RX ORDER — ONDANSETRON 4 MG/1
4 TABLET, ORALLY DISINTEGRATING ORAL EVERY 8 HOURS PRN
Status: DISCONTINUED | OUTPATIENT
Start: 2024-05-09 | End: 2024-05-17 | Stop reason: HOSPADM

## 2024-05-09 RX ORDER — SODIUM CHLORIDE 0.9 % (FLUSH) 0.9 %
5-40 SYRINGE (ML) INJECTION PRN
Status: DISCONTINUED | OUTPATIENT
Start: 2024-05-09 | End: 2024-05-17 | Stop reason: HOSPADM

## 2024-05-09 RX ORDER — ENOXAPARIN SODIUM 100 MG/ML
40 INJECTION SUBCUTANEOUS DAILY
Status: DISCONTINUED | OUTPATIENT
Start: 2024-05-09 | End: 2024-05-17 | Stop reason: HOSPADM

## 2024-05-09 RX ORDER — POTASSIUM CHLORIDE 7.45 MG/ML
10 INJECTION INTRAVENOUS PRN
Status: DISCONTINUED | OUTPATIENT
Start: 2024-05-09 | End: 2024-05-17 | Stop reason: HOSPADM

## 2024-05-09 RX ORDER — MAGNESIUM SULFATE IN WATER 40 MG/ML
2000 INJECTION, SOLUTION INTRAVENOUS PRN
Status: DISCONTINUED | OUTPATIENT
Start: 2024-05-09 | End: 2024-05-17 | Stop reason: HOSPADM

## 2024-05-09 RX ORDER — BISACODYL 10 MG
10 SUPPOSITORY, RECTAL RECTAL DAILY PRN
Status: DISCONTINUED | OUTPATIENT
Start: 2024-05-09 | End: 2024-05-17 | Stop reason: HOSPADM

## 2024-05-09 RX ORDER — 0.9 % SODIUM CHLORIDE 0.9 %
1000 INTRAVENOUS SOLUTION INTRAVENOUS
Status: COMPLETED | OUTPATIENT
Start: 2024-05-09 | End: 2024-05-09

## 2024-05-09 RX ORDER — MEMANTINE HYDROCHLORIDE 5 MG/1
10 TABLET ORAL DAILY
Status: DISCONTINUED | OUTPATIENT
Start: 2024-05-09 | End: 2024-05-17 | Stop reason: HOSPADM

## 2024-05-09 RX ORDER — SODIUM CHLORIDE 0.9 % (FLUSH) 0.9 %
5-40 SYRINGE (ML) INJECTION EVERY 12 HOURS SCHEDULED
Status: DISCONTINUED | OUTPATIENT
Start: 2024-05-09 | End: 2024-05-17 | Stop reason: HOSPADM

## 2024-05-09 RX ORDER — AMLODIPINE BESYLATE 5 MG/1
5 TABLET ORAL DAILY
Status: DISCONTINUED | OUTPATIENT
Start: 2024-05-09 | End: 2024-05-17 | Stop reason: HOSPADM

## 2024-05-09 RX ORDER — GUAIFENESIN/DEXTROMETHORPHAN 100-10MG/5
10 SYRUP ORAL EVERY 4 HOURS PRN
Status: DISCONTINUED | OUTPATIENT
Start: 2024-05-09 | End: 2024-05-17 | Stop reason: HOSPADM

## 2024-05-09 RX ORDER — ENEMA 19; 7 G/133ML; G/133ML
1 ENEMA RECTAL AS NEEDED
Status: DISCONTINUED | OUTPATIENT
Start: 2024-05-09 | End: 2024-05-17 | Stop reason: HOSPADM

## 2024-05-09 RX ORDER — LOSARTAN POTASSIUM 50 MG/1
100 TABLET ORAL DAILY
Status: DISCONTINUED | OUTPATIENT
Start: 2024-05-09 | End: 2024-05-17 | Stop reason: HOSPADM

## 2024-05-09 RX ORDER — ACETAMINOPHEN 325 MG/1
650 TABLET ORAL EVERY 6 HOURS PRN
Status: DISCONTINUED | OUTPATIENT
Start: 2024-05-09 | End: 2024-05-17 | Stop reason: HOSPADM

## 2024-05-09 RX ORDER — HYDRALAZINE HYDROCHLORIDE 20 MG/ML
20 INJECTION INTRAMUSCULAR; INTRAVENOUS EVERY 4 HOURS PRN
Status: DISCONTINUED | OUTPATIENT
Start: 2024-05-09 | End: 2024-05-17 | Stop reason: HOSPADM

## 2024-05-09 RX ORDER — LANOLIN ALCOHOL/MO/W.PET/CERES
3 CREAM (GRAM) TOPICAL NIGHTLY PRN
Status: DISCONTINUED | OUTPATIENT
Start: 2024-05-09 | End: 2024-05-17 | Stop reason: HOSPADM

## 2024-05-09 RX ORDER — FAMOTIDINE 20 MG/1
10 TABLET, FILM COATED ORAL DAILY PRN
Status: DISCONTINUED | OUTPATIENT
Start: 2024-05-09 | End: 2024-05-17 | Stop reason: HOSPADM

## 2024-05-09 RX ORDER — POLYETHYLENE GLYCOL 3350 17 G/17G
17 POWDER, FOR SOLUTION ORAL DAILY PRN
Status: DISCONTINUED | OUTPATIENT
Start: 2024-05-09 | End: 2024-05-17 | Stop reason: HOSPADM

## 2024-05-09 RX ORDER — ONDANSETRON 2 MG/ML
4 INJECTION INTRAMUSCULAR; INTRAVENOUS EVERY 6 HOURS PRN
Status: DISCONTINUED | OUTPATIENT
Start: 2024-05-09 | End: 2024-05-17 | Stop reason: HOSPADM

## 2024-05-09 RX ORDER — ASPIRIN 81 MG/1
81 TABLET ORAL DAILY
Status: DISCONTINUED | OUTPATIENT
Start: 2024-05-09 | End: 2024-05-17 | Stop reason: HOSPADM

## 2024-05-09 RX ADMIN — TUBERCULIN PURIFIED PROTEIN DERIVATIVE 5 UNITS: 5 INJECTION, SOLUTION INTRADERMAL at 18:00

## 2024-05-09 RX ADMIN — ENOXAPARIN SODIUM 40 MG: 100 INJECTION SUBCUTANEOUS at 18:39

## 2024-05-09 RX ADMIN — SODIUM CHLORIDE 1000 ML: 9 INJECTION, SOLUTION INTRAVENOUS at 12:37

## 2024-05-09 RX ADMIN — CEFTRIAXONE 1000 MG: 1 INJECTION, POWDER, FOR SOLUTION INTRAMUSCULAR; INTRAVENOUS at 14:42

## 2024-05-09 RX ADMIN — SODIUM CHLORIDE, PRESERVATIVE FREE 10 ML: 5 INJECTION INTRAVENOUS at 22:30

## 2024-05-09 NOTE — ED PROVIDER NOTES
Emergency Department Provider Note       PCP: Antonio Godfrey Jr., MD   Age: 88 y.o.   Sex: female     DISPOSITION Decision To Admit 05/09/2024 02:18:32 PM       ICD-10-CM    1. Acute metabolic encephalopathy  G93.41       2. Dementia with other behavioral disturbance, unspecified dementia severity, unspecified dementia type (HCC)  F03.918       3. Acute cystitis with hematuria  N30.01       4. Hypothermia, initial encounter  T68.XXXA           Medical Decision Making     DDX:    Hypoglycemia, electrolyte abnormality, UTI, dehydration, hyperammonemia, acute liver failure, uremia, dementia, sepsis, pneumonia, hyponatremia, hypertensive encephalopathy,    CVA, TIA, brain tumor, subarachnoid hemorrhage, CNS vasculitis, meningitis, encephalitis,    Polysubstance abuse, alcohol withdrawal, acute alcohol intoxication,    Acute psychosis, psychiatric illness, hallucinations, suicidal ideations, homicidal ideations,    Multiple sclerosis, thyrotoxicosis, addisonian crisis,      ED Course as of 05/09/24 1423   Thu May 09, 2024   1420 I spoke with the patient's daughter about the findings here in the emergency department.  She remains hypothermic despite 4 hours of Mile hugger and IV fluids.  The patient will be admitted to the hospital overnight for IV antibiotics and continued management of her hypothermia.  Dr. Moran the internal medicine doctor on-call was consulted. [KH]      ED Course User Index  [KH] Ty Smyth, DO     1 or more acute illnesses that pose a threat to life or bodily function.   Drug therapy given requiring intensive monitoring for toxicity.  Shared medical decision making was utilized in creating the patients health plan today.    I independently ordered and reviewed each unique test.     The patients assessment required an independent historian: Daughter and neighbor.  The reason they were needed is dementia and important historical information not provided by the patient.  I interpreted the

## 2024-05-09 NOTE — H&P
Presence of Watchman left atrial appendage closure device    Moderate dementia (HCC)    UTI (urinary tract infection)  Resolved Problems:    * No resolved hospital problems. *        Objective:   Patient Vitals for the past 24 hrs:   Temp Pulse Resp BP SpO2   05/09/24 1410 (!) 95.2 °F (35.1 °C) -- -- -- --   05/09/24 1356 -- 65 12 126/72 93 %   05/09/24 1350 -- 67 13 133/76 91 %   05/09/24 1333 -- 70 21 123/72 95 %   05/09/24 1312 -- 71 13 (!) 109/90 95 %   05/09/24 1302 -- 65 17 112/72 92 %   05/09/24 1246 (!) 94 °F (34.4 °C) 63 12 120/75 95 %   05/09/24 1237 -- 76 22 -- 95 %   05/09/24 1227 -- 64 11 107/78 --   05/09/24 1216 -- 65 16 118/74 --   05/09/24 1156 -- 63 10 (!) 135/90 95 %   05/09/24 1130 -- 65 22 137/86 95 %   05/09/24 1126 -- 63 14 137/86 96 %   05/09/24 1117 -- 62 15 (!) 134/91 95 %   05/09/24 1047 -- 63 18 (!) 129/90 96 %   05/09/24 1041 -- -- 16 -- 98 %   05/09/24 1030 (!) 93 °F (33.9 °C) 57 16 (!) 152/80 --       Oxygen Therapy  SpO2: 93 %  Pulse via Oximetry: 64 beats per minute    Estimated body mass index is 30.38 kg/m² as calculated from the following:    Height as of 3/22/24: 1.702 m (5' 7\").    Weight as of 3/22/24: 88 kg (194 lb).  No intake or output data in the 24 hours ending 05/09/24 1426      Physical Exam:    General:    Well nourished.    Head:  Normocephalic, atraumatic  Eyes:  Sclerae appear normal.  Pupils equally round.  ENT:  Nares appear normal.  Moist oral mucosa  Neck:  No restricted ROM.  Trachea midline   CV:   Irregular.  No jugular venous distension.  Lungs:   CTAB.  Symmetric expansion.  Abdomen:   Soft, nontender, nondistended.  Extremities: No cyanosis or clubbing.  No edema  Skin:     No rashes.  Normal coloration.   Warm and dry.    Neuro:  CN II-XII grossly intact.  Sensation intact.     Orders Placed This Encounter   Medications    sodium chloride 0.9 % bolus 1,000 mL    cefTRIAXone (ROCEPHIN) 1,000 mg in sterile water 10 mL IV syringe     Order Specific Question:   cardiac silhouette.     Pulmonary vascular prominence. Retrocardiac atelectasis and or mild infiltrate. Trace bilateral effusions. Prominent cardiac silhouette.         Signed:  Jourdan Moran MD    Part of this note may have been written by using a voice dictation software.  The note has been proof read but may still contain some grammatical/other typographical errors.

## 2024-05-09 NOTE — ED NOTES
TRANSFER - OUT REPORT:    Verbal report given to Kiana RAINEY on Angelique Seals  being transferred to [facility] for routine progression of patient care       Report consisted of patient's Situation, Background, Assessment and   Recommendations(SBAR).     Information from the following report(s) ED Encounter Summary was reviewed with the receiving nurse.    Rulo Fall Assessment:    Presents to emergency department  because of falls (Syncope, seizure, or loss of consciousness): Yes  Age > 70: Yes  Altered Mental Status, Intoxication with alcohol or substance confusion (Disorientation, impaired judgment, poor safety awaremess, or inability to follow instructions): Yes  Impaired Mobility: Ambulates or transfers with assistive devices or assistance; Unable to ambulate or transer.: Yes  Nursing Judgement: Yes          Lines:   Peripheral IV 05/09/24 Left Antecubital (Active)   Site Assessment Clean, dry & intact 05/09/24 1605   Line Status Blood return noted;Flushed 05/09/24 1605   Phlebitis Assessment No symptoms 05/09/24 1605   Infiltration Assessment 0 05/09/24 1605        Opportunity for questions and clarification was provided.      Patient transported with:  Doris Higgins RN  05/09/24 1605
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
## 2024-05-09 NOTE — ED NOTES
Pt was 93 degrees F rectally.  Dr Smyth aware.  Pt put on bear hugger.     Doris Ardon, RN  05/09/24 0926

## 2024-05-09 NOTE — ED NOTES
Pt ambulated in room for 2 minutes, can stand and bear weight but requires assistance and continued prompting to take full steps     Digna Browne RN  05/09/24 5971

## 2024-05-09 NOTE — ACP (ADVANCE CARE PLANNING)
Advance Care Planning   Healthcare Decision Maker:    Primary Decision Maker: Izabella Hickman - Child - [phone]    Secondary Decision Maker: Fidel Seals - Child - [phone]    Secondary Decision Maker: Celestina Khoury - Child - [phone]    Click here to complete Healthcare Decision Makers including selection of the Healthcare Decision Maker Relationship (ie \"Primary\").

## 2024-05-09 NOTE — PROGRESS NOTES
4 Eyes Skin Assessment     NAME:  Angelique Seals  YOB: 1935  MEDICAL RECORD NUMBER:  [id number]    The patient is being assessed for  Admission    I agree that at least one RN has performed a thorough Head to Toe Skin Assessment on the patient. ALL assessment sites listed below have been assessed.      Areas assessed by both nurses:    Head, Face, Ears, Shoulders, Back, Chest, Arms, Elbows, Hands, Sacrum. Buttock, Coccyx, Ischium, and Legs. Feet and Heels        Does the Patient have a Wound? No noted wound(s)       Luis Prevention initiated by RN: Yes  Wound Care Orders initiated by RN: No    Pressure Injury (Stage 3,4, Unstageable, DTI, NWPT, and Complex wounds) if present, place Wound referral order by RN under : No    New Ostomies, if present place, Ostomy referral order under : No     Nurse 1 eSignature: Electronically signed by KIMI SILVER RN on 5/9/24 at 4:54 PM EDT    **SHARE this note so that the co-signing nurse can place an eSignature**    Nurse 2 eSignature: Electronically signed by Sophia Mcclure RN on 5/9/24 at 4:55 PM EDT

## 2024-05-09 NOTE — PROGRESS NOTES
Patient arrival to floor from ED. Patient is alert and oriented to only self, on room air. VSS. Daughter at bedside. Patient denies pain, nausea, or SOB. Skin assessment completed with second RN, Heike. No abnormalities noted. Home medications reviewed with daughter. Patient oriented to room and educated on using call light to call for assistance.

## 2024-05-09 NOTE — PROGRESS NOTES
TRANSFER - IN REPORT:    Verbal report received from Doris(name) on Angelique Martinezoer  being received from ED(unit) for routine progression of patient care      Report consisted of patient’s Situation, Background, Assessment and   Recommendations(SBAR).     Information from the following report(s) Nurse Handoff Report was reviewed with the receiving nurse.    Opportunity for questions and clarification was provided.      Assessment completed upon patient’s arrival to unit and care assume

## 2024-05-10 LAB
ANION GAP SERPL CALC-SCNC: 10 MMOL/L (ref 9–18)
BASOPHILS # BLD: 0.1 K/UL (ref 0–0.2)
BASOPHILS NFR BLD: 1 % (ref 0–2)
BUN SERPL-MCNC: 19 MG/DL (ref 8–23)
CALCIUM SERPL-MCNC: 9.1 MG/DL (ref 8.8–10.2)
CHLORIDE SERPL-SCNC: 107 MMOL/L (ref 98–107)
CO2 SERPL-SCNC: 26 MMOL/L (ref 20–28)
CREAT SERPL-MCNC: 0.71 MG/DL (ref 0.6–1.1)
DIFFERENTIAL METHOD BLD: NORMAL
EOSINOPHIL # BLD: 0.2 K/UL (ref 0–0.8)
EOSINOPHIL NFR BLD: 2 % (ref 0.5–7.8)
ERYTHROCYTE [DISTWIDTH] IN BLOOD BY AUTOMATED COUNT: 14.6 % (ref 11.9–14.6)
GLUCOSE SERPL-MCNC: 102 MG/DL (ref 70–99)
HCT VFR BLD AUTO: 41.9 % (ref 35.8–46.3)
HGB BLD-MCNC: 13.6 G/DL (ref 11.7–15.4)
IMM GRANULOCYTES # BLD AUTO: 0 K/UL (ref 0–0.5)
IMM GRANULOCYTES NFR BLD AUTO: 0 % (ref 0–5)
LYMPHOCYTES # BLD: 1.6 K/UL (ref 0.5–4.6)
LYMPHOCYTES NFR BLD: 21 % (ref 13–44)
MCH RBC QN AUTO: 30.6 PG (ref 26.1–32.9)
MCHC RBC AUTO-ENTMCNC: 32.5 G/DL (ref 31.4–35)
MCV RBC AUTO: 94.2 FL (ref 82–102)
MM INDURATION, POC: 0 MM (ref 0–5)
MONOCYTES # BLD: 0.7 K/UL (ref 0.1–1.3)
MONOCYTES NFR BLD: 9 % (ref 4–12)
NEUTS SEG # BLD: 5.2 K/UL (ref 1.7–8.2)
NEUTS SEG NFR BLD: 67 % (ref 43–78)
NRBC # BLD: 0 K/UL (ref 0–0.2)
PLATELET # BLD AUTO: 275 K/UL (ref 150–450)
PMV BLD AUTO: 11.4 FL (ref 9.4–12.3)
POTASSIUM SERPL-SCNC: 3.9 MMOL/L (ref 3.5–5.1)
PPD, POC: NEGATIVE
RBC # BLD AUTO: 4.45 M/UL (ref 4.05–5.2)
SODIUM SERPL-SCNC: 144 MMOL/L (ref 136–145)
WBC # BLD AUTO: 7.7 K/UL (ref 4.3–11.1)

## 2024-05-10 PROCEDURE — 6370000000 HC RX 637 (ALT 250 FOR IP): Performed by: FAMILY MEDICINE

## 2024-05-10 PROCEDURE — 97535 SELF CARE MNGMENT TRAINING: CPT

## 2024-05-10 PROCEDURE — 96376 TX/PRO/DX INJ SAME DRUG ADON: CPT

## 2024-05-10 PROCEDURE — G0378 HOSPITAL OBSERVATION PER HR: HCPCS

## 2024-05-10 PROCEDURE — 85025 COMPLETE CBC W/AUTO DIFF WBC: CPT

## 2024-05-10 PROCEDURE — 6370000000 HC RX 637 (ALT 250 FOR IP): Performed by: INTERNAL MEDICINE

## 2024-05-10 PROCEDURE — 96372 THER/PROPH/DIAG INJ SC/IM: CPT

## 2024-05-10 PROCEDURE — 2580000003 HC RX 258: Performed by: INTERNAL MEDICINE

## 2024-05-10 PROCEDURE — 36415 COLL VENOUS BLD VENIPUNCTURE: CPT

## 2024-05-10 PROCEDURE — 6360000002 HC RX W HCPCS: Performed by: INTERNAL MEDICINE

## 2024-05-10 PROCEDURE — 97165 OT EVAL LOW COMPLEX 30 MIN: CPT

## 2024-05-10 PROCEDURE — 97112 NEUROMUSCULAR REEDUCATION: CPT

## 2024-05-10 PROCEDURE — 80048 BASIC METABOLIC PNL TOTAL CA: CPT

## 2024-05-10 PROCEDURE — 97161 PT EVAL LOW COMPLEX 20 MIN: CPT

## 2024-05-10 PROCEDURE — 97530 THERAPEUTIC ACTIVITIES: CPT

## 2024-05-10 RX ORDER — PHENAZOPYRIDINE HYDROCHLORIDE 95 MG/1
95 TABLET ORAL
Status: DISCONTINUED | OUTPATIENT
Start: 2024-05-10 | End: 2024-05-17 | Stop reason: HOSPADM

## 2024-05-10 RX ADMIN — ACETAMINOPHEN 650 MG: 325 TABLET ORAL at 05:16

## 2024-05-10 RX ADMIN — ENOXAPARIN SODIUM 40 MG: 100 INJECTION SUBCUTANEOUS at 09:49

## 2024-05-10 RX ADMIN — PHENAZOPYRIDINE HYDROCHLORIDE 95 MG: 95 TABLET ORAL at 13:47

## 2024-05-10 RX ADMIN — MEMANTINE 10 MG: 5 TABLET ORAL at 09:48

## 2024-05-10 RX ADMIN — ASPIRIN 81 MG: 81 TABLET, COATED ORAL at 09:48

## 2024-05-10 RX ADMIN — WATER 1000 MG: 1 INJECTION INTRAMUSCULAR; INTRAVENOUS; SUBCUTANEOUS at 13:47

## 2024-05-10 RX ADMIN — SODIUM CHLORIDE, PRESERVATIVE FREE 10 ML: 5 INJECTION INTRAVENOUS at 20:15

## 2024-05-10 RX ADMIN — SODIUM CHLORIDE, PRESERVATIVE FREE 10 ML: 5 INJECTION INTRAVENOUS at 09:49

## 2024-05-10 ASSESSMENT — PAIN SCALES - GENERAL: PAINLEVEL_OUTOF10: 5

## 2024-05-10 ASSESSMENT — PAIN DESCRIPTION - ORIENTATION: ORIENTATION: RIGHT

## 2024-05-10 ASSESSMENT — PAIN DESCRIPTION - LOCATION: LOCATION: KNEE;BUTTOCKS

## 2024-05-10 ASSESSMENT — PAIN DESCRIPTION - DESCRIPTORS: DESCRIPTORS: ACHING

## 2024-05-10 NOTE — PROGRESS NOTES
Pt with reports of pain 5/10 in sacrum and knee. Pt requesting tylenol. PRN tylenol 650 mg PO given, per pt request.

## 2024-05-10 NOTE — PLAN OF CARE
Problem: Discharge Planning  Goal: Discharge to home or other facility with appropriate resources  5/10/2024 1802 by Alysia Martinez RN  Outcome: Progressing  Flowsheets (Taken 5/10/2024 0844)  Discharge to home or other facility with appropriate resources:   Identify barriers to discharge with patient and caregiver   Arrange for needed discharge resources and transportation as appropriate   Identify discharge learning needs (meds, wound care, etc)   Refer to discharge planning if patient needs post-hospital services based on physician order or complex needs related to functional status, cognitive ability or social support system  5/10/2024 0644 by Armando Naylor, RN  Outcome: Progressing     Problem: Skin/Tissue Integrity  Goal: Absence of new skin breakdown  Description: 1.  Monitor for areas of redness and/or skin breakdown  2.  Assess vascular access sites hourly  3.  Every 4-6 hours minimum:  Change oxygen saturation probe site  4.  Every 4-6 hours:  If on nasal continuous positive airway pressure, respiratory therapy assess nares and determine need for appliance change or resting period.  5/10/2024 1802 by Alysia Martinez RN  Outcome: Progressing  5/10/2024 0644 by Armando Naylor, RN  Outcome: Progressing     Problem: Safety - Adult  Goal: Free from fall injury  5/10/2024 1802 by Alysia Martinez RN  Outcome: Progressing  5/10/2024 0644 by Armando Naylor, RN  Outcome: Progressing     Problem: Pain  Goal: Verbalizes/displays adequate comfort level or baseline comfort level  5/10/2024 1802 by Alysia Martinez, RN  Outcome: Progressing  5/10/2024 0644 by Armando Naylor, RN  Outcome: Progressing
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
## 2024-05-11 LAB
ANION GAP SERPL CALC-SCNC: 9 MMOL/L (ref 9–18)
BACTERIA SPEC CULT: NORMAL
BACTERIA SPEC CULT: NORMAL
BASOPHILS # BLD: 0 K/UL (ref 0–0.2)
BASOPHILS NFR BLD: 1 % (ref 0–2)
BUN SERPL-MCNC: 20 MG/DL (ref 8–23)
CALCIUM SERPL-MCNC: 9 MG/DL (ref 8.8–10.2)
CHLORIDE SERPL-SCNC: 107 MMOL/L (ref 98–107)
CO2 SERPL-SCNC: 26 MMOL/L (ref 20–28)
CREAT SERPL-MCNC: 0.74 MG/DL (ref 0.6–1.1)
DIFFERENTIAL METHOD BLD: NORMAL
EOSINOPHIL # BLD: 0.1 K/UL (ref 0–0.8)
EOSINOPHIL NFR BLD: 2 % (ref 0.5–7.8)
ERYTHROCYTE [DISTWIDTH] IN BLOOD BY AUTOMATED COUNT: 14.5 % (ref 11.9–14.6)
GLUCOSE SERPL-MCNC: 109 MG/DL (ref 70–99)
HCT VFR BLD AUTO: 41.7 % (ref 35.8–46.3)
HGB BLD-MCNC: 13.5 G/DL (ref 11.7–15.4)
IMM GRANULOCYTES # BLD AUTO: 0 K/UL (ref 0–0.5)
IMM GRANULOCYTES NFR BLD AUTO: 0 % (ref 0–5)
LYMPHOCYTES # BLD: 1.7 K/UL (ref 0.5–4.6)
LYMPHOCYTES NFR BLD: 24 % (ref 13–44)
MCH RBC QN AUTO: 30.4 PG (ref 26.1–32.9)
MCHC RBC AUTO-ENTMCNC: 32.4 G/DL (ref 31.4–35)
MCV RBC AUTO: 93.9 FL (ref 82–102)
MM INDURATION, POC: 0 MM (ref 0–5)
MONOCYTES # BLD: 0.7 K/UL (ref 0.1–1.3)
MONOCYTES NFR BLD: 10 % (ref 4–12)
NEUTS SEG # BLD: 4.6 K/UL (ref 1.7–8.2)
NEUTS SEG NFR BLD: 63 % (ref 43–78)
NRBC # BLD: 0 K/UL (ref 0–0.2)
PLATELET # BLD AUTO: 211 K/UL (ref 150–450)
PMV BLD AUTO: 10.6 FL (ref 9.4–12.3)
POTASSIUM SERPL-SCNC: 3.7 MMOL/L (ref 3.5–5.1)
PPD, POC: NEGATIVE
RBC # BLD AUTO: 4.44 M/UL (ref 4.05–5.2)
SERVICE CMNT-IMP: NORMAL
SODIUM SERPL-SCNC: 142 MMOL/L (ref 136–145)
WBC # BLD AUTO: 7.2 K/UL (ref 4.3–11.1)

## 2024-05-11 PROCEDURE — 6370000000 HC RX 637 (ALT 250 FOR IP): Performed by: FAMILY MEDICINE

## 2024-05-11 PROCEDURE — 80048 BASIC METABOLIC PNL TOTAL CA: CPT

## 2024-05-11 PROCEDURE — 96376 TX/PRO/DX INJ SAME DRUG ADON: CPT

## 2024-05-11 PROCEDURE — 96372 THER/PROPH/DIAG INJ SC/IM: CPT

## 2024-05-11 PROCEDURE — G0378 HOSPITAL OBSERVATION PER HR: HCPCS

## 2024-05-11 PROCEDURE — 6370000000 HC RX 637 (ALT 250 FOR IP): Performed by: INTERNAL MEDICINE

## 2024-05-11 PROCEDURE — 36415 COLL VENOUS BLD VENIPUNCTURE: CPT

## 2024-05-11 PROCEDURE — 6360000002 HC RX W HCPCS: Performed by: INTERNAL MEDICINE

## 2024-05-11 PROCEDURE — 85025 COMPLETE CBC W/AUTO DIFF WBC: CPT

## 2024-05-11 PROCEDURE — 51798 US URINE CAPACITY MEASURE: CPT

## 2024-05-11 PROCEDURE — 2580000003 HC RX 258: Performed by: INTERNAL MEDICINE

## 2024-05-11 RX ADMIN — ACETAMINOPHEN 650 MG: 325 TABLET ORAL at 09:06

## 2024-05-11 RX ADMIN — SODIUM CHLORIDE, PRESERVATIVE FREE 10 ML: 5 INJECTION INTRAVENOUS at 21:08

## 2024-05-11 RX ADMIN — MEMANTINE 10 MG: 5 TABLET ORAL at 09:07

## 2024-05-11 RX ADMIN — ENOXAPARIN SODIUM 40 MG: 100 INJECTION SUBCUTANEOUS at 09:07

## 2024-05-11 RX ADMIN — ACETAMINOPHEN 650 MG: 325 TABLET ORAL at 21:05

## 2024-05-11 RX ADMIN — OXYCODONE 5 MG: 5 TABLET ORAL at 03:41

## 2024-05-11 RX ADMIN — PHENAZOPYRIDINE HYDROCHLORIDE 95 MG: 95 TABLET ORAL at 09:07

## 2024-05-11 RX ADMIN — WATER 1000 MG: 1 INJECTION INTRAMUSCULAR; INTRAVENOUS; SUBCUTANEOUS at 14:23

## 2024-05-11 RX ADMIN — SODIUM CHLORIDE, PRESERVATIVE FREE 10 ML: 5 INJECTION INTRAVENOUS at 09:00

## 2024-05-11 RX ADMIN — ASPIRIN 81 MG: 81 TABLET, COATED ORAL at 09:06

## 2024-05-11 RX ADMIN — PHENAZOPYRIDINE HYDROCHLORIDE 95 MG: 95 TABLET ORAL at 14:23

## 2024-05-11 ASSESSMENT — PAIN DESCRIPTION - LOCATION
LOCATION: ABDOMEN
LOCATION: LEG
LOCATION: PELVIS

## 2024-05-11 ASSESSMENT — PAIN SCALES - GENERAL
PAINLEVEL_OUTOF10: 7
PAINLEVEL_OUTOF10: 3
PAINLEVEL_OUTOF10: 0
PAINLEVEL_OUTOF10: 0
PAINLEVEL_OUTOF10: 5

## 2024-05-11 ASSESSMENT — PAIN DESCRIPTION - ORIENTATION
ORIENTATION: LOWER
ORIENTATION: RIGHT

## 2024-05-11 ASSESSMENT — PAIN DESCRIPTION - DESCRIPTORS
DESCRIPTORS: ACHING

## 2024-05-11 NOTE — PLAN OF CARE
Problem: Discharge Planning  Goal: Discharge to home or other facility with appropriate resources  Outcome: Progressing  Flowsheets (Taken 5/11/2024 0807)  Discharge to home or other facility with appropriate resources:   Identify barriers to discharge with patient and caregiver   Arrange for needed discharge resources and transportation as appropriate   Identify discharge learning needs (meds, wound care, etc)   Refer to discharge planning if patient needs post-hospital services based on physician order or complex needs related to functional status, cognitive ability or social support system     Problem: Skin/Tissue Integrity  Goal: Absence of new skin breakdown  Description: 1.  Monitor for areas of redness and/or skin breakdown  2.  Assess vascular access sites hourly  3.  Every 4-6 hours minimum:  Change oxygen saturation probe site  4.  Every 4-6 hours:  If on nasal continuous positive airway pressure, respiratory therapy assess nares and determine need for appliance change or resting period.  Outcome: Progressing     Problem: Safety - Adult  Goal: Free from fall injury  Outcome: Progressing  Flowsheets (Taken 5/10/2024 2024 by Carolyn Dee RN)  Free From Fall Injury: Instruct family/caregiver on patient safety     Problem: Pain  Goal: Verbalizes/displays adequate comfort level or baseline comfort level  Outcome: Progressing  Flowsheets (Taken 5/11/2024 0800)  Verbalizes/displays adequate comfort level or baseline comfort level: Encourage patient to monitor pain and request assistance

## 2024-05-11 NOTE — PROGRESS NOTES
Hospitalist Progress Note   Admit Date:  2024 10:21 AM   Name:  Angelique Seals   Age:  88 y.o.  Sex:  female  :  1935   MRN:  [id number]   Room:  [facility]/    Presenting/Chief Complaint: Cold Exposure     Reason(s) for Admission: Acute cystitis with hematuria [N30.01]  Hypothermia, initial encounter [T68.XXXA]  Acute metabolic encephalopathy [G93.41]  Hypothermia associated with environmental change [T68.XXXA]  Dementia with other behavioral disturbance, unspecified dementia severity, unspecified dementia type (HCC) [F03.918]     Hospital Course:   Angelique Seals is a 88 y.o. female with medical history of dementia, A-fib status post Watchman device, who presented with hypothermia.  She has a caretaker that comes in every day and caretaker found her on the floor  lying in water from a busted water pipe in the laundry room.  She was more confused than usual.  She was brought to the ER for evaluation where workup was fairly unremarkable except for hypothermia and an abnormal UA.      Patient admitted for observation for hypothermia.  Started on Bear hugger.  Started on empiric antibiotic for UTI.    Subjective & 24hr Events:   Patient is seen and examined at the bedside.  Reports feeling better.  No active complaint.  Denies chest pain, palpitation, nausea, vomiting or abdominal pain.    PT/OT recommending short-term rehab, pending placement.    Assessment & Plan:       Hypothermia associated with environmental change  -Resolved  -Off bear hugger/warming blanket  -monitor vitals       UTI (urinary tract infection)  -Continue rocephin  -Follow-up on urine cultures       Permanent atrial fibrillation (HCC)    Presence of Watchman left atrial appendage closure device  -not on AC or other meds       HTN  -Hold home Norvasc and losartan for now.  Blood pressure optimally controlled, resume when needed       Moderate dementia (HCC)  -complicates care and social situation    Anticipated Discharge Arrangements:  acetaminophen (TYLENOL) tablet 650 mg  650 mg Oral Q6H PRN    Or    acetaminophen (TYLENOL) suppository 650 mg  650 mg Rectal Q6H PRN    hydrALAZINE (APRESOLINE) injection 20 mg  20 mg IntraVENous Q4H PRN    sodium phosphate (FLEET) rectal enema 1 enema  1 enema Rectal PRN    oxyCODONE (ROXICODONE) immediate release tablet 5 mg  5 mg Oral Q4H PRN    melatonin tablet 3 mg  3 mg Oral Nightly PRN    guaiFENesin-dextromethorphan (ROBITUSSIN DM) 100-10 MG/5ML syrup 10 mL  10 mL Oral Q4H PRN    cloNIDine (CATAPRES) tablet 0.1 mg  0.1 mg Oral Q4H PRN    enoxaparin (LOVENOX) injection 40 mg  40 mg SubCUTAneous Daily       Signed:  EVON PINTO MD    Part of this note may have been written by using a voice dictation software.  The note has been proof read but may still contain some grammatical/other typographical errors.

## 2024-05-12 LAB
ANION GAP SERPL CALC-SCNC: 10 MMOL/L (ref 9–18)
BASOPHILS # BLD: 0 K/UL (ref 0–0.2)
BASOPHILS NFR BLD: 1 % (ref 0–2)
BUN SERPL-MCNC: 18 MG/DL (ref 8–23)
CALCIUM SERPL-MCNC: 8.7 MG/DL (ref 8.8–10.2)
CHLORIDE SERPL-SCNC: 107 MMOL/L (ref 98–107)
CO2 SERPL-SCNC: 24 MMOL/L (ref 20–28)
CREAT SERPL-MCNC: 0.73 MG/DL (ref 0.6–1.1)
DIFFERENTIAL METHOD BLD: NORMAL
EOSINOPHIL # BLD: 0.2 K/UL (ref 0–0.8)
EOSINOPHIL NFR BLD: 2 % (ref 0.5–7.8)
ERYTHROCYTE [DISTWIDTH] IN BLOOD BY AUTOMATED COUNT: 14.3 % (ref 11.9–14.6)
GLUCOSE SERPL-MCNC: 103 MG/DL (ref 70–99)
HCT VFR BLD AUTO: 42.4 % (ref 35.8–46.3)
HGB BLD-MCNC: 13.6 G/DL (ref 11.7–15.4)
IMM GRANULOCYTES # BLD AUTO: 0 K/UL (ref 0–0.5)
IMM GRANULOCYTES NFR BLD AUTO: 0 % (ref 0–5)
LYMPHOCYTES # BLD: 1.7 K/UL (ref 0.5–4.6)
LYMPHOCYTES NFR BLD: 21 % (ref 13–44)
MCH RBC QN AUTO: 31 PG (ref 26.1–32.9)
MCHC RBC AUTO-ENTMCNC: 32.1 G/DL (ref 31.4–35)
MCV RBC AUTO: 96.6 FL (ref 82–102)
MONOCYTES # BLD: 0.8 K/UL (ref 0.1–1.3)
MONOCYTES NFR BLD: 10 % (ref 4–12)
NEUTS SEG # BLD: 5.1 K/UL (ref 1.7–8.2)
NEUTS SEG NFR BLD: 66 % (ref 43–78)
NRBC # BLD: 0 K/UL (ref 0–0.2)
PLATELET # BLD AUTO: 177 K/UL (ref 150–450)
PMV BLD AUTO: 10.8 FL (ref 9.4–12.3)
POTASSIUM SERPL-SCNC: 3.9 MMOL/L (ref 3.5–5.1)
RBC # BLD AUTO: 4.39 M/UL (ref 4.05–5.2)
SODIUM SERPL-SCNC: 141 MMOL/L (ref 136–145)
WBC # BLD AUTO: 7.8 K/UL (ref 4.3–11.1)

## 2024-05-12 PROCEDURE — 6370000000 HC RX 637 (ALT 250 FOR IP): Performed by: FAMILY MEDICINE

## 2024-05-12 PROCEDURE — 2580000003 HC RX 258: Performed by: INTERNAL MEDICINE

## 2024-05-12 PROCEDURE — 96372 THER/PROPH/DIAG INJ SC/IM: CPT

## 2024-05-12 PROCEDURE — G0378 HOSPITAL OBSERVATION PER HR: HCPCS

## 2024-05-12 PROCEDURE — 6370000000 HC RX 637 (ALT 250 FOR IP): Performed by: INTERNAL MEDICINE

## 2024-05-12 PROCEDURE — 85025 COMPLETE CBC W/AUTO DIFF WBC: CPT

## 2024-05-12 PROCEDURE — 6360000002 HC RX W HCPCS: Performed by: INTERNAL MEDICINE

## 2024-05-12 PROCEDURE — 36415 COLL VENOUS BLD VENIPUNCTURE: CPT

## 2024-05-12 PROCEDURE — 80048 BASIC METABOLIC PNL TOTAL CA: CPT

## 2024-05-12 RX ORDER — OXYCODONE HYDROCHLORIDE 5 MG/1
5 TABLET ORAL EVERY 6 HOURS PRN
Status: DISCONTINUED | OUTPATIENT
Start: 2024-05-12 | End: 2024-05-17 | Stop reason: HOSPADM

## 2024-05-12 RX ADMIN — ENOXAPARIN SODIUM 40 MG: 100 INJECTION SUBCUTANEOUS at 08:20

## 2024-05-12 RX ADMIN — PHENAZOPYRIDINE HYDROCHLORIDE 95 MG: 95 TABLET ORAL at 17:02

## 2024-05-12 RX ADMIN — OXYCODONE 5 MG: 5 TABLET ORAL at 11:04

## 2024-05-12 RX ADMIN — PHENAZOPYRIDINE HYDROCHLORIDE 95 MG: 95 TABLET ORAL at 08:19

## 2024-05-12 RX ADMIN — SODIUM CHLORIDE, PRESERVATIVE FREE 10 ML: 5 INJECTION INTRAVENOUS at 21:13

## 2024-05-12 RX ADMIN — ASPIRIN 81 MG: 81 TABLET, COATED ORAL at 08:19

## 2024-05-12 RX ADMIN — POLYETHYLENE GLYCOL 3350 17 G: 17 POWDER, FOR SOLUTION ORAL at 08:19

## 2024-05-12 RX ADMIN — ACETAMINOPHEN 650 MG: 325 TABLET ORAL at 08:19

## 2024-05-12 RX ADMIN — SODIUM CHLORIDE, PRESERVATIVE FREE 5 ML: 5 INJECTION INTRAVENOUS at 08:20

## 2024-05-12 RX ADMIN — ACETAMINOPHEN 650 MG: 325 TABLET ORAL at 21:13

## 2024-05-12 RX ADMIN — PHENAZOPYRIDINE HYDROCHLORIDE 95 MG: 95 TABLET ORAL at 12:05

## 2024-05-12 RX ADMIN — MEMANTINE 10 MG: 5 TABLET ORAL at 08:19

## 2024-05-12 ASSESSMENT — PAIN SCALES - WONG BAKER: WONGBAKER_NUMERICALRESPONSE: HURTS A LITTLE BIT

## 2024-05-12 ASSESSMENT — PAIN SCALES - GENERAL
PAINLEVEL_OUTOF10: 6
PAINLEVEL_OUTOF10: 6
PAINLEVEL_OUTOF10: 0
PAINLEVEL_OUTOF10: 0

## 2024-05-12 ASSESSMENT — PAIN DESCRIPTION - ORIENTATION
ORIENTATION: LOWER;RIGHT
ORIENTATION: LOWER

## 2024-05-12 ASSESSMENT — PAIN DESCRIPTION - LOCATION
LOCATION: BACK;KNEE
LOCATION: BACK

## 2024-05-12 ASSESSMENT — PAIN DESCRIPTION - DESCRIPTORS
DESCRIPTORS: ACHING
DESCRIPTORS: ACHING

## 2024-05-12 ASSESSMENT — PAIN - FUNCTIONAL ASSESSMENT: PAIN_FUNCTIONAL_ASSESSMENT: PREVENTS OR INTERFERES SOME ACTIVE ACTIVITIES AND ADLS

## 2024-05-12 NOTE — PROGRESS NOTES
Pt resting in bed. A/ox4. Respirations even and unlabored on room air. Pt denies pain at this time, no distress noted. Bowel sounds active, pt states its been a few days since last bm. Pt instructed to use call light if assistance is needed with call light in reach. Plan of care ongoing.

## 2024-05-12 NOTE — PROGRESS NOTES
Hospitalist Progress Note   Admit Date:  2024 10:21 AM   Name:  Angelique Seals   Age:  88 y.o.  Sex:  female  :  1935   MRN:  [id number]   Room:  [facility]/    Presenting/Chief Complaint: Cold Exposure     Reason(s) for Admission: Acute cystitis with hematuria [N30.01]  Hypothermia, initial encounter [T68.XXXA]  Acute metabolic encephalopathy [G93.41]  Hypothermia associated with environmental change [T68.XXXA]  Dementia with other behavioral disturbance, unspecified dementia severity, unspecified dementia type (HCC) [F03.918]     Hospital Course:   Angelique Seals is a 88 y.o. female with medical history of dementia, A-fib status post Watchman device, who presented with hypothermia.  She has a caretaker that comes in every day and caretaker found her on the floor  lying in water from a busted water pipe in the laundry room.  She was more confused than usual.  She was brought to the ER for evaluation where workup was fairly unremarkable except for hypothermia and an abnormal UA.      Patient admitted for observation for hypothermia.  Started on Bear hugger.  Started on empiric antibiotic for UTI.    Subjective & 24hr Events:   Patient is seen and examined at the bedside.  No active complaint.  Upset about her housing situation.    Pending rehab placement    Assessment & Plan:       Hypothermia associated with environmental change  -Resolved  -Off bear hugger/warming blanket  -monitor vitals       UTI (urinary tract infection): Ruled out  -Urine culture negative  -Completed ceftriaxone for 3 days       Permanent atrial fibrillation (HCC)    Presence of Watchman left atrial appendage closure device  -not on AC or other meds       HTN  -Hold home Norvasc and losartan for now.  Blood pressure optimally controlled, resume when needed       Moderate dementia (HCC)  -complicates care and social situation    Anticipated Discharge Arrangements:   Skilled Nursing Facility    PT/OT evals and PPD ordered?  Therapy  Sodium 141 136 - 145 mmol/L    Potassium 3.9 3.5 - 5.1 mmol/L    Chloride 107 98 - 107 mmol/L    CO2 24 20 - 28 mmol/L    Anion Gap 10 9 - 18 mmol/L    Glucose 103 (H) 70 - 99 mg/dL    BUN 18 8 - 23 MG/DL    Creatinine 0.73 0.60 - 1.10 MG/DL    Est, Glom Filt Rate 79 >60 ml/min/1.73m2    Calcium 8.7 (L) 8.8 - 10.2 MG/DL   CBC with Auto Differential    Collection Time: 05/12/24  4:53 AM   Result Value Ref Range    WBC 7.8 4.3 - 11.1 K/uL    RBC 4.39 4.05 - 5.2 M/uL    Hemoglobin 13.6 11.7 - 15.4 g/dL    Hematocrit 42.4 35.8 - 46.3 %    MCV 96.6 82 - 102 FL    MCH 31.0 26.1 - 32.9 PG    MCHC 32.1 31.4 - 35.0 g/dL    RDW 14.3 11.9 - 14.6 %    Platelets 177 150 - 450 K/uL    MPV 10.8 9.4 - 12.3 FL    nRBC 0.00 0.0 - 0.2 K/uL    Differential Type AUTOMATED      Neutrophils % 66 43 - 78 %    Lymphocytes % 21 13 - 44 %    Monocytes % 10 4.0 - 12.0 %    Eosinophils % 2 0.5 - 7.8 %    Basophils % 1 0.0 - 2.0 %    Immature Granulocytes % 0 0.0 - 5.0 %    Neutrophils Absolute 5.1 1.7 - 8.2 K/UL    Lymphocytes Absolute 1.7 0.5 - 4.6 K/UL    Monocytes Absolute 0.8 0.1 - 1.3 K/UL    Eosinophils Absolute 0.2 0.0 - 0.8 K/UL    Basophils Absolute 0.0 0.0 - 0.2 K/UL    Immature Granulocytes Absolute 0.0 0.0 - 0.5 K/UL       No results for input(s): \"COVID19\" in the last 72 hours.    Current Meds:  Current Facility-Administered Medications   Medication Dose Route Frequency    oxyCODONE (ROXICODONE) immediate release tablet 5 mg  5 mg Oral Q6H PRN    phenazopyridine (PYRIDIUM) tablet 95 mg  95 mg Oral TID WC    [Held by provider] amLODIPine (NORVASC) tablet 5 mg  5 mg Oral Daily    aspirin EC tablet 81 mg  81 mg Oral Daily    [Held by provider] losartan (COZAAR) tablet 100 mg  100 mg Oral Daily    memantine (NAMENDA) tablet 10 mg  10 mg Oral Daily    cefTRIAXone (ROCEPHIN) 1,000 mg in sterile water 10 mL IV syringe  1,000 mg IntraVENous Daily    sodium chloride flush 0.9 % injection 5-40 mL  5-40 mL IntraVENous 2 times per day

## 2024-05-12 NOTE — PROGRESS NOTES
08/23/21    BECCA Headley [address]    Dear Patient,  [facility] need to inform you that you have not kept your healthcare appointments.   Our policy is to notify you when we begin to Pt awake in bed. A/ox4 at this time. Respirations even and unlabored on room air. Pt denies pain, no distress noted. Bowel sounds active, pt states its been a few days since last bm. Sanchez intact. Family at bedside. Pt instructed to use call light if assistance is needed with call light in reach. Plan of care ongoing.

## 2024-05-12 NOTE — PLAN OF CARE
Problem: Skin/Tissue Integrity  Goal: Absence of new skin breakdown  Description: 1.  Monitor for areas of redness and/or skin breakdown  2.  Assess vascular access sites hourly  3.  Every 4-6 hours minimum:  Change oxygen saturation probe site  4.  Every 4-6 hours:  If on nasal continuous positive airway pressure, respiratory therapy assess nares and determine need for appliance change or resting period.  Outcome: Progressing     Problem: Safety - Adult  Goal: Free from fall injury  Outcome: Progressing     Problem: Pain  Goal: Verbalizes/displays adequate comfort level or baseline comfort level  Outcome: Progressing  Flowsheets (Taken 5/12/2024 6615)  Verbalizes/displays adequate comfort level or baseline comfort level:   Encourage patient to monitor pain and request assistance   Assess pain using appropriate pain scale     Problem: ABCDS Injury Assessment  Goal: Absence of physical injury  Outcome: Progressing  Flowsheets (Taken 5/12/2024 0400)  Absence of Physical Injury: Implement safety measures based on patient assessment

## 2024-05-13 PROBLEM — I95.1 ORTHOSTATIC HYPOTENSION: Status: ACTIVE | Noted: 2024-05-13

## 2024-05-13 LAB
ANION GAP SERPL CALC-SCNC: 9 MMOL/L (ref 9–18)
BASOPHILS # BLD: 0 K/UL (ref 0–0.2)
BASOPHILS NFR BLD: 0 % (ref 0–2)
BUN SERPL-MCNC: 20 MG/DL (ref 8–23)
CALCIUM SERPL-MCNC: 8.9 MG/DL (ref 8.8–10.2)
CHLORIDE SERPL-SCNC: 105 MMOL/L (ref 98–107)
CO2 SERPL-SCNC: 26 MMOL/L (ref 20–28)
CREAT SERPL-MCNC: 0.75 MG/DL (ref 0.6–1.1)
DIFFERENTIAL METHOD BLD: NORMAL
EOSINOPHIL # BLD: 0.2 K/UL (ref 0–0.8)
EOSINOPHIL NFR BLD: 3 % (ref 0.5–7.8)
ERYTHROCYTE [DISTWIDTH] IN BLOOD BY AUTOMATED COUNT: 14 % (ref 11.9–14.6)
GLUCOSE SERPL-MCNC: 104 MG/DL (ref 70–99)
HCT VFR BLD AUTO: 40.5 % (ref 35.8–46.3)
HGB BLD-MCNC: 12.9 G/DL (ref 11.7–15.4)
IMM GRANULOCYTES # BLD AUTO: 0 K/UL (ref 0–0.5)
IMM GRANULOCYTES NFR BLD AUTO: 0 % (ref 0–5)
LYMPHOCYTES # BLD: 1.8 K/UL (ref 0.5–4.6)
LYMPHOCYTES NFR BLD: 24 % (ref 13–44)
MCH RBC QN AUTO: 29.9 PG (ref 26.1–32.9)
MCHC RBC AUTO-ENTMCNC: 31.9 G/DL (ref 31.4–35)
MCV RBC AUTO: 93.8 FL (ref 82–102)
MONOCYTES # BLD: 0.8 K/UL (ref 0.1–1.3)
MONOCYTES NFR BLD: 11 % (ref 4–12)
NEUTS SEG # BLD: 4.6 K/UL (ref 1.7–8.2)
NEUTS SEG NFR BLD: 62 % (ref 43–78)
NRBC # BLD: 0 K/UL (ref 0–0.2)
PLATELET # BLD AUTO: 196 K/UL (ref 150–450)
PMV BLD AUTO: 10.7 FL (ref 9.4–12.3)
POTASSIUM SERPL-SCNC: 4 MMOL/L (ref 3.5–5.1)
RBC # BLD AUTO: 4.32 M/UL (ref 4.05–5.2)
SODIUM SERPL-SCNC: 140 MMOL/L (ref 136–145)
WBC # BLD AUTO: 7.4 K/UL (ref 4.3–11.1)

## 2024-05-13 PROCEDURE — 85025 COMPLETE CBC W/AUTO DIFF WBC: CPT

## 2024-05-13 PROCEDURE — 2580000003 HC RX 258: Performed by: INTERNAL MEDICINE

## 2024-05-13 PROCEDURE — 97112 NEUROMUSCULAR REEDUCATION: CPT

## 2024-05-13 PROCEDURE — 1100000000 HC RM PRIVATE

## 2024-05-13 PROCEDURE — 6370000000 HC RX 637 (ALT 250 FOR IP): Performed by: FAMILY MEDICINE

## 2024-05-13 PROCEDURE — 80048 BASIC METABOLIC PNL TOTAL CA: CPT

## 2024-05-13 PROCEDURE — 36415 COLL VENOUS BLD VENIPUNCTURE: CPT

## 2024-05-13 PROCEDURE — 97535 SELF CARE MNGMENT TRAINING: CPT

## 2024-05-13 PROCEDURE — 51701 INSERT BLADDER CATHETER: CPT

## 2024-05-13 PROCEDURE — 97530 THERAPEUTIC ACTIVITIES: CPT

## 2024-05-13 PROCEDURE — 51798 US URINE CAPACITY MEASURE: CPT

## 2024-05-13 PROCEDURE — 6370000000 HC RX 637 (ALT 250 FOR IP): Performed by: INTERNAL MEDICINE

## 2024-05-13 PROCEDURE — 6360000002 HC RX W HCPCS: Performed by: INTERNAL MEDICINE

## 2024-05-13 RX ORDER — LOSARTAN POTASSIUM 100 MG/1
100 TABLET ORAL DAILY
Qty: 90 TABLET | Refills: 3
Start: 2024-05-13

## 2024-05-13 RX ORDER — OXYCODONE HYDROCHLORIDE 5 MG/1
5 TABLET ORAL EVERY 6 HOURS PRN
Qty: 20 TABLET | Refills: 0 | Status: SHIPPED | OUTPATIENT
Start: 2024-05-13 | End: 2024-05-20

## 2024-05-13 RX ORDER — MIDODRINE HYDROCHLORIDE 5 MG/1
5 TABLET ORAL 3 TIMES DAILY
Qty: 90 TABLET | Refills: 0 | Status: SHIPPED | OUTPATIENT
Start: 2024-05-13 | End: 2024-05-17 | Stop reason: HOSPADM

## 2024-05-13 RX ORDER — MIDODRINE HYDROCHLORIDE 5 MG/1
5 TABLET ORAL
Status: DISCONTINUED | OUTPATIENT
Start: 2024-05-13 | End: 2024-05-15

## 2024-05-13 RX ADMIN — MIDODRINE HYDROCHLORIDE 5 MG: 5 TABLET ORAL at 18:09

## 2024-05-13 RX ADMIN — SODIUM CHLORIDE, PRESERVATIVE FREE 10 ML: 5 INJECTION INTRAVENOUS at 21:40

## 2024-05-13 RX ADMIN — ENOXAPARIN SODIUM 40 MG: 100 INJECTION SUBCUTANEOUS at 09:15

## 2024-05-13 RX ADMIN — SODIUM PHOSPHATE 1 ENEMA: 7; 19 ENEMA RECTAL at 22:09

## 2024-05-13 RX ADMIN — ACETAMINOPHEN 650 MG: 325 TABLET ORAL at 18:08

## 2024-05-13 RX ADMIN — MIDODRINE HYDROCHLORIDE 5 MG: 5 TABLET ORAL at 11:43

## 2024-05-13 RX ADMIN — PHENAZOPYRIDINE HYDROCHLORIDE 95 MG: 95 TABLET ORAL at 09:15

## 2024-05-13 RX ADMIN — ASPIRIN 81 MG: 81 TABLET, COATED ORAL at 09:15

## 2024-05-13 RX ADMIN — MEMANTINE 10 MG: 5 TABLET ORAL at 09:15

## 2024-05-13 RX ADMIN — SODIUM CHLORIDE, PRESERVATIVE FREE 5 ML: 5 INJECTION INTRAVENOUS at 09:15

## 2024-05-13 RX ADMIN — OXYCODONE 5 MG: 5 TABLET ORAL at 09:19

## 2024-05-13 ASSESSMENT — PAIN DESCRIPTION - LOCATION
LOCATION: ABDOMEN;HIP;LEG
LOCATION: BACK;HIP

## 2024-05-13 ASSESSMENT — PAIN DESCRIPTION - DESCRIPTORS
DESCRIPTORS: ACHING;SORE
DESCRIPTORS: ACHING;SORE;STABBING;THROBBING

## 2024-05-13 ASSESSMENT — PAIN SCALES - GENERAL
PAINLEVEL_OUTOF10: 10
PAINLEVEL_OUTOF10: 2
PAINLEVEL_OUTOF10: 2
PAINLEVEL_OUTOF10: 0
PAINLEVEL_OUTOF10: 0

## 2024-05-13 ASSESSMENT — PAIN DESCRIPTION - ORIENTATION
ORIENTATION: LEFT
ORIENTATION: LEFT

## 2024-05-13 ASSESSMENT — PAIN SCALES - WONG BAKER: WONGBAKER_NUMERICALRESPONSE: HURTS A LITTLE BIT

## 2024-05-13 NOTE — PROGRESS NOTES
ACUTE PHYSICAL THERAPY GOALS:   (Developed with and agreed upon by patient and/or caregiver.)  LTG:  (1.)Ms. Seals will move from supine to sit and sit to supine , scoot up and down, and roll side to side in bed with CONTACT GUARD ASSIST within 7 treatment day(s).    (2.)Ms. Seals will transfer from bed to chair and chair to bed with CONTACT GUARD ASSIST using the least restrictive device within 7 treatment day(s).    (3.)Ms. Seals will ambulate with CONTACT GUARD ASSIST for 150 feet with the least restrictive device within 7 treatment day(s).  (4.)Ms. Seals will tolerate at least 23 min of dynamic standing activity to assist standing ADLs with the least restrictive device within 7 treatment days.    PHYSICAL THERAPY: Daily Note AM   (Link to Caseload Tracking: PT Visit Days : 2  Time In/Out PT Charge Capture  Rehab Caseload Tracker  Orders    Angelique Seals is a 88 y.o. female   PRIMARY DIAGNOSIS: Hypothermia associated with environmental change  Acute cystitis with hematuria [N30.01]  Hypothermia, initial encounter [T68.XXXA]  Acute metabolic encephalopathy [G93.41]  Hypothermia associated with environmental change [T68.XXXA]  Dementia with other behavioral disturbance, unspecified dementia severity, unspecified dementia type (HCC) [F03.918]       Inpatient: Payor: [employer] / Plan: [employer]-ADVANTAGE PPO / Product Type: Medicare /     ASSESSMENT:     REHAB RECOMMENDATIONS:   Recommendation to date pending progress:  Setting:  Short-term Rehab    Equipment:    To Be Determined     ASSESSMENT:  Ms. Seals presents in supine, agreeable to session. Today's activity is limited by hypotension.       Upon entering, Pnt is agreeable to PT treatment.  she reports no pain at rest. Pnt performed supine > sit with min Ax2, sitting EOB with good sitting balance control. Sit > stand with min ax2 at first using RW, but pt requires mod Ax2 in second half of session. Gait 2 x 6, 2 x 8 ft with min A- mod Ax2, cues  of Care    TIME IN/OUT:  Time In: 1053  Time Out: 1120  Minutes: 27    Miranda Fonseca, PT

## 2024-05-13 NOTE — PROGRESS NOTES
Physician Progress Note      PATIENT:               JAIME CROFT  [facility] #:                  [id number]  :                       1935  ADMIT DATE:       2024 10:21 AM  DISCH DATE:  RESPONDING  PROVIDER #:        Alexx Pendleton MD          QUERY TEXT:    Patient admitted with hypothermia. Documentation reflects acute metabolic   encephalopathy.  If possible, please document in the progress notes and   discharge summary if acute metabolic encephalopathy was:    The medical record reflects the following:  Risk Factors: UTI, Moderate dementia  Clinical Indicators: On admission, more confused than usual. - No other   complaints and mentally is still not back to baseline per daughter.  ED Note-She does have a known history of dementia and according to the   neighbor she is at baseline currently.  Treatment: bear hugger/warming blanket, monitor vitals, Rocephin  Options provided:  -- acute metabolic encephalopathy confirmed after study  -- acute metabolic encephalopathy treated and resolved  -- acute metabolic encephalopathy ruled out after study  -- Other - I will add my own diagnosis  -- Disagree - Not applicable / Not valid  -- Disagree - Clinically unable to determine / Unknown  -- Refer to Clinical Documentation Reviewer    PROVIDER RESPONSE TEXT:    acute metabolic encephalopathy treated and resolved.    Query created by: Chen Leone on 2024 10:35 AM      QUERY TEXT:    Patient admitted with hypothermia. Noted documentation of UTI. In order to   support the diagnosis of UTI, please include additional clinical indicators in   your documentation.  Or please document if the diagnosis of UTI has been   ruled out after further study.    The medical record reflects the following:  Risk Factors: dementia, A-fib status post Watchman  Clinical Indicators: Urine culture negative.  Treatment: empiric antibiotic  Options provided:  -- UTI present as evidenced by, Please document evidence.  -- UTI was ruled out  --  Hypothyroidism

## 2024-05-13 NOTE — PROGRESS NOTES
ACUTE OCCUPATIONAL THERAPY GOALS:   (Developed with and agreed upon by patient and/or caregiver.)  1. Patient will complete lower body bathing and dressing with CONTACT GUARD ASSIST and adaptive equipment as needed.     2. Patient will complete toilet transfers and toileting with STAND BY ASSIST.  3. Patient will complete grooming ADL tasks at standing level with STAND BY ASSIST.  4. Patient will tolerate 30 minutes of OT treatment with 1-2 rest breaks to increase activity tolerance for ADLs.   5. Patient will complete functional transfers with STAND BY ASSIST and adaptive equipment as needed.   6. Patient will tolerate 10 minutes BUE exercises to increase strength for safe, functional transfers.      Timeframe: 7 visits        OCCUPATIONAL THERAPY: Daily Note AM   OT Visit Days: 2   Time In/Out  OT Charge Capture  Rehab Caseload Tracker  OT Orders    Angelique Seals is a 88 y.o. female   PRIMARY DIAGNOSIS: Hypothermia associated with environmental change  Acute cystitis with hematuria [N30.01]  Hypothermia, initial encounter [T68.XXXA]  Acute metabolic encephalopathy [G93.41]  Hypothermia associated with environmental change [T68.XXXA]  Dementia with other behavioral disturbance, unspecified dementia severity, unspecified dementia type (HCC) [F03.918]       Inpatient: Payor: [facility] MEDICARE / Plan: AET MEDICARE-ADVANTAGE PPO / Product Type: Medicare /     ASSESSMENT:     REHAB RECOMMENDATIONS:   Recommendation to date pending progress:  Setting:  Short-term Rehab    Equipment:    Rolling Walker     ASSESSMENT:  Ms. Seals is received supine in bed, agreeable to participate in the session. Intermittent confusion and delayed processing noted throughout the session. Pt performed bed mobility transfers/supine to sit edge of bed with minimal assist x2 and additional time/cues for proper technique and sequencing. Pt presented with fair + sitting balance edge of bed requiring standby assist for safety. Pt performed  standing, unable to obtain  Sittin/66      Oxygen   Room air      MOBILITY: I Mod I S SBA CGA Min Mod Max Total  NT x2 Comments:   Bed Mobility    Rolling [] [] [] [] [] [x] [] [] [] [] [x]    Supine to Sit [] [] [] [] [] [x] [] [] [] [] [x]    Scooting [] [] [] [] [] [x] [] [] [] [] [x]    Sit to Supine [] [] [] [] [] [] [] [] [] [x] []    Transfers    Sit to Stand [] [] [] [] [] [x] [x] [] [] [] [x]    Bed to Chair [] [] [] [] [] [x] [x] [] [] [] [x]    Stand to Sit [] [] [] [] [] [x] [x] [] [] [] [x]    Tub/Shower [] [] [] [] [] [] [] [] [] [x] []     Toilet [] [] [] [] [] [] [] [] [] [x] []      [] [] [] [] [] [] [] [] [] [] []    I=Independent, Mod I=Modified Independent, S=Supervision/Setup, SBA=Standby Assistance, CGA=Contact Guard Assistance, Min=Minimal Assistance, Mod=Moderate Assistance, Max=Maximal Assistance, Total=Total Assistance, NT=Not Tested    ACTIVITIES OF DAILY LIVING: I Mod I S SBA CGA Min Mod Max Total NT Comments   BASIC ADLs:              Upper Body   Bathing [] [] [] [] [] [] [] [] [] [x]     Lower Body Bathing [] [] [] [] [] [] [] [] [] [x]     Toileting [] [] [] [] [] [] [] [] [] [x]    Upper Body Dressing [] [] [] [] [] [] [] [] [] [x]    Lower Body Dressing [] [] [] [] [] [] [] [] [] [x]    Feeding [] [] [] [] [] [] [] [] [] [x]    Grooming [] [] [] [] [] [x] [] [] [] [] Completed oral hygiene and face washing routine standing edge of sink, requiring Min A for balance control   Personal Device Care [] [] [] [] [] [] [] [] [] [x]    Functional Mobility [] [] [] [] [] [x] [x] [] [] [] X2, rolling walker, short distances, chair follow, seated rest breaks   I=Independent, Mod I=Modified Independent, S=Supervision/Setup, SBA=Standby Assistance, CGA=Contact Guard Assistance, Min=Minimal Assistance, Mod=Moderate Assistance, Max=Maximal Assistance, Total=Total Assistance, NT=Not Tested    BALANCE: Good Fair+ Fair Fair- Poor NT Comments   Sitting Static [] [x] [] [] [] []    Sitting

## 2024-05-13 NOTE — PROGRESS NOTES
Pt awake in bed. A/ox4. Respirations even and unlabored on room air. No distress noted. Bowel sounds hypoactive, last bm was about 5 days ago per pt. Family at bedside. Pt instructed to use call light if assistance is needed with call light in reach. Plan of care ongoing.

## 2024-05-13 NOTE — PROGRESS NOTES
PT/OT worked with pt   Stated pt became pale/less responsive  PT attempted to get a set of orthostatic BP  Stated standing BP unable to obtain pt placed in chair after 4min BP rechecked @1110 102/65  MD notified of change in pt's condition with orders received

## 2024-05-13 NOTE — PLAN OF CARE
Problem: Discharge Planning  Goal: Discharge to home or other facility with appropriate resources  Outcome: Progressing  Flowsheets (Taken 5/13/2024 5597)  Discharge to home or other facility with appropriate resources:   Identify barriers to discharge with patient and caregiver   Arrange for needed discharge resources and transportation as appropriate   Identify discharge learning needs (meds, wound care, etc)   Refer to discharge planning if patient needs post-hospital services based on physician order or complex needs related to functional status, cognitive ability or social support system     Problem: Skin/Tissue Integrity  Goal: Absence of new skin breakdown  Description: 1.  Monitor for areas of redness and/or skin breakdown  2.  Assess vascular access sites hourly  3.  Every 4-6 hours minimum:  Change oxygen saturation probe site  4.  Every 4-6 hours:  If on nasal continuous positive airway pressure, respiratory therapy assess nares and determine need for appliance change or resting period.  Outcome: Progressing     Problem: Safety - Adult  Goal: Free from fall injury  Outcome: Progressing     Problem: Pain  Goal: Verbalizes/displays adequate comfort level or baseline comfort level  Outcome: Progressing     Problem: ABCDS Injury Assessment  Goal: Absence of physical injury  Outcome: Progressing

## 2024-05-14 LAB
ANION GAP SERPL CALC-SCNC: 10 MMOL/L (ref 9–18)
BASOPHILS # BLD: 0 K/UL (ref 0–0.2)
BASOPHILS NFR BLD: 0 % (ref 0–2)
BUN SERPL-MCNC: 18 MG/DL (ref 8–23)
CALCIUM SERPL-MCNC: 8.9 MG/DL (ref 8.8–10.2)
CHLORIDE SERPL-SCNC: 100 MMOL/L (ref 98–107)
CO2 SERPL-SCNC: 26 MMOL/L (ref 20–28)
CREAT SERPL-MCNC: 0.77 MG/DL (ref 0.6–1.1)
DIFFERENTIAL METHOD BLD: ABNORMAL
EOSINOPHIL # BLD: 0 K/UL (ref 0–0.8)
EOSINOPHIL NFR BLD: 0 % (ref 0.5–7.8)
ERYTHROCYTE [DISTWIDTH] IN BLOOD BY AUTOMATED COUNT: 13.8 % (ref 11.9–14.6)
GLUCOSE SERPL-MCNC: 132 MG/DL (ref 70–99)
HCT VFR BLD AUTO: 43 % (ref 35.8–46.3)
HGB BLD-MCNC: 14.5 G/DL (ref 11.7–15.4)
IMM GRANULOCYTES # BLD AUTO: 0 K/UL (ref 0–0.5)
IMM GRANULOCYTES NFR BLD AUTO: 0 % (ref 0–5)
LYMPHOCYTES # BLD: 0.8 K/UL (ref 0.5–4.6)
LYMPHOCYTES NFR BLD: 7 % (ref 13–44)
MCH RBC QN AUTO: 31.2 PG (ref 26.1–32.9)
MCHC RBC AUTO-ENTMCNC: 33.7 G/DL (ref 31.4–35)
MCV RBC AUTO: 92.5 FL (ref 82–102)
MM INDURATION, POC: 0 MM (ref 0–5)
MONOCYTES # BLD: 0.8 K/UL (ref 0.1–1.3)
MONOCYTES NFR BLD: 7 % (ref 4–12)
NEUTS SEG # BLD: 9.4 K/UL (ref 1.7–8.2)
NEUTS SEG NFR BLD: 86 % (ref 43–78)
NRBC # BLD: 0 K/UL (ref 0–0.2)
PLATELET # BLD AUTO: 281 K/UL (ref 150–450)
PMV BLD AUTO: 11.8 FL (ref 9.4–12.3)
POTASSIUM SERPL-SCNC: 4.4 MMOL/L (ref 3.5–5.1)
PPD, POC: NEGATIVE
RBC # BLD AUTO: 4.65 M/UL (ref 4.05–5.2)
SODIUM SERPL-SCNC: 137 MMOL/L (ref 136–145)
WBC # BLD AUTO: 11.1 K/UL (ref 4.3–11.1)

## 2024-05-14 PROCEDURE — 80048 BASIC METABOLIC PNL TOTAL CA: CPT

## 2024-05-14 PROCEDURE — 51702 INSERT TEMP BLADDER CATH: CPT

## 2024-05-14 PROCEDURE — 85025 COMPLETE CBC W/AUTO DIFF WBC: CPT

## 2024-05-14 PROCEDURE — 6370000000 HC RX 637 (ALT 250 FOR IP): Performed by: INTERNAL MEDICINE

## 2024-05-14 PROCEDURE — 51798 US URINE CAPACITY MEASURE: CPT

## 2024-05-14 PROCEDURE — 36415 COLL VENOUS BLD VENIPUNCTURE: CPT

## 2024-05-14 PROCEDURE — 6360000002 HC RX W HCPCS: Performed by: INTERNAL MEDICINE

## 2024-05-14 PROCEDURE — 1100000000 HC RM PRIVATE

## 2024-05-14 PROCEDURE — 6370000000 HC RX 637 (ALT 250 FOR IP): Performed by: FAMILY MEDICINE

## 2024-05-14 PROCEDURE — 2580000003 HC RX 258: Performed by: INTERNAL MEDICINE

## 2024-05-14 RX ORDER — TAMSULOSIN HYDROCHLORIDE 0.4 MG/1
0.4 CAPSULE ORAL DAILY
Qty: 30 CAPSULE | Refills: 0 | Status: SHIPPED | OUTPATIENT
Start: 2024-05-14 | End: 2024-06-13

## 2024-05-14 RX ORDER — TAMSULOSIN HYDROCHLORIDE 0.4 MG/1
0.4 CAPSULE ORAL DAILY
Status: DISCONTINUED | OUTPATIENT
Start: 2024-05-14 | End: 2024-05-17 | Stop reason: HOSPADM

## 2024-05-14 RX ADMIN — SODIUM CHLORIDE, PRESERVATIVE FREE 10 ML: 5 INJECTION INTRAVENOUS at 20:49

## 2024-05-14 RX ADMIN — TAMSULOSIN HYDROCHLORIDE 0.4 MG: 0.4 CAPSULE ORAL at 14:13

## 2024-05-14 RX ADMIN — MEMANTINE 10 MG: 5 TABLET ORAL at 08:52

## 2024-05-14 RX ADMIN — PHENAZOPYRIDINE HYDROCHLORIDE 95 MG: 95 TABLET ORAL at 14:07

## 2024-05-14 RX ADMIN — ENOXAPARIN SODIUM 40 MG: 100 INJECTION SUBCUTANEOUS at 08:53

## 2024-05-14 RX ADMIN — MIDODRINE HYDROCHLORIDE 5 MG: 5 TABLET ORAL at 08:53

## 2024-05-14 RX ADMIN — MIDODRINE HYDROCHLORIDE 5 MG: 5 TABLET ORAL at 14:07

## 2024-05-14 RX ADMIN — ASPIRIN 81 MG: 81 TABLET, COATED ORAL at 08:53

## 2024-05-14 RX ADMIN — PHENAZOPYRIDINE HYDROCHLORIDE 95 MG: 95 TABLET ORAL at 08:52

## 2024-05-14 RX ADMIN — PHENAZOPYRIDINE HYDROCHLORIDE 95 MG: 95 TABLET ORAL at 18:13

## 2024-05-14 RX ADMIN — SODIUM CHLORIDE, PRESERVATIVE FREE 10 ML: 5 INJECTION INTRAVENOUS at 08:53

## 2024-05-14 ASSESSMENT — PAIN SCALES - WONG BAKER: WONGBAKER_NUMERICALRESPONSE: HURTS A LITTLE BIT

## 2024-05-14 ASSESSMENT — PAIN SCALES - GENERAL: PAINLEVEL_OUTOF10: 0

## 2024-05-14 NOTE — PROGRESS NOTES
Hospitalist Progress Note   Admit Date:  2024 10:21 AM   Name:  Angelique Seals   Age:  88 y.o.  Sex:  female  :  1935   MRN:  [id number]   Room:  [facility]/    Presenting/Chief Complaint: Cold Exposure     Reason(s) for Admission: Acute cystitis with hematuria [N30.01]  Hypothermia, initial encounter [T68.XXXA]  Acute metabolic encephalopathy [G93.41]  Hypothermia associated with environmental change [T68.XXXA]  Dementia with other behavioral disturbance, unspecified dementia severity, unspecified dementia type (HCC) [F03.918]     Hospital Course:   Angelique Seals is a 88 y.o. female with medical history of dementia, A-fib status post Watchman device, who presented with hypothermia.  She has a caretaker that comes in every day and caretaker found her on the floor  lying in water from a busted water pipe in the laundry room.  She was more confused than usual.  She was brought to the ER for evaluation where workup was fairly unremarkable except for hypothermia and an abnormal UA.      Patient admitted for observation for hypothermia.  Started on Bear hugger.  Started on empiric antibiotic for UTI.    Subjective & 24hr Events:   Patient is seen and examined at the bedside.  Patient noted with symptomatic orthostatic hypotension, became dizzy while working with PT/OT.  Blood pressure improved after 4 minutes of sitting.  Patient started on midodrine 5 mg 3 times daily.  Recommend MARIA TERESA hose.    Also will give voiding trial today.    Pending rehab placement    Assessment & Plan:       Orthostatic hypotension:  -Monitor orthostatic vital  -Start patient on midodrine 5 mg 3 times daily  -Hold antihypertensive  -MARIA TERESA hose.  -Orthostatic precautions      Urinary retention:  -Voiding trial today      Hypothermia associated with environmental change  -Resolved  -Off bear hugger/warming blanket  -monitor vitals       UTI (urinary tract infection): Ruled out  -Urine culture negative  -Completed ceftriaxone for 3 days    atraumatic  Eyes:  Sclerae appear normal.  Pupils equally round.  ENT:  Nares appear normal.  Moist oral mucosa  Neck:  No restricted ROM.  Trachea midline   CV:   Irregular,  No m/r/g.  Lungs:   CTAB.  No wheezing, rhonchi, or rales.  Symmetric expansion.  Abdomen:   Soft, nontender, nondistended.  Extremities: No cyanosis or clubbing.  No edema  Skin:     No rashes.  Normal coloration.   Warm and dry.    Neuro:  CN II-XII grossly intact.    Psych:  Normal mood and affect.      I have personally reviewed labs and tests:  Recent Labs:  Recent Results (from the past 48 hour(s))   Basic Metabolic Panel w/ Reflex to MG    Collection Time: 05/13/24  4:15 AM   Result Value Ref Range    Sodium 140 136 - 145 mmol/L    Potassium 4.0 3.5 - 5.1 mmol/L    Chloride 105 98 - 107 mmol/L    CO2 26 20 - 28 mmol/L    Anion Gap 9 9 - 18 mmol/L    Glucose 104 (H) 70 - 99 mg/dL    BUN 20 8 - 23 MG/DL    Creatinine 0.75 0.60 - 1.10 MG/DL    Est, Glom Filt Rate 76 >60 ml/min/1.73m2    Calcium 8.9 8.8 - 10.2 MG/DL   CBC with Auto Differential    Collection Time: 05/13/24  4:15 AM   Result Value Ref Range    WBC 7.4 4.3 - 11.1 K/uL    RBC 4.32 4.05 - 5.2 M/uL    Hemoglobin 12.9 11.7 - 15.4 g/dL    Hematocrit 40.5 35.8 - 46.3 %    MCV 93.8 82 - 102 FL    MCH 29.9 26.1 - 32.9 PG    MCHC 31.9 31.4 - 35.0 g/dL    RDW 14.0 11.9 - 14.6 %    Platelets 196 150 - 450 K/uL    MPV 10.7 9.4 - 12.3 FL    nRBC 0.00 0.0 - 0.2 K/uL    Differential Type AUTOMATED      Neutrophils % 62 43 - 78 %    Lymphocytes % 24 13 - 44 %    Monocytes % 11 4.0 - 12.0 %    Eosinophils % 3 0.5 - 7.8 %    Basophils % 0 0.0 - 2.0 %    Immature Granulocytes % 0 0.0 - 5.0 %    Neutrophils Absolute 4.6 1.7 - 8.2 K/UL    Lymphocytes Absolute 1.8 0.5 - 4.6 K/UL    Monocytes Absolute 0.8 0.1 - 1.3 K/UL    Eosinophils Absolute 0.2 0.0 - 0.8 K/UL    Basophils Absolute 0.0 0.0 - 0.2 K/UL    Immature Granulocytes Absolute 0.0 0.0 - 0.5 K/UL   PLEASE READ & DOCUMENT PPD TEST IN 72 HRS

## 2024-05-14 NOTE — PROGRESS NOTES
Bladder scan showed pt was retaining about 878 ml of urine. While prepping for straight cath, pt voided only 215 ml of urine. She was bladder scanned again and it showed retention in the high 600s. Straight cath was performed and 600 ml was obtained. Pt also received a fleet enema after straight cath was performed.

## 2024-05-14 NOTE — DISCHARGE SUMMARY
Hospitalist Discharge Summary   Admit Date:  2024 10:21 AM   DC Note date: 2024  Name:  Angelique Seals   Age:  88 y.o.  Sex:  female  :  1935   MRN:  [id number]   Room:  [facility]  PCP:  Antonio Godfrey Jr., MD    Presenting Complaint: Cold Exposure     Initial Admission Diagnosis: Acute cystitis with hematuria [N30.01]  Hypothermia, initial encounter [T68.XXXA]  Acute metabolic encephalopathy [G93.41]  Hypothermia associated with environmental change [T68.XXXA]  Dementia with other behavioral disturbance, unspecified dementia severity, unspecified dementia type (HCC) [F03.918]     Problem List for this Hospitalization (present on admission):    Principal Problem:    Hypothermia associated with environmental change  Active Problems:    Permanent atrial fibrillation (HCC)    Presence of Watchman left atrial appendage closure device    Moderate dementia (HCC)    UTI (urinary tract infection)    Orthostatic hypotension  Resolved Problems:    * No resolved hospital problems. *      Hospital Course:  Angelique Seals is a 88 y.o. female with medical history of dementia, A-fib status post Watchman device, who presented with hypothermia.  She has a caretaker that comes in every day and caretaker found her on the floor  lying in water from a busted water pipe in the laundry room.  She was more confused than usual.  She was brought to the ER for evaluation where workup was fairly unremarkable except for hypothermia and an abnormal UA.       Patient admitted for hypothermia.  Started on Bear hugger and hypothermia resolved.  Started on empiric antibiotic for possible UTI.  Urine culture negative.  Patient has completed antibiotic course.    Patient also complaining of hip pain, imaging negative for any acute pathology.  Also noted with urinary retention and Fernando catheter placed. Failed voiding trial on  and fernando replaced. Pt started on tamsulosin and urology referral given on discharge.     Orthostatic  THIS IS INDICATIVE OF CONTAMINATION DUE TO IMPROPER COLLECTION TECHNIQUE.  PLEASE REPEAT COLLECTION UNLESS PATIENT HAS STARTED ANTIBIOTIC TREATMENT.                  All Labs from Last 24 Hrs:  Recent Results (from the past 24 hour(s))   PLEASE READ & DOCUMENT PPD TEST IN 72 HRS    Collection Time: 05/14/24 12:00 AM   Result Value Ref Range    PPD, (POC) Negative     mm Induration 0 0 - 5 mm   Basic Metabolic Panel w/ Reflex to MG    Collection Time: 05/14/24  5:18 AM   Result Value Ref Range    Sodium 137 136 - 145 mmol/L    Potassium 4.4 3.5 - 5.1 mmol/L    Chloride 100 98 - 107 mmol/L    CO2 26 20 - 28 mmol/L    Anion Gap 10 9 - 18 mmol/L    Glucose 132 (H) 70 - 99 mg/dL    BUN 18 8 - 23 MG/DL    Creatinine 0.77 0.60 - 1.10 MG/DL    Est, Glom Filt Rate 74 >60 ml/min/1.73m2    Calcium 8.9 8.8 - 10.2 MG/DL   CBC with Auto Differential    Collection Time: 05/14/24  5:18 AM   Result Value Ref Range    WBC 11.1 4.3 - 11.1 K/uL    RBC 4.65 4.05 - 5.2 M/uL    Hemoglobin 14.5 11.7 - 15.4 g/dL    Hematocrit 43.0 35.8 - 46.3 %    MCV 92.5 82 - 102 FL    MCH 31.2 26.1 - 32.9 PG    MCHC 33.7 31.4 - 35.0 g/dL    RDW 13.8 11.9 - 14.6 %    Platelets 281 150 - 450 K/uL    MPV 11.8 9.4 - 12.3 FL    nRBC 0.00 0.0 - 0.2 K/uL    Differential Type AUTOMATED      Neutrophils % 86 (H) 43 - 78 %    Lymphocytes % 7 (L) 13 - 44 %    Monocytes % 7 4.0 - 12.0 %    Eosinophils % 0 (L) 0.5 - 7.8 %    Basophils % 0 0.0 - 2.0 %    Immature Granulocytes % 0 0.0 - 5.0 %    Neutrophils Absolute 9.4 (H) 1.7 - 8.2 K/UL    Lymphocytes Absolute 0.8 0.5 - 4.6 K/UL    Monocytes Absolute 0.8 0.1 - 1.3 K/UL    Eosinophils Absolute 0.0 0.0 - 0.8 K/UL    Basophils Absolute 0.0 0.0 - 0.2 K/UL    Immature Granulocytes Absolute 0.0 0.0 - 0.5 K/UL       No results for input(s): \"COVID19\" in the last 72 hours.    Recent Vital Data:  Patient Vitals for the past 24 hrs:   Temp Pulse Resp BP SpO2   05/14/24 0734 98.2 °F (36.8 °C) 72 15 139/77 94 %   05/13/24 2336

## 2024-05-14 NOTE — PLAN OF CARE
Problem: Discharge Planning  Goal: Discharge to home or other facility with appropriate resources  Outcome: Progressing  Flowsheets (Taken 5/14/2024 0813)  Discharge to home or other facility with appropriate resources:   Identify barriers to discharge with patient and caregiver   Arrange for needed discharge resources and transportation as appropriate   Identify discharge learning needs (meds, wound care, etc)   Refer to discharge planning if patient needs post-hospital services based on physician order or complex needs related to functional status, cognitive ability or social support system     Problem: Skin/Tissue Integrity  Goal: Absence of new skin breakdown  Description: 1.  Monitor for areas of redness and/or skin breakdown  2.  Assess vascular access sites hourly  3.  Every 4-6 hours minimum:  Change oxygen saturation probe site  4.  Every 4-6 hours:  If on nasal continuous positive airway pressure, respiratory therapy assess nares and determine need for appliance change or resting period.  Outcome: Progressing     Problem: Safety - Adult  Goal: Free from fall injury  Outcome: Progressing     Problem: Pain  Goal: Verbalizes/displays adequate comfort level or baseline comfort level  Outcome: Progressing     Problem: ABCDS Injury Assessment  Goal: Absence of physical injury  Outcome: Progressing

## 2024-05-15 PROCEDURE — 97530 THERAPEUTIC ACTIVITIES: CPT

## 2024-05-15 PROCEDURE — 97535 SELF CARE MNGMENT TRAINING: CPT

## 2024-05-15 PROCEDURE — 1100000000 HC RM PRIVATE

## 2024-05-15 PROCEDURE — 6370000000 HC RX 637 (ALT 250 FOR IP): Performed by: FAMILY MEDICINE

## 2024-05-15 PROCEDURE — 51702 INSERT TEMP BLADDER CATH: CPT

## 2024-05-15 PROCEDURE — 6370000000 HC RX 637 (ALT 250 FOR IP): Performed by: INTERNAL MEDICINE

## 2024-05-15 PROCEDURE — 2580000003 HC RX 258: Performed by: INTERNAL MEDICINE

## 2024-05-15 PROCEDURE — 6360000002 HC RX W HCPCS: Performed by: INTERNAL MEDICINE

## 2024-05-15 RX ORDER — MIDODRINE HYDROCHLORIDE 5 MG/1
2.5 TABLET ORAL
Status: DISCONTINUED | OUTPATIENT
Start: 2024-05-15 | End: 2024-05-17 | Stop reason: HOSPADM

## 2024-05-15 RX ADMIN — MIDODRINE HYDROCHLORIDE 5 MG: 5 TABLET ORAL at 09:06

## 2024-05-15 RX ADMIN — SODIUM CHLORIDE, PRESERVATIVE FREE 10 ML: 5 INJECTION INTRAVENOUS at 22:00

## 2024-05-15 RX ADMIN — ENOXAPARIN SODIUM 40 MG: 100 INJECTION SUBCUTANEOUS at 09:04

## 2024-05-15 RX ADMIN — MIDODRINE HYDROCHLORIDE 2.5 MG: 5 TABLET ORAL at 14:45

## 2024-05-15 RX ADMIN — PHENAZOPYRIDINE HYDROCHLORIDE 95 MG: 95 TABLET ORAL at 14:45

## 2024-05-15 RX ADMIN — ASPIRIN 81 MG: 81 TABLET, COATED ORAL at 09:05

## 2024-05-15 RX ADMIN — TAMSULOSIN HYDROCHLORIDE 0.4 MG: 0.4 CAPSULE ORAL at 09:05

## 2024-05-15 RX ADMIN — PHENAZOPYRIDINE HYDROCHLORIDE 95 MG: 95 TABLET ORAL at 09:05

## 2024-05-15 RX ADMIN — PHENAZOPYRIDINE HYDROCHLORIDE 95 MG: 95 TABLET ORAL at 17:00

## 2024-05-15 RX ADMIN — MEMANTINE 10 MG: 5 TABLET ORAL at 09:05

## 2024-05-15 RX ADMIN — SODIUM CHLORIDE, PRESERVATIVE FREE 5 ML: 5 INJECTION INTRAVENOUS at 09:05

## 2024-05-15 RX ADMIN — MIDODRINE HYDROCHLORIDE 2.5 MG: 5 TABLET ORAL at 17:00

## 2024-05-15 NOTE — PROGRESS NOTES
ACUTE PHYSICAL THERAPY GOALS:   (Developed with and agreed upon by patient and/or caregiver.)  LTG:  (1.)Ms. Seals will move from supine to sit and sit to supine , scoot up and down, and roll side to side in bed with CONTACT GUARD ASSIST within 7 treatment day(s).    (2.)Ms. Seals will transfer from bed to chair and chair to bed with CONTACT GUARD ASSIST using the least restrictive device within 7 treatment day(s).    (3.)Ms. Seals will ambulate with CONTACT GUARD ASSIST for 150 feet with the least restrictive device within 7 treatment day(s).  (4.)Ms. Seals will tolerate at least 23 min of dynamic standing activity to assist standing ADLs with the least restrictive device within 7 treatment days.    PHYSICAL THERAPY: Daily Note AM   (Link to Caseload Tracking: PT Visit Days : 3  Time In/Out PT Charge Capture  Rehab Caseload Tracker  Orders    Angelique Seals is a 88 y.o. female   PRIMARY DIAGNOSIS: Hypothermia associated with environmental change  Acute cystitis with hematuria [N30.01]  Hypothermia, initial encounter [T68.XXXA]  Acute metabolic encephalopathy [G93.41]  Hypothermia associated with environmental change [T68.XXXA]  Dementia with other behavioral disturbance, unspecified dementia severity, unspecified dementia type (HCC) [F03.918]       Inpatient: Payor: [employer] / Plan: [employer] MEDICARE-ADVANTAGE PPO / Product Type: Medicare /     ASSESSMENT:     REHAB RECOMMENDATIONS:   Recommendation to date pending progress:  Setting:  Short-term Rehab    Equipment:    To Be Determined     ASSESSMENT:  Ms. Seals was supine upon contact and agreeable to PT. Patient is pleasantly confused. Patient performed supine to sit and transfer to standing with min-mod assist, additional time, and cues for technique. Once standing patient ambulated slowly for 90' with rolling walker, min assist for balance, chair follow for safety and cues for sequencing with rolling walker. Patient returned to recliner chair where

## 2024-05-15 NOTE — PROGRESS NOTES
ACUTE OCCUPATIONAL THERAPY GOALS:   (Developed with and agreed upon by patient and/or caregiver.)  1. Patient will complete lower body bathing and dressing with CONTACT GUARD ASSIST and adaptive equipment as needed.     2. Patient will complete toilet transfers and toileting with STAND BY ASSIST.  3. Patient will complete grooming ADL tasks at standing level with STAND BY ASSIST.  4. Patient will tolerate 30 minutes of OT treatment with 1-2 rest breaks to increase activity tolerance for ADLs.   5. Patient will complete functional transfers with STAND BY ASSIST and adaptive equipment as needed.   6. Patient will tolerate 10 minutes BUE exercises to increase strength for safe, functional transfers.      Timeframe: 7 visits        OCCUPATIONAL THERAPY: Daily Note AM   OT Visit Days: 3   Time In/Out  OT Charge Capture  Rehab Caseload Tracker  OT Orders    Angelique Seals is a 88 y.o. female   PRIMARY DIAGNOSIS: Hypothermia associated with environmental change  Acute cystitis with hematuria [N30.01]  Hypothermia, initial encounter [T68.XXXA]  Acute metabolic encephalopathy [G93.41]  Hypothermia associated with environmental change [T68.XXXA]  Dementia with other behavioral disturbance, unspecified dementia severity, unspecified dementia type (HCC) [F03.918]       Inpatient: Payor: [facility] MEDICARE / Plan: AET MEDICARE-ADVANTAGE PPO / Product Type: Medicare /     ASSESSMENT:     REHAB RECOMMENDATIONS:   Recommendation to date pending progress:  Setting:  Short-term Rehab    Equipment:    To Be Determined     ASSESSMENT:  Ms. Seals is received sitting up in chair, agreeable to participate in the session. Intermittent confusion noted throughout the session. Denies SOB, lightheadedness, and pain throughout the session. Pt performed sit<>stand transfers and short distance functional mobility for ADLs using rolling walker with minimal assist, chair follow, and additional time. Pt required frequent verbal, visual, and tactile  cues for safety awareness, walker management, hand placement, upright posture, and sequencing of all functional transfers. Pt completed standing grooming tasks at sink with contact guard assist-minimal assist for dynamic balance. Pt continues to be limited by fatigue and weakness as shown by extended seated rest break required immediately following dynamic standing activities. Pt then completed full body dressing, full body sponge bathing, and [place] hygiene tasks with varying levels of assistance (refer below) and frequent cues for sequencing. Pt positioned comfortably sitting up in chair at end of session with all needs within reach. RN notified of pt's performance. Overall slow yet steady progress towards OT goals this date. Pt continues to demonstrate overall deficits in ADL performance, functional transfers, household mobility for ADLs, strength, balance, and activity tolerance. Continue OT efforts to address functional deficits and POC. Will continue to monitor and advance as indicated.        SUBJECTIVE:     Ms. Seals states, \"Thank you\"     Social/Functional Lives With: Alone  Type of Home: House  Home Layout: One level  Home Access: Level entry  Bathroom Shower/Tub: Walk-in shower  Bathroom Toilet: Standard  Bathroom Equipment: Grab bars in shower, Built-in shower seat, Grab bars around toilet  Bathroom Accessibility: Accessible  Home Equipment: Grab bars  Receives Help From: Family, Other (comment) (Caretakers 3 hrs per day, 5 days per week)  ADL Assistance: Independent  Homemaking Assistance: Needs assistance  Homemaking Responsibilities: Yes  Ambulation Assistance: Independent  Transfer Assistance: Independent  Active : No  Occupation: Retired    OBJECTIVE:     LINES / DRAINS / AIRWAY: Sanchez Catheter    RESTRICTIONS/PRECAUTIONS:           PAIN: VITALS / O2:   Pre Treatment:    0/10        Post Treatment: no c/o pain Vitals          Oxygen        MOBILITY: I Mod I S SBA CGA Min Mod Max Total  NT x2

## 2024-05-15 NOTE — PLAN OF CARE
Problem: Discharge Planning  Goal: Discharge to home or other facility with appropriate resources  Outcome: Progressing  Flowsheets (Taken 5/15/2024 0801)  Discharge to home or other facility with appropriate resources:   Identify barriers to discharge with patient and caregiver   Arrange for needed discharge resources and transportation as appropriate   Identify discharge learning needs (meds, wound care, etc)   Refer to discharge planning if patient needs post-hospital services based on physician order or complex needs related to functional status, cognitive ability or social support system     Problem: Skin/Tissue Integrity  Goal: Absence of new skin breakdown  Description: 1.  Monitor for areas of redness and/or skin breakdown  2.  Assess vascular access sites hourly  3.  Every 4-6 hours minimum:  Change oxygen saturation probe site  4.  Every 4-6 hours:  If on nasal continuous positive airway pressure, respiratory therapy assess nares and determine need for appliance change or resting period.  Outcome: Progressing     Problem: Safety - Adult  Goal: Free from fall injury  Outcome: Progressing     Problem: Pain  Goal: Verbalizes/displays adequate comfort level or baseline comfort level  Outcome: Progressing     Problem: ABCDS Injury Assessment  Goal: Absence of physical injury  Outcome: Progressing

## 2024-05-15 NOTE — PROGRESS NOTES
Hospitalist Progress Note   Admit Date:  2024 10:21 AM   Name:  Angelique Seals   Age:  88 y.o.  Sex:  female  :  1935   MRN:  [id number]   Room:  [facility]/    Presenting/Chief Complaint: Cold Exposure     Reason(s) for Admission: Acute cystitis with hematuria [N30.01]  Hypothermia, initial encounter [T68.XXXA]  Acute metabolic encephalopathy [G93.41]  Hypothermia associated with environmental change [T68.XXXA]  Dementia with other behavioral disturbance, unspecified dementia severity, unspecified dementia type (HCC) [F03.918]     Hospital Course:   Angelique Seals is a 88 y.o. female with medical history of dementia, A-fib status post Watchman device, who presented with hypothermia.  She has a caretaker that comes in every day and caretaker found her on the floor  lying in water from a busted water pipe in the laundry room.  She was more confused than usual.  She was brought to the ER for evaluation where workup was fairly unremarkable except for hypothermia and an abnormal UA.      Patient admitted for observation for hypothermia.  Started on Bear hugger.  Started on empiric antibiotic for UTI.    Subjective & 24hr Events:   Patient is seen and examined at the bedside.  Reports feeling better.  No active complaint.  Denies chest pain, palpitation, nausea, vomiting or abdominal pain.  Denies shortness of breath.    Blood pressure improved and stable on midodrine.    Assessment & Plan:       Orthostatic hypotension:  -Monitor orthostatic vital  -Continue midodrine 5 mg 3 times daily  -Hold antihypertensive  -MARIA TERESA hose.  -Orthostatic precautions      Urinary retention:  -Failed voiding trial on   -Urology referral given for follow-up outpatient  -Continue tamsulosin        Hypothermia associated with environmental change  -Resolved  -Off bear hugger/warming blanket  -monitor vitals       UTI (urinary tract infection): Ruled out  -Urine culture negative  -Completed ceftriaxone for 3 days       Permanent  650 mg Rectal Q6H PRN    hydrALAZINE (APRESOLINE) injection 20 mg  20 mg IntraVENous Q4H PRN    sodium phosphate (FLEET) rectal enema 1 enema  1 enema Rectal PRN    melatonin tablet 3 mg  3 mg Oral Nightly PRN    guaiFENesin-dextromethorphan (ROBITUSSIN DM) 100-10 MG/5ML syrup 10 mL  10 mL Oral Q4H PRN    cloNIDine (CATAPRES) tablet 0.1 mg  0.1 mg Oral Q4H PRN    enoxaparin (LOVENOX) injection 40 mg  40 mg SubCUTAneous Daily       Signed:  EVON PINTO MD    Part of this note may have been written by using a voice dictation software.  The note has been proof read but may still contain some grammatical/other typographical errors.

## 2024-05-15 NOTE — PROGRESS NOTES
Hospitalist Progress Note   Admit Date:  2024 10:21 AM   Name:  Angelique Seals   Age:  88 y.o.  Sex:  female  :  1935   MRN:  [id number]   Room:  [facility]/    Presenting/Chief Complaint: Cold Exposure     Reason(s) for Admission: Acute cystitis with hematuria [N30.01]  Hypothermia, initial encounter [T68.XXXA]  Acute metabolic encephalopathy [G93.41]  Hypothermia associated with environmental change [T68.XXXA]  Dementia with other behavioral disturbance, unspecified dementia severity, unspecified dementia type (HCC) [F03.918]     Hospital Course:   Angelique Seals is a 88 y.o. female with medical history of dementia, A-fib status post Watchman device, who presented with hypothermia.  She has a caretaker that comes in every day and caretaker found her on the floor  lying in water from a busted water pipe in the laundry room.  She was more confused than usual.  She was brought to the ER for evaluation where workup was fairly unremarkable except for hypothermia and an abnormal UA.       Patient admitted for hypothermia.  Started on Bear hugger and hypothermia resolved.  Started on empiric antibiotic for possible UTI.  Urine culture negative.  Patient has completed antibiotic course.     Patient also complaining of hip pain, imaging negative for any acute pathology.  Also noted with urinary retention and Fernando catheter placed. Failed voiding trial on  and fernando replaced. Pt started on tamsulosin and urology referral given for outpatient follow-up.     Orthostatic vitals positive.  Patient became symptomatic with positive vitals.  Antihypertensive held.  Patient started on midodrine and MARIA TERESA hose.  Gradual improvement in symptoms.  Orthostatic precautions discussed.    Subjective & 24hr Events:   Patient is seen and examined at the bedside.  No acute event reported overnight, denies chest pain, palpitation, nausea, vomiting.  Denies shortness of breath.    Pending rehab placement.    Assessment & Plan:  ondansetron (ZOFRAN-ODT) disintegrating tablet 4 mg  4 mg Oral Q8H PRN    Or    ondansetron (ZOFRAN) injection 4 mg  4 mg IntraVENous Q6H PRN    polyethylene glycol (GLYCOLAX) packet 17 g  17 g Oral Daily PRN    bisacodyl (DULCOLAX) suppository 10 mg  10 mg Rectal Daily PRN    famotidine (PEPCID) tablet 10 mg  10 mg Oral Daily PRN    aluminum & magnesium hydroxide-simethicone (MAALOX) 200-200-20 MG/5ML suspension 30 mL  30 mL Oral Q6H PRN    acetaminophen (TYLENOL) tablet 650 mg  650 mg Oral Q6H PRN    Or    acetaminophen (TYLENOL) suppository 650 mg  650 mg Rectal Q6H PRN    hydrALAZINE (APRESOLINE) injection 20 mg  20 mg IntraVENous Q4H PRN    sodium phosphate (FLEET) rectal enema 1 enema  1 enema Rectal PRN    melatonin tablet 3 mg  3 mg Oral Nightly PRN    guaiFENesin-dextromethorphan (ROBITUSSIN DM) 100-10 MG/5ML syrup 10 mL  10 mL Oral Q4H PRN    cloNIDine (CATAPRES) tablet 0.1 mg  0.1 mg Oral Q4H PRN    enoxaparin (LOVENOX) injection 40 mg  40 mg SubCUTAneous Daily       Signed:  EVON PINTO MD    Part of this note may have been written by using a voice dictation software.  The note has been proof read but may still contain some grammatical/other typographical errors.

## 2024-05-16 PROCEDURE — 6370000000 HC RX 637 (ALT 250 FOR IP): Performed by: INTERNAL MEDICINE

## 2024-05-16 PROCEDURE — 6370000000 HC RX 637 (ALT 250 FOR IP): Performed by: FAMILY MEDICINE

## 2024-05-16 PROCEDURE — 6360000002 HC RX W HCPCS: Performed by: INTERNAL MEDICINE

## 2024-05-16 PROCEDURE — 1100000000 HC RM PRIVATE

## 2024-05-16 PROCEDURE — 51701 INSERT BLADDER CATHETER: CPT

## 2024-05-16 PROCEDURE — 2580000003 HC RX 258: Performed by: INTERNAL MEDICINE

## 2024-05-16 PROCEDURE — 97530 THERAPEUTIC ACTIVITIES: CPT

## 2024-05-16 PROCEDURE — 51798 US URINE CAPACITY MEASURE: CPT

## 2024-05-16 RX ADMIN — SODIUM CHLORIDE, PRESERVATIVE FREE 10 ML: 5 INJECTION INTRAVENOUS at 21:40

## 2024-05-16 RX ADMIN — ENOXAPARIN SODIUM 40 MG: 100 INJECTION SUBCUTANEOUS at 08:33

## 2024-05-16 RX ADMIN — MEMANTINE 10 MG: 5 TABLET ORAL at 08:36

## 2024-05-16 RX ADMIN — PHENAZOPYRIDINE HYDROCHLORIDE 95 MG: 95 TABLET ORAL at 11:33

## 2024-05-16 RX ADMIN — TAMSULOSIN HYDROCHLORIDE 0.4 MG: 0.4 CAPSULE ORAL at 08:36

## 2024-05-16 RX ADMIN — MIDODRINE HYDROCHLORIDE 2.5 MG: 5 TABLET ORAL at 17:38

## 2024-05-16 RX ADMIN — PHENAZOPYRIDINE HYDROCHLORIDE 95 MG: 95 TABLET ORAL at 08:37

## 2024-05-16 RX ADMIN — ASPIRIN 81 MG: 81 TABLET, COATED ORAL at 08:36

## 2024-05-16 RX ADMIN — MIDODRINE HYDROCHLORIDE 2.5 MG: 5 TABLET ORAL at 08:36

## 2024-05-16 RX ADMIN — SODIUM CHLORIDE, PRESERVATIVE FREE 5 ML: 5 INJECTION INTRAVENOUS at 08:38

## 2024-05-16 RX ADMIN — PHENAZOPYRIDINE HYDROCHLORIDE 95 MG: 95 TABLET ORAL at 17:38

## 2024-05-16 RX ADMIN — MIDODRINE HYDROCHLORIDE 2.5 MG: 5 TABLET ORAL at 11:32

## 2024-05-16 NOTE — PROGRESS NOTES
Pt noted to have 644ml of urine on bladder scan. Notified on call, Dr. VALDEZ Maddox. Orders received for straight cath. 600 ml of urine obtained.

## 2024-05-16 NOTE — PROGRESS NOTES
ACUTE PHYSICAL THERAPY GOALS:   (Developed with and agreed upon by patient and/or caregiver.)  LTG:  (1.)Ms. Seals will move from supine to sit and sit to supine , scoot up and down, and roll side to side in bed with CONTACT GUARD ASSIST within 7 treatment day(s).    (2.)Ms. Seals will transfer from bed to chair and chair to bed with CONTACT GUARD ASSIST using the least restrictive device within 7 treatment day(s).    (3.)Ms. Seals will ambulate with CONTACT GUARD ASSIST for 150 feet with the least restrictive device within 7 treatment day(s).  (4.)Ms. Seals will tolerate at least 23 min of dynamic standing activity to assist standing ADLs with the least restrictive device within 7 treatment days.    PHYSICAL THERAPY: Daily Note AM   (Link to Caseload Tracking: PT Visit Days : 4  Time In/Out PT Charge Capture  Rehab Caseload Tracker  Orders    Angelique Seals is a 88 y.o. female   PRIMARY DIAGNOSIS: Hypothermia associated with environmental change  Acute cystitis with hematuria [N30.01]  Hypothermia, initial encounter [T68.XXXA]  Acute metabolic encephalopathy [G93.41]  Hypothermia associated with environmental change [T68.XXXA]  Dementia with other behavioral disturbance, unspecified dementia severity, unspecified dementia type (HCC) [F03.918]       Inpatient: Payor: JIM [employer]ICARE / Plan: JIM MEDICARE-ADVANTAGE PPO / Product Type: Medicare /     ASSESSMENT:     REHAB RECOMMENDATIONS:   Recommendation to date pending progress:  Setting:  Short-term Rehab    Equipment:    To Be Determined     ASSESSMENT:  Ms. Seals is supine on arrival, agreeable to mobility. Pt with CGA/MIN A bed mobility, additional time for activity. Pt donned B socks/tennis shoes independently with extra time and some verbal cues. Pt with improved transfers and ambulation using RW today. Pt with overall ALICIA in hallway, cues for walker safety, posture. Pt returned to room, requesting to use restroom, independent in toileting needs, MIN A

## 2024-05-16 NOTE — PROGRESS NOTES
Hospitalist Progress Note   Admit Date:  2024 10:21 AM   Name:  Angelique Seals   Age:  88 y.o.  Sex:  female  :  1935   MRN:  [id number]   Room:  [facility]/    Presenting/Chief Complaint: Cold Exposure     Reason(s) for Admission: Acute cystitis with hematuria [N30.01]  Hypothermia, initial encounter [T68.XXXA]  Acute metabolic encephalopathy [G93.41]  Hypothermia associated with environmental change [T68.XXXA]  Dementia with other behavioral disturbance, unspecified dementia severity, unspecified dementia type (HCC) [F03.918]     Hospital Course:   Angelique Seals is a 88 y.o. female with medical history of dementia, A-fib status post Watchman device, who presented with hypothermia.  She has a caretaker that comes in every day and caretaker found her on the floor  lying in water from a busted water pipe in the laundry room.  She was more confused than usual.  She was brought to the ER for evaluation where workup was fairly unremarkable except for hypothermia and an abnormal UA.       Patient admitted for hypothermia.  Started on Bear hugger and hypothermia resolved.  Started on empiric antibiotic for possible UTI.  Urine culture negative.  Patient has completed antibiotic course.     Patient also complaining of hip pain, imaging negative for any acute pathology.  Also noted with urinary retention and Fernando catheter placed. Failed voiding trial on  and fernando replaced. Pt started on tamsulosin and urology referral given for outpatient follow-up.     Orthostatic vitals positive.  Patient became symptomatic with positive vitals.  Antihypertensive held.  Patient started on midodrine and MARIA TERESA hose.  Gradual improvement in symptoms.  Orthostatic precautions discussed.    Subjective & 24hr Events:   Patient is seen and examined at the bedside.  No acute events overnight.  She denies any complaints.    Pending bed availability at rehab facility    Assessment & Plan:       Orthostatic hypotension,

## 2024-05-17 VITALS
TEMPERATURE: 97.5 F | DIASTOLIC BLOOD PRESSURE: 89 MMHG | HEART RATE: 69 BPM | OXYGEN SATURATION: 98 % | RESPIRATION RATE: 18 BRPM | SYSTOLIC BLOOD PRESSURE: 135 MMHG | WEIGHT: 190.3 LBS | BODY MASS INDEX: 29.81 KG/M2

## 2024-05-17 LAB
SARS-COV-2 RDRP RESP QL NAA+PROBE: NOT DETECTED
SOURCE: NORMAL

## 2024-05-17 PROCEDURE — 51798 US URINE CAPACITY MEASURE: CPT

## 2024-05-17 PROCEDURE — 6370000000 HC RX 637 (ALT 250 FOR IP): Performed by: FAMILY MEDICINE

## 2024-05-17 PROCEDURE — 97530 THERAPEUTIC ACTIVITIES: CPT

## 2024-05-17 PROCEDURE — 87635 SARS-COV-2 COVID-19 AMP PRB: CPT

## 2024-05-17 PROCEDURE — 51702 INSERT TEMP BLADDER CATH: CPT

## 2024-05-17 PROCEDURE — 6370000000 HC RX 637 (ALT 250 FOR IP): Performed by: INTERNAL MEDICINE

## 2024-05-17 PROCEDURE — 2580000003 HC RX 258: Performed by: INTERNAL MEDICINE

## 2024-05-17 PROCEDURE — 6360000002 HC RX W HCPCS: Performed by: INTERNAL MEDICINE

## 2024-05-17 PROCEDURE — 97116 GAIT TRAINING THERAPY: CPT

## 2024-05-17 RX ADMIN — PHENAZOPYRIDINE HYDROCHLORIDE 95 MG: 95 TABLET ORAL at 10:31

## 2024-05-17 RX ADMIN — ENOXAPARIN SODIUM 40 MG: 100 INJECTION SUBCUTANEOUS at 10:30

## 2024-05-17 RX ADMIN — SODIUM CHLORIDE, PRESERVATIVE FREE 10 ML: 5 INJECTION INTRAVENOUS at 10:32

## 2024-05-17 RX ADMIN — TAMSULOSIN HYDROCHLORIDE 0.4 MG: 0.4 CAPSULE ORAL at 10:31

## 2024-05-17 RX ADMIN — MEMANTINE 10 MG: 5 TABLET ORAL at 10:31

## 2024-05-17 RX ADMIN — ACETAMINOPHEN 650 MG: 325 TABLET ORAL at 10:31

## 2024-05-17 RX ADMIN — ASPIRIN 81 MG: 81 TABLET, COATED ORAL at 10:31

## 2024-05-17 ASSESSMENT — PAIN SCALES - GENERAL: PAINLEVEL_OUTOF10: 2

## 2024-05-17 ASSESSMENT — PAIN DESCRIPTION - LOCATION: LOCATION: ABDOMEN

## 2024-05-17 ASSESSMENT — PAIN DESCRIPTION - DESCRIPTORS: DESCRIPTORS: ACHING

## 2024-05-17 NOTE — PROGRESS NOTES
ACUTE PHYSICAL THERAPY GOALS:   (Developed with and agreed upon by patient and/or caregiver.)  LTG:  (1.)Ms. Seals will move from supine to sit and sit to supine , scoot up and down, and roll side to side in bed with CONTACT GUARD ASSIST within 7 treatment day(s).    (2.)Ms. Seals will transfer from bed to chair and chair to bed with CONTACT GUARD ASSIST using the least restrictive device within 7 treatment day(s).    (3.)Ms. Seals will ambulate with CONTACT GUARD ASSIST for 150 feet with the least restrictive device within 7 treatment day(s).  (4.)Ms. Seals will tolerate at least 23 min of dynamic standing activity to assist standing ADLs with the least restrictive device within 7 treatment days.    PHYSICAL THERAPY: Daily Note AM   (Link to Caseload Tracking: PT Visit Days : 5  Time In/Out PT Charge Capture  Rehab Caseload Tracker  Orders    Angelique Seals is a 88 y.o. female   PRIMARY DIAGNOSIS: Hypothermia associated with environmental change  Acute cystitis with hematuria [N30.01]  Hypothermia, initial encounter [T68.XXXA]  Acute metabolic encephalopathy [G93.41]  Hypothermia associated with environmental change [T68.XXXA]  Dementia with other behavioral disturbance, unspecified dementia severity, unspecified dementia type (HCC) [F03.918]       Inpatient: Payor: JIM MEDICARE / Plan: [employer] MEDICARE-ADVANTAGE PPO / Product Type: Medicare /     ASSESSMENT:     REHAB RECOMMENDATIONS:   Recommendation to date pending progress:  Setting:  Short-term Rehab    Equipment:    To Be Determined     ASSESSMENT:  Ms. Seals found reclined in bed and agrees to physical therapy intervention. Pt required CG A with bed mobility, sit to standing from the bed with SB A/ CG A with cues for sequencing with repetition and additional time to complete the task. Pt participated with standing exercises using the walker ( marching in place 2 x 10 reps ), gait with rolling walker 220 feet with constant cues to stay inside the  Program;Transfer Training  Education Method: Verbal;Demonstration  Education Outcome:  (continued education is necessary)    TIME IN/OUT:  Time In: 0908  Time Out: 0935  Minutes: 27    Michelle Kong, PT

## 2024-05-17 NOTE — PROGRESS NOTES
Patient discharged to [facility]  all lines removed  discharge instructions/medications/upcoming appointments for continuation of care reviewed with pt verbalizing understanding (will need reinforcement)    Report given to Lea OSORIO all questions answered     PU ETA   [id number]

## 2024-05-17 NOTE — PLAN OF CARE
Problem: Discharge Planning  Goal: Discharge to home or other facility with appropriate resources  Outcome: Progressing  Flowsheets (Taken 5/17/2024 4411)  Discharge to home or other facility with appropriate resources:   Identify barriers to discharge with patient and caregiver   Arrange for needed discharge resources and transportation as appropriate   Identify discharge learning needs (meds, wound care, etc)   Refer to discharge planning if patient needs post-hospital services based on physician order or complex needs related to functional status, cognitive ability or social support system     Problem: Skin/Tissue Integrity  Goal: Absence of new skin breakdown  Description: 1.  Monitor for areas of redness and/or skin breakdown  2.  Assess vascular access sites hourly  3.  Every 4-6 hours minimum:  Change oxygen saturation probe site  4.  Every 4-6 hours:  If on nasal continuous positive airway pressure, respiratory therapy assess nares and determine need for appliance change or resting period.  Outcome: Progressing     Problem: Safety - Adult  Goal: Free from fall injury  Outcome: Progressing     Problem: Pain  Goal: Verbalizes/displays adequate comfort level or baseline comfort level  Outcome: Progressing     Problem: Safety - Adult  Goal: Free from fall injury  Outcome: Progressing     Problem: ABCDS Injury Assessment  Goal: Absence of physical injury  Outcome: Progressing

## 2024-05-17 NOTE — CARE COORDINATION
MSN, CM:  patient continues to wait on Auth for [facility].  Case Management will continue to follow.  
MSN, CM:  patient has been accepted to [facility].  Awaiting insurance auth which was started Friday.  Case Management will continue to follow.  
MSN, CM:  patient to be discharged to [facility] today for rehab services.  Patient and family agree with this discharge plan. Patient has met all milestones for this admission.  Spoke with patient's daughter and she agrees with discharge plan.  [facility] to transport patient to facility.       05/17/24 1122   Service Assessment   Patient Orientation Alert and Oriented   Cognition Alert   Services At/After Discharge   Transition of Care Consult (CM Consult) SNF  ([facility])   Partner SNF Yes   Services At/After Discharge Skilled Nursing Facility (SNF)   Mode of Transport at Discharge BLS  ([facility])   Hospital Transport Time of Discharge 1330   Confirm Follow Up Transport Family   Condition of Participation: Discharge Planning   The Plan for Transition of Care is related to the following treatment goals: Short term rehab   The Patient and/or Patient Representative was provided with a Choice of Provider? Patient;Patient Representative   Name of the Patient Representative who was provided with the Choice of Provider and agrees with the Discharge Plan?  Daughter   The Patient and/Or Patient Representative agree with the Discharge Plan? Yes   Freedom of Choice list was provided with basic dialogue that supports the patient's individualized plan of care/goals, treatment preferences, and shares the quality data associated with the providers?  Yes       
MSN, CM:  patient was admitted for fall and hypothermia.  Patient has dementia but is able to be self sufficient at this time.  Patient also has an UTI with IV abx started.  PT/OT recommended SNF in which patient's daughter chose [facility].  Referrals sent, awaiting approval to either facility.  Case Management will continue to follow.  
MSN, CM:  patient/daughter requested [facility] to be first choice in which accepted patient.  Auth started for possible discharge Monday.  Case Management will continue to follow.  
Patient lives at home alone with dx of dementia.  Last night a pipe burst and patient sat in water until found by caregiver this morning.  Caregiver comes 9a-12p 5 days a weeks.  Family members alternate visits in the evening and provide diner.     Family is contemplating options which may include moving in with a family member or in an assisted living arrangement.  They are provided with information to contact Tess Dowell at [facility] authority [phone] to see what options may be available for them.    They are also considering increasing caregivers and are provided with a packet of private pay caregivers and information to [facility] to see if they have any pablo money to pay to help cover home services.      Patient will likely go home with a daughter to [place] at discharge and then will return to the area at a later date.  At that time family can reach out to PCP for consideration of  services if needed.   05/09/24 1231   Service Assessment   Patient Orientation Self   Cognition Dementia / Early Alzheimer's   History Provided By Child/Family;Friend   Primary Caregiver Private caregiver   Accompanied By/Relationship daughter and caregiver   Support Systems Children;Family Members;Friends/Neighbors;Home Care Staff   Patient's Healthcare Decision Maker is: Legal Next of Kin   PCP Verified by CM Yes   Last Visit to PCP Within last 6 months   Prior Functional Level Assistance with the following:;Cooking;Housework;Shopping;Mobility   Current Functional Level Mobility;Shopping;Housework;Cooking   Can patient return to prior living arrangement No   Ability to make needs known: Unable   Family able to assist with home care needs: Yes   Would you like for me to discuss the discharge plan with any other family members/significant others, and if so, who? Yes  (children)   Financial Resources Medicare   Community Resources None   CM/SW Referral Activity/Exercise   Social/Functional History   Lives With 
gait, locomotion, and balance/aerobic capacity/endurance/muscle strength/posture

## 2024-05-17 NOTE — DISCHARGE SUMMARY
Hospitalist Discharge Summary   Admit Date:  2024 10:21 AM   DC Note date: 2024  Name:  Angelique Seals   Age:  88 y.o.  Sex:  female  :  1935   MRN:  [id number]   Room:  [facility]  PCP:  Antonio Godfrey Jr., MD    Presenting Complaint: Cold Exposure     Initial Admission Diagnosis: Acute cystitis with hematuria [N30.01]  Hypothermia, initial encounter [T68.XXXA]  Acute metabolic encephalopathy [G93.41]  Hypothermia associated with environmental change [T68.XXXA]  Dementia with other behavioral disturbance, unspecified dementia severity, unspecified dementia type (HCC) [F03.918]     Problem List for this Hospitalization (present on admission):    Principal Problem:    Hypothermia associated with environmental change  Active Problems:    Permanent atrial fibrillation (HCC)    Presence of Watchman left atrial appendage closure device    Moderate dementia (HCC)    UTI (urinary tract infection)    Orthostatic hypotension  Resolved Problems:    * No resolved hospital problems. *      Hospital Course:  Angelique Seals is a 88 y.o. female with medical history of dementia, A-fib status post Watchman device, who presented with hypothermia.  She has a caretaker that comes in every day and caretaker found her on the floor  lying in water from a busted water pipe in the laundry room.  She was more confused than usual.  She was brought to the ER for evaluation where workup was fairly unremarkable except for hypothermia and an abnormal UA.       Patient admitted for hypothermia.  Started on Mile hugger and hypothermia resolved.  Started on empiric antibiotic for possible UTI.  Urine culture negative.  Patient has completed antibiotic course.     Patient also complaining of hip pain, imaging negative for any acute pathology.  Also noted with urinary retention and Fernando catheter placed. Failed voiding trial on  and fernando replaced. Pt started on tamsulosin and urology referral given for outpatient follow-up.    217 02/22/2024 09:11 AM    HDL 89 02/22/2024 09:11 AM    CHOLHDLRATIO 2.4 02/22/2024 09:11 AM    TRIG 122 02/22/2024 09:11 AM      Thyroid  Lab Results   Component Value Date/Time    RTZ9YOG 2.380 02/22/2024 09:11 AM        Most Recent UA Lab Results   Component Value Date/Time    COLORU DARK YELLOW 02/10/2024 11:11 AM    APPEARANCE CLEAR 02/10/2024 11:11 AM    PROTEINU Negative 02/10/2024 11:11 AM    GLUCOSEU Negative 05/09/2024 01:19 PM    GLUCOSEU Negative 02/10/2024 11:11 AM    KETUA Negative 02/10/2024 11:11 AM    BILIRUBINUR Negative 02/10/2024 11:11 AM    BILIRUBINUR Negative 02/07/2022 10:19 AM    BLOODU Trace Hgb 05/09/2024 01:19 PM    BLOODU Negative 02/10/2024 11:11 AM    UROBILINOGEN 0.2 02/10/2024 11:11 AM    NITRU Positive 02/10/2024 11:11 AM    LEUKOCYTESUR Negative 02/10/2024 11:11 AM    WBCUA 20-50 05/09/2024 01:30 PM    WBCUA SMALL 05/09/2024 01:19 PM    RBCUA 5-10 05/09/2024 01:30 PM    BACTERIA 1+ 05/09/2024 01:30 PM    LABCAST 0-2 05/09/2024 01:30 PM    MUCUS 0 02/10/2024 11:11 AM        Microbiology:  Results       Procedure Component Value Units Date/Time    Culture, Urine [[id number]]     Order Status: Canceled Specimen: Urine, clean catch     Culture, Urine [[id number]] Collected: 05/09/24 1322    Order Status: Completed Specimen: Urine, clean catch Updated: 05/11/24 1226     Special Requests NO SPECIAL REQUESTS        Culture       >100,000 COLONIES/mL MIXED SKIN ANGELINA ISOLATED                  THREE OR MORE TYPES OF ORGANISMS ARE PRESENT.  THIS IS INDICATIVE OF CONTAMINATION DUE TO IMPROPER COLLECTION TECHNIQUE.  PLEASE REPEAT COLLECTION UNLESS PATIENT HAS STARTED ANTIBIOTIC TREATMENT.                  All Labs from Last 24 Hrs:  No results found for this or any previous visit (from the past 24 hour(s)).    No results for input(s): \"COVID19\" in the last 72 hours.    Recent Vital Data:  Patient Vitals for the past 24 hrs:   Temp Pulse Resp BP SpO2   05/17/24 0730 97.5 °F (36.4 °C) 68 16 (!)

## 2024-05-17 NOTE — PROGRESS NOTES
Pt noted to have 561 ml in bladder scan. Notified on-call, Dr. VALDEZ Maddox. Ok given to place fernando catheter.

## 2024-05-20 ENCOUNTER — CARE COORDINATION (OUTPATIENT)
Dept: CARE COORDINATION | Facility: CLINIC | Age: 89
End: 2024-05-20

## 2024-05-20 NOTE — CARE COORDINATION
Transition of care outreach postponed due to patient's discharge to [facility] for short term rehab.

## 2024-05-23 ENCOUNTER — OFFICE VISIT (OUTPATIENT)
Dept: UROLOGY | Age: 89
End: 2024-05-23
Payer: MEDICARE

## 2024-05-23 DIAGNOSIS — R33.9 URINARY RETENTION: ICD-10-CM

## 2024-05-23 DIAGNOSIS — N39.0 URINARY TRACT INFECTION WITHOUT HEMATURIA, SITE UNSPECIFIED: Primary | ICD-10-CM

## 2024-05-23 PROCEDURE — 1123F ACP DISCUSS/DSCN MKR DOCD: CPT | Performed by: NURSE PRACTITIONER

## 2024-05-23 PROCEDURE — 99204 OFFICE O/P NEW MOD 45 MIN: CPT | Performed by: NURSE PRACTITIONER

## 2024-05-23 RX ORDER — DOXYCYCLINE HYCLATE 100 MG
100 TABLET ORAL 2 TIMES DAILY
COMMUNITY

## 2024-05-23 RX ORDER — MAGNESIUM 200 MG
200 TABLET ORAL DAILY
COMMUNITY

## 2024-05-23 ASSESSMENT — ENCOUNTER SYMPTOMS
CONSTIPATION: 0
NAUSEA: 0
VOMITING: 0
EYE PAIN: 0
EYE DISCHARGE: 0
SHORTNESS OF BREATH: 0
ABDOMINAL PAIN: 0
HEARTBURN: 0
COUGH: 0
INDIGESTION: 0
BACK PAIN: 0
DIARRHEA: 0
SKIN LESIONS: 0
BLOOD IN STOOL: 0
WHEEZING: 0

## 2024-05-23 NOTE — PROGRESS NOTES
[facility]  [address] [place] [id number]  [phone]          Angelique Seals  : 1935    Chief Complaint   Patient presents with    New Patient    Urinary Retention          HPI     Angelique Seals is a 88 y.o. female    Here today for hospital follow-up and catheter removal.  Patient was actually admitted to the hospital secondary to a fall.  Patient was found on the floor in the laundry room where there was a busted water pipe.  She was found to be hypothermic.  EMS was called and she was transported to the hospital.  Patient was more confused than usual per her caregiver.  Her ER workup overall was fairly unremarkable except for the hypothermia and she did have an abnormal urine.    Urine culture in the ER came back negative.  She had already been started on an antibiotic while the culture grew.  She completed that course of antibiotic therapy.  She also was complaining of some hip discomfort but all of those images were negative.  It was found that she was not completely into the bladder.  A Sanchez catheter was placed and she was given tamsulosin.  It was recommended that she follow-up here in the outpatient setting for catheter removal.    She presents today alone.  She does not have any family members or anyone from rehab with her.  She is anxious however to have the catheter out.  She tells me that prior to hospitalization that she is voiding sufficiently.  She would occasionally have urge incontinence.      Past Medical History:   Diagnosis Date    Anticoagulant long-term use     Arrhythmia     Atrial fibrillation (HCC)     Chronic back pain Many years ago    H/O complete eye exam Yearly    [facility]    Headache     HLD (hyperlipidemia)     Hypertension     controlled with medication     Past Surgical History:   Procedure Laterality Date    APPENDECTOMY      CATARACT REMOVAL Bilateral     EP DEVICE PROCEDURE N/A 3/14/2023    WATCHMAN AGNES CLOSURE DEVICE

## 2024-06-04 ENCOUNTER — OFFICE VISIT (OUTPATIENT)
Dept: FAMILY MEDICINE CLINIC | Facility: CLINIC | Age: 89
End: 2024-06-04
Payer: MEDICARE

## 2024-06-04 VITALS
OXYGEN SATURATION: 94 % | SYSTOLIC BLOOD PRESSURE: 132 MMHG | DIASTOLIC BLOOD PRESSURE: 80 MMHG | TEMPERATURE: 97.2 F | BODY MASS INDEX: 30.23 KG/M2 | HEART RATE: 76 BPM | WEIGHT: 193 LBS

## 2024-06-04 DIAGNOSIS — G30.1 MILD LATE ONSET ALZHEIMER'S DEMENTIA WITHOUT BEHAVIORAL DISTURBANCE, PSYCHOTIC DISTURBANCE, MOOD DISTURBANCE, OR ANXIETY (HCC): ICD-10-CM

## 2024-06-04 DIAGNOSIS — F02.A0 MILD LATE ONSET ALZHEIMER'S DEMENTIA WITHOUT BEHAVIORAL DISTURBANCE, PSYCHOTIC DISTURBANCE, MOOD DISTURBANCE, OR ANXIETY (HCC): ICD-10-CM

## 2024-06-04 DIAGNOSIS — Z09 HOSPITAL DISCHARGE FOLLOW-UP: Primary | ICD-10-CM

## 2024-06-04 DIAGNOSIS — I48.0 PAROXYSMAL ATRIAL FIBRILLATION (HCC): ICD-10-CM

## 2024-06-04 DIAGNOSIS — I10 PRIMARY HYPERTENSION: ICD-10-CM

## 2024-06-04 PROCEDURE — 99213 OFFICE O/P EST LOW 20 MIN: CPT | Performed by: FAMILY MEDICINE

## 2024-06-04 PROCEDURE — 1111F DSCHRG MED/CURRENT MED MERGE: CPT | Performed by: FAMILY MEDICINE

## 2024-06-04 PROCEDURE — 1123F ACP DISCUSS/DSCN MKR DOCD: CPT | Performed by: FAMILY MEDICINE

## 2024-06-04 ASSESSMENT — ENCOUNTER SYMPTOMS
GASTROINTESTINAL NEGATIVE: 1
RESPIRATORY NEGATIVE: 1
EYES NEGATIVE: 1

## 2024-06-04 NOTE — PROGRESS NOTES
Date    Anticoagulant long-term use     Arrhythmia     Atrial fibrillation (HCC)     Chronic back pain Many years ago    H/O complete eye exam Yearly    [facility]    Headache     HLD (hyperlipidemia)     Hypertension     controlled with medication       Review of Systems   Constitutional: Negative.    HENT: Negative.     Eyes: Negative.    Respiratory: Negative.     Cardiovascular: Negative.    Gastrointestinal: Negative.    Genitourinary: Negative.    Musculoskeletal: Negative.    Neurological: Negative.    Psychiatric/Behavioral: Negative.        Blood pressure 132/80, pulse 76, temperature 97.2 °F (36.2 °C), weight 87.5 kg (193 lb), SpO2 94 %, not currently breastfeeding.    Physical Exam  Vitals and nursing note reviewed.   Constitutional:       Appearance: Normal appearance.   HENT:      Right Ear: Tympanic membrane normal.      Left Ear: Tympanic membrane normal.      Mouth/Throat:      Mouth: Mucous membranes are moist.      Pharynx: Oropharynx is clear.   Eyes:      Extraocular Movements: Extraocular movements intact.      Conjunctiva/sclera: Conjunctivae normal.      Pupils: Pupils are equal, round, and reactive to light.   Neck:      Vascular: No carotid bruit.   Cardiovascular:      Rate and Rhythm: Normal rate and regular rhythm.      Heart sounds: Normal heart sounds.   Pulmonary:      Breath sounds: Normal breath sounds.   Musculoskeletal:         General: No swelling.      Cervical back: Neck supple.   Lymphadenopathy:      Cervical: No cervical adenopathy.   Neurological:      Mental Status: Mental status is at baseline.      Cranial Nerves: No cranial nerve deficit.      Coordination: Coordination normal.      Gait: Gait normal.      Deep Tendon Reflexes: Reflexes normal.   Psychiatric:         Mood and Affect: Mood normal.          ASSESSMENT and PLAN    Angelique was seen today for follow-up from hospital.    Diagnoses and all orders for this visit:    Hospital discharge

## 2024-06-05 ENCOUNTER — TELEPHONE (OUTPATIENT)
Dept: FAMILY MEDICINE CLINIC | Facility: CLINIC | Age: 89
End: 2024-06-05

## 2024-06-05 ENCOUNTER — CARE COORDINATION (OUTPATIENT)
Dept: CARE COORDINATION | Facility: CLINIC | Age: 89
End: 2024-06-05

## 2024-06-05 NOTE — CARE COORDINATION
Care Transitions Post-Acute Facility Update Call    2024    Patient: Angelique Seals Patient : 1935   MRN: [id number]  Reason for Admission: Hypothermia Environmental changes  Discharge Date: 24 RARS: Readmission Risk Score: 10.8    According to Psychiatric PCP notes patient is residing at the [facility]. CTN notified , will follow up with HERIBERTO call.     Care Transitions Post Acute Facility Update    Care Transitions Interventions      Post Acute Facility Update         Nursing       Rehab/Functional       SW/Discharge Planning

## 2024-06-05 NOTE — TELEPHONE ENCOUNTER
Who's Calling:  From Where: [facility]     Message: need clarification on how many grams of voltaran gel pt is to apply    Phone #: [phone]  Fax #:

## 2024-06-08 PROBLEM — N39.0 UTI (URINARY TRACT INFECTION): Status: RESOLVED | Noted: 2024-05-09 | Resolved: 2024-06-08

## 2024-06-10 ENCOUNTER — CARE COORDINATION (OUTPATIENT)
Dept: CARE COORDINATION | Facility: CLINIC | Age: 89
End: 2024-06-10

## 2024-06-10 NOTE — CARE COORDINATION
Patient d/c from SNF to [facility]. Patient with recent PCP visit since d/c. No HERIBERTO call planned at this time. Patient now resides at [facility].

## 2024-06-13 ENCOUNTER — APPOINTMENT (OUTPATIENT)
Dept: GENERAL RADIOLOGY | Age: 89
End: 2024-06-13
Payer: MEDICARE

## 2024-06-13 ENCOUNTER — HOSPITAL ENCOUNTER (EMERGENCY)
Age: 89
Discharge: HOME | End: 2024-06-13
Attending: STUDENT IN AN ORGANIZED HEALTH CARE EDUCATION/TRAINING PROGRAM
Payer: MEDICARE

## 2024-06-13 ENCOUNTER — APPOINTMENT (OUTPATIENT)
Dept: CT IMAGING | Age: 89
End: 2024-06-13
Payer: MEDICARE

## 2024-06-13 VITALS
DIASTOLIC BLOOD PRESSURE: 101 MMHG | HEIGHT: 67 IN | SYSTOLIC BLOOD PRESSURE: 157 MMHG | HEART RATE: 70 BPM | BODY MASS INDEX: 23.86 KG/M2 | RESPIRATION RATE: 10 BRPM | TEMPERATURE: 98.3 F | WEIGHT: 152 LBS | OXYGEN SATURATION: 94 %

## 2024-06-13 DIAGNOSIS — K59.00 CONSTIPATION, UNSPECIFIED CONSTIPATION TYPE: ICD-10-CM

## 2024-06-13 DIAGNOSIS — R33.9 URINARY RETENTION: Primary | ICD-10-CM

## 2024-06-13 DIAGNOSIS — F03.90 DEMENTIA WITHOUT BEHAVIORAL DISTURBANCE, PSYCHOTIC DISTURBANCE, MOOD DISTURBANCE, OR ANXIETY, UNSPECIFIED DEMENTIA SEVERITY, UNSPECIFIED DEMENTIA TYPE (HCC): ICD-10-CM

## 2024-06-13 DIAGNOSIS — R06.02 SHORTNESS OF BREATH: ICD-10-CM

## 2024-06-13 LAB
ALBUMIN SERPL-MCNC: 3.6 G/DL (ref 3.2–4.6)
ALBUMIN/GLOB SERPL: 1 (ref 1–1.9)
ALT SERPL-CCNC: 18 U/L (ref 12–65)
ANION GAP SERPL CALC-SCNC: 11 MMOL/L (ref 9–18)
AST SERPL-CCNC: 30 U/L (ref 15–37)
BASOPHILS # BLD: 0.1 K/UL (ref 0–0.2)
BASOPHILS NFR BLD: 1 % (ref 0–2)
BILIRUB SERPL-MCNC: 0.5 MG/DL (ref 0–1.2)
BILIRUB UR QL: NEGATIVE
BUN SERPL-MCNC: 20 MG/DL (ref 8–23)
CALCIUM SERPL-MCNC: 9.8 MG/DL (ref 8.8–10.2)
CHLORIDE SERPL-SCNC: 109 MMOL/L (ref 98–107)
CO2 SERPL-SCNC: 23 MMOL/L (ref 20–28)
CREAT SERPL-MCNC: 0.8 MG/DL (ref 0.6–1.1)
DIFFERENTIAL METHOD BLD: NORMAL
EKG ATRIAL RATE: 160 BPM
EKG DIAGNOSIS: NORMAL
EKG Q-T INTERVAL: 388 MS
EKG QRS DURATION: 138 MS
EKG QTC CALCULATION (BAZETT): 511 MS
EKG R AXIS: -59 DEGREES
EKG T AXIS: 58 DEGREES
EKG VENTRICULAR RATE: 104 BPM
EOSINOPHIL # BLD: 0.4 K/UL (ref 0–0.8)
EOSINOPHIL NFR BLD: 7 % (ref 0.5–7.8)
ERYTHROCYTE [DISTWIDTH] IN BLOOD BY AUTOMATED COUNT: 14.6 % (ref 11.9–14.6)
GLOBULIN SER CALC-MCNC: 3.4 G/DL (ref 2.3–3.5)
GLUCOSE BLD STRIP.AUTO-MCNC: 80 MG/DL (ref 65–100)
GLUCOSE SERPL-MCNC: 87 MG/DL (ref 70–99)
GLUCOSE UR QL STRIP.AUTO: NEGATIVE MG/DL
HCT VFR BLD AUTO: 41.4 % (ref 35.8–46.3)
HGB BLD-MCNC: 13.5 G/DL (ref 11.7–15.4)
IMM GRANULOCYTES # BLD AUTO: 0 K/UL (ref 0–0.5)
IMM GRANULOCYTES NFR BLD AUTO: 0 % (ref 0–5)
INR BLD: 1 (ref 0.9–1.2)
KETONES UR-MCNC: NEGATIVE MG/DL
LEUKOCYTE ESTERASE UR QL STRIP: NEGATIVE
LIPASE SERPL-CCNC: 43 U/L (ref 13–60)
LYMPHOCYTES # BLD: 1.4 K/UL (ref 0.5–4.6)
LYMPHOCYTES NFR BLD: 21 % (ref 13–44)
MCH RBC QN AUTO: 30.3 PG (ref 26.1–32.9)
MCHC RBC AUTO-ENTMCNC: 32.6 G/DL (ref 31.4–35)
MCV RBC AUTO: 93 FL (ref 82–102)
MONOCYTES # BLD: 0.5 K/UL (ref 0.1–1.3)
MONOCYTES NFR BLD: 8 % (ref 4–12)
NEUTS SEG # BLD: 4.2 K/UL (ref 1.7–8.2)
NEUTS SEG NFR BLD: 63 % (ref 43–78)
NITRITE UR QL: NEGATIVE
NRBC # BLD: 0 K/UL (ref 0–0.2)
PH UR: 8.5 (ref 5–9)
PLATELET # BLD AUTO: 200 K/UL (ref 150–450)
PMV BLD AUTO: 10.2 FL (ref 9.4–12.3)
POTASSIUM SERPL-SCNC: 4.1 MMOL/L (ref 3.5–5.1)
PROT SERPL-MCNC: 7 G/DL (ref 6.3–8.2)
PROT UR QL: NEGATIVE MG/DL
PT BLD: 11.7 SECS (ref 9.6–11.6)
RBC # BLD AUTO: 4.45 M/UL (ref 4.05–5.2)
RBC # UR STRIP: NEGATIVE
SERVICE CMNT-IMP: NORMAL
SERVICE CMNT-IMP: NORMAL
SODIUM SERPL-SCNC: 143 MMOL/L (ref 136–145)
SP GR UR: 1.01 (ref 1–1.02)
TROPONIN T SERPL HS-MCNC: 39 NG/L (ref 0–14)
UROBILINOGEN UR QL: 0.2 EU/DL (ref 0.2–1)
WBC # BLD AUTO: 6.6 K/UL (ref 4.3–11.1)

## 2024-06-13 PROCEDURE — 81003 URINALYSIS AUTO W/O SCOPE: CPT

## 2024-06-13 PROCEDURE — 93010 ELECTROCARDIOGRAM REPORT: CPT | Performed by: INTERNAL MEDICINE

## 2024-06-13 PROCEDURE — 71046 X-RAY EXAM CHEST 2 VIEWS: CPT

## 2024-06-13 PROCEDURE — 70498 CT ANGIOGRAPHY NECK: CPT

## 2024-06-13 PROCEDURE — 83690 ASSAY OF LIPASE: CPT

## 2024-06-13 PROCEDURE — 80053 COMPREHEN METABOLIC PANEL: CPT

## 2024-06-13 PROCEDURE — 85025 COMPLETE CBC W/AUTO DIFF WBC: CPT

## 2024-06-13 PROCEDURE — 96374 THER/PROPH/DIAG INJ IV PUSH: CPT

## 2024-06-13 PROCEDURE — 85610 PROTHROMBIN TIME: CPT

## 2024-06-13 PROCEDURE — 99222 1ST HOSP IP/OBS MODERATE 55: CPT | Performed by: STUDENT IN AN ORGANIZED HEALTH CARE EDUCATION/TRAINING PROGRAM

## 2024-06-13 PROCEDURE — 82962 GLUCOSE BLOOD TEST: CPT

## 2024-06-13 PROCEDURE — 94762 N-INVAS EAR/PLS OXIMTRY CONT: CPT

## 2024-06-13 PROCEDURE — 6360000002 HC RX W HCPCS: Performed by: STUDENT IN AN ORGANIZED HEALTH CARE EDUCATION/TRAINING PROGRAM

## 2024-06-13 PROCEDURE — 84484 ASSAY OF TROPONIN QUANT: CPT

## 2024-06-13 PROCEDURE — 99285 EMERGENCY DEPT VISIT HI MDM: CPT

## 2024-06-13 PROCEDURE — 6360000004 HC RX CONTRAST MEDICATION: Performed by: STUDENT IN AN ORGANIZED HEALTH CARE EDUCATION/TRAINING PROGRAM

## 2024-06-13 PROCEDURE — 70450 CT HEAD/BRAIN W/O DYE: CPT

## 2024-06-13 PROCEDURE — 74018 RADEX ABDOMEN 1 VIEW: CPT

## 2024-06-13 PROCEDURE — 93005 ELECTROCARDIOGRAM TRACING: CPT | Performed by: STUDENT IN AN ORGANIZED HEALTH CARE EDUCATION/TRAINING PROGRAM

## 2024-06-13 RX ORDER — KETOROLAC TROMETHAMINE 15 MG/ML
15 INJECTION, SOLUTION INTRAMUSCULAR; INTRAVENOUS ONCE
Status: COMPLETED | OUTPATIENT
Start: 2024-06-13 | End: 2024-06-13

## 2024-06-13 RX ORDER — ALBUTEROL SULFATE 90 UG/1
2 AEROSOL, METERED RESPIRATORY (INHALATION) EVERY 6 HOURS PRN
Qty: 18 G | Refills: 3 | Status: SHIPPED | OUTPATIENT
Start: 2024-06-13

## 2024-06-13 RX ORDER — PHENAZOPYRIDINE HYDROCHLORIDE 95 MG/1
200 TABLET ORAL
Status: DISCONTINUED | OUTPATIENT
Start: 2024-06-13 | End: 2024-06-13

## 2024-06-13 RX ADMIN — KETOROLAC TROMETHAMINE 15 MG: 15 INJECTION, SOLUTION INTRAMUSCULAR; INTRAVENOUS at 13:48

## 2024-06-13 RX ADMIN — IOPAMIDOL 50 ML: 755 INJECTION, SOLUTION INTRAVENOUS at 11:28

## 2024-06-13 ASSESSMENT — PAIN DESCRIPTION - LOCATION: LOCATION: ABDOMEN

## 2024-06-13 ASSESSMENT — PAIN SCALES - GENERAL
PAINLEVEL_OUTOF10: 9
PAINLEVEL_OUTOF10: 9

## 2024-06-13 ASSESSMENT — PAIN DESCRIPTION - ORIENTATION: ORIENTATION: LOWER

## 2024-06-13 NOTE — ED NOTES
Pt ambulated with pulse ox and walker.  Pt did not need assistance and sated 95-98 for the entire time.     Doris Ardon RN  06/13/24 0499

## 2024-06-13 NOTE — DISCHARGE INSTRUCTIONS
CT imaging, x-ray and lab work appears stable.  Your workup today revealed significant urinary retention requiring the placement of a Sanchez catheter.  This catheter should remain in place and to follow-up with the urologist listed.  Return for worsening symptoms, concerns or questions    Try a double dose of MiraLAX today, and tomorrow    Take 2 puffs from the inhaler every 6 hours as needed for shortness of breath

## 2024-06-13 NOTE — CONSULTS
Neurology Consult Note       History:   88-year-old female with history of TBI, dementia, atrial fibrillation.  She presents from assisted living facility and is being seen for code S. per EMS report from assisted living facility, the patient was last known normal at 0700 AM this morning when she developed confusion and right-sided facial droop.  Symptoms resolved on my arrival.  The patient presented to [facility] ED.  A code S was called at 11:14 AM. Neurology arrived to the bedside at 11:20. Initial NIHSS was 0.       Exam: Pertinent positives and negatives include:  NIHSS Score: 0  1a-Level of Consciousness 0  1b-What is Month/Age 0  1c-Open/Close Eyes&Hand 0  2 -Best Gaze 0  3 -Visual Fields 0  4 -Facial Palsy 0  5a-Motor-Left Arm 0  5b-Motor-Right Arm 0  6a-Motor-Left Leg 0  6b-Motor-Right Leg 0  7 -Limb Ataxia 0  8 -Sensory 0  9 -Best Language 0  10-Dysarthria 0  11-Extinction/Inattention 0    Imaging and review of data:   CT head without acute pathology.  Significant diffuse atrophy.    CT angiogram head and neck without LVO or major stenosis on my initial read      Assessment:     88-year-old female seen as a code S with abrupt onset confusion and right facial droop. Initial NIHSS was 0. The patient was not a candidate for tenecteplase because she is outside the window and her NIH is now 0 . The patient was not a candidate for mechanical thrombectomy because of low NIH stroke scale and of no large vessel occlusion on CTA.    Transient alteration of awareness.  Higher suspicion for stroke mimic.  She states she thinks she may have a UTI, complains of burning with urination and suprapubic discomfort.  Recommend full medical workup at this time to investigate.  Focal aware seizures are another possibility, though she is currently at baseline with no prior reported convulsive activity.  No need for further stroke workup at this time.      Roberto Niño, DO  Neurology      I spent 30 minutes today with the patient,

## 2024-06-13 NOTE — ED NOTES
Patient mobility status  with mild difficulty. Provider aware     I have reviewed discharge instructions with the patient and caregiver.  The patient and caregiver verbalized understanding.    Patient left ED via Discharge Method: wheelchair to Home with Child.    Opportunity for questions and clarification provided.     Patient given 1 scripts.            Doris Ardon RN  06/13/24 [id number]

## 2024-06-13 NOTE — ED TRIAGE NOTES
Patient arrives to ED via EMS from [facility]. Patient has AMS and facial droop per staff. Staff reports patient was normal at breakfast and then around 7 patient was altered and had right sided facial droop. Patient also complains of chest pain that she has had \"for weeks\". Stroke alert called in route. MD Sd to evaluate patient when arriving to ED.     Denies blood thinners   am

## 2024-06-13 NOTE — ED PROVIDER NOTES
Care of patient assumed around 4:00, please see full history and physical by Dr. Roberto Beaver who attended to the patient primarily.    In short she was waiting for a KUB related to constipation.  KUB does look constipated.  I performed a digital rectal exam which is negative for fecal impaction.  This was concerned since she was exhibiting urinary retention.    Plans had already been made and discussed between Dr. Beaver and family for patient to go back to facility with Sanchez catheter.    At attempted time of discharge patient's sats seemed to drop with a good waveform.  She was placed on oxygen for a while, then taken off of oxygen for a while.  Her second room air ambulatory pulse ox trial resulted in sats in the mid 90s.  I will prescribe an inhaler for her to use 3-4 times a day as needed.     Sawyer Cardenas MD  06/13/24 9036    
  Findings/impression: No acute cardiopulmonary finding on this portable chest.   Cardiac pacing device in place.         Electronically signed by Jhonny Huff      CTA HEAD NECK W CONTRAST   Final Result   Major arteries in the neck and at [place] are patent without aneurysm      Electronically signed by Shreyas Esquivel      CT HEAD WO CONTRAST   Final Result   Age-related involutional change, stable compared to prior study. No   acute hemorrhage or mass effect.      Electronically signed by Andrew Rose         NIH Stroke Scale  Interval: Reassessment  Level of Consciousness (1a): Alert  LOC Questions (1b): Answers one correctly  LOC Commands (1c): Performs both tasks correctly  Best Gaze (2): Normal  Visual (3): No visual loss  Facial Palsy (4): Normal symmetrical movement  Motor Arm, Left (5a): No drift  Motor Arm, Right (5b): No drift  Motor Leg, Left (6a): No drift  Motor Leg, Right (6b): No drift  Limb Ataxia (7): Absent  Sensory (8): (!) Mild to Moderate  Best Language (9): Mild to moderate aphasia  Dysarthria (10): Normal  Extinction and Inattention (11): No abnormality  Total: 3         No results for input(s): \"COVID19\" in the last 72 hours.    Voice dictation software was used during the making of this note.  This software is not perfect and grammatical and other typographical errors may be present.  This note has not been completely proofread for errors.      Roberto Beaver, DO  06/14/24 0821

## 2024-06-15 ENCOUNTER — APPOINTMENT (OUTPATIENT)
Dept: CT IMAGING | Age: 89
End: 2024-06-15
Payer: MEDICARE

## 2024-06-15 ENCOUNTER — HOSPITAL ENCOUNTER (EMERGENCY)
Age: 89
Discharge: HOME | End: 2024-06-15
Attending: EMERGENCY MEDICINE
Payer: MEDICARE

## 2024-06-15 VITALS
OXYGEN SATURATION: 100 % | SYSTOLIC BLOOD PRESSURE: 169 MMHG | DIASTOLIC BLOOD PRESSURE: 100 MMHG | HEART RATE: 69 BPM | TEMPERATURE: 97.4 F | RESPIRATION RATE: 16 BRPM

## 2024-06-15 DIAGNOSIS — R10.30 LOWER ABDOMINAL PAIN: Primary | ICD-10-CM

## 2024-06-15 DIAGNOSIS — R33.9 URINARY RETENTION: ICD-10-CM

## 2024-06-15 DIAGNOSIS — N39.0 ACUTE UTI: ICD-10-CM

## 2024-06-15 LAB
ALBUMIN SERPL-MCNC: 4 G/DL (ref 3.2–4.6)
ALBUMIN/GLOB SERPL: 1.3 (ref 1–1.9)
ALP SERPL-CCNC: 81 U/L (ref 35–104)
ALT SERPL-CCNC: 18 U/L (ref 12–65)
ANION GAP SERPL CALC-SCNC: 13 MMOL/L (ref 9–18)
APPEARANCE UR: CLEAR
AST SERPL-CCNC: 25 U/L (ref 15–37)
BACTERIA URNS QL MICRO: ABNORMAL /HPF
BASOPHILS # BLD: 0 K/UL (ref 0–0.2)
BASOPHILS NFR BLD: 1 % (ref 0–2)
BILIRUB SERPL-MCNC: 0.4 MG/DL (ref 0–1.2)
BILIRUB UR QL: ABNORMAL
BUN SERPL-MCNC: 23 MG/DL (ref 8–23)
CALCIUM SERPL-MCNC: 9.8 MG/DL (ref 8.8–10.2)
CHLORIDE SERPL-SCNC: 108 MMOL/L (ref 98–107)
CO2 SERPL-SCNC: 21 MMOL/L (ref 20–28)
COLOR UR: ABNORMAL
CREAT SERPL-MCNC: 0.89 MG/DL (ref 0.6–1.1)
DIFFERENTIAL METHOD BLD: NORMAL
EOSINOPHIL # BLD: 0.4 K/UL (ref 0–0.8)
EOSINOPHIL NFR BLD: 6 % (ref 0.5–7.8)
EPI CELLS #/AREA URNS HPF: ABNORMAL /HPF
ERYTHROCYTE [DISTWIDTH] IN BLOOD BY AUTOMATED COUNT: 14.6 % (ref 11.9–14.6)
GLOBULIN SER CALC-MCNC: 3.1 G/DL (ref 2.3–3.5)
GLUCOSE SERPL-MCNC: 83 MG/DL (ref 70–99)
GLUCOSE UR STRIP.AUTO-MCNC: NEGATIVE MG/DL
HCT VFR BLD AUTO: 43.6 % (ref 35.8–46.3)
HGB BLD-MCNC: 14.5 G/DL (ref 11.7–15.4)
HGB UR QL STRIP: ABNORMAL
IMM GRANULOCYTES # BLD AUTO: 0 K/UL (ref 0–0.5)
IMM GRANULOCYTES NFR BLD AUTO: 0 % (ref 0–5)
INR PPP: 0.9
KETONES UR QL STRIP.AUTO: NEGATIVE MG/DL
LACTATE SERPL-SCNC: 1.6 MMOL/L (ref 0.5–2)
LEUKOCYTE ESTERASE UR QL STRIP.AUTO: ABNORMAL
LIPASE SERPL-CCNC: 52 U/L (ref 13–60)
LYMPHOCYTES # BLD: 1.9 K/UL (ref 0.5–4.6)
LYMPHOCYTES NFR BLD: 25 % (ref 13–44)
MCH RBC QN AUTO: 30.5 PG (ref 26.1–32.9)
MCHC RBC AUTO-ENTMCNC: 33.3 G/DL (ref 31.4–35)
MCV RBC AUTO: 91.6 FL (ref 82–102)
MONOCYTES # BLD: 0.6 K/UL (ref 0.1–1.3)
MONOCYTES NFR BLD: 8 % (ref 4–12)
NEUTS SEG # BLD: 4.4 K/UL (ref 1.7–8.2)
NEUTS SEG NFR BLD: 60 % (ref 43–78)
NITRITE UR QL STRIP.AUTO: POSITIVE
NRBC # BLD: 0 K/UL (ref 0–0.2)
PH UR STRIP: 7.5 (ref 5–9)
PLATELET # BLD AUTO: 245 K/UL (ref 150–450)
PMV BLD AUTO: 10.3 FL (ref 9.4–12.3)
POTASSIUM SERPL-SCNC: 4.1 MMOL/L (ref 3.5–5.1)
PROCALCITONIN SERPL-MCNC: 0.08 NG/ML (ref 0–0.1)
PROT SERPL-MCNC: 7.1 G/DL (ref 6.3–8.2)
PROT UR STRIP-MCNC: 30 MG/DL
PROTHROMBIN TIME: 12.7 SEC (ref 11.3–14.9)
RBC # BLD AUTO: 4.76 M/UL (ref 4.05–5.2)
RBC #/AREA URNS HPF: ABNORMAL /HPF
SODIUM SERPL-SCNC: 142 MMOL/L (ref 136–145)
SP GR UR REFRACTOMETRY: 1.01 (ref 1–1.02)
UROBILINOGEN UR QL STRIP.AUTO: 1 EU/DL (ref 0.2–1)
WBC # BLD AUTO: 7.3 K/UL (ref 4.3–11.1)
WBC URNS QL MICRO: ABNORMAL /HPF

## 2024-06-15 PROCEDURE — 81001 URINALYSIS AUTO W/SCOPE: CPT

## 2024-06-15 PROCEDURE — 6370000000 HC RX 637 (ALT 250 FOR IP): Performed by: NURSE PRACTITIONER

## 2024-06-15 PROCEDURE — 83605 ASSAY OF LACTIC ACID: CPT

## 2024-06-15 PROCEDURE — 80053 COMPREHEN METABOLIC PANEL: CPT

## 2024-06-15 PROCEDURE — 85025 COMPLETE CBC W/AUTO DIFF WBC: CPT

## 2024-06-15 PROCEDURE — 83690 ASSAY OF LIPASE: CPT

## 2024-06-15 PROCEDURE — 6360000002 HC RX W HCPCS: Performed by: NURSE PRACTITIONER

## 2024-06-15 PROCEDURE — 85610 PROTHROMBIN TIME: CPT

## 2024-06-15 PROCEDURE — 87040 BLOOD CULTURE FOR BACTERIA: CPT

## 2024-06-15 PROCEDURE — 74177 CT ABD & PELVIS W/CONTRAST: CPT

## 2024-06-15 PROCEDURE — 99285 EMERGENCY DEPT VISIT HI MDM: CPT

## 2024-06-15 PROCEDURE — 96374 THER/PROPH/DIAG INJ IV PUSH: CPT

## 2024-06-15 PROCEDURE — 6360000002 HC RX W HCPCS: Performed by: EMERGENCY MEDICINE

## 2024-06-15 PROCEDURE — 6360000004 HC RX CONTRAST MEDICATION: Performed by: NURSE PRACTITIONER

## 2024-06-15 PROCEDURE — 87086 URINE CULTURE/COLONY COUNT: CPT

## 2024-06-15 PROCEDURE — 96375 TX/PRO/DX INJ NEW DRUG ADDON: CPT

## 2024-06-15 PROCEDURE — 84145 PROCALCITONIN (PCT): CPT

## 2024-06-15 PROCEDURE — 2580000003 HC RX 258: Performed by: NURSE PRACTITIONER

## 2024-06-15 RX ORDER — TRAMADOL HYDROCHLORIDE 50 MG/1
50 TABLET ORAL EVERY 6 HOURS PRN
Qty: 12 TABLET | Refills: 0 | Status: SHIPPED | OUTPATIENT
Start: 2024-06-15 | End: 2024-06-15

## 2024-06-15 RX ORDER — PHENAZOPYRIDINE HYDROCHLORIDE 95 MG/1
95 TABLET ORAL
Status: COMPLETED | OUTPATIENT
Start: 2024-06-15 | End: 2024-06-15

## 2024-06-15 RX ORDER — PHENAZOPYRIDINE HYDROCHLORIDE 100 MG/1
100 TABLET, FILM COATED ORAL 3 TIMES DAILY PRN
Qty: 6 TABLET | Refills: 0 | Status: SHIPPED | OUTPATIENT
Start: 2024-06-15 | End: 2024-06-18

## 2024-06-15 RX ORDER — KETOROLAC TROMETHAMINE 15 MG/ML
15 INJECTION, SOLUTION INTRAMUSCULAR; INTRAVENOUS
Status: COMPLETED | OUTPATIENT
Start: 2024-06-15 | End: 2024-06-15

## 2024-06-15 RX ORDER — 0.9 % SODIUM CHLORIDE 0.9 %
1000 INTRAVENOUS SOLUTION INTRAVENOUS
Status: COMPLETED | OUTPATIENT
Start: 2024-06-15 | End: 2024-06-15

## 2024-06-15 RX ORDER — CEPHALEXIN 500 MG/1
500 CAPSULE ORAL 3 TIMES DAILY
Qty: 21 CAPSULE | Refills: 0 | Status: SHIPPED | OUTPATIENT
Start: 2024-06-15 | End: 2024-06-15

## 2024-06-15 RX ORDER — TRAMADOL HYDROCHLORIDE 50 MG/1
50 TABLET ORAL
Status: COMPLETED | OUTPATIENT
Start: 2024-06-15 | End: 2024-06-15

## 2024-06-15 RX ORDER — PHENAZOPYRIDINE HYDROCHLORIDE 100 MG/1
100 TABLET, FILM COATED ORAL 3 TIMES DAILY PRN
Qty: 6 TABLET | Refills: 0 | Status: SHIPPED | OUTPATIENT
Start: 2024-06-15 | End: 2024-06-15

## 2024-06-15 RX ORDER — TRAMADOL HYDROCHLORIDE 50 MG/1
50 TABLET ORAL EVERY 6 HOURS PRN
Qty: 12 TABLET | Refills: 0 | Status: SHIPPED | OUTPATIENT
Start: 2024-06-15 | End: 2024-06-18

## 2024-06-15 RX ORDER — CEPHALEXIN 500 MG/1
500 CAPSULE ORAL 3 TIMES DAILY
Qty: 21 CAPSULE | Refills: 0 | Status: SHIPPED | OUTPATIENT
Start: 2024-06-15 | End: 2024-06-22

## 2024-06-15 RX ORDER — ACETAMINOPHEN 500 MG
1000 TABLET ORAL
Status: COMPLETED | OUTPATIENT
Start: 2024-06-15 | End: 2024-06-15

## 2024-06-15 RX ADMIN — ACETAMINOPHEN 1000 MG: 500 TABLET, FILM COATED ORAL at 14:27

## 2024-06-15 RX ADMIN — PHENAZOPYRIDINE HYDROCHLORIDE 95 MG: 95 TABLET ORAL at 14:27

## 2024-06-15 RX ADMIN — KETOROLAC TROMETHAMINE 15 MG: 15 INJECTION, SOLUTION INTRAMUSCULAR; INTRAVENOUS at 12:22

## 2024-06-15 RX ADMIN — CEFTRIAXONE 1000 MG: 1 INJECTION, POWDER, FOR SOLUTION INTRAMUSCULAR; INTRAVENOUS at 13:54

## 2024-06-15 RX ADMIN — IOPAMIDOL 100 ML: 755 INJECTION, SOLUTION INTRAVENOUS at 12:33

## 2024-06-15 RX ADMIN — TRAMADOL HYDROCHLORIDE 50 MG: 50 TABLET ORAL at 15:17

## 2024-06-15 RX ADMIN — SODIUM CHLORIDE 1000 ML: 9 INJECTION, SOLUTION INTRAVENOUS at 12:22

## 2024-06-15 ASSESSMENT — ENCOUNTER SYMPTOMS
SHORTNESS OF BREATH: 0
ABDOMINAL PAIN: 1

## 2024-06-15 NOTE — ED NOTES
Patient mobility status  with no difficulty. Provider aware     I have reviewed discharge instructions with the patient.  The patient verbalized understanding.    Patient left ED via Discharge Method: ambulatory to Home with Spouse.    Opportunity for questions and clarification provided.     Patient given 3 scripts.           Jared Anaya RN  06/15/24 2566

## 2024-06-15 NOTE — ED TRIAGE NOTES
Pt arrives seated on wheelchair accompanied by daughter with c/o mid lower abdominal pain, onset beginning of the week. Daughter reports giving patient oxycodone approximately two hours ago for patient which daughter reports patient doesn't typically take.  Patient not answering all questions stating she is in pain.  Was recently evaluated for urinary retention and fernando catheter was placed. Currently on abx.

## 2024-06-15 NOTE — ED PROVIDER NOTES
Emergency Department Provider Note       PCP: Antonio Godfrey Jr., MD   Age: 88 y.o.   Sex: female     DISPOSITION Decision To Discharge 06/15/2024 03:06:16 PM       ICD-10-CM    1. Lower abdominal pain  R10.30 traMADol (ULTRAM) 50 MG tablet     DISCONTINUED: traMADol (ULTRAM) 50 MG tablet      2. Acute UTI  N39.0       3. Urinary retention  R33.9 [facility]          Medical Decision Making     88-year-old female brought into the emergency department today by her daughter for complaint of abdominal pain and altered mental status.  Patient appears uncomfortable but in no acute distress at this time.  She is able to answer some questions but does not answer all questions.  She has been diagnosed with dementia and is on Namenda.  She was also given her oxycodone today which I believe is likely contributing to the altered mental status as she does appear drowsy.  Appears to have no focal neurological deficits at this time as far as I can tell.  She was recently seen in the emergency department 2 days ago and was found to have urinary retention and had a Sanchez catheter placed.  She also did have a CT and CTA of the head that day which were both normal.  Will check labs with sepsis markers and CT of the abdomen today.  Will have Sanchez catheter exchanged and collect urine from newly inserted catheter. Case discussed with my attending, Dr. Gomez who will also see the patient.     Labs are unremarkable.  No leukocytosis.  No electrolyte derangement.  Lactic acid and procalcitonin are normal.  Urinalysis is concerning for UTI.  Bladder wall thickening noted on CT as well which would be consistent with UTI.  All results discussed with the patient and her daughter at this time.  Patient has continued to have some lower abdominal pain.  Suspect she is likely having bladder spasms related to the Sanchez catheter.  The daughter states the patient was staying in an assisted nursing facility  calcification. 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 No acute finding of the solid abdominal  organs. The gallbladder is unremarkable in appearance. Evaluation of the bowel  demonstrates no evidence of obstruction or inflammatory changes. Diverticulosis  without diverticulitis. No free fluid nor free air. There is a Sanchez catheter  within the urinary bladder. Although the urinary bladder is somewhat  decompressed there is likely disproportionate circumferential wall thickening.  Small cyst with calcifications of the right ovary. No adenopathy.      Impression    Degradation of the exam by motion artifact is somewhat limiting.  Sanchez catheter in place with likely superimposed wall thickening of the urinary  bladder. Correlate for cystitis.    Indeterminate cystic lesions of the spleen. If there are no prior exams to  document stability consider multiphase CT abdomen for further evaluation.    Small cyst with calcifications of the right ovary. Recommend follow-up pelvic  ultrasound.    Electronically signed by Jhonny Huff   CBC with Diff   Result Value Ref Range    WBC 7.3 4.3 - 11.1 K/uL    RBC 4.76 4.05 - 5.2 M/uL    Hemoglobin 14.5 11.7 - 15.4 g/dL    Hematocrit 43.6 35.8 - 46.3 %    MCV 91.6 82 - 102 FL    MCH 30.5 26.1 - 32.9 PG    MCHC 33.3 31.4 - 35.0 g/dL    RDW 14.6 11.9 - 14.6 %    Platelets 245 150 - 450 K/uL    MPV 10.3 9.4 - 12.3 FL    nRBC 0.00 0.0 - 0.2 K/uL    Differential Type AUTOMATED      Neutrophils % 60 43 - 78 %    Lymphocytes % 25 13 - 44 %    Monocytes % 8 4.0 - 12.0 %    Eosinophils % 6 0.5 - 7.8 %    Basophils % 1 0.0 - 2.0 %    Immature Granulocytes % 0 0.0 - 5.0 %    Neutrophils Absolute 4.4 1.7 - 8.2 K/UL    Lymphocytes Absolute 1.9 0.5 - 4.6 K/UL    Monocytes Absolute 0.6 0.1 - 1.3 K/UL    Eosinophils Absolute 0.4 0.0 - 0.8 K/UL    Basophils Absolute 0.0 0.0 - 0.2 K/UL    Immature Granulocytes Absolute 0.0 0.0 - 0.5 K/UL   CMP   Result Value Ref Range    Sodium 142 136 -

## 2024-06-15 NOTE — DISCHARGE INSTRUCTIONS
As we discussed, her workup in the emergency department today is reassuring. Her labs are normal. Her CT shows thickening of the urinary bladder, along with her urinalysis today, is concerning for a urinary tract infection. Stop the Macrobid and begin keflex three times per day. She was given IV rocephin in the ER today. You can begin the Keflex this evening or tomorrow morning. I have also sent in pyridium. This can mask symptoms, so I only recommend this for 2 days so we know if she is still having symptoms. Please follow-up with her primary care provider. I have also sent a referral to Urology who can see her to remove the catheter and recheck of her symptoms if her primary care provider is unable to do so. I would recommend to try the tramadol for pain and not give the oxycodone as this likely contributed to the altered mental status today. Try to manage pain with over-the-counter tylenol and/or motrin and use prescription pain medication for more severe pain. Return to the emergency department for any new, worsening, or concerning symptoms.

## 2024-06-16 LAB
BACTERIA SPEC CULT: NORMAL
BACTERIA SPEC CULT: NORMAL
SERVICE CMNT-IMP: NORMAL
SERVICE CMNT-IMP: NORMAL

## 2024-06-17 ENCOUNTER — TELEPHONE (OUTPATIENT)
Dept: FAMILY MEDICINE CLINIC | Facility: CLINIC | Age: 89
End: 2024-06-17

## 2024-06-17 ENCOUNTER — CARE COORDINATION (OUTPATIENT)
Dept: CARE COORDINATION | Facility: CLINIC | Age: 89
End: 2024-06-17

## 2024-06-17 NOTE — TELEPHONE ENCOUNTER
Patient went to ER on Thursday by ambulance in lots of pain. Went back to ER on Saturday and replaced Sanchez catheter. Patient has cystitis and put on Macrobid and Pyridium. Can't go back to assisted living. Desated in the 70's twice when she fell asleep in the ER. Please call Izabella to advise.

## 2024-06-17 NOTE — TELEPHONE ENCOUNTER
Returned patient's daughter's call. She will be seeing Urology on Thursday and directed them to keep catheter in until seeing Urology since she had severe urinary retention at ER. She remains on antibiotic and pyridium, confusion ongoing with her baseline dementia. Patient is staying with her daughter this week and they have a caregiver coming several days to help provide care while she cannot return to assisted living. Informed daughter to call us Thursday if catheter is left in place to make decision about next steps for her living arrangements as she cannot return to assisted living with catheter.

## 2024-06-18 LAB
BACTERIA SPEC CULT: NORMAL
SERVICE CMNT-IMP: NORMAL

## 2024-06-20 ENCOUNTER — TELEPHONE (OUTPATIENT)
Dept: UROLOGY | Age: 89
End: 2024-06-20

## 2024-06-20 ENCOUNTER — OFFICE VISIT (OUTPATIENT)
Dept: UROLOGY | Age: 89
End: 2024-06-20
Payer: MEDICARE

## 2024-06-20 DIAGNOSIS — R33.9 URINARY RETENTION: Primary | ICD-10-CM

## 2024-06-20 LAB
BACTERIA SPEC CULT: NORMAL
SERVICE CMNT-IMP: NORMAL

## 2024-06-20 PROCEDURE — 1123F ACP DISCUSS/DSCN MKR DOCD: CPT | Performed by: NURSE PRACTITIONER

## 2024-06-20 PROCEDURE — 99214 OFFICE O/P EST MOD 30 MIN: CPT | Performed by: NURSE PRACTITIONER

## 2024-06-20 RX ORDER — TRAMADOL HYDROCHLORIDE 50 MG/1
50 TABLET ORAL EVERY 4 HOURS PRN
Qty: 18 TABLET | Refills: 0 | Status: SHIPPED | OUTPATIENT
Start: 2024-06-20 | End: 2024-06-23

## 2024-06-20 RX ORDER — PHENAZOPYRIDINE HYDROCHLORIDE 100 MG/1
100 TABLET, FILM COATED ORAL 3 TIMES DAILY PRN
Qty: 30 TABLET | Refills: 0 | Status: SHIPPED | OUTPATIENT
Start: 2024-06-20 | End: 2025-06-20

## 2024-06-20 RX ORDER — CEPHALEXIN 500 MG/1
500 CAPSULE ORAL 3 TIMES DAILY
Qty: 21 CAPSULE | Refills: 0 | Status: SHIPPED | OUTPATIENT
Start: 2024-06-20 | End: 2024-06-20

## 2024-06-20 RX ORDER — CEPHALEXIN 500 MG/1
500 CAPSULE ORAL DAILY
Qty: 21 CAPSULE | Refills: 0 | Status: SHIPPED | OUTPATIENT
Start: 2024-06-20

## 2024-06-20 ASSESSMENT — ENCOUNTER SYMPTOMS
BACK PAIN: 0
NAUSEA: 0

## 2024-06-20 NOTE — TELEPHONE ENCOUNTER
I placed a referral to [facility] for urodynamic testing.  Please send information and call and arrange.    Thank you,   Ally

## 2024-06-20 NOTE — PROGRESS NOTES
catheter via urethra and/or suprapubic.  Other option would be to proceed with further testing.  The daughter would like to proceed with further testing understanding that if neurogenic bladder is found that catheter will be what is recommended.  I will send a referral over for urodynamics.    I will keep her on antibiotic low-dose of cephalexin until testing can be obtained.  I will also send in Cosamin for bladder spasms along with Pyridium refills.  She has been using tramadol at night for the discomfort I will send a refill of this as well.    Orders Placed This Encounter    Amb External Referral To Urogynecology     Referral Priority:   Routine     Referral Reason:   Specialty Services Required     Requested Specialty:   Urogynecology     Number of Visits Requested:   1    DISCONTD: cephALEXin (KEFLEX) 500 MG capsule     Sig: Take 1 capsule by mouth 3 times daily for 7 days     Dispense:  21 capsule     Refill:  0    phenazopyridine (PYRIDIUM) 100 MG tablet     Sig: Take 1 tablet by mouth 3 times daily as needed for Pain     Dispense:  30 tablet     Refill:  0    hyoscyamine (LEVSIN/SL) 125 MCG sublingual tablet     Sig: Place 1 tablet under the tongue every 4 hours as needed for Cramping     Dispense:  90 tablet     Refill:  3    traMADol (ULTRAM) 50 MG tablet     Sig: Take 1 tablet by mouth every 4 hours as needed for Pain for up to 3 days. Intended supply: 3 days. Take lowest dose possible to manage pain Max Daily Amount: 300 mg     Dispense:  18 tablet     Refill:  0     Reduce doses taken as pain becomes manageable    cephALEXin (KEFLEX) 500 MG capsule     Sig: Take 1 capsule by mouth daily     Dispense:  21 capsule     Refill:  0       Return in about 3 weeks (around 7/11/2024) for for KUB and OV.    Total billable time associated with this visit was 30  minutes, during which I coordinated care, counseled the patient and/or family, documented information in the electronic medical record, independently

## 2024-07-05 PROCEDURE — 93294 REM INTERROG EVL PM/LDLS PM: CPT | Performed by: INTERNAL MEDICINE

## 2024-07-05 PROCEDURE — 93296 REM INTERROG EVL PM/IDS: CPT | Performed by: INTERNAL MEDICINE

## 2024-07-07 ENCOUNTER — PATIENT MESSAGE (OUTPATIENT)
Dept: FAMILY MEDICINE CLINIC | Facility: CLINIC | Age: 89
End: 2024-07-07

## 2024-07-08 RX ORDER — LOSARTAN POTASSIUM 100 MG/1
TABLET ORAL
Qty: 90 TABLET | Refills: 1 | Status: SHIPPED | OUTPATIENT
Start: 2024-07-08

## 2024-09-09 ENCOUNTER — OFFICE VISIT (OUTPATIENT)
Age: 89
End: 2024-09-09
Payer: MEDICARE

## 2024-09-09 ENCOUNTER — NURSE ONLY (OUTPATIENT)
Age: 89
End: 2024-09-09

## 2024-09-09 VITALS
WEIGHT: 151 LBS | HEIGHT: 67 IN | BODY MASS INDEX: 23.7 KG/M2 | SYSTOLIC BLOOD PRESSURE: 138 MMHG | HEART RATE: 77 BPM | DIASTOLIC BLOOD PRESSURE: 90 MMHG

## 2024-09-09 DIAGNOSIS — I95.1 ORTHOSTATIC HYPOTENSION: ICD-10-CM

## 2024-09-09 DIAGNOSIS — I49.5 TACHY-BRADY SYNDROME (HCC): Primary | ICD-10-CM

## 2024-09-09 DIAGNOSIS — Z95.818 PRESENCE OF WATCHMAN LEFT ATRIAL APPENDAGE CLOSURE DEVICE: Chronic | ICD-10-CM

## 2024-09-09 DIAGNOSIS — I25.10 CORONARY ARTERY DISEASE INVOLVING NATIVE CORONARY ARTERY OF NATIVE HEART WITHOUT ANGINA PECTORIS: ICD-10-CM

## 2024-09-09 DIAGNOSIS — I48.21 PERMANENT ATRIAL FIBRILLATION (HCC): Primary | Chronic | ICD-10-CM

## 2024-09-09 PROCEDURE — 1123F ACP DISCUSS/DSCN MKR DOCD: CPT | Performed by: INTERNAL MEDICINE

## 2024-09-09 PROCEDURE — 99214 OFFICE O/P EST MOD 30 MIN: CPT | Performed by: INTERNAL MEDICINE

## 2024-10-04 PROCEDURE — 93296 REM INTERROG EVL PM/IDS: CPT | Performed by: INTERNAL MEDICINE

## 2024-10-04 PROCEDURE — 93294 REM INTERROG EVL PM/LDLS PM: CPT | Performed by: INTERNAL MEDICINE

## 2024-10-22 ENCOUNTER — OFFICE VISIT (OUTPATIENT)
Dept: FAMILY MEDICINE CLINIC | Facility: CLINIC | Age: 89
End: 2024-10-22
Payer: MEDICARE

## 2024-10-22 VITALS
WEIGHT: 192 LBS | BODY MASS INDEX: 30.07 KG/M2 | DIASTOLIC BLOOD PRESSURE: 82 MMHG | HEART RATE: 74 BPM | SYSTOLIC BLOOD PRESSURE: 136 MMHG | OXYGEN SATURATION: 98 % | TEMPERATURE: 97.2 F

## 2024-10-22 DIAGNOSIS — R39.9 UTI SYMPTOMS: Primary | ICD-10-CM

## 2024-10-22 DIAGNOSIS — N39.0 RECURRENT UTI: ICD-10-CM

## 2024-10-22 PROCEDURE — 1123F ACP DISCUSS/DSCN MKR DOCD: CPT | Performed by: FAMILY MEDICINE

## 2024-10-22 PROCEDURE — 99213 OFFICE O/P EST LOW 20 MIN: CPT | Performed by: FAMILY MEDICINE

## 2024-10-22 RX ORDER — CIPROFLOXACIN 250 MG/1
250 TABLET, FILM COATED ORAL 2 TIMES DAILY
Qty: 20 TABLET | Refills: 0 | Status: SHIPPED | OUTPATIENT
Start: 2024-10-22 | End: 2024-10-29

## 2024-10-22 RX ORDER — TRIMETHOPRIM HYDROCHLORIDE 50 MG/5ML
100 SOLUTION ORAL DAILY
Qty: 300 ML | Refills: 5 | Status: SHIPPED | OUTPATIENT
Start: 2024-10-22

## 2024-10-22 RX ORDER — ESTRADIOL 0.1 MG/G
1 CREAM VAGINAL DAILY
COMMUNITY
Start: 2024-07-01

## 2024-10-22 ASSESSMENT — ENCOUNTER SYMPTOMS
RESPIRATORY NEGATIVE: 1
GASTROINTESTINAL NEGATIVE: 1

## 2024-10-22 NOTE — PROGRESS NOTES
HISTORY OF PRESENT ILLNESS    Angelique Seals is a 89 y.o. female.  HPI  Chief Complaint   Patient presents with    Frequent/Recurrent UTI     See above. Due to demential daughter gives history. Has a history of recurrent UTIs. Urologist prescribed pessary due to mild bladder prolapse. Facility takes her to urinate every 2 hours. Diagnosed last week with UTI. Tool 3 days of Cipro. Symptoms resolved but returned two days ago. Has burning and frequency. No fever or back pain.      Current Outpatient Medications on File Prior to Visit   Medication Sig Dispense Refill    estradiol (ESTRACE) 0.1 MG/GM vaginal cream Place 1 g vaginally daily      losartan (COZAAR) 100 MG tablet 1/2 po qam. Increase to 1 po qam if BP increases. (Patient taking differently: Take 1 tablet by mouth daily) 90 tablet 1    albuterol sulfate HFA (PROVENTIL HFA) 108 (90 Base) MCG/ACT inhaler Inhale 2 puffs into the lungs every 6 hours as needed for Wheezing 18 g 3    magnesium 200 MG TABS tablet Take 1 tablet by mouth daily      memantine (NAMENDA) 10 MG tablet Take one bid 180 tablet 3    B Complex Vitamins (VITAMIN B COMPLEX PO) Take 1 tablet by mouth daily      aspirin EC 81 MG EC tablet Take 1 tablet by mouth daily 90 tablet 1    TURMERIC PO Take 3 capsules by mouth daily Turmeric and Ginger      Multiple Vitamins-Minerals (ONE-A-DAY WOMENS PO) Take 1 tablet by mouth daily      AZO-CRANBERRY PO Take 1 tablet by mouth daily      Cholecalciferol 50 MCG (2000 UT) TABS Take 1 tablet by mouth daily       No current facility-administered medications on file prior to visit.     Past Medical History:   Diagnosis Date    Anticoagulant long-term use     Arrhythmia     Atrial fibrillation (HCC)     Chronic back pain Many years ago    H/O complete eye exam Yearly    [facility]    Headache     HLD (hyperlipidemia)     Hypertension     controlled with medication       Review of Systems   Constitutional: Negative.    Respiratory: Negative.

## 2024-11-06 ENCOUNTER — OFFICE VISIT (OUTPATIENT)
Dept: UROGYNECOLOGY | Age: 89
End: 2024-11-06

## 2024-11-06 VITALS — HEIGHT: 68 IN | BODY MASS INDEX: 30.16 KG/M2 | WEIGHT: 199 LBS

## 2024-11-06 DIAGNOSIS — N81.4 UTERINE PROLAPSE: Primary | ICD-10-CM

## 2024-11-06 LAB
BILIRUBIN, URINE, POC: NEGATIVE
BILIRUBIN, URINE, POC: NEGATIVE
BLOOD URINE, POC: NEGATIVE
BLOOD URINE, POC: NEGATIVE
GLUCOSE URINE, POC: NEGATIVE
GLUCOSE URINE, POC: NEGATIVE
KETONES, URINE, POC: NEGATIVE
KETONES, URINE, POC: NEGATIVE
LEUKOCYTE ESTERASE, URINE, POC: NEGATIVE
LEUKOCYTE ESTERASE, URINE, POC: NEGATIVE
NITRITE, URINE, POC: NEGATIVE
NITRITE, URINE, POC: NEGATIVE
PH, URINE, POC: 7 (ref 4.6–8)
PH, URINE, POC: 7 (ref 4.6–8)
PROTEIN,URINE, POC: NEGATIVE
PROTEIN,URINE, POC: NEGATIVE
SPECIFIC GRAVITY, URINE, POC: 1.02 (ref 1–1.03)
SPECIFIC GRAVITY, URINE, POC: 1.02 (ref 1–1.03)
URINALYSIS CLARITY, POC: CLEAR
URINALYSIS CLARITY, POC: CLEAR
URINALYSIS COLOR, POC: YELLOW
URINALYSIS COLOR, POC: YELLOW
UROBILINOGEN, POC: NORMAL
UROBILINOGEN, POC: NORMAL MG/DL

## 2024-11-06 NOTE — PROGRESS NOTES
[facility] UROGYNECOLOGY  [address] [place] [address]  [address]  [place] [address]  Dept: [phone]        PCP:  Antonio Godfrey Jr., MD    11/7/2024      HPI:  I am being asked to see this patient in consultation by Bekah   for New Patient (prolapse)  .     [facility]  [address] [place] [address] [address]  [place] [address]  Dept: [phone]           PCP:  Antonio Godfrey Jr., MD     11/6/2024     HPI:  I am being asked to see this patient in consultation by Bekah   for New Patient (prolapse)  .   Ms. Seals is here today with one of her daughters. She has dementia and is not able to answer questions well. Ms. Tillman came in today with one of her daughters, but its her daughter Izabella (not here) who has managed most of her care.      Ms. Angelique Seals has been experiencing prolapse for 5/2024. The prolapse does not cause her pain and/or pressure. She does not have to splint to complete urination, a BM, or for comfort.  She has tried a pessary, she has not tried physical therapy, she has not  had surgery for her POP.      In searching through the records its appears that:  After a fall in May, Ms. Seals was found to have urinary retention and has been followed by [facility] for this. They sent her to [facility] for bladder testing.     Prior to getting her bladder tested W [facility]: Ms. Seals's family set her up to see a urogyn in [place].  Ms Seals was seen by a urogyn in [place] (Dr. Murrieta) 7/1/24. A Grade 1-2 cystocele was noted, + hypermobility of the urethra, vaginal atrophy, and PVR was <100ml. He placed a #2 ring with support pessary at that time. Her daughter is helping with estrogen cream placement. Since the pessary was placed there does not seem to be any issues with voiding.      She did see Adriana Olivera on 8/26/24 for UDS. Results reviewed. She has not been back to [facility] or to urology since.      Pt's daughter reports mother is not leaking from her bowl's.     Pt's

## 2024-11-07 ENCOUNTER — PATIENT MESSAGE (OUTPATIENT)
Dept: UROGYNECOLOGY | Age: 89
End: 2024-11-07

## 2024-11-07 NOTE — ASSESSMENT & PLAN NOTE
Pessary was removed for exam today. Her vaginal tissue looks good and no signs of complication from the pessary. There was no POP on exam today. PVR is within normal limits considering that she does not have any symptoms and family feels she is voiding much better.     Cont current care and work with Adriana Olivera for pessary maintenance. I do not have anything more to contribute to her care. I will refer her back to [facility] (urology), and Dr. Murrieta-the urogyn she was working with, Adriana Olivera, JESSIKA and her PCP.

## 2024-11-11 NOTE — TELEPHONE ENCOUNTER
Patient daughter Izabella ( ALEJANDRA verified) was called and was told to continue care with [facility] for Urinary retention. Patient daughter understood.

## 2024-11-17 ENCOUNTER — HOSPITAL ENCOUNTER (EMERGENCY)
Age: 89
Discharge: HOME | End: 2024-11-17
Attending: EMERGENCY MEDICINE
Payer: MEDICARE

## 2024-11-17 VITALS
SYSTOLIC BLOOD PRESSURE: 154 MMHG | BODY MASS INDEX: 30.22 KG/M2 | HEIGHT: 68 IN | OXYGEN SATURATION: 97 % | WEIGHT: 199.4 LBS | TEMPERATURE: 97.6 F | HEART RATE: 64 BPM | DIASTOLIC BLOOD PRESSURE: 88 MMHG | RESPIRATION RATE: 18 BRPM

## 2024-11-17 DIAGNOSIS — R33.9 URINARY RETENTION: Primary | ICD-10-CM

## 2024-11-17 DIAGNOSIS — E86.0 DEHYDRATION: ICD-10-CM

## 2024-11-17 DIAGNOSIS — N30.00 ACUTE CYSTITIS WITHOUT HEMATURIA: ICD-10-CM

## 2024-11-17 LAB
ALBUMIN SERPL-MCNC: 3.7 G/DL (ref 3.2–4.6)
ALBUMIN/GLOB SERPL: 1.2 (ref 1–1.9)
ALP SERPL-CCNC: 113 U/L (ref 35–104)
ALT SERPL-CCNC: 10 U/L (ref 12–65)
ANION GAP SERPL CALC-SCNC: 9 MMOL/L (ref 7–16)
APPEARANCE UR: ABNORMAL
AST SERPL-CCNC: 24 U/L (ref 15–37)
BACTERIA URNS QL MICRO: ABNORMAL /HPF
BASOPHILS # BLD: 0.1 K/UL (ref 0–0.2)
BASOPHILS NFR BLD: 1 % (ref 0–2)
BILIRUB SERPL-MCNC: 0.2 MG/DL (ref 0–1.2)
BILIRUB UR QL: NEGATIVE
BUN SERPL-MCNC: 24 MG/DL (ref 8–23)
CALCIUM SERPL-MCNC: 9.5 MG/DL (ref 8.8–10.2)
CHLORIDE SERPL-SCNC: 106 MMOL/L (ref 98–107)
CO2 SERPL-SCNC: 27 MMOL/L (ref 20–29)
COLOR UR: YELLOW
CREAT SERPL-MCNC: 0.83 MG/DL (ref 0.8–1.3)
DIFFERENTIAL METHOD BLD: NORMAL
EOSINOPHIL # BLD: 0.3 K/UL (ref 0–0.8)
EOSINOPHIL NFR BLD: 5 % (ref 0.5–7.8)
EPI CELLS #/AREA URNS HPF: ABNORMAL /HPF
ERYTHROCYTE [DISTWIDTH] IN BLOOD BY AUTOMATED COUNT: 13.1 % (ref 11.9–14.6)
GLOBULIN SER CALC-MCNC: 3.1 G/DL (ref 2.3–3.5)
GLUCOSE SERPL-MCNC: 105 MG/DL (ref 65–100)
GLUCOSE UR STRIP.AUTO-MCNC: NEGATIVE MG/DL
HCT VFR BLD AUTO: 39.9 % (ref 35.8–46.3)
HGB BLD-MCNC: 12.9 G/DL (ref 11.7–15.4)
HGB UR QL STRIP: NEGATIVE
IMM GRANULOCYTES # BLD AUTO: 0 K/UL (ref 0–0.5)
IMM GRANULOCYTES NFR BLD AUTO: 0 % (ref 0–5)
KETONES UR QL STRIP.AUTO: NEGATIVE MG/DL
LACTATE SERPL-SCNC: 1.2 MMOL/L (ref 0.5–2)
LEUKOCYTE ESTERASE UR QL STRIP.AUTO: ABNORMAL
LIPASE SERPL-CCNC: 48 U/L (ref 13–60)
LYMPHOCYTES # BLD: 1.5 K/UL (ref 0.5–4.6)
LYMPHOCYTES NFR BLD: 25 % (ref 13–44)
MCH RBC QN AUTO: 30.6 PG (ref 26.1–32.9)
MCHC RBC AUTO-ENTMCNC: 32.3 G/DL (ref 31.4–35)
MCV RBC AUTO: 94.5 FL (ref 82–102)
MONOCYTES # BLD: 0.6 K/UL (ref 0.1–1.3)
MONOCYTES NFR BLD: 10 % (ref 4–12)
MUCOUS THREADS URNS QL MICRO: ABNORMAL /LPF
NEUTS SEG # BLD: 3.7 K/UL (ref 1.7–8.2)
NEUTS SEG NFR BLD: 60 % (ref 43–78)
NITRITE UR QL STRIP.AUTO: NEGATIVE
NRBC # BLD: 0 K/UL (ref 0–0.2)
OTHER OBSERVATIONS: ABNORMAL
PH UR STRIP: 6 (ref 5–9)
PLATELET # BLD AUTO: 241 K/UL (ref 150–450)
PMV BLD AUTO: 10.6 FL (ref 9.4–12.3)
POTASSIUM SERPL-SCNC: 4.3 MMOL/L (ref 3.5–5.1)
PROT SERPL-MCNC: 6.8 G/DL (ref 6.3–8.2)
PROT UR STRIP-MCNC: NEGATIVE MG/DL
RBC # BLD AUTO: 4.22 M/UL (ref 4.05–5.2)
RBC #/AREA URNS HPF: 0 /HPF
SODIUM SERPL-SCNC: 142 MMOL/L (ref 133–143)
SP GR UR REFRACTOMETRY: >=1.03 (ref 1–1.02)
UROBILINOGEN UR QL STRIP.AUTO: 0.2 EU/DL (ref 0.2–1)
WBC # BLD AUTO: 6.2 K/UL (ref 4.3–11.1)
WBC URNS QL MICRO: ABNORMAL /HPF

## 2024-11-17 PROCEDURE — 51798 US URINE CAPACITY MEASURE: CPT

## 2024-11-17 PROCEDURE — 2580000003 HC RX 258: Performed by: EMERGENCY MEDICINE

## 2024-11-17 PROCEDURE — 51701 INSERT BLADDER CATHETER: CPT

## 2024-11-17 PROCEDURE — 83605 ASSAY OF LACTIC ACID: CPT

## 2024-11-17 PROCEDURE — 80053 COMPREHEN METABOLIC PANEL: CPT

## 2024-11-17 PROCEDURE — 87086 URINE CULTURE/COLONY COUNT: CPT

## 2024-11-17 PROCEDURE — 99284 EMERGENCY DEPT VISIT MOD MDM: CPT

## 2024-11-17 PROCEDURE — 96361 HYDRATE IV INFUSION ADD-ON: CPT

## 2024-11-17 PROCEDURE — 85025 COMPLETE CBC W/AUTO DIFF WBC: CPT

## 2024-11-17 PROCEDURE — 6370000000 HC RX 637 (ALT 250 FOR IP): Performed by: EMERGENCY MEDICINE

## 2024-11-17 PROCEDURE — 81001 URINALYSIS AUTO W/SCOPE: CPT

## 2024-11-17 PROCEDURE — 6360000002 HC RX W HCPCS: Performed by: EMERGENCY MEDICINE

## 2024-11-17 PROCEDURE — 96374 THER/PROPH/DIAG INJ IV PUSH: CPT

## 2024-11-17 PROCEDURE — 83690 ASSAY OF LIPASE: CPT

## 2024-11-17 RX ORDER — LIDOCAINE HYDROCHLORIDE 20 MG/ML
JELLY TOPICAL
Status: COMPLETED | OUTPATIENT
Start: 2024-11-17 | End: 2024-11-17

## 2024-11-17 RX ORDER — CEFDINIR 300 MG/1
300 CAPSULE ORAL 2 TIMES DAILY
Qty: 12 CAPSULE | Refills: 0 | Status: SHIPPED | OUTPATIENT
Start: 2024-11-18 | End: 2024-11-24

## 2024-11-17 RX ORDER — 0.9 % SODIUM CHLORIDE 0.9 %
1000 INTRAVENOUS SOLUTION INTRAVENOUS ONCE
Status: COMPLETED | OUTPATIENT
Start: 2024-11-17 | End: 2024-11-17

## 2024-11-17 RX ORDER — CEFDINIR 300 MG/1
300 CAPSULE ORAL 2 TIMES DAILY
Qty: 14 CAPSULE | Refills: 0 | Status: SHIPPED | OUTPATIENT
Start: 2024-11-17 | End: 2024-11-17

## 2024-11-17 RX ADMIN — SODIUM CHLORIDE 1000 ML: 9 INJECTION, SOLUTION INTRAVENOUS at 02:53

## 2024-11-17 RX ADMIN — WATER 1000 MG: 1 INJECTION INTRAMUSCULAR; INTRAVENOUS; SUBCUTANEOUS at 03:23

## 2024-11-17 RX ADMIN — LIDOCAINE HYDROCHLORIDE: 20 JELLY TOPICAL at 03:23

## 2024-11-17 ASSESSMENT — PAIN SCALES - PAIN ASSESSMENT IN ADVANCED DEMENTIA (PAINAD)
BREATHING: NORMAL
TOTALSCORE: 0
CONSOLABILITY: NO NEED TO CONSOLE
FACIALEXPRESSION: SMILING OR INEXPRESSIVE
BODYLANGUAGE: RELAXED

## 2024-11-17 ASSESSMENT — PAIN - FUNCTIONAL ASSESSMENT
PAIN_FUNCTIONAL_ASSESSMENT: ADULT NONVERBAL PAIN SCALE (NPVS)
PAIN_FUNCTIONAL_ASSESSMENT: PAIN ASSESSMENT IN ADVANCED DEMENTIA (PAINAD)

## 2024-11-17 NOTE — ED PROVIDER NOTES
This software is not perfect and grammatical and other typographical errors may be present.  This note has not been completely proofread for errors.     Talha Caballero MD  11/17/24 9348

## 2024-11-17 NOTE — ED TRIAGE NOTES
Pt arriving from [facility] via ems for abd pain. Was pain free at around 8:30 pm. Was awakened around midnight d/t pain. Has had urine retention in the past and had to be drained previously per pt's family. Family wanting pt to be checked for urinary retention.  Hx dementia and only able to answer with yes/no questions. Has pacemaker.

## 2024-11-17 NOTE — ED NOTES
This RN in to place fernando and daughter at bedside now states patient cannot return to facility with fernando in place.  Family concerned about taking pt home due to her decline in mobility.  Dr. Caballero in to talk to family.

## 2024-11-17 NOTE — DISCHARGE INSTRUCTIONS
Return as needed for any questions concerns or worsening symptoms particularly lack of urinary output, increasing/unremitting abdominal or flank pain, recurrent vomiting, confusion or changes in behavior, lethargy, ill appearance.

## 2024-11-18 LAB
BACTERIA SPEC CULT: NORMAL
SERVICE CMNT-IMP: NORMAL

## 2024-11-26 ENCOUNTER — OFFICE VISIT (OUTPATIENT)
Dept: FAMILY MEDICINE CLINIC | Facility: CLINIC | Age: 88
End: 2024-11-26
Payer: MEDICARE

## 2024-11-26 VITALS
WEIGHT: 188.8 LBS | BODY MASS INDEX: 28.71 KG/M2 | SYSTOLIC BLOOD PRESSURE: 104 MMHG | HEART RATE: 50 BPM | TEMPERATURE: 97.2 F | DIASTOLIC BLOOD PRESSURE: 60 MMHG | OXYGEN SATURATION: 92 %

## 2024-11-26 DIAGNOSIS — R10.13 EPIGASTRIC PAIN: Primary | ICD-10-CM

## 2024-11-26 LAB
ALBUMIN SERPL-MCNC: 3.6 G/DL (ref 3.2–4.6)
ALBUMIN/GLOB SERPL: 0.9 (ref 1–1.9)
ALP SERPL-CCNC: 160 U/L (ref 35–104)
ALT SERPL-CCNC: 17 U/L (ref 8–45)
ANION GAP SERPL CALC-SCNC: 10 MMOL/L (ref 7–16)
AST SERPL-CCNC: 29 U/L (ref 15–37)
BASOPHILS # BLD: 0.1 K/UL (ref 0–0.2)
BASOPHILS NFR BLD: 1 % (ref 0–2)
BILIRUB SERPL-MCNC: 0.4 MG/DL (ref 0–1.2)
BUN SERPL-MCNC: 27 MG/DL (ref 8–23)
CALCIUM SERPL-MCNC: 9.8 MG/DL (ref 8.8–10.2)
CHLORIDE SERPL-SCNC: 101 MMOL/L (ref 98–107)
CO2 SERPL-SCNC: 30 MMOL/L (ref 20–29)
CREAT SERPL-MCNC: 1.34 MG/DL (ref 0.6–1.1)
DIFFERENTIAL METHOD BLD: NORMAL
EOSINOPHIL # BLD: 0.2 K/UL (ref 0–0.8)
EOSINOPHIL NFR BLD: 3 % (ref 0.5–7.8)
ERYTHROCYTE [DISTWIDTH] IN BLOOD BY AUTOMATED COUNT: 13.4 % (ref 11.9–14.6)
GLOBULIN SER CALC-MCNC: 4 G/DL (ref 2.3–3.5)
GLUCOSE SERPL-MCNC: 131 MG/DL (ref 70–99)
HCT VFR BLD AUTO: 46.2 % (ref 35.8–46.3)
HGB BLD-MCNC: 14.5 G/DL (ref 11.7–15.4)
IMM GRANULOCYTES # BLD AUTO: 0 K/UL (ref 0–0.5)
IMM GRANULOCYTES NFR BLD AUTO: 0 % (ref 0–5)
LIPASE SERPL-CCNC: 38 U/L (ref 13–60)
LYMPHOCYTES # BLD: 1.9 K/UL (ref 0.5–4.6)
LYMPHOCYTES NFR BLD: 28 % (ref 13–44)
MCH RBC QN AUTO: 30.3 PG (ref 26.1–32.9)
MCHC RBC AUTO-ENTMCNC: 31.4 G/DL (ref 31.4–35)
MCV RBC AUTO: 96.7 FL (ref 82–102)
MONOCYTES # BLD: 0.5 K/UL (ref 0.1–1.3)
MONOCYTES NFR BLD: 7 % (ref 4–12)
NEUTS SEG # BLD: 4.2 K/UL (ref 1.7–8.2)
NEUTS SEG NFR BLD: 61 % (ref 43–78)
NRBC # BLD: 0 K/UL (ref 0–0.2)
PLATELET # BLD AUTO: 282 K/UL (ref 150–450)
PMV BLD AUTO: 11 FL (ref 9.4–12.3)
POTASSIUM SERPL-SCNC: 3.9 MMOL/L (ref 3.5–5.1)
PROT SERPL-MCNC: 7.6 G/DL (ref 6.3–8.2)
RBC # BLD AUTO: 4.78 M/UL (ref 4.05–5.2)
SODIUM SERPL-SCNC: 141 MMOL/L (ref 136–145)
WBC # BLD AUTO: 7 K/UL (ref 4.3–11.1)

## 2024-11-26 PROCEDURE — 1123F ACP DISCUSS/DSCN MKR DOCD: CPT | Performed by: FAMILY MEDICINE

## 2024-11-26 PROCEDURE — 99213 OFFICE O/P EST LOW 20 MIN: CPT | Performed by: FAMILY MEDICINE

## 2024-11-26 PROCEDURE — 1159F MED LIST DOCD IN RCRD: CPT | Performed by: FAMILY MEDICINE

## 2024-11-26 PROCEDURE — 96372 THER/PROPH/DIAG INJ SC/IM: CPT | Performed by: FAMILY MEDICINE

## 2024-11-26 RX ORDER — KETOROLAC TROMETHAMINE 30 MG/ML
30 INJECTION, SOLUTION INTRAMUSCULAR; INTRAVENOUS ONCE
Status: COMPLETED | OUTPATIENT
Start: 2024-11-26 | End: 2024-11-26

## 2024-11-26 RX ADMIN — KETOROLAC TROMETHAMINE 30 MG: 30 INJECTION, SOLUTION INTRAMUSCULAR; INTRAVENOUS at 15:12

## 2024-11-26 ASSESSMENT — ENCOUNTER SYMPTOMS
DIARRHEA: 0
BLOOD IN STOOL: 0
CONSTIPATION: 1
RECTAL PAIN: 0
ABDOMINAL DISTENTION: 0
ANAL BLEEDING: 0
ABDOMINAL PAIN: 1
VOMITING: 0
NAUSEA: 0
EYES NEGATIVE: 1
RESPIRATORY NEGATIVE: 1

## 2024-12-06 ENCOUNTER — TELEPHONE (OUTPATIENT)
Dept: FAMILY MEDICINE CLINIC | Facility: CLINIC | Age: 88
End: 2024-12-06

## 2024-12-06 ENCOUNTER — HOSPITAL ENCOUNTER (OUTPATIENT)
Dept: CT IMAGING | Age: 88
Discharge: HOME | End: 2024-12-08
Payer: MEDICARE

## 2024-12-06 DIAGNOSIS — S22.060A COMPRESSION FRACTURE OF T8 VERTEBRA, INITIAL ENCOUNTER (HCC): Primary | ICD-10-CM

## 2024-12-06 DIAGNOSIS — I71.9 AORTIC ANEURYSM WITHOUT RUPTURE, UNSPECIFIED PORTION OF AORTA (HCC): ICD-10-CM

## 2024-12-06 DIAGNOSIS — R10.13 EPIGASTRIC PAIN: ICD-10-CM

## 2024-12-06 PROCEDURE — 74150 CT ABDOMEN W/O CONTRAST: CPT

## 2024-12-06 NOTE — TELEPHONE ENCOUNTER
----- Message from Dr. Antonio Godfrey MD sent at 12/6/2024  3:31 PM EST -----  No gallstone. Function test is pending. Incidental finding of severe stress fracture of vertebra in mid back. If having symptoms refer to ortho.  ----- Message -----  From: [place], [facility] Incoming Orders Results To [facility]  Sent: 12/6/2024   3:08 PM EST  To: Antonio Godfrey Jr., MD

## 2024-12-06 NOTE — TELEPHONE ENCOUNTER
Reviewed labs with pt's daughter milli, She wanted to know if there were any concerns about the 4.5cm aortic aneurysm?

## 2024-12-09 ENCOUNTER — TELEPHONE (OUTPATIENT)
Dept: ORTHOPEDIC SURGERY | Age: 88
End: 2024-12-09

## 2024-12-10 ENCOUNTER — TELEPHONE (OUTPATIENT)
Dept: FAMILY MEDICINE CLINIC | Facility: CLINIC | Age: 88
End: 2024-12-10

## 2024-12-10 DIAGNOSIS — I71.9 AORTIC ANEURYSM WITHOUT RUPTURE, UNSPECIFIED PORTION OF AORTA (HCC): Primary | ICD-10-CM

## 2024-12-10 NOTE — TELEPHONE ENCOUNTER
Vascular Surgery called to let us know they received the referral we sent, but they don't treat patients for aortic aneurysm. They said patient needs to be referred to  [facility]

## 2024-12-16 ENCOUNTER — OFFICE VISIT (OUTPATIENT)
Age: 88
End: 2024-12-16
Payer: MEDICARE

## 2024-12-16 ENCOUNTER — TELEPHONE (OUTPATIENT)
Dept: FAMILY MEDICINE CLINIC | Facility: CLINIC | Age: 88
End: 2024-12-16

## 2024-12-16 VITALS — HEIGHT: 68 IN | WEIGHT: 189 LBS | BODY MASS INDEX: 28.64 KG/M2

## 2024-12-16 DIAGNOSIS — R26.89 BALANCE PROBLEM: Primary | ICD-10-CM

## 2024-12-16 DIAGNOSIS — R26.9 NEUROLOGIC GAIT DYSFUNCTION: ICD-10-CM

## 2024-12-16 DIAGNOSIS — S22.060A COMPRESSION FRACTURE OF T8 VERTEBRA, INITIAL ENCOUNTER (HCC): Primary | ICD-10-CM

## 2024-12-16 PROCEDURE — 99204 OFFICE O/P NEW MOD 45 MIN: CPT | Performed by: ORTHOPAEDIC SURGERY

## 2024-12-16 PROCEDURE — 1159F MED LIST DOCD IN RCRD: CPT | Performed by: ORTHOPAEDIC SURGERY

## 2024-12-16 PROCEDURE — 1123F ACP DISCUSS/DSCN MKR DOCD: CPT | Performed by: ORTHOPAEDIC SURGERY

## 2024-12-16 NOTE — TELEPHONE ENCOUNTER
[facility] called and said they can't do the wheelchair because they don't accept her insurance.

## 2024-12-16 NOTE — PROGRESS NOTES
high thoracic region but I do visualize what appears to be the thoracic 8 vertebral body with similar compression and loss of height compared to previous CT scan      Diagnosis:      ICD-10-CM    1. Compression fracture of T8 vertebra, initial encounter ([facility])  S22.060A XR THORACOLUMBAR SPINE (MIN 2 VIEWS)          Assessment/Plan:      Patient does have compression deformity with severe loss of height at T8.  Question remains as to what is causing her anterior abdominal pain.  Her back pain seems tolerable and somewhat baseline.  I did discuss with the patient's daughter that irritation of the T8 nerve root could cause radicular pain and discomfort in the mid abdomen.  In the absence of any john paul myelopathy though, would not advise surgical intervention for a thoracic radiculopathy.  The patient has a history of a pacemaker that is not believed to be MRI compatible at first discussion with the family today.  Her pain is somewhat stable and they are working with her on physical therapy.  I would like to observe her symptoms for another few weeks and if she continues to have back and abdominal pain, would consider pursuing an MRI of the thoracic spine further to ensure completeness of our workup.  Patient will follow-up in 3 weeks.  Available sooner if the patient or her family have any further issues or concerns.  X-rays at the next visit of the thoracic spine.                  Electronically signed by Nathanael Valero Jr, MD  12/16/24  3:20 PM

## 2024-12-16 NOTE — TELEPHONE ENCOUNTER
Pt's daughter called and asked if she can get a lightweight wheelchair for pt so when they go out places the pt can use it. She wants to know if  can prescribe that?

## 2024-12-30 ENCOUNTER — PATIENT MESSAGE (OUTPATIENT)
Dept: FAMILY MEDICINE CLINIC | Facility: CLINIC | Age: 88
End: 2024-12-30

## 2024-12-30 RX ORDER — GABAPENTIN 100 MG/1
100 CAPSULE ORAL 2 TIMES DAILY
Qty: 60 CAPSULE | Refills: 3 | Status: SHIPPED | OUTPATIENT
Start: 2024-12-30 | End: 2025-01-29

## 2025-01-23 ENCOUNTER — OFFICE VISIT (OUTPATIENT)
Dept: CARDIOTHORACIC SURGERY | Age: 89
End: 2025-01-23

## 2025-01-23 VITALS
HEIGHT: 68 IN | DIASTOLIC BLOOD PRESSURE: 79 MMHG | SYSTOLIC BLOOD PRESSURE: 113 MMHG | HEART RATE: 82 BPM | WEIGHT: 184 LBS | BODY MASS INDEX: 27.89 KG/M2 | OXYGEN SATURATION: 93 %

## 2025-01-23 DIAGNOSIS — I71.21 ANEURYSM OF ASCENDING AORTA WITHOUT RUPTURE (HCC): Primary | ICD-10-CM

## 2025-01-23 NOTE — PROGRESS NOTES
AUGIE Burnett MD, MS Angelique Pererar         2025       HISTORY OF PRESENT ILLNESS:  The patient is a 89 y.o. female who is seen for evaluation of an ascending aortic aneurysm. She is accompanied by her daughter who supplements her history. She does not express any acute complaint. She denies any family history of sudden cardiac death or acute aortic dissection. She resides in assisting living with memory care.         Past Medical History:   Diagnosis Date    Anticoagulant long-term use     Arrhythmia     Atrial fibrillation (HCC)     Chronic back pain Many years ago    H/O complete eye exam Yearly    [facility]    Headache     HLD (hyperlipidemia)     Hypertension     controlled with medication       Past Surgical History:   Procedure Laterality Date    APPENDECTOMY      CATARACT REMOVAL Bilateral     EP DEVICE PROCEDURE N/A 3/14/2023    WATCHMAN AGNES CLOSURE DEVICE performed by Arnold Brandt MD at [facility] MAIN OR    GYN      fibroid tumors removed, 1.5 ovaries removed    ORTHOPEDIC SURGERY  2012    left elbow surgery    ORTHOPEDIC SURGERY  's    R knee scope    ME UNLISTED PROCEDURE CARDIAC SURGERY      cath    PRE-MALIGNANT / BENIGN SKIN LESION EXCISION      basal cell removed from face       Family History   Problem Relation Age of Onset    Heart Disease Mother         Cogestive heart  disease    Hypertension Mother     Other Mother         pacemaker,  at 95    Arthritis Mother     High Blood Pressure Mother     Stroke Brother         mild x2; major x1  (smoker)    Heart Disease Brother         2 mild strokes and one serious .  He lost his ability to speak    Lung Disease Father     Heart Disease Father     Other Father         pacemaker -  at 87       Social History     Socioeconomic History    Marital status:      Spouse name: Not on file    Number of children: Not on file    Years of education: Not on file    Highest education level: Not

## 2025-02-24 ENCOUNTER — OFFICE VISIT (OUTPATIENT)
Dept: FAMILY MEDICINE CLINIC | Facility: CLINIC | Age: 89
End: 2025-02-24
Payer: MEDICARE

## 2025-02-24 VITALS
OXYGEN SATURATION: 93 % | SYSTOLIC BLOOD PRESSURE: 118 MMHG | TEMPERATURE: 97.1 F | WEIGHT: 192 LBS | DIASTOLIC BLOOD PRESSURE: 62 MMHG | BODY MASS INDEX: 29.19 KG/M2 | HEART RATE: 108 BPM

## 2025-02-24 DIAGNOSIS — R73.01 ELEVATED FASTING GLUCOSE: ICD-10-CM

## 2025-02-24 DIAGNOSIS — Z00.00 ENCOUNTER FOR SUBSEQUENT ANNUAL WELLNESS VISIT (AWV) IN MEDICARE PATIENT: ICD-10-CM

## 2025-02-24 DIAGNOSIS — G30.1 MODERATE LATE ONSET ALZHEIMER'S DEMENTIA WITHOUT BEHAVIORAL DISTURBANCE, PSYCHOTIC DISTURBANCE, MOOD DISTURBANCE, OR ANXIETY (HCC): ICD-10-CM

## 2025-02-24 DIAGNOSIS — F02.B0 MODERATE LATE ONSET ALZHEIMER'S DEMENTIA WITHOUT BEHAVIORAL DISTURBANCE, PSYCHOTIC DISTURBANCE, MOOD DISTURBANCE, OR ANXIETY (HCC): ICD-10-CM

## 2025-02-24 DIAGNOSIS — E78.5 ELEVATED LIPIDS: ICD-10-CM

## 2025-02-24 DIAGNOSIS — I10 PRIMARY HYPERTENSION: Primary | ICD-10-CM

## 2025-02-24 PROBLEM — I49.5 SINUS NODE DYSFUNCTION (HCC): Status: RESOLVED | Noted: 2020-07-17 | Resolved: 2025-02-24

## 2025-02-24 PROBLEM — I48.21 PERMANENT ATRIAL FIBRILLATION (HCC): Chronic | Status: RESOLVED | Noted: 2023-03-14 | Resolved: 2025-02-24

## 2025-02-24 LAB
ALBUMIN SERPL-MCNC: 3.6 G/DL (ref 3.2–4.6)
ALBUMIN/GLOB SERPL: 1.2 (ref 1–1.9)
ALP SERPL-CCNC: 101 U/L (ref 35–104)
ALT SERPL-CCNC: 21 U/L (ref 8–45)
ANION GAP SERPL CALC-SCNC: 9 MMOL/L (ref 7–16)
APPEARANCE UR: ABNORMAL
AST SERPL-CCNC: 29 U/L (ref 15–37)
BACTERIA URNS QL MICRO: ABNORMAL /HPF
BASOPHILS # BLD: 0.06 K/UL (ref 0–0.2)
BASOPHILS NFR BLD: 1.1 % (ref 0–2)
BILIRUB SERPL-MCNC: 0.6 MG/DL (ref 0–1.2)
BILIRUB UR QL: NEGATIVE
BUN SERPL-MCNC: 24 MG/DL (ref 8–23)
CALCIUM SERPL-MCNC: 9.9 MG/DL (ref 8.8–10.2)
CASTS URNS QL MICRO: ABNORMAL /LPF
CHLORIDE SERPL-SCNC: 106 MMOL/L (ref 98–107)
CHOLEST SERPL-MCNC: 212 MG/DL (ref 0–200)
CO2 SERPL-SCNC: 30 MMOL/L (ref 20–29)
COLOR UR: ABNORMAL
CREAT SERPL-MCNC: 1.04 MG/DL (ref 0.6–1.1)
DIFFERENTIAL METHOD BLD: ABNORMAL
EOSINOPHIL # BLD: 0.25 K/UL (ref 0–0.8)
EOSINOPHIL NFR BLD: 4.7 % (ref 0.5–7.8)
EPI CELLS #/AREA URNS HPF: ABNORMAL /HPF
ERYTHROCYTE [DISTWIDTH] IN BLOOD BY AUTOMATED COUNT: 14.6 % (ref 11.9–14.6)
EST. AVERAGE GLUCOSE BLD GHB EST-MCNC: 112 MG/DL
GLOBULIN SER CALC-MCNC: 2.9 G/DL (ref 2.3–3.5)
GLUCOSE SERPL-MCNC: 87 MG/DL (ref 70–99)
GLUCOSE UR STRIP.AUTO-MCNC: NEGATIVE MG/DL
HBA1C MFR BLD: 5.5 % (ref 0–5.6)
HCT VFR BLD AUTO: 43 % (ref 35.8–46.3)
HDLC SERPL-MCNC: 70 MG/DL (ref 40–60)
HDLC SERPL: 3 (ref 0–5)
HGB BLD-MCNC: 13.4 G/DL (ref 11.7–15.4)
HGB UR QL STRIP: NEGATIVE
IMM GRANULOCYTES # BLD AUTO: 0 K/UL (ref 0–0.5)
IMM GRANULOCYTES NFR BLD AUTO: 0 % (ref 0–5)
KETONES UR QL STRIP.AUTO: NEGATIVE MG/DL
LDLC SERPL CALC-MCNC: 122 MG/DL (ref 0–100)
LEUKOCYTE ESTERASE UR QL STRIP.AUTO: ABNORMAL
LYMPHOCYTES # BLD: 1.54 K/UL (ref 0.5–4.6)
LYMPHOCYTES NFR BLD: 28.7 % (ref 13–44)
MCH RBC QN AUTO: 30.4 PG (ref 26.1–32.9)
MCHC RBC AUTO-ENTMCNC: 31.2 G/DL (ref 31.4–35)
MCV RBC AUTO: 97.5 FL (ref 82–102)
MONOCYTES # BLD: 0.57 K/UL (ref 0.1–1.3)
MONOCYTES NFR BLD: 10.6 % (ref 4–12)
MUCOUS THREADS URNS QL MICRO: ABNORMAL /LPF
NEUTS SEG # BLD: 2.94 K/UL (ref 1.7–8.2)
NEUTS SEG NFR BLD: 54.9 % (ref 43–78)
NITRITE UR QL STRIP.AUTO: NEGATIVE
NRBC # BLD: 0 K/UL (ref 0–0.2)
OTHER OBSERVATIONS: ABNORMAL
PH UR STRIP: 6.5 (ref 5–9)
PLATELET # BLD AUTO: 228 K/UL (ref 150–450)
PMV BLD AUTO: 11.4 FL (ref 9.4–12.3)
POTASSIUM SERPL-SCNC: 4.5 MMOL/L (ref 3.5–5.1)
PROT SERPL-MCNC: 6.5 G/DL (ref 6.3–8.2)
PROT UR STRIP-MCNC: ABNORMAL MG/DL
RBC # BLD AUTO: 4.41 M/UL (ref 4.05–5.2)
RBC #/AREA URNS HPF: ABNORMAL /HPF
SODIUM SERPL-SCNC: 145 MMOL/L (ref 136–145)
SP GR UR REFRACTOMETRY: 1.02 (ref 1–1.02)
TRIGL SERPL-MCNC: 99 MG/DL (ref 0–150)
TSH, 3RD GENERATION: 2.13 UIU/ML (ref 0.27–4.2)
UROBILINOGEN UR QL STRIP.AUTO: 1 EU/DL (ref 0.2–1)
VLDLC SERPL CALC-MCNC: 20 MG/DL (ref 6–23)
WBC # BLD AUTO: 5.4 K/UL (ref 4.3–11.1)
WBC URNS QL MICRO: ABNORMAL /HPF

## 2025-02-24 PROCEDURE — 1159F MED LIST DOCD IN RCRD: CPT | Performed by: FAMILY MEDICINE

## 2025-02-24 PROCEDURE — G0439 PPPS, SUBSEQ VISIT: HCPCS | Performed by: FAMILY MEDICINE

## 2025-02-24 PROCEDURE — 1123F ACP DISCUSS/DSCN MKR DOCD: CPT | Performed by: FAMILY MEDICINE

## 2025-02-24 PROCEDURE — 99214 OFFICE O/P EST MOD 30 MIN: CPT | Performed by: FAMILY MEDICINE

## 2025-02-24 RX ORDER — GABAPENTIN 100 MG/1
100 CAPSULE ORAL 3 TIMES DAILY
Qty: 90 CAPSULE | Refills: 5 | Status: SHIPPED | OUTPATIENT
Start: 2025-02-24 | End: 2025-08-23

## 2025-02-24 SDOH — ECONOMIC STABILITY: FOOD INSECURITY: WITHIN THE PAST 12 MONTHS, THE FOOD YOU BOUGHT JUST DIDN'T LAST AND YOU DIDN'T HAVE MONEY TO GET MORE.: NEVER TRUE

## 2025-02-24 SDOH — ECONOMIC STABILITY: FOOD INSECURITY: WITHIN THE PAST 12 MONTHS, YOU WORRIED THAT YOUR FOOD WOULD RUN OUT BEFORE YOU GOT MONEY TO BUY MORE.: NEVER TRUE

## 2025-02-24 ASSESSMENT — ENCOUNTER SYMPTOMS
EYES NEGATIVE: 1
GASTROINTESTINAL NEGATIVE: 1
RESPIRATORY NEGATIVE: 1
ALLERGIC/IMMUNOLOGIC NEGATIVE: 1

## 2025-02-24 ASSESSMENT — PATIENT HEALTH QUESTIONNAIRE - PHQ9: DEPRESSION UNABLE TO ASSESS: FUNCTIONAL CAPACITY MOTIVATION LIMITS ACCURACY

## 2025-02-24 NOTE — PROGRESS NOTES
HISTORY OF PRESENT ILLNESS    Angelique Seals is a 89 y.o. female.  HPI  Chief Complaint   Patient presents with    Medicare AWV     See above. Stable on current regimen.  Due to dementia history is mostly from daughter. Patient resides in assisted living facility.  No side effects from BP medication. No headache or vision change. Denies chest pain or SOB. No swelling.  Demential symptoms persist but are stable.  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  History of atrial fib resolved after Watchman's. Followed by cardiology.    Current Outpatient Medications on File Prior to Visit   Medication Sig Dispense Refill    estradiol (ESTRACE) 0.1 MG/GM vaginal cream Place 1 g vaginally daily      Trimethoprim HCl (PRIMSOL) 50 MG/5ML SOLN Take 100 mg by mouth daily 300 mL 5    losartan (COZAAR) 100 MG tablet 1/2 po qam. Increase to 1 po qam if BP increases. (Patient taking differently: Take 1 tablet by mouth daily) 90 tablet 1    magnesium 200 MG TABS tablet Take 1 tablet by mouth daily      memantine (NAMENDA) 10 MG tablet Take one bid 180 tablet 3    B Complex Vitamins (VITAMIN B COMPLEX PO) Take 1 tablet by mouth daily      aspirin EC 81 MG EC tablet Take 1 tablet by mouth daily 90 tablet 1    TURMERIC PO Take 3 capsules by mouth daily Turmeric and Meenakshi      Multiple Vitamins-Minerals (ONE-A-DAY WOMENS PO) Take 1 tablet by mouth daily      AZO-CRANBERRY PO Take 1 tablet by mouth daily      Cholecalciferol 50 MCG (2000 UT) TABS Take 1 tablet by mouth daily       No current facility-administered medications on file prior to visit.     Past Medical History:   Diagnosis Date    Anticoagulant long-term use     Arrhythmia     Atrial fibrillation (HCC)     Chronic back pain Many years ago    H/O complete eye exam Yearly    [facility]    Headache     HLD (hyperlipidemia)     Hypertension     controlled with medication       Review of Systems   Constitutional:

## 2025-02-25 ENCOUNTER — TELEPHONE (OUTPATIENT)
Dept: FAMILY MEDICINE CLINIC | Facility: CLINIC | Age: 89
End: 2025-02-25

## 2025-02-25 DIAGNOSIS — N30.00 ACUTE CYSTITIS WITHOUT HEMATURIA: Primary | ICD-10-CM

## 2025-02-25 NOTE — TELEPHONE ENCOUNTER
----- Message from Dr. Antonio Godfrey MD sent at 2/25/2025  6:17 AM EST -----  Please check if UA was cultured. If no symptoms prefer to repeat in one week but I know it is not easy to get here so the alternative would Cipro 250mg #14 1 po bid. Recheck UA after done with abx  ----- Message -----  From: Esteban Hodges Incoming [place] W/Juanita [employer]  Sent: 2/24/2025   9:32 PM EST  To: Antonio Godfrey Jr., MD

## 2025-02-25 NOTE — TELEPHONE ENCOUNTER
Reviewed results with pt's daughter. She states she will have daughter come by and  cups and collect specimen.

## 2025-03-10 ENCOUNTER — OFFICE VISIT (OUTPATIENT)
Age: 89
End: 2025-03-10
Payer: MEDICARE

## 2025-03-10 VITALS
WEIGHT: 193 LBS | HEIGHT: 68 IN | DIASTOLIC BLOOD PRESSURE: 98 MMHG | SYSTOLIC BLOOD PRESSURE: 144 MMHG | BODY MASS INDEX: 29.25 KG/M2 | HEART RATE: 60 BPM

## 2025-03-10 DIAGNOSIS — I49.3 VENTRICULAR PREMATURE BEATS: Primary | ICD-10-CM

## 2025-03-10 DIAGNOSIS — I10 ESSENTIAL HYPERTENSION: ICD-10-CM

## 2025-03-10 DIAGNOSIS — I25.10 CORONARY ARTERY DISEASE INVOLVING NATIVE CORONARY ARTERY OF NATIVE HEART WITHOUT ANGINA PECTORIS: ICD-10-CM

## 2025-03-10 DIAGNOSIS — Z95.818 PRESENCE OF WATCHMAN LEFT ATRIAL APPENDAGE CLOSURE DEVICE: Chronic | ICD-10-CM

## 2025-03-10 PROCEDURE — 1126F AMNT PAIN NOTED NONE PRSNT: CPT | Performed by: INTERNAL MEDICINE

## 2025-03-10 PROCEDURE — 1123F ACP DISCUSS/DSCN MKR DOCD: CPT | Performed by: INTERNAL MEDICINE

## 2025-03-10 PROCEDURE — 99214 OFFICE O/P EST MOD 30 MIN: CPT | Performed by: INTERNAL MEDICINE

## 2025-03-10 NOTE — PROGRESS NOTES
[address] [facility], [place] [address]  PHONE: [phone]     03/10/25    NAME:  Angelique Seals  : 1935  MRN: [id number]       SUBJECTIVE:   Angelique Seals is a 89 y.o. female seen for a follow up visit regarding the following:     Chief Complaint   Patient presents with    Coronary Artery Disease       HPI:   Here for PAF and CAD.  PPM as well.    Aug 2021 [facility] -mild to mod nonobstructive disease  Echo 2022: normal EF, mod MR, mild AI  Watchman device 3/2023  TANESHA 2023: low normal EF, mild to mod MR  CTA 2024: Ascending thoracic aortic aneurysm measuring 4.5 cm.       Memory worsening, here with daughter.  Living at [facility].     Gabapentin has helped pain.     No CP, RUTH, SOB.     She is asx in Afib, no palpitations.  Has felt well, no angina.   Off the BB, on ARB now.      Patient denies recent history of orthopnea, PND, excessive dizziness and/or syncope.         , 3 kids in town.        Past Medical History, Past Surgical History, Family history, Social History, and Medications were all reviewed with the patient today and updated as necessary.     Current Outpatient Medications   Medication Sig Dispense Refill    gabapentin (NEURONTIN) 100 MG capsule Take 1 capsule by mouth 3 times daily for 180 days. Intended supply: 30 days 90 capsule 5    estradiol (ESTRACE) 0.1 MG/GM vaginal cream Place 1 g vaginally daily      Trimethoprim HCl (PRIMSOL) 50 MG/5ML SOLN Take 100 mg by mouth daily 300 mL 5    losartan (COZAAR) 100 MG tablet 1/2 po qam. Increase to 1 po qam if BP increases. (Patient taking differently: Take 1 tablet by mouth daily) 90 tablet 1    magnesium 200 MG TABS tablet Take 1 tablet by mouth daily      memantine (NAMENDA) 10 MG tablet Take one bid 180 tablet 3    B Complex Vitamins (VITAMIN B COMPLEX PO) Take 1 tablet by mouth daily      aspirin EC 81 MG EC tablet Take 1 tablet by mouth daily 90 tablet 1    TURMERIC PO Take 3 capsules by mouth daily

## 2025-06-10 ENCOUNTER — APPOINTMENT (OUTPATIENT)
Dept: CT IMAGING | Age: 89
DRG: 536 | End: 2025-06-10
Payer: MEDICARE

## 2025-06-10 ENCOUNTER — APPOINTMENT (OUTPATIENT)
Dept: GENERAL RADIOLOGY | Age: 89
DRG: 536 | End: 2025-06-10
Payer: MEDICARE

## 2025-06-10 ENCOUNTER — HOSPITAL ENCOUNTER (INPATIENT)
Age: 89
LOS: 7 days | Discharge: NURSING, REHAB OR LONG-TERM CARE | DRG: 536 | End: 2025-06-18
Attending: STUDENT IN AN ORGANIZED HEALTH CARE EDUCATION/TRAINING PROGRAM | Admitting: INTERNAL MEDICINE
Payer: MEDICARE

## 2025-06-10 ENCOUNTER — PATIENT MESSAGE (OUTPATIENT)
Dept: FAMILY MEDICINE CLINIC | Facility: CLINIC | Age: 89
End: 2025-06-10

## 2025-06-10 DIAGNOSIS — F02.A0 MILD LATE ONSET ALZHEIMER'S DEMENTIA WITHOUT BEHAVIORAL DISTURBANCE, PSYCHOTIC DISTURBANCE, MOOD DISTURBANCE, OR ANXIETY (HCC): Primary | ICD-10-CM

## 2025-06-10 DIAGNOSIS — R26.9 NEUROLOGIC GAIT DYSFUNCTION: ICD-10-CM

## 2025-06-10 DIAGNOSIS — G30.1 MILD LATE ONSET ALZHEIMER'S DEMENTIA WITHOUT BEHAVIORAL DISTURBANCE, PSYCHOTIC DISTURBANCE, MOOD DISTURBANCE, OR ANXIETY (HCC): Primary | ICD-10-CM

## 2025-06-10 DIAGNOSIS — S72.141C: ICD-10-CM

## 2025-06-10 DIAGNOSIS — S72.001A CLOSED FRACTURE OF RIGHT HIP, INITIAL ENCOUNTER (HCC): Primary | ICD-10-CM

## 2025-06-10 LAB
ALBUMIN SERPL-MCNC: 3 G/DL (ref 3.2–4.6)
ALBUMIN/GLOB SERPL: 0.9 (ref 1–1.9)
ALP SERPL-CCNC: 84 U/L (ref 35–104)
ALT SERPL-CCNC: 19 U/L (ref 8–45)
ANION GAP SERPL CALC-SCNC: 10 MMOL/L (ref 7–16)
AST SERPL-CCNC: 31 U/L (ref 15–37)
BASOPHILS # BLD: 0.03 K/UL (ref 0–0.2)
BASOPHILS NFR BLD: 0.3 % (ref 0–2)
BILIRUB SERPL-MCNC: 0.6 MG/DL (ref 0–1.2)
BUN SERPL-MCNC: 26 MG/DL (ref 8–23)
CALCIUM SERPL-MCNC: 9 MG/DL (ref 8.8–10.2)
CHLORIDE SERPL-SCNC: 108 MMOL/L (ref 98–107)
CK SERPL-CCNC: 346 U/L (ref 21–215)
CO2 SERPL-SCNC: 28 MMOL/L (ref 20–29)
CREAT SERPL-MCNC: 1.01 MG/DL (ref 0.6–1.1)
DIFFERENTIAL METHOD BLD: ABNORMAL
EOSINOPHIL # BLD: 0.1 K/UL (ref 0–0.8)
EOSINOPHIL NFR BLD: 1 % (ref 0.5–7.8)
ERYTHROCYTE [DISTWIDTH] IN BLOOD BY AUTOMATED COUNT: 13.9 % (ref 11.9–14.6)
GLOBULIN SER CALC-MCNC: 3.2 G/DL (ref 2.3–3.5)
GLUCOSE SERPL-MCNC: 125 MG/DL (ref 70–99)
HCT VFR BLD AUTO: 37.4 % (ref 35.8–46.3)
HGB BLD-MCNC: 12 G/DL (ref 11.7–15.4)
IMM GRANULOCYTES # BLD AUTO: 0.04 K/UL (ref 0–0.5)
IMM GRANULOCYTES NFR BLD AUTO: 0.4 % (ref 0–5)
LACTATE SERPL-SCNC: 1.5 MMOL/L (ref 0.5–2)
LIPASE SERPL-CCNC: 27 U/L (ref 13–60)
LYMPHOCYTES # BLD: 1.61 K/UL (ref 0.5–4.6)
LYMPHOCYTES NFR BLD: 16.2 % (ref 13–44)
MAGNESIUM SERPL-MCNC: 2.1 MG/DL (ref 1.8–2.4)
MCH RBC QN AUTO: 30.8 PG (ref 26.1–32.9)
MCHC RBC AUTO-ENTMCNC: 32.1 G/DL (ref 31.4–35)
MCV RBC AUTO: 95.9 FL (ref 82–102)
MONOCYTES # BLD: 0.97 K/UL (ref 0.1–1.3)
MONOCYTES NFR BLD: 9.7 % (ref 4–12)
NEUTS SEG # BLD: 7.2 K/UL (ref 1.7–8.2)
NEUTS SEG NFR BLD: 72.4 % (ref 43–78)
NRBC # BLD: 0 K/UL (ref 0–0.2)
PLATELET # BLD AUTO: 171 K/UL (ref 150–450)
PMV BLD AUTO: 11.7 FL (ref 9.4–12.3)
POTASSIUM SERPL-SCNC: 4.2 MMOL/L (ref 3.5–5.1)
PROCALCITONIN SERPL-MCNC: 0.1 NG/ML (ref 0–0.1)
PROT SERPL-MCNC: 6.1 G/DL (ref 6.3–8.2)
RBC # BLD AUTO: 3.9 M/UL (ref 4.05–5.2)
SODIUM SERPL-SCNC: 145 MMOL/L (ref 136–145)
TROPONIN T SERPL HS-MCNC: 42.9 NG/L (ref 0–14)
WBC # BLD AUTO: 10 K/UL (ref 4.3–11.1)

## 2025-06-10 PROCEDURE — 84145 PROCALCITONIN (PCT): CPT

## 2025-06-10 PROCEDURE — 83690 ASSAY OF LIPASE: CPT

## 2025-06-10 PROCEDURE — 71045 X-RAY EXAM CHEST 1 VIEW: CPT

## 2025-06-10 PROCEDURE — 73502 X-RAY EXAM HIP UNI 2-3 VIEWS: CPT

## 2025-06-10 PROCEDURE — 73552 X-RAY EXAM OF FEMUR 2/>: CPT

## 2025-06-10 PROCEDURE — 83735 ASSAY OF MAGNESIUM: CPT

## 2025-06-10 PROCEDURE — 2580000003 HC RX 258: Performed by: STUDENT IN AN ORGANIZED HEALTH CARE EDUCATION/TRAINING PROGRAM

## 2025-06-10 PROCEDURE — 85025 COMPLETE CBC W/AUTO DIFF WBC: CPT

## 2025-06-10 PROCEDURE — 80053 COMPREHEN METABOLIC PANEL: CPT

## 2025-06-10 PROCEDURE — 96375 TX/PRO/DX INJ NEW DRUG ADDON: CPT

## 2025-06-10 PROCEDURE — 93005 ELECTROCARDIOGRAM TRACING: CPT | Performed by: STUDENT IN AN ORGANIZED HEALTH CARE EDUCATION/TRAINING PROGRAM

## 2025-06-10 PROCEDURE — 96374 THER/PROPH/DIAG INJ IV PUSH: CPT

## 2025-06-10 PROCEDURE — 84484 ASSAY OF TROPONIN QUANT: CPT

## 2025-06-10 PROCEDURE — 83605 ASSAY OF LACTIC ACID: CPT

## 2025-06-10 PROCEDURE — 6360000002 HC RX W HCPCS: Performed by: STUDENT IN AN ORGANIZED HEALTH CARE EDUCATION/TRAINING PROGRAM

## 2025-06-10 PROCEDURE — 99285 EMERGENCY DEPT VISIT HI MDM: CPT

## 2025-06-10 PROCEDURE — 82550 ASSAY OF CK (CPK): CPT

## 2025-06-10 RX ORDER — ONDANSETRON 2 MG/ML
4 INJECTION INTRAMUSCULAR; INTRAVENOUS
Status: COMPLETED | OUTPATIENT
Start: 2025-06-10 | End: 2025-06-10

## 2025-06-10 RX ORDER — MORPHINE SULFATE 4 MG/ML
4 INJECTION, SOLUTION INTRAMUSCULAR; INTRAVENOUS
Status: COMPLETED | OUTPATIENT
Start: 2025-06-10 | End: 2025-06-10

## 2025-06-10 RX ADMIN — ONDANSETRON 4 MG: 2 INJECTION, SOLUTION INTRAMUSCULAR; INTRAVENOUS at 22:34

## 2025-06-10 RX ADMIN — MORPHINE SULFATE 4 MG: 4 INJECTION INTRAVENOUS at 22:35

## 2025-06-10 RX ADMIN — SODIUM CHLORIDE 1000 ML: 0.9 INJECTION, SOLUTION INTRAVENOUS at 22:55

## 2025-06-10 ASSESSMENT — PAIN - FUNCTIONAL ASSESSMENT: PAIN_FUNCTIONAL_ASSESSMENT: 0-10

## 2025-06-10 ASSESSMENT — PAIN SCALES - GENERAL: PAINLEVEL_OUTOF10: 5

## 2025-06-11 ENCOUNTER — ANESTHESIA EVENT (OUTPATIENT)
Dept: SURGERY | Age: 89
End: 2025-06-11
Payer: MEDICARE

## 2025-06-11 ENCOUNTER — ANESTHESIA (OUTPATIENT)
Dept: SURGERY | Age: 89
End: 2025-06-11
Payer: MEDICARE

## 2025-06-11 ENCOUNTER — APPOINTMENT (OUTPATIENT)
Dept: CT IMAGING | Age: 89
DRG: 536 | End: 2025-06-11
Payer: MEDICARE

## 2025-06-11 PROBLEM — S72.141C: Status: ACTIVE | Noted: 2025-06-11

## 2025-06-11 LAB
APPEARANCE UR: CLEAR
BACTERIA URNS QL MICRO: NEGATIVE /HPF
BILIRUB UR QL: NEGATIVE
COLOR UR: ABNORMAL
EKG ATRIAL RATE: 133 BPM
EKG DIAGNOSIS: NORMAL
EKG Q-T INTERVAL: 374 MS
EKG QRS DURATION: 137 MS
EKG QTC CALCULATION (BAZETT): 485 MS
EKG R AXIS: -44 DEGREES
EKG T AXIS: 20 DEGREES
EKG VENTRICULAR RATE: 101 BPM
EPI CELLS #/AREA URNS HPF: ABNORMAL /HPF
GLUCOSE UR STRIP.AUTO-MCNC: NEGATIVE MG/DL
HGB UR QL STRIP: NEGATIVE
HYALINE CASTS URNS QL MICRO: ABNORMAL /LPF
KETONES UR QL STRIP.AUTO: NEGATIVE MG/DL
LEUKOCYTE ESTERASE UR QL STRIP.AUTO: NEGATIVE
MAGNESIUM SERPL-MCNC: 2.1 MG/DL (ref 1.8–2.4)
NITRITE UR QL STRIP.AUTO: NEGATIVE
PH UR STRIP: 6 (ref 5–9)
POTASSIUM SERPL-SCNC: 4.1 MMOL/L (ref 3.5–5.1)
PROT UR STRIP-MCNC: ABNORMAL MG/DL
RBC #/AREA URNS HPF: ABNORMAL /HPF
SP GR UR REFRACTOMETRY: 1.02 (ref 1–1.02)
UROBILINOGEN UR QL STRIP.AUTO: 0.2 EU/DL (ref 0.2–1)
WBC URNS QL MICRO: ABNORMAL /HPF

## 2025-06-11 PROCEDURE — 99223 1ST HOSP IP/OBS HIGH 75: CPT | Performed by: ORTHOPAEDIC SURGERY

## 2025-06-11 PROCEDURE — 6360000002 HC RX W HCPCS: Performed by: INTERNAL MEDICINE

## 2025-06-11 PROCEDURE — 2500000003 HC RX 250 WO HCPCS: Performed by: INTERNAL MEDICINE

## 2025-06-11 PROCEDURE — 2000000000 HC ICU R&B

## 2025-06-11 PROCEDURE — 3E033XZ INTRODUCTION OF VASOPRESSOR INTO PERIPHERAL VEIN, PERCUTANEOUS APPROACH: ICD-10-PCS | Performed by: ORTHOPAEDIC SURGERY

## 2025-06-11 PROCEDURE — 70450 CT HEAD/BRAIN W/O DYE: CPT

## 2025-06-11 PROCEDURE — 72125 CT NECK SPINE W/O DYE: CPT

## 2025-06-11 PROCEDURE — 93010 ELECTROCARDIOGRAM REPORT: CPT | Performed by: INTERNAL MEDICINE

## 2025-06-11 PROCEDURE — 2580000003 HC RX 258: Performed by: INTERNAL MEDICINE

## 2025-06-11 PROCEDURE — 81001 URINALYSIS AUTO W/SCOPE: CPT

## 2025-06-11 PROCEDURE — 6370000000 HC RX 637 (ALT 250 FOR IP): Performed by: INTERNAL MEDICINE

## 2025-06-11 PROCEDURE — 36415 COLL VENOUS BLD VENIPUNCTURE: CPT

## 2025-06-11 PROCEDURE — 2700000000 HC OXYGEN THERAPY PER DAY

## 2025-06-11 PROCEDURE — 84132 ASSAY OF SERUM POTASSIUM: CPT

## 2025-06-11 PROCEDURE — 83735 ASSAY OF MAGNESIUM: CPT

## 2025-06-11 RX ORDER — BISACODYL 5 MG/1
5 TABLET, DELAYED RELEASE ORAL DAILY
Status: DISCONTINUED | OUTPATIENT
Start: 2025-06-12 | End: 2025-06-12 | Stop reason: SDUPTHER

## 2025-06-11 RX ORDER — DOPAMINE HYDROCHLORIDE 320 MG/100ML
1-20 INJECTION, SOLUTION INTRAVENOUS CONTINUOUS
Status: DISCONTINUED | OUTPATIENT
Start: 2025-06-11 | End: 2025-06-11

## 2025-06-11 RX ORDER — SODIUM CHLORIDE 9 MG/ML
INJECTION, SOLUTION INTRAVENOUS CONTINUOUS
Status: ACTIVE | OUTPATIENT
Start: 2025-06-11 | End: 2025-06-12

## 2025-06-11 RX ORDER — ONDANSETRON 4 MG/1
4 TABLET, ORALLY DISINTEGRATING ORAL EVERY 8 HOURS PRN
Status: DISCONTINUED | OUTPATIENT
Start: 2025-06-11 | End: 2025-06-12 | Stop reason: SDUPTHER

## 2025-06-11 RX ORDER — POTASSIUM CHLORIDE 7.45 MG/ML
10 INJECTION INTRAVENOUS PRN
Status: DISCONTINUED | OUTPATIENT
Start: 2025-06-11 | End: 2025-06-18 | Stop reason: HOSPADM

## 2025-06-11 RX ORDER — GABAPENTIN 100 MG/1
100 CAPSULE ORAL 3 TIMES DAILY
Status: DISCONTINUED | OUTPATIENT
Start: 2025-06-11 | End: 2025-06-18 | Stop reason: HOSPADM

## 2025-06-11 RX ORDER — OXYCODONE HYDROCHLORIDE 5 MG/1
5 TABLET ORAL EVERY 4 HOURS PRN
Status: DISCONTINUED | OUTPATIENT
Start: 2025-06-11 | End: 2025-06-12 | Stop reason: SDUPTHER

## 2025-06-11 RX ORDER — LOSARTAN POTASSIUM 50 MG/1
50 TABLET ORAL DAILY
Status: DISCONTINUED | OUTPATIENT
Start: 2025-06-11 | End: 2025-06-11

## 2025-06-11 RX ORDER — MAGNESIUM HYDROXIDE/ALUMINUM HYDROXICE/SIMETHICONE 120; 1200; 1200 MG/30ML; MG/30ML; MG/30ML
30 SUSPENSION ORAL EVERY 6 HOURS PRN
Status: DISCONTINUED | OUTPATIENT
Start: 2025-06-11 | End: 2025-06-18 | Stop reason: HOSPADM

## 2025-06-11 RX ORDER — SODIUM CHLORIDE 0.9 % (FLUSH) 0.9 %
5-40 SYRINGE (ML) INJECTION PRN
Status: DISCONTINUED | OUTPATIENT
Start: 2025-06-11 | End: 2025-06-18 | Stop reason: HOSPADM

## 2025-06-11 RX ORDER — MAGNESIUM SULFATE IN WATER 40 MG/ML
2000 INJECTION, SOLUTION INTRAVENOUS PRN
Status: DISCONTINUED | OUTPATIENT
Start: 2025-06-11 | End: 2025-06-18 | Stop reason: HOSPADM

## 2025-06-11 RX ORDER — SODIUM CHLORIDE 9 MG/ML
INJECTION, SOLUTION INTRAVENOUS PRN
Status: DISCONTINUED | OUTPATIENT
Start: 2025-06-11 | End: 2025-06-18 | Stop reason: HOSPADM

## 2025-06-11 RX ORDER — 0.9 % SODIUM CHLORIDE 0.9 %
1000 INTRAVENOUS SOLUTION INTRAVENOUS
Status: COMPLETED | OUTPATIENT
Start: 2025-06-11 | End: 2025-06-11

## 2025-06-11 RX ORDER — POLYETHYLENE GLYCOL 3350 17 G/17G
17 POWDER, FOR SOLUTION ORAL DAILY PRN
Status: DISCONTINUED | OUTPATIENT
Start: 2025-06-11 | End: 2025-06-18 | Stop reason: HOSPADM

## 2025-06-11 RX ORDER — NOREPINEPHRINE BITARTRATE 0.02 MG/ML
1-100 INJECTION, SOLUTION INTRAVENOUS CONTINUOUS
Status: DISCONTINUED | OUTPATIENT
Start: 2025-06-11 | End: 2025-06-13

## 2025-06-11 RX ORDER — FAMOTIDINE 10 MG
10 TABLET ORAL 2 TIMES DAILY PRN
Status: DISCONTINUED | OUTPATIENT
Start: 2025-06-11 | End: 2025-06-18 | Stop reason: HOSPADM

## 2025-06-11 RX ORDER — ESTRADIOL 0.1 MG/G
1 CREAM VAGINAL DAILY
Status: DISCONTINUED | OUTPATIENT
Start: 2025-06-11 | End: 2025-06-11 | Stop reason: ALTCHOICE

## 2025-06-11 RX ORDER — METAXALONE 800 MG/1
800 TABLET ORAL EVERY 8 HOURS PRN
Status: DISCONTINUED | OUTPATIENT
Start: 2025-06-11 | End: 2025-06-18 | Stop reason: HOSPADM

## 2025-06-11 RX ORDER — BISACODYL 10 MG
10 SUPPOSITORY, RECTAL RECTAL DAILY PRN
Status: DISCONTINUED | OUTPATIENT
Start: 2025-06-11 | End: 2025-06-18 | Stop reason: HOSPADM

## 2025-06-11 RX ORDER — B-COMPLEX WITH VITAMIN C
1 TABLET ORAL DAILY
Status: DISCONTINUED | OUTPATIENT
Start: 2025-06-11 | End: 2025-06-11 | Stop reason: ALTCHOICE

## 2025-06-11 RX ORDER — SODIUM CHLORIDE 0.9 % (FLUSH) 0.9 %
5-40 SYRINGE (ML) INJECTION EVERY 12 HOURS SCHEDULED
Status: DISCONTINUED | OUTPATIENT
Start: 2025-06-11 | End: 2025-06-18 | Stop reason: HOSPADM

## 2025-06-11 RX ORDER — POTASSIUM CHLORIDE 1500 MG/1
40 TABLET, EXTENDED RELEASE ORAL PRN
Status: DISCONTINUED | OUTPATIENT
Start: 2025-06-11 | End: 2025-06-18 | Stop reason: HOSPADM

## 2025-06-11 RX ORDER — MEMANTINE HYDROCHLORIDE 5 MG/1
10 TABLET ORAL 2 TIMES DAILY
Status: DISCONTINUED | OUTPATIENT
Start: 2025-06-11 | End: 2025-06-18 | Stop reason: HOSPADM

## 2025-06-11 RX ORDER — MORPHINE SULFATE 2 MG/ML
2 INJECTION, SOLUTION INTRAMUSCULAR; INTRAVENOUS
Status: DISCONTINUED | OUTPATIENT
Start: 2025-06-11 | End: 2025-06-18 | Stop reason: HOSPADM

## 2025-06-11 RX ORDER — VITAMIN B COMPLEX
1000 TABLET ORAL DAILY
Status: DISCONTINUED | OUTPATIENT
Start: 2025-06-11 | End: 2025-06-18 | Stop reason: HOSPADM

## 2025-06-11 RX ORDER — OXYCODONE HYDROCHLORIDE 5 MG/1
10 TABLET ORAL EVERY 4 HOURS PRN
Status: DISCONTINUED | OUTPATIENT
Start: 2025-06-11 | End: 2025-06-12 | Stop reason: SDUPTHER

## 2025-06-11 RX ORDER — MULTIVITAMIN WITH IRON
1 TABLET ORAL DAILY
Status: DISCONTINUED | OUTPATIENT
Start: 2025-06-11 | End: 2025-06-18 | Stop reason: HOSPADM

## 2025-06-11 RX ORDER — LANOLIN ALCOHOL/MO/W.PET/CERES
400 CREAM (GRAM) TOPICAL DAILY
Status: DISCONTINUED | OUTPATIENT
Start: 2025-06-11 | End: 2025-06-18 | Stop reason: HOSPADM

## 2025-06-11 RX ORDER — 0.9 % SODIUM CHLORIDE 0.9 %
1000 INTRAVENOUS SOLUTION INTRAVENOUS
Status: COMPLETED | OUTPATIENT
Start: 2025-06-11 | End: 2025-06-10

## 2025-06-11 RX ORDER — ACETAMINOPHEN 500 MG
1000 TABLET ORAL 3 TIMES DAILY
Status: DISCONTINUED | OUTPATIENT
Start: 2025-06-11 | End: 2025-06-18 | Stop reason: HOSPADM

## 2025-06-11 RX ORDER — LOSARTAN POTASSIUM 50 MG/1
100 TABLET ORAL DAILY
Status: DISCONTINUED | OUTPATIENT
Start: 2025-06-11 | End: 2025-06-11

## 2025-06-11 RX ORDER — ENOXAPARIN SODIUM 100 MG/ML
40 INJECTION SUBCUTANEOUS DAILY
Status: DISCONTINUED | OUTPATIENT
Start: 2025-06-11 | End: 2025-06-18 | Stop reason: HOSPADM

## 2025-06-11 RX ORDER — ONDANSETRON 2 MG/ML
4 INJECTION INTRAMUSCULAR; INTRAVENOUS EVERY 6 HOURS PRN
Status: DISCONTINUED | OUTPATIENT
Start: 2025-06-11 | End: 2025-06-12 | Stop reason: SDUPTHER

## 2025-06-11 RX ORDER — MORPHINE SULFATE 4 MG/ML
4 INJECTION, SOLUTION INTRAMUSCULAR; INTRAVENOUS
Status: DISCONTINUED | OUTPATIENT
Start: 2025-06-11 | End: 2025-06-18 | Stop reason: HOSPADM

## 2025-06-11 RX ADMIN — CEFTRIAXONE 1000 MG: 1 INJECTION, POWDER, FOR SOLUTION INTRAMUSCULAR; INTRAVENOUS at 06:10

## 2025-06-11 RX ADMIN — OXYCODONE 5 MG: 5 TABLET ORAL at 14:20

## 2025-06-11 RX ADMIN — GABAPENTIN 100 MG: 100 CAPSULE ORAL at 09:11

## 2025-06-11 RX ADMIN — ENOXAPARIN SODIUM 40 MG: 100 INJECTION SUBCUTANEOUS at 06:12

## 2025-06-11 RX ADMIN — SODIUM CHLORIDE, PRESERVATIVE FREE 10 ML: 5 INJECTION INTRAVENOUS at 21:34

## 2025-06-11 RX ADMIN — SODIUM CHLORIDE 6 MCG/MIN: 0.9 INJECTION, SOLUTION INTRAVENOUS at 21:34

## 2025-06-11 RX ADMIN — OXYCODONE 10 MG: 5 TABLET ORAL at 15:53

## 2025-06-11 RX ADMIN — GABAPENTIN 100 MG: 100 CAPSULE ORAL at 20:33

## 2025-06-11 RX ADMIN — GABAPENTIN 100 MG: 100 CAPSULE ORAL at 14:20

## 2025-06-11 RX ADMIN — SODIUM CHLORIDE 5 MCG/MIN: 0.9 INJECTION, SOLUTION INTRAVENOUS at 08:41

## 2025-06-11 RX ADMIN — OXYCODONE 10 MG: 5 TABLET ORAL at 20:33

## 2025-06-11 RX ADMIN — ACETAMINOPHEN 1000 MG: 500 TABLET, FILM COATED ORAL at 09:11

## 2025-06-11 RX ADMIN — MULTIVITAMIN TABLET 1 TABLET: TABLET at 09:11

## 2025-06-11 RX ADMIN — MORPHINE SULFATE 2 MG: 2 INJECTION, SOLUTION INTRAMUSCULAR; INTRAVENOUS at 22:19

## 2025-06-11 RX ADMIN — Medication 1000 UNITS: at 09:11

## 2025-06-11 RX ADMIN — MEMANTINE 10 MG: 5 TABLET ORAL at 20:33

## 2025-06-11 RX ADMIN — MEMANTINE 10 MG: 5 TABLET ORAL at 09:10

## 2025-06-11 RX ADMIN — SODIUM CHLORIDE 1000 ML: 0.9 INJECTION, SOLUTION INTRAVENOUS at 02:03

## 2025-06-11 RX ADMIN — MAGNESIUM GLUCONATE 500 MG ORAL TABLET 400 MG: 500 TABLET ORAL at 09:11

## 2025-06-11 RX ADMIN — DOPAMINE HYDROCHLORIDE 5 MCG/KG/MIN: 320 INJECTION, SOLUTION INTRAVENOUS at 04:04

## 2025-06-11 RX ADMIN — SODIUM CHLORIDE: 0.9 INJECTION, SOLUTION INTRAVENOUS at 15:17

## 2025-06-11 ASSESSMENT — PAIN SCALES - GENERAL
PAINLEVEL_OUTOF10: 0
PAINLEVEL_OUTOF10: 1

## 2025-06-11 NOTE — INTERDISCIPLINARY ROUNDS
Multi-D Rounds/Checklist (leapfrog):  Lines: can any be removed?: None    Urinary Catheter 06/11/25 Sanchez (Active)      DVT Prophylaxis: Ordered  Vent: N/A  Nutrition Ordered/appropriate: Ordered  Can antibiotics or other drugs be stopped? Yes/End Date set Yes  MRSA swab:   Inpat Anti-Infectives (From admission, onward)       Start     Ordered Stop    06/11/25 0600  cefTRIAXone (ROCEPHIN) 1,000 mg in sterile water 10 mL IV syringe  1,000 mg,   IntraVENous,   EVERY 24 HOURS         06/11/25 0526 06/16/25 0559                  Consults needed: None  A: Is pain control adequate? (has PRNs? Stop drip?) Yes  B: Sedation break and SBT? N/A  C: Is sedation choice appropriate? N/A  D: Delirium/CAM-ICU? No  E: Mobility goals/appropriateness? Yes  F: Family update and plan? daughter is surrogate decision maker and is being updated daily by primary attending and nursing staff.    Jhoana Harrington, APRN - CNP

## 2025-06-11 NOTE — ED NOTES
TRANSFER - OUT REPORT:    Verbal report given to Maria Teresa RAINEY on Angelique Seals  being transferred to [facility] for routine progression of patient care       Report consisted of patient's Situation, Background, Assessment and   Recommendations(SBAR).     Information from the following report(s) Nurse Handoff Report, ED Encounter Summary, ED SBAR, Adult Overview, MAR, Recent Results, and Neuro Assessment was reviewed with the receiving nurse.    [place] Fall Assessment:    Presents to emergency department  because of falls (Syncope, seizure, or loss of consciousness): No  Age > 70: Yes  Altered Mental Status, Intoxication with alcohol or substance confusion (Disorientation, impaired judgment, poor safety awaremess, or inability to follow instructions): Yes  Impaired Mobility: Ambulates or transfers with assistive devices or assistance; Unable to ambulate or transer.: Yes  Nursing Judgement: Yes          Lines:   Peripheral IV 06/10/25 Right Antecubital (Active)        Opportunity for questions and clarification was provided.      Patient transported with:  Registered Nurse          Madison, Maggi, RN  06/11/25 0116

## 2025-06-11 NOTE — ED TRIAGE NOTES
Pt BIB EMS from [facility]. Staff reports increased confusion, weakness, generalized pain.  Pt has Pt had a fall yesterday morning, x-rays were down but they don't have results. Pt with possible UTI symptoms as well. Pt alert x 1, baseline dementia. Vitals WNL

## 2025-06-11 NOTE — H&P
Hospitalist History and Physical   Admit Date:  6/10/2025  9:31 PM   Name:  Angelique Seals   Age:  89 y.o.  Sex:  female  :  1935   MRN:  [id number]   Room:  [place] [address]    Presenting/Chief Complaint: Altered Mental Status and Fatigue     Reason(s) for Admission: No admission diagnoses are documented for this encounter.     History of Present Illness:   Angelique Seals is a 89 y.o. female with medical history of dementia, hypertension, peripheral neuropathy, Alzheimer's [place] to [facility] [place] because she had a fall at home resulting in intertrochanter hip involving right femur.   The patient became intensely confused and not herself not being able to get up to go to the bathroom.  Not sure how well she is taking her medications or eating the food according to the daughters.  Patient apparently lives alone      Assessment & Plan:     Active Problems:  Right hip fracture  We will consult Ortho for repair  Patient to remain n.p.o. tonight  Anticoagulation has been on hold since yesterday  2 liter of fluid boluses bp trending down.  Added dopamine  Targeting icu     Hypertension  Losartan 100 mg daily    Alzheimer's dementia  On Namenda 10 twice daily    Peripheral neuropathy   on [id number] mg 3 times a day      PT/OT evals ordered?  Defer to primary team    Diet: N.p.o.    VTE prophylaxis: Lovenox    Code status: Full code    Code status discussed: Yes    Blood consent obtained: N/A      Non-peripheral Lines and Tubes (if present):             Hospital Problems:  Active Problems:    * No active hospital problems. *  Resolved Problems:    * No resolved hospital problems. *        Objective:   Patient Vitals for the past 24 hrs:   Temp Pulse Resp BP SpO2   25 0045 -- 85 (!) 9 (!) 79/53 95 %   25 0039 -- 82 10 (!) 93/56 94 %   06/10/25 2345 -- 77 10 104/65 99 %   06/10/25 2331 -- 80 11 99/60 98 %   06/10/25 2316 -- 84 12 104/69 97 %   06/10/25 2300 -- 90 11 (!) 84/69 95 %   06/10/25 2255  from 6/13/2024. FINDINGS: There is prominence of the ventricles and sulci indicative of atrophy. Periventricular and deep white matter hypoattenuation likely relates to chronic small vessel ischemic disease. No midline shift, mass effect, or extra-axial fluid collections are identified. No acute intracranial hemorrhage, mass, or acute territorial infarction is present per CT criteria. Gray-white junction is preserved. Calvarium is intact.  Visualized paranasal sinuses are clear. There is incomplete fusion of the posterior arch of C1 on a developmental basis.     1. No acute intracranial process. If clinical concern for acute ischemia, MRI is a more sensitive exam. 2. Chronic small vessel ischemic disease. 3. Atrophy. Electronically signed by Boomi Ngoc    XR CHEST PORTABLE  Result Date: 6/10/2025  Chest X-ray INDICATION: Mental status changes. Fall. Pain. COMPARISON: Chest x-ray from 6/13/2024. TECHNIQUE: AP/PA view of the chest was obtained. FINDINGS: Cardiac size and pulmonary vasculature are within normal limits. No consolidation, pleural effusion, or pneumothorax is present. Left-sided pacemaker is present. Degenerative changes are identified.     No acute cardiopulmonary process. Electronically signed by Miguel A Grossman        Signed:  Marcial Madrid MD    Part of this note may have been written by using a voice dictation software.  The note has been proof read but may still contain some grammatical/other typographical errors.

## 2025-06-11 NOTE — CARE COORDINATION
Case Management Assessment  Initial Evaluation    Date/Time of Evaluation: 6/11/2025 3:18 PM  Assessment Completed by: Latonya Talavera RN    If patient is discharged prior to next notation, then this note serves as note for discharge by case management.    Patient Name: Angelique Seals                   YOB: 1935  Diagnosis: Closed fracture of right hip, initial encounter ([facility]) [S72.001A]  Intertrochanteric fracture of right hip, open type III, initial encounter ([facility]) [S72.141C]                   Date / Time: 6/10/2025  9:31 PM    Patient Admission Status: Inpatient   Readmission Risk (Low < 19, Mod (19-27), High > 27): Readmission Risk Score: 9.9    Current PCP: Antonio Godfrey Jr., MD  PCP verified by CM? (P) Yes    Chart Reviewed: Yes      History Provided by: Child/Family  Patient Orientation: Alert and Oriented    Patient Cognition: Alert (has dementia)    Hospitalization in the last 30 days (Readmission):  No    If yes, Readmission Assessment in  Navigator will be completed.    Advance Directives:      Code Status: DNR   Patient's Primary Decision Maker is: (P) Legal Next of Kin    Primary Decision Maker: MarylouKangIzabella - Child - [phone]    Secondary Decision Maker: Celestina Khoury - Child - [phone]    Secondary Decision Maker: BozenaFidel - Child - [phone]    Discharge Planning:    Patient lives with:   Type of Home: (P) Skilled Nursing Facility  Primary Care Giver: Other (Comment)  Patient Support Systems include: Children, Family Members   Current Financial resources: (P) Medicare ([facility])  Current community resources: (P) Other (Comment), Assisted Living ([facility])  Current services prior to admission: (P) Durable Medical Equipment            Current DME: (P) Walker, Wheelchair            Type of Home Care services:   detention    ADLS  Prior functional level: (P) Assistance with the following:  Current functional level: (P) Assistance with the  [T37.999L]

## 2025-06-11 NOTE — PERIOP NOTE
Procedure for today cancelled per Dr. Suh and Dr. Hays due to patient having eaten breakfast.   no yes

## 2025-06-11 NOTE — ED PROVIDER NOTES
Emergency Department Provider Note       [facility] EMERGENCY DEPT   PCP: Antonio Godfrey Jr., MD   Age: 89 y.o.   Sex: female     DISPOSITION      ICD-10-CM    1. Closed fracture of right hip, initial encounter ([facility])  S72.001A           Medical Decision Making     89-year-old female patient with history of dementia presenting this department with vague complaints of pain, seems tied to the right lower extremity.  Concern for hip injury on my initial assessment.  Patient also has a low-grade fever, caregivers at her facility report intermittent altered mental status and family at bedside states patient is at her baseline.  Broad-based workup to include labs with sepsis markers, CT imaging of the head and neck and plain film imaging of the chest and head    X-ray imaging shows evidence of close right hip fracture.  Will notify orthopedic provider on-call although patient may not be a surgical candidate given her extensive health history, she is nearly bedbound at baseline.  Notified family will discuss.  CT imaging the head and neck are pending.  Will obtain urinalysis as well  Hospitalist notified of need for admission for  ED Course as of 06/11/25 0011   Tue Kameron 10, 2025   2332 Procalcitonin, lactic acid within normal limits.  Initial troponin slightly elevated at 42, CK of 346 [BR]      ED Course User Index  [BR] Roberto Beaver R, DO     1 or more acute illnesses that pose a threat to life or bodily function.   1 or more chronic illnesses with a severe exacerbation or progression.  Parental controlled substances given in the ED.  Discussion with external consultants.  Chronic medical problems impacting care include history of dementia.  Shared medical decision making was utilized in creating the patients health plan today.  I independently ordered and reviewed each unique test.    I reviewed external records: ED visit note from a different ED.   I reviewed external records: provider visit note from PCP.  I  Hemoglobin 12.0 11.7 - 15.4 g/dL    Hematocrit 37.4 35.8 - 46.3 %    MCV 95.9 82 - 102 FL    MCH 30.8 26.1 - 32.9 PG    MCHC 32.1 31.4 - 35.0 g/dL    RDW 13.9 11.9 - 14.6 %    Platelets 171 150 - 450 K/uL    MPV 11.7 9.4 - 12.3 FL    nRBC 0.00 0.0 - 0.2 K/uL    Differential Type AUTOMATED      Neutrophils % 72.4 43.0 - 78.0 %    Lymphocytes % 16.2 13.0 - 44.0 %    Monocytes % 9.7 4.0 - 12.0 %    Eosinophils % 1.0 0.5 - 7.8 %    Basophils % 0.3 0.0 - 2.0 %    Immature Granulocytes % 0.4 0.0 - 5.0 %    Neutrophils Absolute 7.20 1.70 - 8.20 K/UL    Lymphocytes Absolute 1.61 0.50 - 4.60 K/UL    Monocytes Absolute 0.97 0.10 - 1.30 K/UL    Eosinophils Absolute 0.10 0.00 - 0.80 K/UL    Basophils Absolute 0.03 0.00 - 0.20 K/UL    Immature Granulocytes Absolute 0.04 0.0 - 0.5 K/UL   Comprehensive Metabolic Panel   Result Value Ref Range    Sodium 145 136 - 145 mmol/L    Potassium 4.2 3.5 - 5.1 mmol/L    Chloride 108 (H) 98 - 107 mmol/L    CO2 28 20 - 29 mmol/L    Anion Gap 10 7 - 16 mmol/L    Glucose 125 (H) 70 - 99 mg/dL    BUN 26 (H) 8 - 23 MG/DL    Creatinine 1.01 0.60 - 1.10 MG/DL    Est, Glom Filt Rate 53 (L) >60 ml/min/1.73m2    Calcium 9.0 8.8 - 10.2 MG/DL    Total Bilirubin 0.6 0.0 - 1.2 MG/DL    ALT 19 8 - 45 U/L    AST 31 15 - 37 U/L    Alk Phosphatase 84 35 - 104 U/L    Total Protein 6.1 (L) 6.3 - 8.2 g/dL    Albumin 3.0 (L) 3.2 - 4.6 g/dL    Globulin 3.2 2.3 - 3.5 g/dL    Albumin/Globulin Ratio 0.9 (L) 1.0 - 1.9     Magnesium   Result Value Ref Range    Magnesium 2.1 1.8 - 2.4 mg/dL   Lipase   Result Value Ref Range    Lipase 27 13 - 60 U/L   Lactic Acid   Result Value Ref Range    Lactic Acid 1.5 0.5 - 2.0 mmol/L   Procalcitonin   Result Value Ref Range    Procalcitonin 0.10 0.00 - 0.10 ng/mL   CK   Result Value Ref Range    Total  (H) 21 - 215 U/L   Troponin   Result Value Ref Range    Troponin T 42.9 (H) 0 - 14 ng/L   EKG 12 Lead   Result Value Ref Range    Ventricular Rate 101 BPM    Atrial Rate 133 BPM

## 2025-06-11 NOTE — ED NOTES
This RN messaged MD Madrid and said \"I gave the patient morphine at 2235. Shortly after her blood pressures dropped to 60/40 and her O2 was in the 70s. We gave her a bolus of NS and put her on 4 L. Her O2 is 97%. Since the fluids have finished, her blood pressures have dropped again. She has a MAP of 60. Her family states she has a paralyzed bladder and has to be bladder scanned and cathed frequently. I wanted you to be aware of the bladder issues before we gave too much fluid. I also wanted to inform you on low blood pressures.\"    RN awaiting response and orders.     Maggi Madison, RN  06/11/25 0120

## 2025-06-11 NOTE — PROGRESS NOTES
Hospitalist Progress Note   Admit Date:  6/10/2025  9:31 PM   Name:  Angelique Seals   Age:  89 y.o.  Sex:  female  :  1935   MRN:  [id number]   Room:  [facility]/    Presenting/Chief Complaint: Altered Mental Status and Fatigue     Reason(s) for Admission: Closed fracture of right hip, initial encounter ([facility]) [S72.001A]  Intertrochanteric fracture of right hip, open type III, initial encounter ([facility]) [S72.141C]     Hospital Course:     Angelique Seals is a 89 y.o. 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     Admitted s/p fall with right intertrochanteric hip fracture    She has been seen by orthopedics -pending plans     She had refractory hypotension requiring ICU care and pressors after morphine     O2 also in 70s     CXR negative   CT head and CT cspine negative     Discharge plans pending   [facility]       Subjective & 24hr Events:     RN reports ectopy  Daughter updated bedside   Eating breakfast   Some pain       Assessment & Plan:     Acute hypotension:  25  Appears to have occurred after morphine   Dopamine changed to levophed  Wean off as able   Stop losartan   Check UA  She appears to be on D1 emperic rocephin - will continue for now        Principal Problem:    Intertrochanteric fracture of right hip, open type III, initial encounter ([facility])  Plan:   25  Orthopedics following  NPO for 6-12  Therapies  Case management for discharge needs         Dementia:  25  Namenda           Ectopy:  Has PPM   25  Stop dopamine  Pending labs   Remote tele         pAFIB  S/p PPM  CAD   25  Follows with cardiology  Not anticoagulated         Neurogenic bladder:  25  No self cath per daughter   Voids frequently         Anticipated Discharge Arrangements:   Skilled Nursing Facility    PT/OT evals ordered?  Therapy evals ordered  Diet:  Diet NPO Exceptions are: Sips of  BPM    QRS Duration 137 ms    Q-T Interval 374 ms    QTc Calculation (Bazett) 485 ms    R Axis -44 degrees    T Axis 20 degrees    Diagnosis       Atrial fibrillation with rapid ventricular response  RBBB and LAFB  Abnormal lateral Q waves    Confirmed by Talha Yeager MD ([id number]) on 6/11/2025 6:15:50 AM     CBC with Auto Differential    Collection Time: 06/10/25 10:08 PM   Result Value Ref Range    WBC 10.0 4.3 - 11.1 K/uL    RBC 3.90 (L) 4.05 - 5.2 M/uL    Hemoglobin 12.0 11.7 - 15.4 g/dL    Hematocrit 37.4 35.8 - 46.3 %    MCV 95.9 82 - 102 FL    MCH 30.8 26.1 - 32.9 PG    MCHC 32.1 31.4 - 35.0 g/dL    RDW 13.9 11.9 - 14.6 %    Platelets 171 150 - 450 K/uL    MPV 11.7 9.4 - 12.3 FL    nRBC 0.00 0.0 - 0.2 K/uL    Differential Type AUTOMATED      Neutrophils % 72.4 43.0 - 78.0 %    Lymphocytes % 16.2 13.0 - 44.0 %    Monocytes % 9.7 4.0 - 12.0 %    Eosinophils % 1.0 0.5 - 7.8 %    Basophils % 0.3 0.0 - 2.0 %    Immature Granulocytes % 0.4 0.0 - 5.0 %    Neutrophils Absolute 7.20 1.70 - 8.20 K/UL    Lymphocytes Absolute 1.61 0.50 - 4.60 K/UL    Monocytes Absolute 0.97 0.10 - 1.30 K/UL    Eosinophils Absolute 0.10 0.00 - 0.80 K/UL    Basophils Absolute 0.03 0.00 - 0.20 K/UL    Immature Granulocytes Absolute 0.04 0.0 - 0.5 K/UL   Comprehensive Metabolic Panel    Collection Time: 06/10/25 10:08 PM   Result Value Ref Range    Sodium 145 136 - 145 mmol/L    Potassium 4.2 3.5 - 5.1 mmol/L    Chloride 108 (H) 98 - 107 mmol/L    CO2 28 20 - 29 mmol/L    Anion Gap 10 7 - 16 mmol/L    Glucose 125 (H) 70 - 99 mg/dL    BUN 26 (H) 8 - 23 MG/DL    Creatinine 1.01 0.60 - 1.10 MG/DL    Est, Glom Filt Rate 53 (L) >60 ml/min/1.73m2    Calcium 9.0 8.8 - 10.2 MG/DL    Total Bilirubin 0.6 0.0 - 1.2 MG/DL    ALT 19 8 - 45 U/L    AST 31 15 - 37 U/L    Alk Phosphatase 84 35 - 104 U/L    Total Protein 6.1 (L) 6.3 - 8.2 g/dL    Albumin 3.0 (L) 3.2 - 4.6 g/dL    Globulin 3.2 2.3 - 3.5 g/dL    Albumin/Globulin Ratio 0.9 (L) 1.0 - 1.9

## 2025-06-11 NOTE — ED NOTES
TRANSFER - OUT REPORT:    Verbal report given to Janis RAINEY on Angelique Seals  being transferred to [facility] for routine progression of patient care       Report consisted of patient's Situation, Background, Assessment and   Recommendations(SBAR).     Information from the following report(s) ED SBAR was reviewed with the receiving nurse.    [place] Fall Assessment:    Presents to emergency department  because of falls (Syncope, seizure, or loss of consciousness): No  Age > 70: Yes  Altered Mental Status, Intoxication with alcohol or substance confusion (Disorientation, impaired judgment, poor safety awaremess, or inability to follow instructions): Yes  Impaired Mobility: Ambulates or transfers with assistive devices or assistance; Unable to ambulate or transer.: Yes  Nursing Judgement: Yes          Lines:   Peripheral IV 06/10/25 Right Antecubital (Active)        Opportunity for questions and clarification was provided.      Patient transported with:  Registered Nurse          Kwai, Belkys, RN  06/11/25 3642

## 2025-06-11 NOTE — CONSULTS
Orthopaedic Trauma Service  Consultation Note    Patient ID:  Angelique Seals  89 y.o.  female  6/16/1935   [id number]    Presenting/Chief Complaint: Altered Mental Status and Fatigue  Right hip fracture    Date of Admission: 6/10/2025  9:31 PM   Reason(s) for Admission:   Closed fracture of right hip, initial encounter ([facility]) [S72.001A]  Intertrochanteric fracture of right hip, open type III, initial encounter ([facility]) [S72.141C]     Date of Consultation: June 11, 2025  Referring Physician: Crystal Ty MD    Hospital Problems:  Principal Problem:    Intertrochanteric fracture of right hip, open type III, initial encounter ([facility])  Resolved Problems:    * No resolved hospital problems. *    Assessment & Plan     Reviewed imaging w/ patient and patient's Daughter, Izabella Hickman  Recommend Right femur intramedullary nailing  - Discussed risks/benefits of surgery. See op note.    Someone ordered a diet today so will have to delay surgery to tomorrow pending medical optimization     This is a traumatic injury  Also w/ concern for low bone mineral density  -Will order Vit D  -Recommend outpatient DEXA scan following discharge for assessment of bone mineral density    Multimodal pain control  WB status: NWB RLE  Diet:  Diet NPO p MN Exceptions are: Sips of Water with Meds  DVT prophylaxis:  hold for OR    History of Present Illness   Angelique Seals is a 89 y.o. female who presented from her memory care facility at [facility] with   -Sig PMH: Dementia, hypertension, peripheral neuropathy    Patient was found to have altered mental status consistent with baseline Alzheimer's dementia    History was obtained from her daughter Izabella  Radiographs in the emergency department demonstrated displaced right intertrochanteric femur fracture    Appears her blood pressure has been somewhat variable ranging from 130/100 when she arrived to a low of 70/50.  Due to labile blood pressures she was admitted to the intensive care

## 2025-06-11 NOTE — ANESTHESIA PRE PROCEDURE
Department of Anesthesiology  Preprocedure Note       Name:  Angelique Seals   Age:  89 y.o.  :  1935                                          MRN:  [id number]         Date:  2025      Surgeon: Surgeon(s):  Anand Hays MD    Procedure: Procedure(s):  FEMUR INTRAMEDULLARY NAIL MARTA INSERTION ANTEGRADE    Medications prior to admission:   Prior to Admission medications    Medication Sig Start Date End Date Taking? Authorizing Provider   gabapentin (NEURONTIN) 100 MG capsule Take 1 capsule by mouth 3 times daily for 180 days. Intended supply: 30 days 25  Antonio Godfrey Jr., MD   estradiol (ESTRACE) 0.1 MG/GM vaginal cream Place 1 g vaginally daily 24   Marko Hemphill MD   Trimethoprim HCl (PRIMSOL) 50 MG/5ML SOLN Take 100 mg by mouth daily 10/22/24   Antonio Godfrey Jr., MD   losartan (COZAAR) 100 MG tablet 1/2 po qam. Increase to 1 po qam if BP increases.  Patient taking differently: Take 1 tablet by mouth daily 24   Antonio Godfrey Jr., MD   magnesium 200 MG TABS tablet Take 1 tablet by mouth daily    Marko Hemphill MD   memantine (NAMENDA) 10 MG tablet Take one bid 24   Carlitos Mejia DO   B Complex Vitamins (VITAMIN B COMPLEX PO) Take 1 tablet by mouth daily    Antonio Godfrey Jr., MD   aspirin EC 81 MG EC tablet Take 1 tablet by mouth daily 23   Chong Zhang MD   TURMERIC PO Take 3 capsules by mouth daily Turmeric and Meenakshi    Marko Hemphill MD   Multiple Vitamins-Minerals (ONE-A-DAY WOMENS PO) Take 1 tablet by mouth daily    Marko Hemphill MD   AZO-CRANBERRY PO Take 1 tablet by mouth daily    Marko Hemphill MD   Cholecalciferol 50 MCG ( UT) TABS Take 1 tablet by mouth daily    Automatic Reconciliation, Ar       Current medications:    Current Facility-Administered Medications   Medication Dose Route Frequency Provider Last Rate Last Admin    Vitamin D (CHOLECALCIFEROL) tablet 1,000 Units  1,000 Units Oral Daily

## 2025-06-12 ENCOUNTER — APPOINTMENT (OUTPATIENT)
Dept: GENERAL RADIOLOGY | Age: 89
DRG: 536 | End: 2025-06-12
Payer: MEDICARE

## 2025-06-12 PROBLEM — D64.9 ANEMIA: Status: ACTIVE | Noted: 2025-06-12

## 2025-06-12 PROBLEM — D69.6 THROMBOCYTOPENIA: Status: ACTIVE | Noted: 2025-06-12

## 2025-06-12 PROBLEM — I95.9 HYPOTENSION: Status: ACTIVE | Noted: 2025-06-12

## 2025-06-12 LAB
25(OH)D3 SERPL-MCNC: 28.3 NG/ML (ref 30–100)
ANION GAP SERPL CALC-SCNC: 9 MMOL/L (ref 7–16)
BASOPHILS # BLD: 0.07 K/UL (ref 0–0.2)
BASOPHILS NFR BLD: 0.8 % (ref 0–2)
BUN SERPL-MCNC: 37 MG/DL (ref 8–23)
CALCIUM SERPL-MCNC: 8.4 MG/DL (ref 8.8–10.2)
CHLORIDE SERPL-SCNC: 110 MMOL/L (ref 98–107)
CO2 SERPL-SCNC: 25 MMOL/L (ref 20–29)
CREAT SERPL-MCNC: 0.95 MG/DL (ref 0.6–1.1)
DIFFERENTIAL METHOD BLD: ABNORMAL
EOSINOPHIL # BLD: 0.59 K/UL (ref 0–0.8)
EOSINOPHIL NFR BLD: 6.4 % (ref 0.5–7.8)
ERYTHROCYTE [DISTWIDTH] IN BLOOD BY AUTOMATED COUNT: 13.6 % (ref 11.9–14.6)
GLUCOSE SERPL-MCNC: 138 MG/DL (ref 70–99)
HCT VFR BLD AUTO: 32.6 % (ref 35.8–46.3)
HGB BLD-MCNC: 10.1 G/DL (ref 11.7–15.4)
IMM GRANULOCYTES # BLD AUTO: 0.04 K/UL (ref 0–0.5)
IMM GRANULOCYTES NFR BLD AUTO: 0.4 % (ref 0–5)
LYMPHOCYTES # BLD: 1.86 K/UL (ref 0.5–4.6)
LYMPHOCYTES NFR BLD: 20.2 % (ref 13–44)
MCH RBC QN AUTO: 30.6 PG (ref 26.1–32.9)
MCHC RBC AUTO-ENTMCNC: 31 G/DL (ref 31.4–35)
MCV RBC AUTO: 98.8 FL (ref 82–102)
MONOCYTES # BLD: 0.82 K/UL (ref 0.1–1.3)
MONOCYTES NFR BLD: 8.9 % (ref 4–12)
NEUTS SEG # BLD: 5.82 K/UL (ref 1.7–8.2)
NEUTS SEG NFR BLD: 63.3 % (ref 43–78)
NRBC # BLD: 0 K/UL (ref 0–0.2)
PLATELET # BLD AUTO: 145 K/UL (ref 150–450)
PMV BLD AUTO: 10.8 FL (ref 9.4–12.3)
POTASSIUM SERPL-SCNC: 4 MMOL/L (ref 3.5–5.1)
RBC # BLD AUTO: 3.3 M/UL (ref 4.05–5.2)
SODIUM SERPL-SCNC: 144 MMOL/L (ref 136–145)
WBC # BLD AUTO: 9.2 K/UL (ref 4.3–11.1)

## 2025-06-12 PROCEDURE — 7100000000 HC PACU RECOVERY - FIRST 15 MIN: Performed by: ORTHOPAEDIC SURGERY

## 2025-06-12 PROCEDURE — 2500000003 HC RX 250 WO HCPCS: Performed by: INTERNAL MEDICINE

## 2025-06-12 PROCEDURE — 2709999900 HC NON-CHARGEABLE SUPPLY: Performed by: ORTHOPAEDIC SURGERY

## 2025-06-12 PROCEDURE — 3600000002 HC SURGERY LEVEL 2 BASE: Performed by: ORTHOPAEDIC SURGERY

## 2025-06-12 PROCEDURE — 7100000001 HC PACU RECOVERY - ADDTL 15 MIN: Performed by: ORTHOPAEDIC SURGERY

## 2025-06-12 PROCEDURE — 3600000012 HC SURGERY LEVEL 2 ADDTL 15MIN: Performed by: ORTHOPAEDIC SURGERY

## 2025-06-12 PROCEDURE — 6360000002 HC RX W HCPCS: Performed by: ANESTHESIOLOGY

## 2025-06-12 PROCEDURE — 2500000003 HC RX 250 WO HCPCS: Performed by: ORTHOPAEDIC SURGERY

## 2025-06-12 PROCEDURE — 85025 COMPLETE CBC W/AUTO DIFF WBC: CPT

## 2025-06-12 PROCEDURE — 80048 BASIC METABOLIC PNL TOTAL CA: CPT

## 2025-06-12 PROCEDURE — 36415 COLL VENOUS BLD VENIPUNCTURE: CPT

## 2025-06-12 PROCEDURE — 2580000003 HC RX 258: Performed by: INTERNAL MEDICINE

## 2025-06-12 PROCEDURE — 2500000003 HC RX 250 WO HCPCS

## 2025-06-12 PROCEDURE — 6360000002 HC RX W HCPCS: Performed by: ORTHOPAEDIC SURGERY

## 2025-06-12 PROCEDURE — 2720000010 HC SURG SUPPLY STERILE: Performed by: ORTHOPAEDIC SURGERY

## 2025-06-12 PROCEDURE — 2500000003 HC RX 250 WO HCPCS: Performed by: ANESTHESIOLOGY

## 2025-06-12 PROCEDURE — 2580000003 HC RX 258

## 2025-06-12 PROCEDURE — 82306 VITAMIN D 25 HYDROXY: CPT

## 2025-06-12 PROCEDURE — C1713 ANCHOR/SCREW BN/BN,TIS/BN: HCPCS | Performed by: ORTHOPAEDIC SURGERY

## 2025-06-12 PROCEDURE — 3700000000 HC ANESTHESIA ATTENDED CARE: Performed by: ORTHOPAEDIC SURGERY

## 2025-06-12 PROCEDURE — 6370000000 HC RX 637 (ALT 250 FOR IP): Performed by: INTERNAL MEDICINE

## 2025-06-12 PROCEDURE — 2000000000 HC ICU R&B

## 2025-06-12 PROCEDURE — 6360000002 HC RX W HCPCS

## 2025-06-12 PROCEDURE — 27245 TREAT THIGH FRACTURE: CPT | Performed by: ORTHOPAEDIC SURGERY

## 2025-06-12 PROCEDURE — 76942 ECHO GUIDE FOR BIOPSY: CPT | Performed by: ANESTHESIOLOGY

## 2025-06-12 PROCEDURE — 6360000002 HC RX W HCPCS: Performed by: INTERNAL MEDICINE

## 2025-06-12 PROCEDURE — 0QS636Z REPOSITION RIGHT UPPER FEMUR WITH INTRAMEDULLARY INTERNAL FIXATION DEVICE, PERCUTANEOUS APPROACH: ICD-10-PCS | Performed by: ORTHOPAEDIC SURGERY

## 2025-06-12 PROCEDURE — 3700000001 HC ADD 15 MINUTES (ANESTHESIA): Performed by: ORTHOPAEDIC SURGERY

## 2025-06-12 PROCEDURE — C1769 GUIDE WIRE: HCPCS | Performed by: ORTHOPAEDIC SURGERY

## 2025-06-12 DEVICE — SCREW BNE L95MM DIA10.35MM PROX FEM G TI CANN FOR TFN ADV: Type: IMPLANTABLE DEVICE | Site: HIP | Status: FUNCTIONAL

## 2025-06-12 DEVICE — NAIL IM L400MM DIA10MM 130DEG LNG R PROX FEM GRN TI CANN: Type: IMPLANTABLE DEVICE | Site: HIP | Status: FUNCTIONAL

## 2025-06-12 DEVICE — SCREW BNE LCK 5X48 MM LP XL25 RETROGRADE STRL RFNADVANCED: Type: IMPLANTABLE DEVICE | Site: HIP | Status: FUNCTIONAL

## 2025-06-12 RX ORDER — ETOMIDATE 2 MG/ML
INJECTION INTRAVENOUS
Status: DISCONTINUED | OUTPATIENT
Start: 2025-06-12 | End: 2025-06-12 | Stop reason: SDUPTHER

## 2025-06-12 RX ORDER — DEXAMETHASONE SODIUM PHOSPHATE 4 MG/ML
INJECTION, SOLUTION INTRA-ARTICULAR; INTRALESIONAL; INTRAMUSCULAR; INTRAVENOUS; SOFT TISSUE
Status: DISCONTINUED | OUTPATIENT
Start: 2025-06-12 | End: 2025-06-12 | Stop reason: SDUPTHER

## 2025-06-12 RX ORDER — ONDANSETRON 2 MG/ML
INJECTION INTRAMUSCULAR; INTRAVENOUS
Status: DISCONTINUED | OUTPATIENT
Start: 2025-06-12 | End: 2025-06-12 | Stop reason: SDUPTHER

## 2025-06-12 RX ORDER — ONDANSETRON 2 MG/ML
4 INJECTION INTRAMUSCULAR; INTRAVENOUS EVERY 6 HOURS PRN
Status: DISCONTINUED | OUTPATIENT
Start: 2025-06-12 | End: 2025-06-18 | Stop reason: HOSPADM

## 2025-06-12 RX ORDER — BUPIVACAINE HYDROCHLORIDE 2.5 MG/ML
INJECTION, SOLUTION EPIDURAL; INFILTRATION; INTRACAUDAL; PERINEURAL PRN
Status: DISCONTINUED | OUTPATIENT
Start: 2025-06-12 | End: 2025-06-12 | Stop reason: ALTCHOICE

## 2025-06-12 RX ORDER — BISACODYL 5 MG/1
5 TABLET, DELAYED RELEASE ORAL DAILY
Status: DISCONTINUED | OUTPATIENT
Start: 2025-06-13 | End: 2025-06-18 | Stop reason: HOSPADM

## 2025-06-12 RX ORDER — LIDOCAINE HYDROCHLORIDE 20 MG/ML
INJECTION, SOLUTION EPIDURAL; INFILTRATION; INTRACAUDAL; PERINEURAL
Status: DISCONTINUED | OUTPATIENT
Start: 2025-06-12 | End: 2025-06-12 | Stop reason: SDUPTHER

## 2025-06-12 RX ORDER — FENTANYL CITRATE 50 UG/ML
INJECTION, SOLUTION INTRAMUSCULAR; INTRAVENOUS
Status: DISCONTINUED | OUTPATIENT
Start: 2025-06-12 | End: 2025-06-12 | Stop reason: SDUPTHER

## 2025-06-12 RX ORDER — DEXAMETHASONE SODIUM PHOSPHATE 10 MG/ML
INJECTION, SOLUTION INTRAMUSCULAR; INTRAVENOUS
Status: COMPLETED | OUTPATIENT
Start: 2025-06-12 | End: 2025-06-12

## 2025-06-12 RX ORDER — LABETALOL HYDROCHLORIDE 5 MG/ML
5 INJECTION, SOLUTION INTRAVENOUS ONCE
Status: DISCONTINUED | OUTPATIENT
Start: 2025-06-12 | End: 2025-06-12 | Stop reason: HOSPADM

## 2025-06-12 RX ORDER — HYDROCODONE BITARTRATE AND ACETAMINOPHEN 5; 325 MG/1; MG/1
1 TABLET ORAL EVERY 4 HOURS PRN
Status: DISCONTINUED | OUTPATIENT
Start: 2025-06-12 | End: 2025-06-18 | Stop reason: HOSPADM

## 2025-06-12 RX ORDER — MIDODRINE HYDROCHLORIDE 5 MG/1
5 TABLET ORAL
Status: DISCONTINUED | OUTPATIENT
Start: 2025-06-12 | End: 2025-06-18 | Stop reason: HOSPADM

## 2025-06-12 RX ORDER — SODIUM CHLORIDE, SODIUM LACTATE, POTASSIUM CHLORIDE, CALCIUM CHLORIDE 600; 310; 30; 20 MG/100ML; MG/100ML; MG/100ML; MG/100ML
INJECTION, SOLUTION INTRAVENOUS
Status: DISCONTINUED | OUTPATIENT
Start: 2025-06-12 | End: 2025-06-12 | Stop reason: SDUPTHER

## 2025-06-12 RX ORDER — PHENYLEPHRINE HYDROCHLORIDE 10 MG/ML
INJECTION INTRAVENOUS
Status: DISCONTINUED | OUTPATIENT
Start: 2025-06-12 | End: 2025-06-12 | Stop reason: SDUPTHER

## 2025-06-12 RX ORDER — SODIUM CHLORIDE 9 MG/ML
INJECTION, SOLUTION INTRAVENOUS CONTINUOUS
Status: ACTIVE | OUTPATIENT
Start: 2025-06-12 | End: 2025-06-12

## 2025-06-12 RX ORDER — BUPIVACAINE HYDROCHLORIDE AND EPINEPHRINE 2.5; 5 MG/ML; UG/ML
INJECTION, SOLUTION EPIDURAL; INFILTRATION; INTRACAUDAL; PERINEURAL
Status: COMPLETED | OUTPATIENT
Start: 2025-06-12 | End: 2025-06-12

## 2025-06-12 RX ORDER — TRAMADOL HYDROCHLORIDE 50 MG/1
50 TABLET ORAL EVERY 6 HOURS PRN
Status: DISCONTINUED | OUTPATIENT
Start: 2025-06-12 | End: 2025-06-18 | Stop reason: HOSPADM

## 2025-06-12 RX ORDER — ONDANSETRON 4 MG/1
4 TABLET, ORALLY DISINTEGRATING ORAL EVERY 8 HOURS PRN
Status: DISCONTINUED | OUTPATIENT
Start: 2025-06-12 | End: 2025-06-18 | Stop reason: HOSPADM

## 2025-06-12 RX ORDER — ROCURONIUM BROMIDE 10 MG/ML
INJECTION, SOLUTION INTRAVENOUS
Status: DISCONTINUED | OUTPATIENT
Start: 2025-06-12 | End: 2025-06-12 | Stop reason: SDUPTHER

## 2025-06-12 RX ADMIN — MAGNESIUM GLUCONATE 500 MG ORAL TABLET 400 MG: 500 TABLET ORAL at 09:58

## 2025-06-12 RX ADMIN — PHENYLEPHRINE HYDROCHLORIDE 200 MCG: 10 INJECTION INTRAVENOUS at 17:22

## 2025-06-12 RX ADMIN — MIDODRINE HYDROCHLORIDE 5 MG: 5 TABLET ORAL at 09:58

## 2025-06-12 RX ADMIN — ROCURONIUM BROMIDE 10 MG: 10 INJECTION, SOLUTION INTRAVENOUS at 17:41

## 2025-06-12 RX ADMIN — BUPIVACAINE HYDROCHLORIDE AND EPINEPHRINE BITARTRATE 30 ML: 2.5; .005 INJECTION, SOLUTION EPIDURAL; INFILTRATION; INTRACAUDAL; PERINEURAL at 18:23

## 2025-06-12 RX ADMIN — CEFAZOLIN 2000 MG: 10 INJECTION, POWDER, FOR SOLUTION INTRAVENOUS at 21:26

## 2025-06-12 RX ADMIN — Medication 1000 UNITS: at 09:58

## 2025-06-12 RX ADMIN — DEXAMETHASONE SODIUM PHOSPHATE 4 MG: 10 INJECTION, SOLUTION INTRAMUSCULAR; INTRAVENOUS at 18:23

## 2025-06-12 RX ADMIN — MEMANTINE 10 MG: 5 TABLET ORAL at 20:00

## 2025-06-12 RX ADMIN — MORPHINE SULFATE 2 MG: 2 INJECTION, SOLUTION INTRAMUSCULAR; INTRAVENOUS at 19:58

## 2025-06-12 RX ADMIN — PHENYLEPHRINE HYDROCHLORIDE 100 MCG: 10 INJECTION INTRAVENOUS at 16:26

## 2025-06-12 RX ADMIN — GABAPENTIN 100 MG: 100 CAPSULE ORAL at 09:59

## 2025-06-12 RX ADMIN — PHENYLEPHRINE HYDROCHLORIDE 200 MCG: 10 INJECTION INTRAVENOUS at 16:06

## 2025-06-12 RX ADMIN — ONDANSETRON 4 MG: 2 INJECTION INTRAMUSCULAR; INTRAVENOUS at 16:42

## 2025-06-12 RX ADMIN — GABAPENTIN 100 MG: 100 CAPSULE ORAL at 12:28

## 2025-06-12 RX ADMIN — GABAPENTIN 100 MG: 100 CAPSULE ORAL at 19:59

## 2025-06-12 RX ADMIN — ROCURONIUM BROMIDE 50 MG: 10 INJECTION, SOLUTION INTRAVENOUS at 15:58

## 2025-06-12 RX ADMIN — BISACODYL 5 MG: 5 TABLET, COATED ORAL at 09:59

## 2025-06-12 RX ADMIN — MEMANTINE 10 MG: 5 TABLET ORAL at 09:59

## 2025-06-12 RX ADMIN — ACETAMINOPHEN 1000 MG: 500 TABLET, FILM COATED ORAL at 19:59

## 2025-06-12 RX ADMIN — ETOMIDATE 12 MG: 2 INJECTION, SOLUTION INTRAVENOUS at 15:58

## 2025-06-12 RX ADMIN — SUGAMMADEX 200 MG: 100 INJECTION, SOLUTION INTRAVENOUS at 18:01

## 2025-06-12 RX ADMIN — SODIUM CHLORIDE: 0.9 INJECTION, SOLUTION INTRAVENOUS at 08:09

## 2025-06-12 RX ADMIN — CEFTRIAXONE 1000 MG: 1 INJECTION, POWDER, FOR SOLUTION INTRAMUSCULAR; INTRAVENOUS at 06:44

## 2025-06-12 RX ADMIN — SODIUM CHLORIDE, PRESERVATIVE FREE 10 ML: 5 INJECTION INTRAVENOUS at 20:00

## 2025-06-12 RX ADMIN — LIDOCAINE HYDROCHLORIDE 100 MG: 20 INJECTION, SOLUTION EPIDURAL; INFILTRATION; INTRACAUDAL; PERINEURAL at 15:58

## 2025-06-12 RX ADMIN — MULTIVITAMIN TABLET 1 TABLET: TABLET at 09:58

## 2025-06-12 RX ADMIN — MIDODRINE HYDROCHLORIDE 5 MG: 5 TABLET ORAL at 12:28

## 2025-06-12 RX ADMIN — MORPHINE SULFATE 2 MG: 2 INJECTION, SOLUTION INTRAMUSCULAR; INTRAVENOUS at 01:38

## 2025-06-12 RX ADMIN — SODIUM CHLORIDE, SODIUM LACTATE, POTASSIUM CHLORIDE, AND CALCIUM CHLORIDE: 600; 310; 30; 20 INJECTION, SOLUTION INTRAVENOUS at 15:58

## 2025-06-12 RX ADMIN — SODIUM CHLORIDE 3 MCG/MIN: 0.9 INJECTION, SOLUTION INTRAVENOUS at 12:30

## 2025-06-12 RX ADMIN — FENTANYL CITRATE 25 MCG: 50 INJECTION, SOLUTION INTRAMUSCULAR; INTRAVENOUS at 16:43

## 2025-06-12 RX ADMIN — MORPHINE SULFATE 2 MG: 2 INJECTION, SOLUTION INTRAMUSCULAR; INTRAVENOUS at 10:03

## 2025-06-12 RX ADMIN — DEXAMETHASONE SODIUM PHOSPHATE 4 MG: 4 INJECTION INTRA-ARTICULAR; INTRALESIONAL; INTRAMUSCULAR; INTRAVENOUS; SOFT TISSUE at 16:42

## 2025-06-12 RX ADMIN — SODIUM CHLORIDE, PRESERVATIVE FREE 10 ML: 5 INJECTION INTRAVENOUS at 09:30

## 2025-06-12 RX ADMIN — PHENYLEPHRINE HYDROCHLORIDE 200 MCG: 10 INJECTION INTRAVENOUS at 15:54

## 2025-06-12 ASSESSMENT — PAIN SCALES - GENERAL
PAINLEVEL_OUTOF10: 0
PAINLEVEL_OUTOF10: 6
PAINLEVEL_OUTOF10: 0
PAINLEVEL_OUTOF10: 7

## 2025-06-12 ASSESSMENT — PAIN DESCRIPTION - LOCATION: LOCATION: LEG

## 2025-06-12 ASSESSMENT — PAIN DESCRIPTION - ORIENTATION: ORIENTATION: RIGHT

## 2025-06-12 ASSESSMENT — PAIN DESCRIPTION - DESCRIPTORS: DESCRIPTORS: ACHING;DISCOMFORT

## 2025-06-12 NOTE — OP NOTE
Operative Note      Patient: Angelique Seals  YOB: 1935  MRN: [id number]    Date of Procedure: 6/12/2025    Pre-Op Diagnosis Codes:      * Closed fracture of trochanter of right femur, initial encounter ([facility]) [S72.101A]    Post-Op Diagnosis: Same       Procedure(s):  Treatment of right intertrochanteric femoral fracture with intramedullary implant (CPT 32962)    Surgeon(s):  Anand Hays MD    Anesthesia: General    Estimated Blood Loss (mL): Minimal    Complications: None    Specimens:   * No specimens in log *        Implants:  Implant Name Type Inv. Item Serial No.  Lot No. LRB No. Used Action   NAIL IM L400MM [id number] 130DEG LNG R PROX FEM GRN TI ORALIA - [id number]  NAIL IM L400MM [id number] 130DEG LNG R PROX FEM GRN TI ORALIA  DEPUY SY[employer]ES [employer] [id number] Right 1 Implanted   SCREW BNE L95MM DIA10.35MM PROX FEM G TI ORALIA FOR TFN ADV - [id number]  SCREW BNE L95MM DIA10.35MM PROX FEM G TI ORALIA FOR TFN ADV  DEPUY SYNTHES [place]-WD [id number] Right 1 Implanted   SCREW BNE LCK 5X48 MM LP XL25 RETROGRADE STRL RFNADVANCED - [id number]  SCREW BNE LCK 5X48 MM LP XL25 RETROGRADE STRL RFNADVANCED  DEPUY SYNTHES [place]-WD [id number] Right 2 Implanted     Findings:  Infection Present At Time Of Surgery (PATOS) (choose all levels that have infection present):  No infection present    Other Findings: Fixation with Synthes TFNA     Indications:  Angelique Seals is a 89 y.o. female who presented from her memory care facility at [facility] with right intertrochanteric femur fracture  -Sig PMH: Dementia, hypertension, peripheral neuropathy     Patient was found to have altered mental status consistent with baseline Alzheimer's dementia  History was obtained from her daughter Izabella  Radiographs in the emergency department demonstrated displaced right intertrochanteric femur fracture    Pre-operative exam was notable for intact distal neurovascular function.     Surgery is recommended with the goal(s) of:  - Attain and

## 2025-06-12 NOTE — PERIOP NOTE
TRANSFER - OUT REPORT:    Verbal report given to Alva RAINEY on Angelique Seals  being transferred to [facility] for routine post-op       Report consisted of patient's Situation, Background, Assessment and   Recommendations(SBAR).     Information from the following report(s) Nurse Handoff Report was reviewed with the receiving nurse.           Lines:   Peripheral IV 06/10/25 Right Antecubital (Active)   Site Assessment Clean, dry & intact 06/12/25 1850   Line Status Infusing 06/12/25 1850   Line Care Connections checked and tightened 06/12/25 1850   Phlebitis Assessment No symptoms 06/12/25 1850   Infiltration Assessment 0 06/12/25 1850   Alcohol Cap Used Yes 06/12/25 1850   Dressing Status Clean, dry & intact 06/12/25 1850   Dressing Type Transparent 06/12/25 1850       Peripheral IV 06/11/25 Left Wrist (Active)   Site Assessment Clean, dry & intact 06/12/25 1850   Line Status Normal saline locked 06/12/25 1850   Line Care Connections checked and tightened 06/12/25 1850   Phlebitis Assessment No symptoms 06/12/25 1850   Infiltration Assessment 0 06/12/25 1850   Alcohol Cap Used Yes 06/12/25 1850   Dressing Status Clean, dry & intact 06/12/25 1850   Dressing Type Transparent 06/12/25 1850        Opportunity for questions and clarification was provided.      Patient transported with:  Monitor, O2 @ 4lpm, and Registered Nurse

## 2025-06-12 NOTE — PROGRESS NOTES
-- (!) 98/51 100 %   06/11/25 1730 -- 71 -- (!) 82/46 99 %   06/11/25 1715 -- 61 -- (!) 79/46 99 %   06/11/25 1700 -- 61 10 (!) 88/51 100 %   06/11/25 1645 -- 73 11 (!) 86/50 100 %   06/11/25 1630 -- 62 -- (!) 91/52 100 %   06/11/25 1615 -- 69 11 (!) 116/54 99 %   06/11/25 1600 -- 64 12 (!) 111/54 100 %   06/11/25 1545 98.5 °F (36.9 °C) 67 10 (!) 119/57 100 %   06/11/25 1530 -- 63 -- 115/60 100 %   06/11/25 1515 -- 72 19 (!) 104/59 100 %   06/11/25 1500 -- 66 10 (!) 102/59 98 %   06/11/25 1445 -- 70 11 (!) 102/52 98 %   06/11/25 1430 -- 64 11 (!) 105/55 100 %   06/11/25 1415 -- 65 10 (!) 117/57 99 %   06/11/25 1400 -- 66 10 (!) 111/57 99 %   06/11/25 1345 -- 73 11 (!) 104/53 100 %   06/11/25 1330 -- 67 (!) 9 117/60 100 %   06/11/25 1315 -- 68 11 122/62 100 %   06/11/25 1300 -- 67 12 120/65 (!) 89 %   06/11/25 1245 -- 76 11 111/75 99 %   06/11/25 1230 -- 71 12 (!) 106/58 99 %   06/11/25 1215 -- 68 10 (!) 101/53 99 %   06/11/25 1200 -- 62 10 (!) 86/52 99 %   06/11/25 1145 97.5 °F (36.4 °C) 69 -- (!) 86/48 100 %   06/11/25 1130 -- 60 12 (!) 87/52 100 %   06/11/25 1115 -- 63 10 (!) 86/50 99 %   06/11/25 1100 -- 64 11 (!) 99/51 100 %   06/11/25 1045 -- 62 (!) 9 (!) 104/54 100 %   06/11/25 1030 -- 66 15 (!) 110/56 95 %   06/11/25 1015 -- 64 13 (!) 105/57 100 %   06/11/25 1000 -- 66 11 (!) 117/57 100 %   06/11/25 0945 -- 65 14 (!) 111/58 100 %   06/11/25 0930 -- 67 13 (!) 115/55 100 %   06/11/25 0915 -- 69 14 (!) 131/56 100 %   06/11/25 0900 -- 66 13 (!) 117/59 100 %       Oxygen Therapy  SpO2: 100 %  Pulse Oximeter Device Mode: Continuous  Pulse via Oximetry: 66 beats per minute  Pulse Oximeter Device Location: Finger  O2 Device: Nasal cannula  O2 Flow Rate (L/min): 4 L/min    Estimated body mass index is 28.89 kg/m² as calculated from the following:    Height as of this encounter: 1.727 m (5' 8\").    Weight as of this encounter: 86.2 kg (190 lb).    Intake/Output Summary (Last 24 hours) at 6/12/2025 0896  Last data filed  aluminum & magnesium hydroxide-simethicone (MAALOX PLUS) 200-200-20 MG/5ML suspension 30 mL  30 mL Oral Q6H PRN    cefTRIAXone (ROCEPHIN) 1,000 mg in sterile water 10 mL IV syringe  1,000 mg IntraVENous Q24H    multivitamin 1 tablet  1 tablet Oral Daily    norepinephrine (LEVOPHED) 4 mg in sodium chloride 0.9 % 250 mL infusion (premix)  1-100 mcg/min IntraVENous Continuous       Signed:  Crystal Ty MD    Part of this note may have been written by using a voice dictation software.  The note has been proof read but may still contain some grammatical/other typographical errors.

## 2025-06-12 NOTE — PROGRESS NOTES
SPEECH LANGUAGE PATHOLOGY: ATTEMPT     NAME: Angelique Seals  : 1935  MRN: [id number]    ADMISSION DATE: 6/10/2025  PRIMARY DIAGNOSIS: Intertrochanteric fracture of right hip, open type III, initial encounter ([facility])    Speech Therapy consult received and appreciated.  Chart review completed and case discussed with RN. Patient is currently NPO pending surgery. Will follow up for evaluation on 24    LEXI Anthony  2025 11:58 AM

## 2025-06-12 NOTE — ANESTHESIA POSTPROCEDURE EVALUATION
Department of Anesthesiology  Postprocedure Note    Patient: Angelique Seals  MRN: [id number]  YOB: 1935  Date of evaluation: 6/12/2025    Procedure Summary       Date: 06/12/25 Room / Location: [facility] MAIN OR [address] [place] [address] MAIN OR    Anesthesia Start: 1538 Anesthesia Stop:     Procedure: FEMUR INTRAMEDULLARY NAIL MARTA INSERTION ANTEGRADE RIGHT (Right: Hip) Diagnosis:       Closed fracture of trochanter of right femur, initial encounter ([facility])      (Closed fracture of trochanter of right femur, initial encounter ([facility]) [S72.101A])    Providers: Anand Hays MD Responsible Provider: Jin Santos MD    Anesthesia Type: General ASA Status: 3            Anesthesia Type: General    Nory Phase I: Nory Score: 7    Nory Phase II:      Anesthesia Post Evaluation    Patient location during evaluation: PACU  Patient participation: complete - patient participated  Level of consciousness: awake  Airway patency: patent  Nausea & Vomiting: no nausea  Cardiovascular status: hemodynamically stable  Respiratory status: acceptable and nonlabored ventilation  Hydration status: stable  Multimodal analgesia pain management approach    No notable events documented.

## 2025-06-12 NOTE — ANESTHESIA PROCEDURE NOTES
Peripheral Block    Patient location during procedure: pre-op  Reason for block: post-op pain management and at surgeon's request  Start time: 6/12/2025 6:23 PM  End time: 6/12/2025 6:30 PM  Staffing  Performed: anesthesiologist   Anesthesiologist: Jin Santos MD  Performed by: Jin Santos MD  Authorized by: Jin Santos MD    Preanesthetic Checklist  Completed: patient identified, IV checked, site marked, risks and benefits discussed, surgical/procedural consents, equipment checked, pre-op evaluation, timeout performed, anesthesia consent given, oxygen available and monitors applied/VS acknowledged  Peripheral Block   Patient position: supine  Prep: ChloraPrep  Provider prep: mask  Patient monitoring: cardiac monitor, continuous pulse ox, frequent blood pressure checks, IV access and oxygen  Block type: Fascia iliaca  Laterality: right  Injection technique: single-shot  Guidance: ultrasound guided    Needle   Needle type: insulated echogenic nerve stimulator needle   Needle gauge: 21 G  Needle localization: ultrasound guidance  Needle length: 10 cm  Assessment   Injection assessment: negative aspiration for heme, no paresthesia on injection, local visualized surrounding nerve on ultrasound and no intravascular symptoms  Paresthesia pain: none  Slow fractionated injection: yes  Hemodynamics: stable  Outcomes: patient tolerated procedure well and uncomplicated    Additional Notes  -Block placed for post op pain at surgeon's request.     -Ultrasound used to identify anatomy of nerve bundle.    -Needle placement and local injection at perineural area confirmed with real time ultrasound guidance.     -Local visualized with ultrasound surrounding nerve.    -Permanent Image taken and placed on chart.      Medications Administered  BUPivacaine 0.25%-EPINEPHrine injection [id number] - Perineural   30 mL - 6/12/2025 6:23:00 PM  dexAMETHasone (DECADRON) (PF) 10 mg/mL injection - Other   4 mg - 6/12/2025 6:23:00

## 2025-06-12 NOTE — INTERDISCIPLINARY ROUNDS
Multi-D Rounds/Checklist (leapfrog):  Lines: can any be removed?: None    Urinary Catheter 06/11/25 Sanchez (Active)      DVT Prophylaxis: Ordered  Vent: N/A  Nutrition Ordered/appropriate: Ordered  Can antibiotics or other drugs be stopped? Yes/End Date set Yes  MRSA swab:   Inpat Anti-Infectives (From admission, onward)       Start     Ordered Stop    06/11/25 0600  cefTRIAXone (ROCEPHIN) 1,000 mg in sterile water 10 mL IV syringe  1,000 mg,   IntraVENous,   EVERY 24 HOURS         06/11/25 0526 06/16/25 0559                  Consults needed: None  A: Is pain control adequate? (has PRNs? Stop drip?) Yes  B: Sedation break and SBT? N/A  C: Is sedation choice appropriate? N/A  D: Delirium/CAM-ICU? No  E: Mobility goals/appropriateness? Yes  F: Family update and plan? daughter is surrogate decision maker and is being updated daily by primary attending and nursing staff.    Carmen Dash, APRN - CNP

## 2025-06-12 NOTE — PLAN OF CARE
Problem: Discharge Planning  Goal: Discharge to home or other facility with appropriate resources  Outcome: Progressing  Flowsheets (Taken 6/11/2025 1917)  Discharge to home or other facility with appropriate resources:   Identify barriers to discharge with patient and caregiver   Arrange for needed discharge resources and transportation as appropriate   Identify discharge learning needs (meds, wound care, etc)     Problem: Pain  Goal: Verbalizes/displays adequate comfort level or baseline comfort level  Outcome: Progressing     Problem: Safety - Adult  Goal: Free from fall injury  Outcome: Progressing     Problem: Skin/Tissue Integrity  Goal: Skin integrity remains intact  Description: 1.  Monitor for areas of redness and/or skin breakdown2.  Assess vascular access sites hourly3.  Every 4-6 hours minimum:  Change oxygen saturation probe site4.  Every 4-6 hours:  If on nasal continuous positive airway pressure, respiratory therapy assess nares and determine need for appliance change or resting period  Outcome: Progressing  Flowsheets (Taken 6/11/2025 1917)  Skin Integrity Remains Intact: Monitor for areas of redness and/or skin breakdown

## 2025-06-12 NOTE — BRIEF OP NOTE
Brief Postoperative Note      Patient: Angelique Seals  YOB: 1935  MRN: [id number]    Date of Procedure: 6/12/2025    Pre-Op Diagnosis Codes:      * Closed fracture of trochanter of right femur, initial encounter ([facility]) [S72.101A]    Post-Op Diagnosis: Same       Procedure(s):  FEMUR INTRAMEDULLARY NAIL MARTA INSERTION ANTEGRADE RIGHT    Surgeon(s):  Anand Hays MD    Anesthesia: General    Estimated Blood Loss (mL): 200     Complications: None    Implants:  Implant Name Type Inv. Item Serial No.  Lot No. LRB No. Used Action   NAIL IM L400MM [id number] 130DEG LNG R PROX FEM GRN TI ORALIA - [id number]  NAIL IM L400MM [id number] 130DEG LNG R PROX FEM GRN TI ORALIA  DEPUY SYN[employer]- [id number] Right 1 Implanted   SCREW BNE L95MM DIA10.35MM PROX FEM G TI ORALIA FOR TFN ADV - [id number]  SCREW BNE L95MM DIA10.35MM PROX FEM G TI ORALIA FOR TFN ADV  [employer] SYNTH[employer]- [id number] Right 1 Implanted   SCREW BNE LCK 5X48 MM LP XL25 RETROGRADE STRL RFNADVANCED - [id number]  SCREW BNE LCK 5X48 MM LP XL25 RETROGRADE STRL RFNADVANCED  DEPUY SYNTHES [place]-WD [id number] Right 2 Implanted     Findings:  Infection Present At Time Of Surgery (PATOS) (choose all levels that have infection present):  No infection present  Other Findings: see op report    Electronically signed by Anand Hays MD on 6/12/2025 at 6:14 PM

## 2025-06-13 ENCOUNTER — APPOINTMENT (OUTPATIENT)
Dept: GENERAL RADIOLOGY | Age: 89
DRG: 536 | End: 2025-06-13
Payer: MEDICARE

## 2025-06-13 LAB
ANION GAP SERPL CALC-SCNC: 10 MMOL/L (ref 7–16)
BASOPHILS # BLD: 0.01 K/UL (ref 0–0.2)
BASOPHILS NFR BLD: 0.1 % (ref 0–2)
BUN SERPL-MCNC: 38 MG/DL (ref 8–23)
CALCIUM SERPL-MCNC: 8.6 MG/DL (ref 8.8–10.2)
CHLORIDE SERPL-SCNC: 109 MMOL/L (ref 98–107)
CO2 SERPL-SCNC: 23 MMOL/L (ref 20–29)
CREAT SERPL-MCNC: 0.75 MG/DL (ref 0.6–1.1)
DIFFERENTIAL METHOD BLD: ABNORMAL
EOSINOPHIL # BLD: 0 K/UL (ref 0–0.8)
EOSINOPHIL NFR BLD: 0 % (ref 0.5–7.8)
ERYTHROCYTE [DISTWIDTH] IN BLOOD BY AUTOMATED COUNT: 13.5 % (ref 11.9–14.6)
GLUCOSE SERPL-MCNC: 163 MG/DL (ref 70–99)
HCT VFR BLD AUTO: 29.8 % (ref 35.8–46.3)
HGB BLD-MCNC: 9.5 G/DL (ref 11.7–15.4)
IMM GRANULOCYTES # BLD AUTO: 0.02 K/UL (ref 0–0.5)
IMM GRANULOCYTES NFR BLD AUTO: 0.3 % (ref 0–5)
LYMPHOCYTES # BLD: 0.33 K/UL (ref 0.5–4.6)
LYMPHOCYTES NFR BLD: 4.7 % (ref 13–44)
MCH RBC QN AUTO: 30.5 PG (ref 26.1–32.9)
MCHC RBC AUTO-ENTMCNC: 31.9 G/DL (ref 31.4–35)
MCV RBC AUTO: 95.8 FL (ref 82–102)
MONOCYTES # BLD: 0.17 K/UL (ref 0.1–1.3)
MONOCYTES NFR BLD: 2.4 % (ref 4–12)
NEUTS SEG # BLD: 6.54 K/UL (ref 1.7–8.2)
NEUTS SEG NFR BLD: 92.5 % (ref 43–78)
NRBC # BLD: 0 K/UL (ref 0–0.2)
PLATELET # BLD AUTO: 169 K/UL (ref 150–450)
PMV BLD AUTO: 11.9 FL (ref 9.4–12.3)
POTASSIUM SERPL-SCNC: 4.7 MMOL/L (ref 3.5–5.1)
RBC # BLD AUTO: 3.11 M/UL (ref 4.05–5.2)
SODIUM SERPL-SCNC: 141 MMOL/L (ref 136–145)
WBC # BLD AUTO: 7.1 K/UL (ref 4.3–11.1)

## 2025-06-13 PROCEDURE — 6370000000 HC RX 637 (ALT 250 FOR IP): Performed by: ORTHOPAEDIC SURGERY

## 2025-06-13 PROCEDURE — 6360000002 HC RX W HCPCS: Performed by: ORTHOPAEDIC SURGERY

## 2025-06-13 PROCEDURE — 92610 EVALUATE SWALLOWING FUNCTION: CPT

## 2025-06-13 PROCEDURE — 97112 NEUROMUSCULAR REEDUCATION: CPT

## 2025-06-13 PROCEDURE — 1100000003 HC PRIVATE W/ TELEMETRY

## 2025-06-13 PROCEDURE — 97535 SELF CARE MNGMENT TRAINING: CPT

## 2025-06-13 PROCEDURE — 6370000000 HC RX 637 (ALT 250 FOR IP): Performed by: INTERNAL MEDICINE

## 2025-06-13 PROCEDURE — 2700000000 HC OXYGEN THERAPY PER DAY

## 2025-06-13 PROCEDURE — 36415 COLL VENOUS BLD VENIPUNCTURE: CPT

## 2025-06-13 PROCEDURE — 97162 PT EVAL MOD COMPLEX 30 MIN: CPT

## 2025-06-13 PROCEDURE — 2500000003 HC RX 250 WO HCPCS: Performed by: ORTHOPAEDIC SURGERY

## 2025-06-13 PROCEDURE — 85025 COMPLETE CBC W/AUTO DIFF WBC: CPT

## 2025-06-13 PROCEDURE — 73030 X-RAY EXAM OF SHOULDER: CPT

## 2025-06-13 PROCEDURE — 80048 BASIC METABOLIC PNL TOTAL CA: CPT

## 2025-06-13 PROCEDURE — 97166 OT EVAL MOD COMPLEX 45 MIN: CPT

## 2025-06-13 PROCEDURE — 97530 THERAPEUTIC ACTIVITIES: CPT

## 2025-06-13 PROCEDURE — 94760 N-INVAS EAR/PLS OXIMETRY 1: CPT

## 2025-06-13 PROCEDURE — 6360000002 HC RX W HCPCS: Performed by: NURSE PRACTITIONER

## 2025-06-13 RX ORDER — FLUCONAZOLE 100 MG/1
150 TABLET ORAL ONCE
Status: COMPLETED | OUTPATIENT
Start: 2025-06-13 | End: 2025-06-13

## 2025-06-13 RX ORDER — KETOROLAC TROMETHAMINE 15 MG/ML
15 INJECTION, SOLUTION INTRAMUSCULAR; INTRAVENOUS ONCE
Status: COMPLETED | OUTPATIENT
Start: 2025-06-13 | End: 2025-06-13

## 2025-06-13 RX ORDER — TRIMETHOPRIM 100 MG/1
100 TABLET ORAL DAILY
Status: DISCONTINUED | OUTPATIENT
Start: 2025-06-14 | End: 2025-06-18 | Stop reason: HOSPADM

## 2025-06-13 RX ADMIN — GABAPENTIN 100 MG: 100 CAPSULE ORAL at 14:43

## 2025-06-13 RX ADMIN — ENOXAPARIN SODIUM 40 MG: 100 INJECTION SUBCUTANEOUS at 08:40

## 2025-06-13 RX ADMIN — MIDODRINE HYDROCHLORIDE 5 MG: 5 TABLET ORAL at 08:39

## 2025-06-13 RX ADMIN — CEFTRIAXONE 1000 MG: 1 INJECTION, POWDER, FOR SOLUTION INTRAMUSCULAR; INTRAVENOUS at 06:29

## 2025-06-13 RX ADMIN — CEFAZOLIN 2000 MG: 10 INJECTION, POWDER, FOR SOLUTION INTRAVENOUS at 04:18

## 2025-06-13 RX ADMIN — ACETAMINOPHEN 1000 MG: 500 TABLET, FILM COATED ORAL at 20:01

## 2025-06-13 RX ADMIN — Medication 1000 UNITS: at 08:36

## 2025-06-13 RX ADMIN — MIDODRINE HYDROCHLORIDE 5 MG: 5 TABLET ORAL at 17:00

## 2025-06-13 RX ADMIN — MEMANTINE 10 MG: 5 TABLET ORAL at 08:39

## 2025-06-13 RX ADMIN — MEMANTINE 10 MG: 5 TABLET ORAL at 20:01

## 2025-06-13 RX ADMIN — MAGNESIUM GLUCONATE 500 MG ORAL TABLET 400 MG: 500 TABLET ORAL at 08:39

## 2025-06-13 RX ADMIN — ACETAMINOPHEN 1000 MG: 500 TABLET, FILM COATED ORAL at 08:39

## 2025-06-13 RX ADMIN — HYDROCODONE BITARTRATE AND ACETAMINOPHEN 1 TABLET: 5; 325 TABLET ORAL at 17:00

## 2025-06-13 RX ADMIN — SODIUM CHLORIDE, PRESERVATIVE FREE 10 ML: 5 INJECTION INTRAVENOUS at 20:01

## 2025-06-13 RX ADMIN — GABAPENTIN 100 MG: 100 CAPSULE ORAL at 22:05

## 2025-06-13 RX ADMIN — MULTIVITAMIN TABLET 1 TABLET: TABLET at 08:40

## 2025-06-13 RX ADMIN — KETOROLAC TROMETHAMINE 15 MG: 15 INJECTION, SOLUTION INTRAMUSCULAR; INTRAVENOUS at 22:04

## 2025-06-13 RX ADMIN — ACETAMINOPHEN 1000 MG: 500 TABLET, FILM COATED ORAL at 14:42

## 2025-06-13 RX ADMIN — MORPHINE SULFATE 2 MG: 2 INJECTION, SOLUTION INTRAMUSCULAR; INTRAVENOUS at 04:16

## 2025-06-13 RX ADMIN — SODIUM CHLORIDE, PRESERVATIVE FREE 10 ML: 5 INJECTION INTRAVENOUS at 08:41

## 2025-06-13 RX ADMIN — GABAPENTIN 100 MG: 100 CAPSULE ORAL at 08:40

## 2025-06-13 RX ADMIN — MIDODRINE HYDROCHLORIDE 5 MG: 5 TABLET ORAL at 12:22

## 2025-06-13 RX ADMIN — FLUCONAZOLE 150 MG: 100 TABLET ORAL at 17:00

## 2025-06-13 RX ADMIN — HYDROCODONE BITARTRATE AND ACETAMINOPHEN 1 TABLET: 5; 325 TABLET ORAL at 08:38

## 2025-06-13 RX ADMIN — HYDROCODONE BITARTRATE AND ACETAMINOPHEN 1 TABLET: 5; 325 TABLET ORAL at 12:22

## 2025-06-13 RX ADMIN — BISACODYL 5 MG: 5 TABLET, COATED ORAL at 14:43

## 2025-06-13 ASSESSMENT — PAIN - FUNCTIONAL ASSESSMENT
PAIN_FUNCTIONAL_ASSESSMENT: PREVENTS OR INTERFERES SOME ACTIVE ACTIVITIES AND ADLS
PAIN_FUNCTIONAL_ASSESSMENT: INTOLERABLE, UNABLE TO DO ANY ACTIVE OR PASSIVE ACTIVITIES

## 2025-06-13 ASSESSMENT — PAIN DESCRIPTION - ORIENTATION
ORIENTATION: RIGHT

## 2025-06-13 ASSESSMENT — PAIN DESCRIPTION - PAIN TYPE
TYPE: SURGICAL PAIN
TYPE: SURGICAL PAIN

## 2025-06-13 ASSESSMENT — PAIN SCALES - PAIN ASSESSMENT IN ADVANCED DEMENTIA (PAINAD)
CONSOLABILITY: NO NEED TO CONSOLE
TOTALSCORE: 4
NEGVOCALIZATION: OCCASIONAL MOAN/GROAN, LOW SPEECH, NEGATIVE/DISAPPROVING QUALITY
BREATHING: NORMAL
BODYLANGUAGE: RELAXED
FACIALEXPRESSION: SMILING OR INEXPRESSIVE
TOTALSCORE: 0
CONSOLABILITY: DISTRACTED OR REASSURED BY VOICE/TOUCH
BODYLANGUAGE: TENSE, DISTRESSED PACING, FIDGETING
FACIALEXPRESSION: SMILING OR INEXPRESSIVE

## 2025-06-13 ASSESSMENT — PAIN SCALES - GENERAL
PAINLEVEL_OUTOF10: 4
PAINLEVEL_OUTOF10: 0
PAINLEVEL_OUTOF10: 2
PAINLEVEL_OUTOF10: 0
PAINLEVEL_OUTOF10: 0
PAINLEVEL_OUTOF10: 2
PAINLEVEL_OUTOF10: 2
PAINLEVEL_OUTOF10: 3
PAINLEVEL_OUTOF10: 7
PAINLEVEL_OUTOF10: 0

## 2025-06-13 ASSESSMENT — PAIN DESCRIPTION - DESCRIPTORS
DESCRIPTORS: ACHING
DESCRIPTORS: PATIENT UNABLE TO DESCRIBE

## 2025-06-13 ASSESSMENT — PAIN DESCRIPTION - FREQUENCY
FREQUENCY: INTERMITTENT
FREQUENCY: INTERMITTENT

## 2025-06-13 ASSESSMENT — PAIN DESCRIPTION - LOCATION
LOCATION: HIP

## 2025-06-13 ASSESSMENT — PAIN SCALES - WONG BAKER: WONGBAKER_NUMERICALRESPONSE: NO HURT

## 2025-06-13 ASSESSMENT — PAIN DESCRIPTION - ONSET
ONSET: GRADUAL
ONSET: GRADUAL

## 2025-06-13 NOTE — PROGRESS NOTES
TRANSFER - IN REPORT:    Verbal report received from Lea RAINEY on Angelique G Bozena  being received from PACU for routine post-op      Report consisted of patient's Situation, Background, Assessment and   Recommendations(SBAR).     Information from the following report(s) Nurse Handoff Report was reviewed with the receiving nurse.    Opportunity for questions and clarification was provided.      Assessment completed upon patient's arrival to unit and care assumed.

## 2025-06-13 NOTE — PROGRESS NOTES
Pt's daughter present for transfer to room 716. All personal belongings with pt or daughter. Pt transferred to new bed from recliner via lift. Daughter states that pt does not wear O2 normally. Will try pt off O2. Tele box on pt.

## 2025-06-13 NOTE — PROGRESS NOTES
4 Eyes Skin Assessment     NAME:  Angelique Seals  YOB: 1935  MEDICAL RECORD NUMBER:  [id number]    The patient is being assessed for  Admission    I agree that at least one RN has performed a thorough Head to Toe Skin Assessment on the patient. ALL assessment sites listed below have been assessed.      Areas assessed by both nurses:    Head, Face, Ears, Shoulders, Back, Chest, Arms, Elbows, Hands, Sacrum. Buttock, Coccyx, Ischium, Legs. Feet and Heels, and Under Medical Devices         Does the Patient have a Wound? Yes wound(s) were present on assessment. LDA wound assessment was Initiated and completed by RN       Luis Prevention initiated by RN: Yes  Wound Care Orders initiated by RN: Yes    Pressure Injury (Stage 3,4, Unstageable, DTI, NWPT, and Complex wounds) if present, place Wound referral order by RN under : No    New Ostomies, if present place, Ostomy referral order under : No     Nurse 1 eSignature: Electronically signed by AL SMITH RN on 6/13/25 at 5:38 PM EDT    **SHARE this note so that the co-signing nurse can place an eSignature**    Nurse 2 eSignature: Electronically signed by Katie Smith RN on 6/13/25 at 5:40 PM EDT

## 2025-06-13 NOTE — THERAPY EVALUATION
ACUTE OCCUPATIONAL THERAPY GOALS:   (Developed with and agreed upon by patient and/or caregiver.)  1. Patient will complete upper body bathing and dressing with MODERATE ASSIST and adaptive equipment as needed.   2. Patient will complete self-grooming tasks in unsupported sitting with MINIMAL ASSIST and adaptive equipment as needed.  3. Patient will tolerate 25 minutes of OT treatment with 1-2 rest breaks to increase activity tolerance for ADLs.   4. Patient will complete functional transfers with MODERATE ASSIST and adaptive equipment as needed.   5. Patient will complete functional activity while seated edge of bed with MODERATE ASSIST and adaptive equipment as needed.   6. Patient will tolerate 15 minutes unsupported sitting balance with MODERATE ASSIST in preparation for ADL performance.   7. Patient will tolerate 10 minutes BUE therapeutic activities to increase use of BUE during ADL performance.      Timeframe: 7 visits     OCCUPATIONAL THERAPY Initial Assessment, Daily Note, and AM       OT Visit Days: 1  Acknowledge Orders  Time  OT Charge Capture  Rehab Caseload Tracker      WBAT RLE    Angelique Seals is a 89 y.o. female   PRIMARY DIAGNOSIS: Intertrochanteric fracture of right hip, open type III, initial encounter ([facility])  Closed fracture of right hip, initial encounter ([facility]) [S72.001A]  Intertrochanteric fracture of right hip, open type III, initial encounter ([facility]) [S72.141C]  Procedure(s) (LRB):  FEMUR INTRAMEDULLARY NAIL MARTA INSERTION ANTEGRADE RIGHT (Right)  1 Day Post-Op  Reason for Referral: Generalized Muscle Weakness (M62.81)  Other lack of cordination (R27.8)  History of falling (Z91.81)  Inpatient: Payor: Counts include 234 beds at the [facility] MEDICARE / Plan: T MEDICARE-ADVANTAGE PPO / Product Type: Medicare /     ASSESSMENT:     REHAB RECOMMENDATIONS:   Recommendation to date pending progress:  Setting:  Short-term Rehab    Equipment:   To Be Determined     ASSESSMENT:  Ms. Seals is a 89 y/o F who presents to the hospital  clothing?: A Lot  How much help is needed for taking care of personal grooming?: A Lot  How much help for eating meals?: A Little  AM-PAC Inpatient Daily Activity Raw Score: 10  AM-PAC Inpatient ADL T-Scale Score : 27.31  ADL Inpatient CMS 0-100% Score: 74.7  ADL Inpatient CMS G-Code Modifier : CL    SUBJECTIVE:     Ms. Seals states, \"Okay\"     Social/Functional Lives With: Other (Comment)  Type of Home: Assisted living  Home Equipment: Walker - Rolling, Wheelchair - Manual  Receives Help From: Other (Comment)  Prior Level of Assist for ADLs: Needs assistance  Prior Level of Assist for Homemaking: Needs assistance  Homemaking Responsibilities: No  Prior Level of Assist for Transfers: Needs assistance  Active : No  Patient's  Info: family    OBJECTIVE:     LINES / DRAINS / AIRWAY: Continuous Pulse Oximetry, Sanchez Catheter, and Telemetry     RESTRICTIONS/PRECAUTIONS:  Restrictions/Precautions: Weight Bearing, Fall Risk  Right Lower Extremity Weight Bearing: Weight Bearing As Tolerated    PAIN: VITALS / O2:   Pre Treatment:   Denies pain at rest, endorsing R hip pain with movement, does not provide objective rating, repositioned for comfort       Post Treatment: same as above, positioned comfortably sitting up in chair, patient satisfied, RN aware       Vitals          Oxygen            GROSS EVALUATION: INTACT IMPAIRED   (See Comments)   UE AROM [] []CARA Fletcher WFL   UE PROM [] []   Strength [] Generalized weakness throughout      Posture / Balance [] Sitting - Static: Poor  Sitting - Dynamic: Poor  Standing - Static: Poor  Standing - Dynamic: Poor   Sensation []     Coordination [] Gross and fine motor deficits noted, decreased speed and accuracy      Tone []       Edema []    Activity Tolerance [] Patient limited by fatigue, Patient limited by pain, Treatment limited secondary to decreased cognition     Hand Dominance R [] L []      COGNITION/  PERCEPTION: INTACT IMPAIRED   (See Comments)

## 2025-06-13 NOTE — PROGRESS NOTES
TRANSFER - OUT REPORT:    Verbal report given to Janna RN on Angelique Seals  being transferred to [facility] for routine progression of patient care       Report consisted of patient's Situation, Background, Assessment and   Recommendations(SBAR).     Information from the following report(s) Nurse Handoff Report, Surgery Report, MAR, Cardiac Rhythm A-fib, BBB, and Neuro Assessment was reviewed with the receiving nurse.           Lines:   Peripheral IV 06/10/25 Right Antecubital (Active)   Site Assessment Clean, dry & intact 06/13/25 1130   Line Status Infusing 06/13/25 1130   Line Care Connections checked and tightened 06/13/25 1130   Phlebitis Assessment No symptoms 06/13/25 1130   Infiltration Assessment 0 06/13/25 1130   Alcohol Cap Used Yes 06/13/25 1130   Dressing Status Clean, dry & intact 06/13/25 1130   Dressing Type Transparent 06/13/25 1130       Peripheral IV 06/11/25 Left Wrist (Active)   Site Assessment Clean, dry & intact 06/13/25 1130   Line Status Normal saline locked 06/13/25 1130   Line Care Connections checked and tightened 06/13/25 1130   Phlebitis Assessment No symptoms 06/13/25 1130   Infiltration Assessment 0 06/13/25 1130   Alcohol Cap Used Yes 06/13/25 1130   Dressing Status Clean, dry & intact 06/13/25 1130   Dressing Type Transparent 06/13/25 1130        Opportunity for questions and clarification was provided.      Patient transported with:  Monitor, O2 @ 4lpm, and Registered Nurse

## 2025-06-13 NOTE — PROGRESS NOTES
Progress Note    Patient: Angelique Seals MRN: [id number]  SSN: [id number]    YOB: 1935  Age: 89 y.o.  Sex: female      Admit Date: 6/10/2025    LOS: 2 days     Subjective:     Eating lunch. Daughter at bedside said she is due soon for more pain medication and seems to be more uncomfortable in last few minutes.     Objective:     Vitals:    06/13/25 0700 06/13/25 0715 06/13/25 0811 06/13/25 1222   BP: (!) 104/56 103/62     Pulse: 78 73     Resp: 11 (!) 6 15 16   Temp:       TempSrc:       SpO2: 99% 97%     Weight:       Height:            Intake and Output:  Current Shift: 06/13 0701 - 06/13 1900  In: 658.7 [I.V.:658.7]  Out: -   Last three shifts: 06/11 1901 - 06/13 0700  In: 1925.1 [I.V.:1925.1]  Out: 1120 [Urine:920]    arousable but not oriented    Right lower Extremity  - dressing with strikethrough  - Sensation: SILT Sa/Aburto/T/SP/DP   - Motor: intact TA/EHL/FHL/GS   - Digits warm, well-perfused, brisk cap refill    Lab/Data Review:  Recent Labs     06/13/25  0354   HGB 9.5*   HCT 29.8*          Assessment:     ORTHO INJURIES:  closed fracture of trochanter of right femur     ORTHO PROCEDURES:  Lis 6/12  FEMUR INTRAMEDULLARY NAIL MARTA INSERTION ANTEGRADE RIGHT   Plan:     WBAT RLE. Mobilize as able. Patient was assist x 2 in weeks leading up to injury per daughter.  May change dressing as needed for strikethrough/saturation  Wound care:   change dressing only as needed: xeroform or adaptec, 4x4 gauze, secure with aquacel, medipore tape, or tegaderm.   Multimodal pain control per orders   WB status: WBAT RLE  PT/OT for mobility/ADLs   Diet:  ADULT DIET; Dysphagia - Soft and Bite Sized  DVT prophylaxis: Defer to primary team; then  mg daily x 1 mo    Dispo: pending    F/up w/ [facility] on 2 weeks    Signed By: BILL Reardon     June 13, 2025

## 2025-06-13 NOTE — PROGRESS NOTES
SPEECH LANGUAGE PATHOLOGY: DYSPHAGIA Initial Assessment and Discharge    Acknowledge Order  I  Therapy Time  I   Charges     I  Rehab Caseload Tracker      NAME: Angelique Seals  : 1935  MRN: [id number]    ADMISSION DATE: 6/10/2025  PRIMARY DIAGNOSIS: Intertrochanteric fracture of right hip, open type III, initial encounter ([facility])    ICD-10: Treatment Diagnosis: R13.12 Dysphagia, Oropharyngeal Phase    RECOMMENDATIONS   Diet:    Regular Consistency  Thin Liquids    Medication: as tolerated   Compensatory Swallowing Strategies:   Assist feed  Upright as possible for all oral intake   Therapeutic Intervention:   Patient/family education  No additional speech therapy intervention indicated at this time.    Patient continues to require skilled intervention:  No. Please re-consult if new concerns arise.      Anticipated Discharge Needs: No additional speech therapy is indicated.      ASSESSMENT    Patient presents with functional oropharyngeal swallow. Functional mastication; appropriate oral clearance. No signs or symptoms of airway compromise observed with any trials. She did require assistance with self-feeding which daughter relates to RUE discomfort.  Per report, she consumes regular diet and thin liquids at baseline and is able to self-feed.     Discussed diet options with patient's daughter who wishes to resume regular diet. She will require assistance with self-feeding based on current presentation. No additional speech therapy needs identified.     GENERAL    Subjective: Patient pleasant. Cooperative. Non-verbal at baseline. She does nod to answer questions appropriately    Reason for Consult:  Diet tolerance.     History of Present Injury/Illness: Ms. Seals  has a past medical history of Anticoagulant long-term use, Aortic aneurysm, Arrhythmia, Atrial fibrillation (HCC), Chronic back pain, H/O complete eye exam, Headache, HLD (hyperlipidemia), Hypertension, Pacemaker, and Watchman left atrial appendage

## 2025-06-13 NOTE — PROGRESS NOTES
TRANSFER - IN REPORT:    Verbal report received from REDD Garcia on Angelique Martinezoer  being received from ICU for routine progression of patient care      Report consisted of patient's Situation, Background, Assessment and   Recommendations(SBAR).     Information from the following report(s) Nurse Handoff Report was reviewed with the receiving nurse.    Opportunity for questions and clarification was provided.      Assessment completed upon patient's arrival to unit (716) and care assumed.

## 2025-06-13 NOTE — CARE COORDINATION
Chart reviewed and pt discussed in am IDR as continues ICU awaiting tx to floor bed s/p FEMUR INTRAMEDULLARY NAIL MARTA INSERTION ANTEGRADE RIGHT  per ortho.     No gtts currently. NC 2L O2.     Followed by Hosptialist and Ortho.     PT/OT/ST following. Recommendation for STR. Family would like RGV and referral sent through careport.     CM following for HERIBERTO needs/POC. LOS 2 days.

## 2025-06-13 NOTE — PROGRESS NOTES
ACUTE PHYSICAL THERAPY GOALS:   (Developed with and agreed upon by patient and/or caregiver.)  LTG:  (1.)Ms. Seals will move from supine to sit and sit to supine , scoot up and down, and roll side to side in bed with MODERATE ASSIST within 7 treatment day(s).    (2.)Ms. Seals will transfer from bed to chair and chair to bed with MODERATE ASSIST using the least restrictive device within 7 treatment day(s).    (3.)Ms. Seals will ambulate with MODERATE ASSIST for 10 feet with the least restrictive device within 7 treatment day(s).  (4.)Ms. Seals will perform there ex in supine x 10 min with MOD A within 7 treatment days for increased AROM and strength B LE.   (5.)Ms. Seals will maintain static sitting EOB x 8 min with MOD A within 7 treatment days for increased trunk control and balance in sitting.   ________________________________________________________________________________________________       PHYSICAL THERAPY Initial Assessment and AM  (Link to Caseload Tracking: PT Visit Days : 1  Acknowledge Orders  Time In/Out  PT Charge Capture  Rehab Caseload Tracker    WBAT TRAVIS Burk is a 89 y.o. female   PRIMARY DIAGNOSIS: Intertrochanteric fracture of right hip, open type III, initial encounter ([facility])  Closed fracture of right hip, initial encounter ([facility]) [S72.001A]  Intertrochanteric fracture of right hip, open type III, initial encounter ([facility]) [S72.141C]  Procedure(s) (LRB):  FEMUR INTRAMEDULLARY NAIL MARTA INSERTION ANTEGRADE RIGHT (Right)  1 Day Post-Op  Reason for Referral: Generalized Muscle Weakness (M62.81)  Difficulty in walking, Not elsewhere classified (R26.2)  Other abnormalities of gait and mobility (R26.89)  History of falling (Z91.81)  Inpatient: Payor: On license of [facility] MEDICARE / Plan: On license of [facility] MEDICARE-ADVANTAGE PPO / Product Type: Medicare /     ASSESSMENT:     REHAB RECOMMENDATIONS:   Recommendation to date pending progress:  Setting:  Short-term Rehab    Equipment:    To Be Determined

## 2025-06-13 NOTE — PROGRESS NOTES
Hospitalist Progress Note   Admit Date:  6/10/2025  9:31 PM   Name:  Angelique Saels   Age:  89 y.o.  Sex:  female  :  1935   MRN:  [id number]   Room:  [address] [place] [address]    Presenting/Chief Complaint: Altered Mental Status and Fatigue     Reason(s) for Admission: Closed fracture of right hip, initial encounter ([facility]) [S72.001A]  Intertrochanteric fracture of right hip, open type III, initial encounter ([facility]) [S72.141C]     Hospital Course:     Angelique Seals is a 89 y.o. female with medical history of :    -dementia  -HTN  -neuropathy  -Neurogenic bladder- no caths  -PAF s/p watchman  -PPM  -ascending TAA 4.5 cm     Admitted s/p fall with right intertrochanteric hip fracture    She has been seen by orthopedics -surgical repair     She had refractory hypotension requiring ICU care and pressors after morphine   Levophed stopped  Midodrine added   She is rehydrated     O2 also in 70s     CXR negative   CT head and CT cspine negative     UA negative   She is on empiric rocephin, typically takes trimethoprim for her UTI prevention     Discharge plans pending   [facility]       Subjective & 24hr Events:     Daughter Katie bedside  Off levophed  Smiles and giggles- not very verbal  Some pain  Hungry  Has pain right shoulder and not moving as much per RN       Assessment & Plan:     Acute hypotension:  25  Appears to have occurred after morphine and is sensitive to pain meds  Weaned  off levophed    midodrine 5 mg TID   Stopped losartan   No infectious sources   HGB down to 9.5 after fluids and trauma- daily rechecks   She is on D3 emperic rocephin - EOT -  trimethoprim at home         Principal Problem:    Intertrochanteric fracture of right hip, open type III, initial encounter ([facility])  Plan:   25  Orthopedics following  OR   Therapies  Case management for discharge needs         Acute blood loss anemia:  25  Down to 9.5  Daily HGB        Right UE pain:  25  Xray

## 2025-06-13 NOTE — PROGRESS NOTES
Spiritual Health History and Assessment/Progress Note  [facility]      Room # 3109/01    Name: Angelique Seals           Age: 89 y.o.    Gender: female          MRN: [id number]  Mu-ism: Anabaptist       Preferred Language: English      Date: 06/13/25  Visit Time: Begin Time: 1000 End Time : 1010  Complexity of Encounter: Moderate      Visit Summary:  visited patient in ICU after attending IDR to assess spiritual health needs. Daughter came . Patient was not able to verbalize or participate in assessment, but smiled when  introduced self and offered prayer.  Daughter came to room shortly after and appeared to cope we.  Daughter confirmed patient's Zoroastrian maggi, stating that patient was a  and stated that patient has five supportive children.   provided pastoral presence, prayer and empathetic listening.  will continue to follow patient in ICU and assess further support.      Referral/Consult From: Rounding  Encounter Overview/Reason: Initial Encounter  Service Provided For: Patient and family together     Patient was available.    Maggi, Belief, Meaning:   Patient is connected with a maggi tradition or spiritual practice  Family/Friends are connected with a maggi tradition or spiritual practice    Importance and Influence:  Patient does not have spiritual/personal beliefs that influence decisions regarding their health  Family/Friends does not have spiritual/personal beliefs that influence decisions regarding the patient's health    Community:  Patient   Support System Includes   Children   Family/Friends   indicated that they feel well-supported    Assessment and Plan of Care:   Emotions Expressed by Patient:   Assessment: Calm    Interventions by :   Intervention: Sustaining Presence/Ministry of presence, Prayer (assurance of)/[place], Explored/Affirmed feelings, thoughts, concerns     Result/ Response by Patient:   Outcome: Comfort,

## 2025-06-14 LAB
ANION GAP SERPL CALC-SCNC: 7 MMOL/L (ref 7–16)
BASOPHILS # BLD: 0.01 K/UL (ref 0–0.2)
BASOPHILS NFR BLD: 0.1 % (ref 0–2)
BUN SERPL-MCNC: 48 MG/DL (ref 8–23)
CALCIUM SERPL-MCNC: 8.4 MG/DL (ref 8.8–10.2)
CHLORIDE SERPL-SCNC: 108 MMOL/L (ref 98–107)
CO2 SERPL-SCNC: 26 MMOL/L (ref 20–29)
CREAT SERPL-MCNC: 1.04 MG/DL (ref 0.6–1.1)
DIFFERENTIAL METHOD BLD: ABNORMAL
EOSINOPHIL # BLD: 0.04 K/UL (ref 0–0.8)
EOSINOPHIL NFR BLD: 0.5 % (ref 0.5–7.8)
ERYTHROCYTE [DISTWIDTH] IN BLOOD BY AUTOMATED COUNT: 13.6 % (ref 11.9–14.6)
EST. AVERAGE GLUCOSE BLD GHB EST-MCNC: 108 MG/DL
FERRITIN SERPL-MCNC: 206 NG/ML (ref 8–388)
FOLATE SERPL-MCNC: 14.8 NG/ML (ref 3.1–17.5)
GLUCOSE SERPL-MCNC: 119 MG/DL (ref 70–99)
HBA1C MFR BLD: 5.4 % (ref 0–5.6)
HCT VFR BLD AUTO: 24 % (ref 35.8–46.3)
HCT VFR BLD AUTO: 25.8 % (ref 35.8–46.3)
HGB BLD-MCNC: 7.8 G/DL (ref 11.7–15.4)
HGB BLD-MCNC: 8.1 G/DL (ref 11.7–15.4)
IMM GRANULOCYTES # BLD AUTO: 0.03 K/UL (ref 0–0.5)
IMM GRANULOCYTES NFR BLD AUTO: 0.4 % (ref 0–5)
IRON SATN MFR SERPL: 25 % (ref 20–50)
IRON SERPL-MCNC: 47 UG/DL (ref 35–100)
LYMPHOCYTES # BLD: 1.09 K/UL (ref 0.5–4.6)
LYMPHOCYTES NFR BLD: 13.4 % (ref 13–44)
MCH RBC QN AUTO: 31.1 PG (ref 26.1–32.9)
MCHC RBC AUTO-ENTMCNC: 32.5 G/DL (ref 31.4–35)
MCV RBC AUTO: 95.6 FL (ref 82–102)
MONOCYTES # BLD: 0.68 K/UL (ref 0.1–1.3)
MONOCYTES NFR BLD: 8.4 % (ref 4–12)
NEUTS SEG # BLD: 6.27 K/UL (ref 1.7–8.2)
NEUTS SEG NFR BLD: 77.2 % (ref 43–78)
NRBC # BLD: 0 K/UL (ref 0–0.2)
PLATELET # BLD AUTO: 156 K/UL (ref 150–450)
PMV BLD AUTO: 10.9 FL (ref 9.4–12.3)
POTASSIUM SERPL-SCNC: 4.9 MMOL/L (ref 3.5–5.1)
RBC # BLD AUTO: 2.51 M/UL (ref 4.05–5.2)
SODIUM SERPL-SCNC: 141 MMOL/L (ref 136–145)
TIBC SERPL-MCNC: 190 UG/DL (ref 240–450)
UIBC SERPL-MCNC: 143 UG/DL (ref 112–347)
VIT B12 SERPL-MCNC: 565 PG/ML (ref 193–986)
WBC # BLD AUTO: 8.1 K/UL (ref 4.3–11.1)

## 2025-06-14 PROCEDURE — 85018 HEMOGLOBIN: CPT

## 2025-06-14 PROCEDURE — 2500000003 HC RX 250 WO HCPCS: Performed by: ORTHOPAEDIC SURGERY

## 2025-06-14 PROCEDURE — 51798 US URINE CAPACITY MEASURE: CPT

## 2025-06-14 PROCEDURE — 83550 IRON BINDING TEST: CPT

## 2025-06-14 PROCEDURE — 85025 COMPLETE CBC W/AUTO DIFF WBC: CPT

## 2025-06-14 PROCEDURE — 1100000003 HC PRIVATE W/ TELEMETRY

## 2025-06-14 PROCEDURE — 83540 ASSAY OF IRON: CPT

## 2025-06-14 PROCEDURE — 82728 ASSAY OF FERRITIN: CPT

## 2025-06-14 PROCEDURE — 82607 VITAMIN B-12: CPT

## 2025-06-14 PROCEDURE — 83036 HEMOGLOBIN GLYCOSYLATED A1C: CPT

## 2025-06-14 PROCEDURE — 82746 ASSAY OF FOLIC ACID SERUM: CPT

## 2025-06-14 PROCEDURE — 6370000000 HC RX 637 (ALT 250 FOR IP): Performed by: INTERNAL MEDICINE

## 2025-06-14 PROCEDURE — 6370000000 HC RX 637 (ALT 250 FOR IP): Performed by: ORTHOPAEDIC SURGERY

## 2025-06-14 PROCEDURE — 51701 INSERT BLADDER CATHETER: CPT

## 2025-06-14 PROCEDURE — 36415 COLL VENOUS BLD VENIPUNCTURE: CPT

## 2025-06-14 PROCEDURE — 80048 BASIC METABOLIC PNL TOTAL CA: CPT

## 2025-06-14 PROCEDURE — 85014 HEMATOCRIT: CPT

## 2025-06-14 RX ADMIN — HYDROCODONE BITARTRATE AND ACETAMINOPHEN 1 TABLET: 5; 325 TABLET ORAL at 11:16

## 2025-06-14 RX ADMIN — TRAMADOL HYDROCHLORIDE 50 MG: 50 TABLET, COATED ORAL at 23:33

## 2025-06-14 RX ADMIN — TRAMADOL HYDROCHLORIDE 50 MG: 50 TABLET, COATED ORAL at 05:43

## 2025-06-14 RX ADMIN — MIDODRINE HYDROCHLORIDE 5 MG: 5 TABLET ORAL at 12:00

## 2025-06-14 RX ADMIN — MEMANTINE 10 MG: 5 TABLET ORAL at 20:24

## 2025-06-14 RX ADMIN — MAGNESIUM GLUCONATE 500 MG ORAL TABLET 400 MG: 500 TABLET ORAL at 09:00

## 2025-06-14 RX ADMIN — MIDODRINE HYDROCHLORIDE 5 MG: 5 TABLET ORAL at 09:00

## 2025-06-14 RX ADMIN — MULTIVITAMIN TABLET 1 TABLET: TABLET at 09:00

## 2025-06-14 RX ADMIN — Medication 1000 UNITS: at 09:00

## 2025-06-14 RX ADMIN — ACETAMINOPHEN 1000 MG: 500 TABLET, FILM COATED ORAL at 17:06

## 2025-06-14 RX ADMIN — GABAPENTIN 100 MG: 100 CAPSULE ORAL at 09:00

## 2025-06-14 RX ADMIN — ACETAMINOPHEN 1000 MG: 500 TABLET, FILM COATED ORAL at 20:24

## 2025-06-14 RX ADMIN — TRIMETHOPRIM 100 MG: 100 TABLET ORAL at 09:00

## 2025-06-14 RX ADMIN — SODIUM CHLORIDE, PRESERVATIVE FREE 5 ML: 5 INJECTION INTRAVENOUS at 20:24

## 2025-06-14 RX ADMIN — GABAPENTIN 100 MG: 100 CAPSULE ORAL at 14:00

## 2025-06-14 RX ADMIN — GABAPENTIN 100 MG: 100 CAPSULE ORAL at 20:24

## 2025-06-14 RX ADMIN — BISACODYL 5 MG: 5 TABLET, COATED ORAL at 09:00

## 2025-06-14 RX ADMIN — ACETAMINOPHEN 1000 MG: 500 TABLET, FILM COATED ORAL at 09:00

## 2025-06-14 RX ADMIN — SODIUM CHLORIDE, PRESERVATIVE FREE 10 ML: 5 INJECTION INTRAVENOUS at 09:00

## 2025-06-14 RX ADMIN — HYDROCODONE BITARTRATE AND ACETAMINOPHEN 1 TABLET: 5; 325 TABLET ORAL at 17:07

## 2025-06-14 RX ADMIN — MIDODRINE HYDROCHLORIDE 5 MG: 5 TABLET ORAL at 17:06

## 2025-06-14 RX ADMIN — MEMANTINE 10 MG: 5 TABLET ORAL at 09:00

## 2025-06-14 ASSESSMENT — PAIN SCALES - PAIN ASSESSMENT IN ADVANCED DEMENTIA (PAINAD)
BODYLANGUAGE: RELAXED
BODYLANGUAGE: RELAXED
TOTALSCORE: 0
FACIALEXPRESSION: SMILING OR INEXPRESSIVE
BODYLANGUAGE: RIGID, FISTS CLENCHED, KNEES UP, PUSHING/PULLING AWAY, STRIKES OUT
CONSOLABILITY: DISTRACTED OR REASSURED BY VOICE/TOUCH
TOTALSCORE: 5
BODYLANGUAGE: RELAXED
NEGVOCALIZATION: OCCASIONAL MOAN/GROAN, LOW SPEECH, NEGATIVE/DISAPPROVING QUALITY
FACIALEXPRESSION: SMILING OR INEXPRESSIVE
BREATHING: NORMAL
NEGVOCALIZATION: OCCASIONAL MOAN/GROAN, LOW SPEECH, NEGATIVE/DISAPPROVING QUALITY
CONSOLABILITY: NO NEED TO CONSOLE
TOTALSCORE: 0
BODYLANGUAGE: RIGID, FISTS CLENCHED, KNEES UP, PUSHING/PULLING AWAY, STRIKES OUT
TOTALSCORE: 9
CONSOLABILITY: UNABLE TO CONSOLE, DISTRACT OR REASSURE
BREATHING: NORMAL
BREATHING: NORMAL
FACIALEXPRESSION: SMILING OR INEXPRESSIVE
CONSOLABILITY: NO NEED TO CONSOLE
NEGVOCALIZATION: REPEATED TROUBLED CALLING OUT, LOUD MOANING/GROANING, CRYING
TOTALSCORE: 0
TOTALSCORE: 5
FACIALEXPRESSION: FACIAL GRIMACING
BODYLANGUAGE: RIGID, FISTS CLENCHED, KNEES UP, PUSHING/PULLING AWAY, STRIKES OUT
CONSOLABILITY: NO NEED TO CONSOLE
FACIALEXPRESSION: SMILING OR INEXPRESSIVE
BREATHING: NOISY LABORED BREATHING, LONG PERIODS HYPERVENTILATION, CHEYNE-STOKES RESPIRATIONS
CONSOLABILITY: DISTRACTED OR REASSURED BY VOICE/TOUCH
FACIALEXPRESSION: SAD, FRIGHTENED, FROWN
BREATHING: NORMAL

## 2025-06-14 ASSESSMENT — PAIN DESCRIPTION - LOCATION
LOCATION: HIP

## 2025-06-14 ASSESSMENT — PAIN SCALES - GENERAL
PAINLEVEL_OUTOF10: 0
PAINLEVEL_OUTOF10: 5
PAINLEVEL_OUTOF10: 9
PAINLEVEL_OUTOF10: 0
PAINLEVEL_OUTOF10: 5
PAINLEVEL_OUTOF10: 0

## 2025-06-14 ASSESSMENT — PAIN DESCRIPTION - ORIENTATION
ORIENTATION: RIGHT

## 2025-06-14 ASSESSMENT — PAIN DESCRIPTION - DESCRIPTORS
DESCRIPTORS: ACHING
DESCRIPTORS: PATIENT UNABLE TO DESCRIBE

## 2025-06-14 ASSESSMENT — PAIN DESCRIPTION - FREQUENCY
FREQUENCY: INTERMITTENT

## 2025-06-14 ASSESSMENT — PAIN SCALES - WONG BAKER
WONGBAKER_NUMERICALRESPONSE: HURTS EVEN MORE
WONGBAKER_NUMERICALRESPONSE: NO HURT

## 2025-06-14 ASSESSMENT — PAIN - FUNCTIONAL ASSESSMENT
PAIN_FUNCTIONAL_ASSESSMENT: PREVENTS OR INTERFERES WITH ALL ACTIVE AND SOME PASSIVE ACTIVITIES
PAIN_FUNCTIONAL_ASSESSMENT: PREVENTS OR INTERFERES WITH ALL ACTIVE AND SOME PASSIVE ACTIVITIES
PAIN_FUNCTIONAL_ASSESSMENT: INTOLERABLE, UNABLE TO DO ANY ACTIVE OR PASSIVE ACTIVITIES
PAIN_FUNCTIONAL_ASSESSMENT: PREVENTS OR INTERFERES SOME ACTIVE ACTIVITIES AND ADLS

## 2025-06-14 ASSESSMENT — PAIN DESCRIPTION - ONSET
ONSET: UNABLE TO COMMUNICATE
ONSET: GRADUAL
ONSET: GRADUAL
ONSET: UNABLE TO COMMUNICATE

## 2025-06-14 ASSESSMENT — PAIN DESCRIPTION - PAIN TYPE
TYPE: SURGICAL PAIN

## 2025-06-14 NOTE — PROGRESS NOTES
Mere Brennan  : 1935  Primary: [employer] Ppo (Medicare Managed)  Secondary:  [address] [facility] [address]  Phone: [phone]  Fax: [phone]      Plan of Care/Certification Expiration Date: 23      >PT Visit Info:  Plan of Care/Certification Expiration Date: 23      Visit Count:  5    OUTPATIENT PHYSICAL THERAPY:OP NOTE TYPE: OP Note Type: Treatment Note 2023       Episode  }Appt Desk             Treatment Diagnosis:  Muscle Weakness, Generalized (M62.81)  Pain in right knee (M25.561)  Pain in left knee (M25.562)  Stiffness of right knee, not elsewhere classified (M25.661)  Stiffness of left knee, not elsewhere classified (M25.662)  Other abnormalities of gait and mobility (R26.89)  Unsteadiness on Feet (R26.81) Debility (R54)  Medical/Referring Diagnosis:  Contusion of left knee, initial encounter [S80.02XA]  Referring Physician:  Renuka Mirza MD MD Orders:  PT Eval and Treat   Date of Onset:  Onset Date:  (chronic)     Allergies:   Guaifenesin, Loratadine-pseudoephedrine er, Amoxicillin, and Sulfa antibiotics  Restrictions/Precautions:  Restrictions/Precautions: Cardiac  No data recorded   Interventions Planned (Treatment may consist of any combination of the following):    Current Treatment Recommendations: Strengthening; ROM; Balance training; Functional mobility training; Transfer training; ADL/Self-care training; IADL training; Endurance training; Gait training; Stair training; Neuromuscular re-education; Home exercise program; Safety education & training; Patient/Caregiver education & training; Equipment evaluation, education, & procurement; Therapeutic activities     >Subjective Comments: pt with no reported specific changes. Pt reports her blood pressure was a bit high yesterday, but today it was down.      >Initial:   achy   /10>Post Session:    achy    /10  Medications Last Reviewed: Patient/Caregiver provided printed discharge information.

## 2025-06-14 NOTE — PROGRESS NOTES
Hospitalist Progress Note   Admit Date:  6/10/2025  9:31 PM   Name:  Angelique Seals   Age:  89 y.o.  Sex:  female  :  1935   MRN:  [id number]   Room:  [facility]    Presenting/Chief Complaint: Altered Mental Status and Fatigue     Reason(s) for Admission: Closed fracture of right hip, initial encounter ([facility]) [S72.001A]  Intertrochanteric fracture of right hip, open type III, initial encounter ([facility]) [S72.141C]     Hospital Course:   Angelique Seals is a 89 y.o. female with medical history of dementia, hypertension, neuropathy, neurogenic bladder, proximal atrial fibrillation status post Watchman procedure, status post pacemaker, ascending thoracic aneurysm measuring 4.5 cm who presented with fall.  Patient was noted to have right intertrochanteric hip fracture and underwent surgical repair on .     Patient had refractory hypotension after receiving morphine after procedure.  Patient had required ICU transfer with pressor support.   Patient blood pressure improved with hydration, and midodrine and Levophed was weaned off.  Patient transferred to the floor on .    Subjective & 24hr Events:   Patient was seen and examined at bedside.  No acute events noted overnight.  Patient was alert and awake but minimally interactive with me today.  Answer some basic questions with yes or no.  Does not have any acute complaints.    Assessment & Plan:     Hypotension  Likely due to medication side effects as it happened after receiving morphine.  Patient had required ICU transfer and Levophed to maintain MAP.  -Blood pressure stable on midodrine.  Continue to monitor blood pressure and wean midodrine as appropriate    Intertrochanteric fracture of right hip  Status post femur intramedullary nail easton insertion on   -Orthopedics appreciated.  Management as per orthopedics  -DVT PPx per orthopedic  -PT/OT    Anemia, likely acute blood loss anemia  Hemoglobin had down trended from 10.1 presurgery to 7.8

## 2025-06-14 NOTE — PLAN OF CARE
Problem: Pain  Goal: Verbalizes/displays adequate comfort level or baseline comfort level  Outcome: Progressing  Flowsheets (Taken 6/14/2025 1842)  Verbalizes/displays adequate comfort level or baseline comfort level:   Assess pain using appropriate pain scale   Implement non-pharmacological measures as appropriate and evaluate response  Note: Assessing pt pain with the PAINAD scale.  Repositioning pt freq to help decrease pain.     Problem: Skin/Tissue Integrity  Goal: Skin integrity remains intact  Description: 1.  Monitor for areas of redness and/or skin breakdown2.  Assess vascular access sites hourly3.  Every 4-6 hours minimum:  Change oxygen saturation probe site4.  Every 4-6 hours:  If on nasal continuous positive airway pressure, respiratory therapy assess nares and determine need for appliance change or resting period  Outcome: Progressing  Flowsheets (Taken 6/14/2025 1842)  Skin Integrity Remains Intact:   Monitor for areas of redness and/or skin breakdown   Turn and reposition as indicated  Note: Repositioning pt every 2 hr to prevent skin breakdown.     Problem: Skin/Tissue Integrity - Adult  Goal: Skin integrity remains intact  Description: 1.  Monitor for areas of redness and/or skin breakdown2.  Assess vascular access sites hourly3.  Every 4-6 hours minimum:  Change oxygen saturation probe site4.  Every 4-6 hours:  If on nasal continuous positive airway pressure, respiratory therapy assess nares and determine need for appliance change or resting period  Outcome: Progressing  Flowsheets (Taken 6/14/2025 1842)  Skin Integrity Remains Intact:   Monitor for areas of redness and/or skin breakdown   Turn and reposition as indicated  Note: Repositioning pt every 2 hr to prevent skin breakdown.

## 2025-06-15 LAB
ANION GAP SERPL CALC-SCNC: 7 MMOL/L (ref 7–16)
BUN SERPL-MCNC: 44 MG/DL (ref 8–23)
CALCIUM SERPL-MCNC: 8.5 MG/DL (ref 8.8–10.2)
CHLORIDE SERPL-SCNC: 104 MMOL/L (ref 98–107)
CO2 SERPL-SCNC: 26 MMOL/L (ref 20–29)
CREAT SERPL-MCNC: 0.92 MG/DL (ref 0.6–1.1)
GLUCOSE SERPL-MCNC: 97 MG/DL (ref 70–99)
HCT VFR BLD AUTO: 25.4 % (ref 35.8–46.3)
HGB BLD-MCNC: 8 G/DL (ref 11.7–15.4)
POTASSIUM SERPL-SCNC: 4.7 MMOL/L (ref 3.5–5.1)
SODIUM SERPL-SCNC: 138 MMOL/L (ref 136–145)

## 2025-06-15 PROCEDURE — 51702 INSERT TEMP BLADDER CATH: CPT

## 2025-06-15 PROCEDURE — 6370000000 HC RX 637 (ALT 250 FOR IP): Performed by: ORTHOPAEDIC SURGERY

## 2025-06-15 PROCEDURE — 6370000000 HC RX 637 (ALT 250 FOR IP): Performed by: INTERNAL MEDICINE

## 2025-06-15 PROCEDURE — 1100000003 HC PRIVATE W/ TELEMETRY

## 2025-06-15 PROCEDURE — 97530 THERAPEUTIC ACTIVITIES: CPT

## 2025-06-15 PROCEDURE — 51701 INSERT BLADDER CATHETER: CPT

## 2025-06-15 PROCEDURE — 85014 HEMATOCRIT: CPT

## 2025-06-15 PROCEDURE — 85018 HEMOGLOBIN: CPT

## 2025-06-15 PROCEDURE — 2500000003 HC RX 250 WO HCPCS: Performed by: ORTHOPAEDIC SURGERY

## 2025-06-15 PROCEDURE — 36415 COLL VENOUS BLD VENIPUNCTURE: CPT

## 2025-06-15 PROCEDURE — 51798 US URINE CAPACITY MEASURE: CPT

## 2025-06-15 PROCEDURE — 80048 BASIC METABOLIC PNL TOTAL CA: CPT

## 2025-06-15 RX ADMIN — BISACODYL 5 MG: 5 TABLET, COATED ORAL at 08:30

## 2025-06-15 RX ADMIN — ACETAMINOPHEN 1000 MG: 500 TABLET, FILM COATED ORAL at 20:17

## 2025-06-15 RX ADMIN — MEMANTINE 10 MG: 5 TABLET ORAL at 08:30

## 2025-06-15 RX ADMIN — MIDODRINE HYDROCHLORIDE 5 MG: 5 TABLET ORAL at 08:30

## 2025-06-15 RX ADMIN — MULTIVITAMIN TABLET 1 TABLET: TABLET at 08:30

## 2025-06-15 RX ADMIN — HYDROCODONE BITARTRATE AND ACETAMINOPHEN 1 TABLET: 5; 325 TABLET ORAL at 10:19

## 2025-06-15 RX ADMIN — POLYETHYLENE GLYCOL 3350 17 G: 17 POWDER, FOR SOLUTION ORAL at 14:40

## 2025-06-15 RX ADMIN — GABAPENTIN 100 MG: 100 CAPSULE ORAL at 14:32

## 2025-06-15 RX ADMIN — TRIMETHOPRIM 100 MG: 100 TABLET ORAL at 08:30

## 2025-06-15 RX ADMIN — ACETAMINOPHEN 1000 MG: 500 TABLET, FILM COATED ORAL at 14:32

## 2025-06-15 RX ADMIN — SODIUM CHLORIDE, PRESERVATIVE FREE 5 ML: 5 INJECTION INTRAVENOUS at 20:17

## 2025-06-15 RX ADMIN — Medication 1000 UNITS: at 08:30

## 2025-06-15 RX ADMIN — SODIUM CHLORIDE, PRESERVATIVE FREE 10 ML: 5 INJECTION INTRAVENOUS at 10:20

## 2025-06-15 RX ADMIN — MEMANTINE 10 MG: 5 TABLET ORAL at 20:17

## 2025-06-15 RX ADMIN — MIDODRINE HYDROCHLORIDE 5 MG: 5 TABLET ORAL at 12:00

## 2025-06-15 RX ADMIN — GABAPENTIN 100 MG: 100 CAPSULE ORAL at 08:30

## 2025-06-15 RX ADMIN — GABAPENTIN 100 MG: 100 CAPSULE ORAL at 20:17

## 2025-06-15 RX ADMIN — MAGNESIUM GLUCONATE 500 MG ORAL TABLET 400 MG: 500 TABLET ORAL at 08:30

## 2025-06-15 RX ADMIN — MIDODRINE HYDROCHLORIDE 5 MG: 5 TABLET ORAL at 16:59

## 2025-06-15 ASSESSMENT — PAIN SCALES - PAIN ASSESSMENT IN ADVANCED DEMENTIA (PAINAD)
BREATHING: NORMAL
TOTALSCORE: 0
CONSOLABILITY: NO NEED TO CONSOLE
FACIALEXPRESSION: SMILING OR INEXPRESSIVE
BODYLANGUAGE: RELAXED
FACIALEXPRESSION: FACIAL GRIMACING
TOTALSCORE: 0
CONSOLABILITY: NO NEED TO CONSOLE
CONSOLABILITY: NO NEED TO CONSOLE
TOTALSCORE: 7
TOTALSCORE: 0
FACIALEXPRESSION: SMILING OR INEXPRESSIVE
FACIALEXPRESSION: SMILING OR INEXPRESSIVE
BODYLANGUAGE: RELAXED
CONSOLABILITY: UNABLE TO CONSOLE, DISTRACT OR REASSURE
BREATHING: NORMAL
NEGVOCALIZATION: OCCASIONAL MOAN/GROAN, LOW SPEECH, NEGATIVE/DISAPPROVING QUALITY
BODYLANGUAGE: TENSE, DISTRESSED PACING, FIDGETING
BREATHING: NORMAL
BODYLANGUAGE: RELAXED

## 2025-06-15 ASSESSMENT — PAIN SCALES - GENERAL
PAINLEVEL_OUTOF10: 0

## 2025-06-15 ASSESSMENT — PAIN DESCRIPTION - DESCRIPTORS: DESCRIPTORS: PATIENT UNABLE TO DESCRIBE

## 2025-06-15 ASSESSMENT — PAIN DESCRIPTION - PAIN TYPE: TYPE: SURGICAL PAIN;ACUTE PAIN

## 2025-06-15 ASSESSMENT — PAIN DESCRIPTION - LOCATION: LOCATION: HIP

## 2025-06-15 ASSESSMENT — PAIN - FUNCTIONAL ASSESSMENT: PAIN_FUNCTIONAL_ASSESSMENT: INTOLERABLE, UNABLE TO DO ANY ACTIVE OR PASSIVE ACTIVITIES

## 2025-06-15 ASSESSMENT — PAIN DESCRIPTION - ONSET: ONSET: GRADUAL

## 2025-06-15 ASSESSMENT — PAIN DESCRIPTION - ORIENTATION: ORIENTATION: RIGHT

## 2025-06-15 ASSESSMENT — PAIN DESCRIPTION - FREQUENCY: FREQUENCY: INTERMITTENT

## 2025-06-15 NOTE — PLAN OF CARE
Problem: Discharge Planning  Goal: Discharge to home or other facility with appropriate resources  Recent Flowsheet Documentation  Taken 6/15/2025 1120 by Janna Marquis RN  Discharge to home or other facility with appropriate resources:   Identify barriers to discharge with patient and caregiver   Arrange for needed discharge resources and transportation as appropriate   Identify discharge learning needs (meds, wound care, etc)     Problem: Pain  Goal: Verbalizes/displays adequate comfort level or baseline comfort level  Outcome: Progressing  Flowsheets (Taken 6/15/2025 1849)  Verbalizes/displays adequate comfort level or baseline comfort level:   Implement non-pharmacological measures as appropriate and evaluate response   Assess pain using appropriate pain scale  Note: Use PAINAD scale     Problem: Safety - Adult  Goal: Free from fall injury  Flowsheets  Taken 6/15/2025 1849  Free From Fall Injury: Instruct family/caregiver on patient safety  Taken 6/15/2025 0744  Free From Fall Injury: Instruct family/caregiver on patient safety     Problem: Skin/Tissue Integrity  Goal: Skin integrity remains intact  Description: 1.  Monitor for areas of redness and/or skin breakdown2.  Assess vascular access sites hourly3.  Every 4-6 hours minimum:  Change oxygen saturation probe site4.  Every 4-6 hours:  If on nasal continuous positive airway pressure, respiratory therapy assess nares and determine need for appliance change or resting period  Flowsheets  Taken 6/15/2025 1849  Skin Integrity Remains Intact:   Monitor for areas of redness and/or skin breakdown   Turn and reposition as indicated   Positioning devices   Check visual cues for pain   Monitor skin under medical devices  Taken 6/15/2025 0744  Skin Integrity Remains Intact: Monitor for areas of redness and/or skin breakdown  Note: Pt turned every 2 hr r/t redness and excoriation.      Problem: Confusion  Goal: Confusion, delirium, dementia, or psychosis is improved

## 2025-06-15 NOTE — PLAN OF CARE
Problem: Discharge Planning  Goal: Discharge to home or other facility with appropriate resources  Outcome: Progressing  Flowsheets (Taken 6/14/2025 2025)  Discharge to home or other facility with appropriate resources: Identify barriers to discharge with patient and caregiver     Problem: Pain  Goal: Verbalizes/displays adequate comfort level or baseline comfort level  6/14/2025 2232 by Yvette Neely RN  Outcome: Progressing  Flowsheets (Taken 6/14/2025 1937)  Verbalizes/displays adequate comfort level or baseline comfort level: Assess pain using appropriate pain scale  6/14/2025 1842 by Janna Marquis, RN  Outcome: Progressing  Flowsheets (Taken 6/14/2025 1842)  Verbalizes/displays adequate comfort level or baseline comfort level:   Assess pain using appropriate pain scale   Implement non-pharmacological measures as appropriate and evaluate response  Note: Assessing pt pain with the PAINAD scale.  Repositioning pt freq to help decrease pain.     Problem: Safety - Adult  Goal: Free from fall injury  Outcome: Progressing  Flowsheets (Taken 6/14/2025 2025)  Free From Fall Injury: Instruct family/caregiver on patient safety     Problem: Skin/Tissue Integrity  Goal: Skin integrity remains intact  Description: 1.  Monitor for areas of redness and/or skin breakdown2.  Assess vascular access sites hourly3.  Every 4-6 hours minimum:  Change oxygen saturation probe site4.  Every 4-6 hours:  If on nasal continuous positive airway pressure, respiratory therapy assess nares and determine need for appliance change or resting period  6/14/2025 2232 by Yvette Neely, RN  Outcome: Progressing  Flowsheets (Taken 6/14/2025 2025)  Skin Integrity Remains Intact: Monitor for areas of redness and/or skin breakdown  6/14/2025 1842 by Janna Marquis, RN  Outcome: Progressing  Flowsheets (Taken 6/14/2025 1842)  Skin Integrity Remains Intact:   Monitor for areas of redness and/or skin breakdown   Turn and reposition as

## 2025-06-15 NOTE — PLAN OF CARE
Problem: Discharge Planning  Goal: Discharge to home or other facility with appropriate resources  6/14/2025 2232 by Yvette Neely, RN  Outcome: Progressing  Flowsheets (Taken 6/14/2025 2025)  Discharge to home or other facility with appropriate resources: Identify barriers to discharge with patient and caregiver     Problem: Pain  Goal: Verbalizes/displays adequate comfort level or baseline comfort level  Recent Flowsheet Documentation  Taken 6/15/2025 0003 by Yvette Neely RN  Verbalizes/displays adequate comfort level or baseline comfort level: Encourage patient to monitor pain and request assistance  6/14/2025 2232 by Yvette Neely, RN  Outcome: Progressing  Flowsheets (Taken 6/14/2025 1937)  Verbalizes/displays adequate comfort level or baseline comfort level: Assess pain using appropriate pain scale  6/14/2025 1842 by Janna Marquis RN  Outcome: Progressing  Flowsheets (Taken 6/14/2025 1842)  Verbalizes/displays adequate comfort level or baseline comfort level:   Assess pain using appropriate pain scale   Implement non-pharmacological measures as appropriate and evaluate response  Note: Assessing pt pain with the PAINAD scale.  Repositioning pt freq to help decrease pain.     Problem: Safety - Adult  Goal: Free from fall injury  6/14/2025 2232 by Yvette Neely RN  Outcome: Progressing  Flowsheets (Taken 6/14/2025 2025)  Free From Fall Injury: Instruct family/caregiver on patient safety     Problem: Skin/Tissue Integrity  Goal: Skin integrity remains intact  Description: 1.  Monitor for areas of redness and/or skin breakdown2.  Assess vascular access sites hourly3.  Every 4-6 hours minimum:  Change oxygen saturation probe site4.  Every 4-6 hours:  If on nasal continuous positive airway pressure, respiratory therapy assess nares and determine need for appliance change or resting period  6/14/2025 2232 by Yvette Neely, RN  Outcome: Progressing  Flowsheets (Taken 6/14/2025 2025)  Skin Integrity Remains

## 2025-06-15 NOTE — PROGRESS NOTES
ACUTE PHYSICAL THERAPY GOALS:   (Developed with and agreed upon by patient and/or caregiver.)  LTG:  (1.)Ms. Seals will move from supine to sit and sit to supine , scoot up and down, and roll side to side in bed with MODERATE ASSIST within 7 treatment day(s).    (2.)Ms. Seals will transfer from bed to chair and chair to bed with MODERATE ASSIST using the least restrictive device within 7 treatment day(s).    (3.)Ms. Seals will ambulate with MODERATE ASSIST for 10 feet with the least restrictive device within 7 treatment day(s).  (4.)Ms. Seals will perform there ex in supine x 10 min with MOD A within 7 treatment days for increased AROM and strength B LE.   (5.)Ms. Seals will maintain static sitting EOB x 8 min with MOD A within 7 treatment days for increased trunk control and balance in sitting.     PHYSICAL THERAPY: Daily Note AM   (Link to Caseload Tracking: PT Visit Days : 2  Time In/Out PT Charge Capture  Rehab Caseload Tracker  Orders  WBAT ALLIE Seals is a 89 y.o. female   PRIMARY DIAGNOSIS: Intertrochanteric fracture of right hip, open type III, initial encounter ([facility])  Closed fracture of right hip, initial encounter ([facility]) [S72.001A]  Intertrochanteric fracture of right hip, open type III, initial encounter ([facility]) [S72.141C]  Procedure(s) (LRB):  FEMUR INTRAMEDULLARY NAIL MARTA INSERTION ANTEGRADE RIGHT (Right)  3 Days Post-Op  Inpatient: Payor: [facility] MEDICARE / Plan: [employer] MEDICARE-ADVANTAGE PPO / Product Type: Medicare /     ASSESSMENT:     REHAB RECOMMENDATIONS:   Recommendation to date pending progress:  Setting:  Short-term Rehab    Equipment:    To Be Determined     ASSESSMENT:  Ms. Seals was supine upon contact and agreeable to PT. Of note, patient presents pleasantly confused; oriented to self. Patient alert but minimal verbalizations noted. Patient performed supine to sit with max assist x 2, additional time, and cues for technique. Patient sat EOB for prolonged amount of time working  on sitting balance, command following, midline sitting, posture, and core initiation. Patient demonstrates fair- to poor sitting balance with resistive tendencies noted especially when therapist correcting balance to midline. Patient returned to supine with max assist x 2 where she was repositioned for comfort. Slow progress towards PT goals. Will continue PT efforts.     SUBJECTIVE:   Ms. Seals smiled and laughed throughout session but minimal verbalizations noted     Social/Functional Lives With: Other (Comment)  Type of Home: Assisted living  Home Equipment: Walker - Rolling, Wheelchair - Manual  Receives Help From: Other (Comment)  Prior Level of Assist for ADLs: Needs assistance  Prior Level of Assist for Homemaking: Needs assistance  Homemaking Responsibilities: No  Prior Level of Assist for Transfers: Needs assistance  Active : No  Patient's  Info: family  OBJECTIVE:     PAIN: VITALS / O2: PRECAUTION / LINES / DRAINS:   Pre Treatment: 0         Post Treatment: 0 Vitals        Oxygen    None    RESTRICTIONS/PRECAUTIONS:        MOBILITY: I Mod I S SBA CGA Min Mod Max Total  NT x2 Comments:   Bed Mobility    Rolling [] [] [] [] [] [] [] [] [] [] []    Supine to Sit [] [] [] [] [] [] [] [x] [] [] [x]    Scooting [] [] [] [] [] [] [] [] [] [] []    Sit to Supine [] [] [] [] [] [] [] [x] [] [] [x]    Transfers    Sit to Stand [] [] [] [] [] [] [] [] [] [] []    Bed to Chair [] [] [] [] [] [] [] [] [] [] []    Stand to Sit [] [] [] [] [] [] [] [] [] [] []     [] [] [] [] [] [] [] [] [] [] []    I=Independent, Mod I=Modified Independent, S=Supervision, SBA=Standby Assistance, CGA=Contact Guard Assistance,   Min=Minimal Assistance, Mod=Moderate Assistance, Max=Maximal Assistance, Total=Total Assistance, NT=Not Tested    BALANCE: Good Fair+ Fair Fair- Poor NT Comments   Sitting Static [] [] [] [x] [x] []    Sitting Dynamic [] [] [] [] [x] []              Standing Static [] [] [] [] [] []    Standing

## 2025-06-15 NOTE — PROGRESS NOTES
Hospitalist Progress Note   Admit Date:  6/10/2025  9:31 PM   Name:  Angelique Seals   Age:  89 y.o.  Sex:  female  :  1935   MRN:  [id number]   Room:  [facility]    Presenting/Chief Complaint: Altered Mental Status and Fatigue     Reason(s) for Admission: Closed fracture of right hip, initial encounter ([facility]) [S72.001A]  Intertrochanteric fracture of right hip, open type III, initial encounter ([facility]) [S72.141C]     Hospital Course:   Angelique Seals is a 89 y.o. female with medical history of dementia, hypertension, neuropathy, neurogenic bladder, proximal atrial fibrillation status post Watchman procedure, status post pacemaker, ascending thoracic aneurysm measuring 4.5 cm who presented with fall.  Patient was noted to have right intertrochanteric hip fracture and underwent surgical repair on .     Patient had refractory hypotension after receiving morphine after procedure.  Patient had required ICU transfer with pressor support.   Patient blood pressure improved with hydration, and midodrine and Levophed was weaned off.  Patient transferred to the floor on .    Subjective & 24hr Events:   Patient was seen and examined at bedside.  No acute events noted overnight.  Patient is sitting up in bed.  Awake and alert.  Able to answer some questions with few words.  Does not have any acute complaints.    Assessment & Plan:     Hypotension  Likely due to medication side effects as it happened after receiving morphine.  Patient had required ICU transfer and Levophed to maintain MAP.  - Blood pressure remains stable at low normal range.  Continue with midodrine p.o.    Intertrochanteric fracture of right hip  Status post femur intramedullary nail easton insertion on   -Orthopedics appreciated.  Management as per orthopedics  -DVT PPx per orthopedic  -PT/OT.  Recommending short-term rehab    Anemia, likely acute blood loss anemia  Hemoglobin had down trended from 10.1 presurgery to 7.8 on .  06/15/25: Hb

## 2025-06-16 LAB
HCT VFR BLD AUTO: 27.3 % (ref 35.8–46.3)
HGB BLD-MCNC: 8.6 G/DL (ref 11.7–15.4)

## 2025-06-16 PROCEDURE — 85014 HEMATOCRIT: CPT

## 2025-06-16 PROCEDURE — 85018 HEMOGLOBIN: CPT

## 2025-06-16 PROCEDURE — 6370000000 HC RX 637 (ALT 250 FOR IP): Performed by: INTERNAL MEDICINE

## 2025-06-16 PROCEDURE — 6370000000 HC RX 637 (ALT 250 FOR IP): Performed by: ORTHOPAEDIC SURGERY

## 2025-06-16 PROCEDURE — 1100000000 HC RM PRIVATE

## 2025-06-16 PROCEDURE — 2500000003 HC RX 250 WO HCPCS: Performed by: ORTHOPAEDIC SURGERY

## 2025-06-16 PROCEDURE — 36415 COLL VENOUS BLD VENIPUNCTURE: CPT

## 2025-06-16 PROCEDURE — 97112 NEUROMUSCULAR REEDUCATION: CPT

## 2025-06-16 PROCEDURE — 97530 THERAPEUTIC ACTIVITIES: CPT

## 2025-06-16 PROCEDURE — 99221 1ST HOSP IP/OBS SF/LOW 40: CPT | Performed by: PHYSICIAN ASSISTANT

## 2025-06-16 PROCEDURE — 97535 SELF CARE MNGMENT TRAINING: CPT

## 2025-06-16 PROCEDURE — 51702 INSERT TEMP BLADDER CATH: CPT

## 2025-06-16 RX ORDER — OXYCODONE HYDROCHLORIDE 5 MG/1
2.5 TABLET ORAL 2 TIMES DAILY
Refills: 0 | Status: DISPENSED | OUTPATIENT
Start: 2025-06-16 | End: 2025-06-18

## 2025-06-16 RX ADMIN — ACETAMINOPHEN 1000 MG: 500 TABLET, FILM COATED ORAL at 20:52

## 2025-06-16 RX ADMIN — MIDODRINE HYDROCHLORIDE 5 MG: 5 TABLET ORAL at 08:22

## 2025-06-16 RX ADMIN — MIDODRINE HYDROCHLORIDE 5 MG: 5 TABLET ORAL at 11:49

## 2025-06-16 RX ADMIN — Medication 1000 UNITS: at 08:22

## 2025-06-16 RX ADMIN — MEMANTINE 10 MG: 5 TABLET ORAL at 08:22

## 2025-06-16 RX ADMIN — MAGNESIUM GLUCONATE 500 MG ORAL TABLET 400 MG: 500 TABLET ORAL at 08:22

## 2025-06-16 RX ADMIN — GABAPENTIN 100 MG: 100 CAPSULE ORAL at 20:52

## 2025-06-16 RX ADMIN — BISACODYL 5 MG: 5 TABLET, COATED ORAL at 08:22

## 2025-06-16 RX ADMIN — MIDODRINE HYDROCHLORIDE 5 MG: 5 TABLET ORAL at 17:40

## 2025-06-16 RX ADMIN — ACETAMINOPHEN 1000 MG: 500 TABLET, FILM COATED ORAL at 08:22

## 2025-06-16 RX ADMIN — HYDROCODONE BITARTRATE AND ACETAMINOPHEN 1 TABLET: 5; 325 TABLET ORAL at 08:22

## 2025-06-16 RX ADMIN — SODIUM CHLORIDE, PRESERVATIVE FREE 10 ML: 5 INJECTION INTRAVENOUS at 20:53

## 2025-06-16 RX ADMIN — OXYCODONE 2.5 MG: 5 TABLET ORAL at 20:52

## 2025-06-16 RX ADMIN — TRIMETHOPRIM 100 MG: 100 TABLET ORAL at 08:21

## 2025-06-16 RX ADMIN — SODIUM CHLORIDE, PRESERVATIVE FREE 10 ML: 5 INJECTION INTRAVENOUS at 08:24

## 2025-06-16 RX ADMIN — GABAPENTIN 100 MG: 100 CAPSULE ORAL at 08:22

## 2025-06-16 RX ADMIN — MEMANTINE 10 MG: 5 TABLET ORAL at 20:52

## 2025-06-16 RX ADMIN — MULTIVITAMIN TABLET 1 TABLET: TABLET at 08:21

## 2025-06-16 RX ADMIN — GABAPENTIN 100 MG: 100 CAPSULE ORAL at 14:03

## 2025-06-16 RX ADMIN — ACETAMINOPHEN 1000 MG: 500 TABLET, FILM COATED ORAL at 15:49

## 2025-06-16 ASSESSMENT — PAIN SCALES - PAIN ASSESSMENT IN ADVANCED DEMENTIA (PAINAD)
BODYLANGUAGE: RELAXED
TOTALSCORE: 0
FACIALEXPRESSION: SMILING OR INEXPRESSIVE
TOTALSCORE: 7
TOTALSCORE: 0
BREATHING: NORMAL
BODYLANGUAGE: RELAXED
CONSOLABILITY: UNABLE TO CONSOLE, DISTRACT OR REASSURE
FACIALEXPRESSION: SMILING OR INEXPRESSIVE
BODYLANGUAGE: TENSE, DISTRESSED PACING, FIDGETING
CONSOLABILITY: NO NEED TO CONSOLE
CONSOLABILITY: NO NEED TO CONSOLE
BREATHING: OCCASIONAL LABORED BREATHING, SHORT PERIOD OF HYPERVENTILATION
FACIALEXPRESSION: FACIAL GRIMACING
BREATHING: NORMAL
NEGVOCALIZATION: OCCASIONAL MOAN/GROAN, LOW SPEECH, NEGATIVE/DISAPPROVING QUALITY

## 2025-06-16 ASSESSMENT — PAIN SCALES - GENERAL
PAINLEVEL_OUTOF10: 0
PAINLEVEL_OUTOF10: 7
PAINLEVEL_OUTOF10: 0

## 2025-06-16 NOTE — CONSULTS
CONSULT                      Date: 6/16/2025        Patient Name: Angelique Seals     YOB: 1935      Age:  89 y.o.      History of Present Illness     89 y.o.   female  with medical history of dementia, hypertension, neuropathy, neurogenic bladder, proximal atrial fibrillation status post Watchman procedure, status post pacemaker, ascending thoracic aneurysm measuring 4.5 cm who presented with fall.  Patient was noted to have right intertrochanteric hip fracture and underwent surgical repair on 6/12.      Patient had refractory hypotension after receiving morphine after procedure.  Patient had required ICU transfer with pressor support.   Patient blood pressure improved with hydration, and midodrine and Levophed was weaned off.  Patient transferred to the floor on 6/13/925.  -Copied from H&P    Urology consult for neurogenic bladder with urinary retention. Pt has previously been seen in our office, most recently by Luisana Guadarrama NP in 6/2024. At that time pt had a fernando in place and was referred for urodynamics. She had the UDS done but did not follow up afterward. At some point her fernando was removed. This admission, fernando was replaced 6/11 for retention. It was removed for a voiding trial and pt unable to void so it was replaced yesterday. Pt unable to provide hx d/t dementia.    Past Medical History     Past Medical History:   Diagnosis Date    Anticoagulant long-term use     Aortic aneurysm     Arrhythmia     Atrial fibrillation (HCC)     Chronic back pain Many years ago    H/O complete eye exam Yearly    [facility]    Headache     HLD (hyperlipidemia)     Hypertension     controlled with medication    Pacemaker     Watchman left atrial appendage closure device         Past Surgical History     Past Surgical History:   Procedure Laterality Date    APPENDECTOMY      CATARACT REMOVAL Bilateral 2012    EP DEVICE PROCEDURE N/A 3/14/2023    WATCHMAN AGNES CLOSURE DEVICE performed by Arnold THOMPSON  Amoxicillin, and Sulfa antibiotics    Social History     Social History       Tobacco History       Smoking Status  Never      Passive Exposure  Past      Smokeless Tobacco Use  Never              Alcohol History       Alcohol Use Status  No              Drug Use       Drug Use Status  Never              Sexual Activity       Sexually Active  Not Currently                    Family History     Family History   Problem Relation Age of Onset    Heart Disease Mother         Cogestive heart  disease    Hypertension Mother     Other Mother         pacemaker,  at 95    Arthritis Mother     High Blood Pressure Mother     Stroke Brother         mild x2; major x1  (smoker)    Heart Disease Brother         2 mild strokes and one serious .  He lost his ability to speak    Lung Disease Father     Heart Disease Father     Other Father         pacemaker -  at 87       Review of Systems     Pertinent information in HPI    Physical Exam     /68   Pulse 69   Temp 97.9 °F (36.6 °C) (Oral)   Resp 16   Ht 1.727 m (5' 8\")   Wt 86.2 kg (190 lb)   SpO2 97%   BMI 28.89 kg/m²     Physical Exam:  GENERAL ASSESSMENT: confused, no acute distress and no anxiety, depression or agitation  Chest: Easy work of breathing  CVS exam: RRR  ABDOMEN: not done  Neurological exam reveals alert, oriented, normal speech, no focal findings or movement disorder noted.  FEMALE GENITOURINARY EXAM: fernando draining clear yellow urine  MALE GENITAL EXAM: not done      Labs      Recent Results (from the past 24 hours)   Hemoglobin and Hematocrit    Collection Time: 25  4:01 AM   Result Value Ref Range    Hemoglobin 8.6 (L) 11.7 - 15.4 g/dL    Hematocrit 27.3 (L) 35.8 - 46.3 %        Imaging/Diagnostics Last 24 Hours     No results found.    Assessment      Principal Problem:    Intertrochanteric fracture of right hip, open type III, initial encounter (HCC)  Active Problems:    Permanent atrial fibrillation (HCC)    Presence of Watchman left

## 2025-06-16 NOTE — CARE COORDINATION
Michael Vann continues to review the referral information.  Hopefully they will render a decision soon. YOLANDA spoke with pt's dtr, Izabella, via phone.  CM will send her a list of facilities via text from [facility] for her to review to provide additional choices in the event [facility] declines.  List sent.  CM following.

## 2025-06-16 NOTE — PROGRESS NOTES
Hospitalist Progress Note   Admit Date:  6/10/2025  9:31 PM   Name:  Angelique Seals   Age:  89 y.o.  Sex:  female  :  1935   MRN:  [id number]   Room:  [facility]    Presenting/Chief Complaint: Altered Mental Status and Fatigue     Reason(s) for Admission: Closed fracture of right hip, initial encounter ([facility]) [S72.001A]  Intertrochanteric fracture of right hip, open type III, initial encounter ([facility]) [S72.141C]     Hospital Course:   Angelique Seals is a 89 y.o. female with medical history of dementia, hypertension, neuropathy, neurogenic bladder, proximal atrial fibrillation status post Watchman procedure, status post pacemaker, ascending thoracic aneurysm measuring 4.5 cm who presented with fall.  Patient was noted to have right intertrochanteric hip fracture and underwent surgical repair on .     Patient had refractory hypotension after receiving morphine after procedure.  Patient had required ICU transfer with pressor support.   Patient blood pressure improved with hydration, and midodrine and Levophed was weaned off.  Patient transferred to the floor on .    Subjective & 24hr Events:   Patient was seen and examined at bedside.  No acute events noted overnight.  Patient daughter, Izabella is at bedside.    Reports that patient was in pain this morning.  Patient does not normally interact/converse and therefore has been difficult for her to ask for pain medication.  Patient already has Tylenol scheduled.  Will schedule low-dose oxycodone twice daily after discussion with the daughter.    Patient laying in bed.  Smiling and answering yes or no to most questions.  Following commands.  Hb is improving.    Assessment & Plan:     Hypotension  Likely due to medication side effects as it happened after receiving morphine.  Patient had required ICU transfer and Levophed to maintain MAP.  - Blood pressure remained stable.  Continue with midodrine.  Can attempt to wean midodrine off as  IntraVENous PRN    0.9 % sodium chloride infusion   IntraVENous PRN    potassium chloride (KLOR-CON M) extended release tablet 40 mEq  40 mEq Oral PRN    Or    potassium bicarb-citric acid (EFFER-K) effervescent tablet 40 mEq  40 mEq Oral PRN    Or    potassium chloride 10 mEq/100 mL IVPB (Peripheral Line)  10 mEq IntraVENous PRN    potassium chloride 10 mEq/100 mL IVPB (Peripheral Line)  10 mEq IntraVENous PRN    magnesium sulfate 2000 mg in 50 mL IVPB premix  2,000 mg IntraVENous PRN    acetaminophen (TYLENOL) tablet 1,000 mg  1,000 mg Oral TID    morphine (PF) injection 2 mg  2 mg IntraVENous Q2H PRN    Or    morphine sulfate (PF) injection 4 mg  4 mg IntraVENous Q2H PRN    metaxalone (SKELAXIN) tablet 800 mg  800 mg Oral Q8H PRN    [Held by provider] enoxaparin (LOVENOX) injection 40 mg  40 mg SubCUTAneous Daily    polyethylene glycol (GLYCOLAX) packet 17 g  17 g Oral Daily PRN    bisacodyl (DULCOLAX) suppository 10 mg  10 mg Rectal Daily PRN    famotidine (PEPCID) tablet 10 mg  10 mg Oral BID PRN    aluminum & magnesium hydroxide-simethicone (MAALOX PLUS) 200-200-20 MG/5ML suspension 30 mL  30 mL Oral Q6H PRN    multivitamin 1 tablet  1 tablet Oral Daily       Signed:  Ciro Joy MD    Part of this note may have been written by using a voice dictation software.  The note has been proof read but may still contain some grammatical/other typographical errors.

## 2025-06-16 NOTE — WOUND CARE
Brief changed. Patient has erythema and scar related to healing yeast rash washington anal, washington vaginal/ external labia  and groin folds was treated with Oral meds. Recommend to continue diligent incontinence care, frequent turning and zinc barrier bid and prn .     Noted large wet area under right heel when turning patient for washington area exam.  Right heel found to have large deflated blister with purple areas,DTI 7x6cm,  lateral foot with 2 blisters mid metatarsal [id number] at 5th MTP joint area 2x2cm, both stage 2 .  Heel blister unroofed with sterile scissors.  Areas dressed with xeroform ABD and rolled gauze, will need 3 times per week dressing changes.                         Left heel assessed as well blister light purple in center, DTI noted as well as defined area of lightly blanchable erythema lateral ankle stage 1 2x1cm.

## 2025-06-16 NOTE — PROGRESS NOTES
ACUTE PHYSICAL THERAPY GOALS:   (Developed with and agreed upon by patient and/or caregiver.)  LTG:  (1.)Ms. Seals will move from supine to sit and sit to supine , scoot up and down, and roll side to side in bed with MODERATE ASSIST within 7 treatment day(s).    (2.)Ms. Seals will transfer from bed to chair and chair to bed with MODERATE ASSIST using the least restrictive device within 7 treatment day(s).    (3.)Ms. Seals will ambulate with MODERATE ASSIST for 10 feet with the least restrictive device within 7 treatment day(s).  (4.)Ms. Seals will perform there ex in supine x 10 min with MOD A within 7 treatment days for increased AROM and strength B LE.   (5.)Ms. Seals will maintain static sitting EOB x 8 min with MOD A within 7 treatment days for increased trunk control and balance in sitting.     PHYSICAL THERAPY: Daily Note AM   (Link to Caseload Tracking: PT Visit Days : 3  Time In/Out PT Charge Capture  Rehab Caseload Tracker  Orders  WBAT ALLIE Seals is a 89 y.o. female   PRIMARY DIAGNOSIS: Intertrochanteric fracture of right hip, open type III, initial encounter ([facility])  Closed fracture of right hip, initial encounter ([facility]) [S72.001A]  Intertrochanteric fracture of right hip, open type III, initial encounter ([facility]) [S72.141C]  Procedure(s) (LRB):  FEMUR INTRAMEDULLARY NAIL MARTA INSERTION ANTEGRADE RIGHT (Right)  4 Days Post-Op  Inpatient: Payor: [facility] MEDICARE / Plan: [employer] MEDICARE-ADVANTAGE PPO / Product Type: Medicare /     ASSESSMENT:     REHAB RECOMMENDATIONS:   Recommendation to date pending progress:  Setting:  Short-term Rehab    Equipment:    To Be Determined     ASSESSMENT:  Ms. Seals was supine upon contact and agreeable to PT. Of note, patient presents pleasantly confused; oriented to self and mostly nonverbal throughout session. Patient performed supine to sit with max assist x 2, additional time, and cues for technique. Patient sat EOB where she demonstrated improved sitting

## 2025-06-16 NOTE — PROGRESS NOTES
Physician Progress Note      PATIENT:               JAIME CROFT  CSN #:                  [id number]  :                       1935  ADMIT DATE:       6/10/2025 9:31 PM  DISCH DATE:  RESPONDING  PROVIDER #:        Ciro Joy MD          QUERY TEXT:    Please clarify whether the right femoral fracture documented in the H&P is   related to a traumatic or non-traumatic cause or following a minor injury that   would not routinely break a healthy bone:    The clinical indicators include:  --H&P from  by Dr. Madrid reflects \"R hip fracture\"  --Consult note from  by Dr. Hays reflects \"Intertrochanteric fracture of   right hip, open type 3, initials encounter\" and \"this is a traumatic injury   with concern for low bone mineral density\"  --Xray Right hip from  showed \"Intertrochanteric right femoral fracture   demonstrated, bone mineralization is decreased\"  --Home medications shows Multi vitamin daily  --MAR shows One A Day Womens tablet daily, Vitamin D PO daily  Options provided:  -- Pathological fracture related to osteoporosis  -- Traumatic fracture  -- Other - I will add my own diagnosis  -- Disagree - Not applicable / Not valid  -- Disagree - Clinically unable to determine / Unknown  -- Refer to Clinical Documentation Reviewer    PROVIDER RESPONSE TEXT:    The patient has a Traumatic fracture.    Query created by: Karlee Aguilar on 2025 10:30 AM      QUERY TEXT:    Based on your medical judgment, please clarify these findings and document if   any of the following are being evaluated and/or treated:    The clinical indicators include:  --H&P from  by Dr. Madrid reflects \"2 liter of fluid boluses bp trending   down\"  --Vitals flowsheet shows lowest BP 68/48 with MAP 55 on   --MAR shows IV Dopamine drip, IV Levophed drip, 2,000mL total IVF bolus  Options provided:  -- Hypovolemic Shock  -- Shock due to, Please document the type of shock.  -- Other - I will add my own

## 2025-06-16 NOTE — PLAN OF CARE
Problem: Discharge Planning  Goal: Discharge to home or other facility with appropriate resources  Outcome: Progressing  Flowsheets  Taken 6/15/2025 2005 by Yvette Neely RN  Discharge to home or other facility with appropriate resources: Identify barriers to discharge with patient and caregiver  Taken 6/15/2025 1120 by Janna Marquis RN  Discharge to home or other facility with appropriate resources:   Identify barriers to discharge with patient and caregiver   Arrange for needed discharge resources and transportation as appropriate   Identify discharge learning needs (meds, wound care, etc)     Problem: Pain  Goal: Verbalizes/displays adequate comfort level or baseline comfort level  6/15/2025 2217 by Yvette Neely RN  Outcome: Progressing  6/15/2025 1849 by Janna Marquis RN  Outcome: Progressing  Flowsheets (Taken 6/15/2025 1849)  Verbalizes/displays adequate comfort level or baseline comfort level:   Implement non-pharmacological measures as appropriate and evaluate response   Assess pain using appropriate pain scale  Note: Use PAINAD scale     Problem: Safety - Adult  Goal: Free from fall injury  6/15/2025 2217 by Yvette Neely RN  Outcome: Progressing  Flowsheets (Taken 6/15/2025 2005)  Free From Fall Injury: Instruct family/caregiver on patient safety  6/15/2025 1849 by Janna Marquis RN  Flowsheets  Taken 6/15/2025 1849  Free From Fall Injury: Instruct family/caregiver on patient safety  Taken 6/15/2025 0744  Free From Fall Injury: Instruct family/caregiver on patient safety     Problem: Skin/Tissue Integrity  Goal: Skin integrity remains intact  Description: 1.  Monitor for areas of redness and/or skin breakdown2.  Assess vascular access sites hourly3.  Every 4-6 hours minimum:  Change oxygen saturation probe site4.  Every 4-6 hours:  If on nasal continuous positive airway pressure, respiratory therapy assess nares and determine need for appliance change or resting period  6/15/2025 2217 by

## 2025-06-16 NOTE — PROGRESS NOTES
ACUTE OCCUPATIONAL THERAPY GOALS:   (Developed with and agreed upon by patient and/or caregiver.)  1. Patient will complete upper body bathing and dressing with MODERATE ASSIST and adaptive equipment as needed.   2. Patient will complete self-grooming tasks in unsupported sitting with MINIMAL ASSIST and adaptive equipment as needed.  3. Patient will tolerate 25 minutes of OT treatment with 1-2 rest breaks to increase activity tolerance for ADLs.   4. Patient will complete functional transfers with MODERATE ASSIST and adaptive equipment as needed.   5. Patient will complete functional activity while seated edge of bed with MODERATE ASSIST and adaptive equipment as needed.   6. Patient will tolerate 15 minutes unsupported sitting balance with MODERATE ASSIST in preparation for ADL performance.   7. Patient will tolerate 10 minutes BUE therapeutic activities to increase use of BUE during ADL performance.      Timeframe: 7 visits        OCCUPATIONAL THERAPY: Daily Note AM   OT Visit Days: 2   Time In/Out  OT Charge Capture  Rehab Caseload Tracker  OT Orders    Angelique Seals is a 89 y.o. female   PRIMARY DIAGNOSIS: Intertrochanteric fracture of right hip, open type III, initial encounter ([facility])  Closed fracture of right hip, initial encounter ([facility]) [S72.001A]  Intertrochanteric fracture of right hip, open type III, initial encounter ([facility]) [S72.141C]  Procedure(s) (LRB):  FEMUR INTRAMEDULLARY NAIL MARTA INSERTION ANTEGRADE RIGHT (Right)  4 Days Post-Op  Inpatient: Payor: [facility] MEDICARE / Plan: [facility] MEDICARE-ADVANTAGE PPO / Product Type: Medicare /     ASSESSMENT:     REHAB RECOMMENDATIONS:   Recommendation to date pending progress:  Setting:  Short-term Rehab    Equipment:    None     ASSESSMENT:  Ms. Seals is doing fair today. Pt presents supine upon arrival. Pt required max A x2 for bed mobility and transfers at this time. Fair sitting balance noted at edge of bed. Pt was transferred over to chair and was assisted    Upper Body   Bathing [] [] [] [] [] [] [] [] [] [x]     Lower Body Bathing [] [] [] [] [] [] [] [] [] [x]     Toileting [] [] [] [] [] [] [] [] [] [x]    Upper Body Dressing [] [] [] [] [] [] [] [] [] [x]    Lower Body Dressing [] [] [] [] [] [] [] [] [] [x]    Feeding [] [] [] [] [] [] [] [] [] [x]    Grooming [] [] [] [] [] [] [] [x] [] []    Personal Device Care [] [] [] [] [] [] [] [] [] [x]    Functional Mobility [] [] [] [] [] [] [] [x] [] [] x2   I=Independent, Mod I=Modified Independent, S=Supervision/Setup, SBA=Standby Assistance, CGA=Contact Guard Assistance, Min=Minimal Assistance, Mod=Moderate Assistance, Max=Maximal Assistance, Total=Total Assistance, NT=Not Tested    BALANCE: Good Fair+ Fair Fair- Poor NT Comments   Sitting Static [] [] [] [] [] []    Sitting Dynamic [] [] [] [x] [] []              Standing Static [] [] [] [] [x] []    Standing Dynamic [] [] [] [] [x] []        PLAN:     FREQUENCY/DURATION   OT Plan of Care: 5 times/week for duration of hospital stay or until stated goals are met, whichever comes first.    TREATMENT:     TREATMENT:   Co-Treatment between OT & PT necessary due to patient's decreased overall endurance/tolerance levels, as well as need for high level skilled assistance to complete functional transfers/mobility and functional tasks  Neuromuscular Re-education (25 Minutes): Patient participated in neuromuscular re-education including postural training, midline training, standing tolerance activity , and sitting balance activity   with maximal assistance, verbal cues, and tactile cues to improve sitting balance, standing balance, posture, coordination, static balance, and dynamic balance in order to prepare for functional task, prepare for seated ADLs, prepare for standing ADLs, prepare for functional transfer, and increase safety awareness.     TREATMENT GRID:  N/A    AFTER TREATMENT PRECAUTIONS: Bed/Chair Locked, Call light within reach, Chair, Needs within reach, and

## 2025-06-17 LAB
HCT VFR BLD AUTO: 27 % (ref 35.8–46.3)
HGB BLD-MCNC: 8.9 G/DL (ref 11.7–15.4)
SARS-COV-2 RDRP RESP QL NAA+PROBE: NOT DETECTED
SOURCE: NORMAL

## 2025-06-17 PROCEDURE — 1100000000 HC RM PRIVATE

## 2025-06-17 PROCEDURE — 97530 THERAPEUTIC ACTIVITIES: CPT

## 2025-06-17 PROCEDURE — 85014 HEMATOCRIT: CPT

## 2025-06-17 PROCEDURE — 87635 SARS-COV-2 COVID-19 AMP PRB: CPT

## 2025-06-17 PROCEDURE — 6370000000 HC RX 637 (ALT 250 FOR IP): Performed by: INTERNAL MEDICINE

## 2025-06-17 PROCEDURE — 97112 NEUROMUSCULAR REEDUCATION: CPT

## 2025-06-17 PROCEDURE — 6370000000 HC RX 637 (ALT 250 FOR IP): Performed by: ORTHOPAEDIC SURGERY

## 2025-06-17 PROCEDURE — 85018 HEMOGLOBIN: CPT

## 2025-06-17 PROCEDURE — 2500000003 HC RX 250 WO HCPCS: Performed by: ORTHOPAEDIC SURGERY

## 2025-06-17 PROCEDURE — 36415 COLL VENOUS BLD VENIPUNCTURE: CPT

## 2025-06-17 PROCEDURE — 51702 INSERT TEMP BLADDER CATH: CPT

## 2025-06-17 PROCEDURE — 97535 SELF CARE MNGMENT TRAINING: CPT

## 2025-06-17 RX ADMIN — OXYCODONE 2.5 MG: 5 TABLET ORAL at 19:49

## 2025-06-17 RX ADMIN — MEMANTINE 10 MG: 5 TABLET ORAL at 08:20

## 2025-06-17 RX ADMIN — MAGNESIUM GLUCONATE 500 MG ORAL TABLET 400 MG: 500 TABLET ORAL at 08:20

## 2025-06-17 RX ADMIN — MULTIVITAMIN TABLET 1 TABLET: TABLET at 08:20

## 2025-06-17 RX ADMIN — GABAPENTIN 100 MG: 100 CAPSULE ORAL at 19:49

## 2025-06-17 RX ADMIN — SODIUM CHLORIDE, PRESERVATIVE FREE 10 ML: 5 INJECTION INTRAVENOUS at 08:24

## 2025-06-17 RX ADMIN — TRIMETHOPRIM 100 MG: 100 TABLET ORAL at 08:20

## 2025-06-17 RX ADMIN — SODIUM CHLORIDE, PRESERVATIVE FREE 5 ML: 5 INJECTION INTRAVENOUS at 19:50

## 2025-06-17 RX ADMIN — BISACODYL 5 MG: 5 TABLET, COATED ORAL at 08:21

## 2025-06-17 RX ADMIN — ACETAMINOPHEN 1000 MG: 500 TABLET, FILM COATED ORAL at 19:49

## 2025-06-17 RX ADMIN — GABAPENTIN 100 MG: 100 CAPSULE ORAL at 14:13

## 2025-06-17 RX ADMIN — Medication 1000 UNITS: at 08:20

## 2025-06-17 RX ADMIN — MIDODRINE HYDROCHLORIDE 5 MG: 5 TABLET ORAL at 08:20

## 2025-06-17 RX ADMIN — HYDROCODONE BITARTRATE AND ACETAMINOPHEN 1 TABLET: 5; 325 TABLET ORAL at 16:56

## 2025-06-17 RX ADMIN — GABAPENTIN 100 MG: 100 CAPSULE ORAL at 08:20

## 2025-06-17 RX ADMIN — ACETAMINOPHEN 1000 MG: 500 TABLET, FILM COATED ORAL at 14:13

## 2025-06-17 RX ADMIN — MEMANTINE 10 MG: 5 TABLET ORAL at 19:49

## 2025-06-17 ASSESSMENT — PAIN SCALES - GENERAL
PAINLEVEL_OUTOF10: 0
PAINLEVEL_OUTOF10: 7

## 2025-06-17 ASSESSMENT — PAIN SCALES - PAIN ASSESSMENT IN ADVANCED DEMENTIA (PAINAD)
FACIALEXPRESSION: SMILING OR INEXPRESSIVE
TOTALSCORE: 0
TOTALSCORE: 0
CONSOLABILITY: DISTRACTED OR REASSURED BY VOICE/TOUCH
BODYLANGUAGE: RELAXED
FACIALEXPRESSION: SMILING OR INEXPRESSIVE
BREATHING: OCCASIONAL LABORED BREATHING, SHORT PERIOD OF HYPERVENTILATION
TOTALSCORE: 7
BREATHING: NORMAL
BREATHING: NORMAL
TOTALSCORE: 0
BODYLANGUAGE: RELAXED
BODYLANGUAGE: RELAXED
BODYLANGUAGE: TENSE, DISTRESSED PACING, FIDGETING
FACIALEXPRESSION: FACIAL GRIMACING
CONSOLABILITY: NO NEED TO CONSOLE
NEGVOCALIZATION: REPEATED TROUBLED CALLING OUT, LOUD MOANING/GROANING, CRYING
BREATHING: NORMAL
CONSOLABILITY: NO NEED TO CONSOLE
FACIALEXPRESSION: SMILING OR INEXPRESSIVE
CONSOLABILITY: NO NEED TO CONSOLE

## 2025-06-17 NOTE — CARE COORDINATION
Patient accepted for STR admission to [facility] for tomorrow if she is medically cleared for dc.  Insurance auth request submitted via the [employer] online portal. Authorization received.  Certification number [id number].  CM left VM with pt's dtr, Izabella, with this update.  Rapid COVID ordered. CM following.    1308:  TC from pt's dtr.  CM updated her about bed offer, insurance approval and plan for dc tomorrow. She is in agreement with this plan.

## 2025-06-17 NOTE — PLAN OF CARE
Problem: Discharge Planning  Goal: Discharge to home or other facility with appropriate resources  6/16/2025 2242 by Yvette Neely RN  Outcome: Progressing  Flowsheets (Taken 6/16/2025 1915)  Discharge to home or other facility with appropriate resources: Identify barriers to discharge with patient and caregiver  6/16/2025 1033 by Madeline Marroquin RN  Outcome: Progressing     Problem: Pain  Goal: Verbalizes/displays adequate comfort level or baseline comfort level  6/16/2025 2242 by Yvette Neely RN  Outcome: Progressing  6/16/2025 1033 by Madeline Marroquin RN  Outcome: Progressing     Problem: Safety - Adult  Goal: Free from fall injury  6/16/2025 2242 by Yvette Neely RN  Outcome: Progressing  Flowsheets (Taken 6/16/2025 1915)  Free From Fall Injury: Instruct family/caregiver on patient safety  6/16/2025 1033 by Madeline Marroquin RN  Outcome: Progressing     Problem: Skin/Tissue Integrity  Goal: Skin integrity remains intact  Description: 1.  Monitor for areas of redness and/or skin breakdown2.  Assess vascular access sites hourly3.  Every 4-6 hours minimum:  Change oxygen saturation probe site4.  Every 4-6 hours:  If on nasal continuous positive airway pressure, respiratory therapy assess nares and determine need for appliance change or resting period  6/16/2025 2242 by Yvette Neely RN  Outcome: Progressing  Flowsheets (Taken 6/16/2025 1915)  Skin Integrity Remains Intact: Monitor for areas of redness and/or skin breakdown  6/16/2025 1033 by Madeline Marroquin RN  Outcome: Progressing     Problem: Confusion  Goal: Confusion, delirium, dementia, or psychosis is improved or at baseline  Description: INTERVENTIONS:  1. Assess for possible contributors to thought disturbance, including medications, impaired vision or hearing, underlying metabolic abnormalities, dehydration, psychiatric diagnoses, and notify attending LIP  2. Kulm high risk fall precautions, as indicated  3. Provide frequent short contacts to

## 2025-06-17 NOTE — PROGRESS NOTES
Hospitalist Progress Note   Admit Date:  6/10/2025  9:31 PM   Name:  Angelique Seals   Age:  89 y.o.  Sex:  female  :  1935   MRN:  [id number]   Room:  [facility]    Presenting/Chief Complaint: Altered Mental Status and Fatigue     Reason(s) for Admission: Closed fracture of right hip, initial encounter ([facility]) [S72.001A]  Intertrochanteric fracture of right hip, open type III, initial encounter ([facility]) [S72.141C]     Hospital Course:   Angelique Seals is a 89 y.o. female with medical history of dementia, hypertension, neuropathy, neurogenic bladder, permanent atrial fibrillation status post Watchman procedure, status post pacemaker, ascending thoracic aneurysm measuring 4.5 cm who presented with fall.  Patient was noted to have right intertrochanteric hip fracture and underwent surgical repair on .     Patient had refractory hypotension after receiving morphine after procedure.  Patient had required ICU transfer with pressor support.   Patient blood pressure improved with hydration, and midodrine and Levophed was weaned off.  Patient transferred to the floor on 25.    Subjective & 24hr Events:   Patient is resting in chair no fever.  No nausea no vomiting.  Hemoglobin stable.  No bleeding per rectum or dark stools.  Still has urinary retention and Sanchez catheter in place.    Assessment & Plan:     Hypotension  Likely due to medication side effects as it happened after receiving morphine.  Patient had required ICU transfer and Levophed to maintain MAP.  - Midodrine held this afternoon.  Will monitor blood pressure closely.    Intertrochanteric fracture of right hip  Status post femur intramedullary nail easton insertion on   -Orthopedics appreciated.  Management as per orthopedics  -DVT PPx per orthopedic  -PT/OT.  Recommending short-term rehab    Anemia, likely acute blood loss anemia  Hemoglobin had down trended from 10.1 presurgery to 7.8 on .  25:  hemoglobin stable at 8.9 this morning

## 2025-06-17 NOTE — PROGRESS NOTES
Nutrition Assessment  Assessment Type: Initial  Reason for visit:  Best Practice Alert: Pressure Injury Stage 2 or greater  Malnutrition Screening Tool Score: 0  Unintentional weight loss PTA: 0 to 1 pound (0 points)  Eating poorly due to decreased appetite: No (0 points)    Nutrition Intervention:   Food and/or Nutrient Delivery:   Meals and Snacks:  Diet: Continue current diet per SLP evaluation  Medical Food Supplements:   Medical food supplement therapy:  Initiate Ensure High Protein (low calorie high protein) 160 calories, 16 grams protein per 8 ounce serving      Malnutrition Assessment:  Academy/A.S.P[facility]N Clinical Malnutrition Criteria  Malnutrition Status: At risk for malnutrition (wounds)  Nutrition Focused Physical Exam: Unremarkable     Nutrition Assessment:  Food/Nutrition Related History:   At baseline patient has a good appetite, eating 3 meals/day on average. She doesn't follow a specific diet at baseline. She denies any changes to her appetite or oral intakes prior to admit. Denies having issues chewing/swallowing. Denies n/v/c/d prior to admit.      Do You Have Any Cultural, Quaker, or Ethnic Food Preferences?: No     Weight History:   EMR weight history review: 192# 10/22/24 (FOV), 199# 11/6/24 (urogynecology), 184# 1/23/25 (cardiothoracic surgery), 192# 2/24/25 (FOV), 193# 3/10/25 (cardiology). UBW is ~190 lbs, no acute changes.     Nutrition Background:       PMH significant for dementia, HTN, peripheral neuropathy. She presents s/p fall at home (after becoming confused) h hip pain. She is admitted with a right hip fracture.   6/12: FEMUR INTRAMEDULLARY NAIL MARTA INSERTION ANTEGRADE RIGHT   6/16: wound care eval - DTI to right and left heel, Stage 2 pressure ulcer to right foot    Nutrition Monitoring/Evaluation:  6/13: regular diet w/ thin liquids per SLP but soft and bite sized diet ordered    Patient is reclined in bed. Daughter provides history as above. States patient has been eating well

## 2025-06-17 NOTE — PROGRESS NOTES
ACUTE PHYSICAL THERAPY GOALS:   (Developed with and agreed upon by patient and/or caregiver.)  LTG:  (1.)Ms. Seals will move from supine to sit and sit to supine , scoot up and down, and roll side to side in bed with MODERATE ASSIST within 7 treatment day(s).    (2.)Ms. Seals will transfer from bed to chair and chair to bed with MODERATE ASSIST using the least restrictive device within 7 treatment day(s).    (3.)Ms. Seals will ambulate with MODERATE ASSIST for 10 feet with the least restrictive device within 7 treatment day(s).  (4.)Ms. Seals will perform there ex in supine x 10 min with MOD A within 7 treatment days for increased AROM and strength B LE.   (5.)Ms. Seals will maintain static sitting EOB x 8 min with MOD A within 7 treatment days for increased trunk control and balance in sitting.     PHYSICAL THERAPY: Daily Note AM   (Link to Caseload Tracking: PT Visit Days : 4  Time In/Out PT Charge Capture  Rehab Caseload Tracker  Orders  WBSETH Seals is a 89 y.o. female   PRIMARY DIAGNOSIS: Intertrochanteric fracture of right hip, open type III, initial encounter ([facility])  Closed fracture of right hip, initial encounter ([facility]) [S72.001A]  Intertrochanteric fracture of right hip, open type III, initial encounter ([facility]) [S72.141C]  Procedure(s) (LRB):  FEMUR INTRAMEDULLARY NAIL MARTA INSERTION ANTEGRADE RIGHT (Right)  5 Days Post-Op  Inpatient: Payor: [facility] MEDICARE / Plan: [employer] MEDICARE-ADVANTAGE PPO / Product Type: Medicare /     ASSESSMENT:     REHAB RECOMMENDATIONS:   Recommendation to date pending progress:  Setting:  Short-term Rehab    Equipment:    To Be Determined     ASSESSMENT:  Ms. Seals was supine upon contact and agreeable to PT. Of note, patient presents pleasantly confused with decreased command following noted; oriented to self and mostly nonverbal throughout session. Patient performed transferred back to bed via low squat pivot transfer with max-total assist x 2 utilization gait

## 2025-06-17 NOTE — PROGRESS NOTES
ACUTE OCCUPATIONAL THERAPY GOALS:   (Developed with and agreed upon by patient and/or caregiver.)  1. Patient will complete upper body bathing and dressing with MODERATE ASSIST and adaptive equipment as needed.   2. Patient will complete self-grooming tasks in unsupported sitting with MINIMAL ASSIST and adaptive equipment as needed.  3. Patient will tolerate 25 minutes of OT treatment with 1-2 rest breaks to increase activity tolerance for ADLs.   4. Patient will complete functional transfers with MODERATE ASSIST and adaptive equipment as needed.   5. Patient will complete functional activity while seated edge of bed with MODERATE ASSIST and adaptive equipment as needed.   6. Patient will tolerate 15 minutes unsupported sitting balance with MODERATE ASSIST in preparation for ADL performance.   7. Patient will tolerate 10 minutes BUE therapeutic activities to increase use of BUE during ADL performance.      Timeframe: 7 visits        OCCUPATIONAL THERAPY: Daily Note AM   OT Visit Days: 3   Time In/Out  OT Charge Capture  Rehab Caseload Tracker  OT Orders    Angelique Seals is a 89 y.o. female   PRIMARY DIAGNOSIS: Intertrochanteric fracture of right hip, open type III, initial encounter ([facility])  Closed fracture of right hip, initial encounter ([facility]) [S72.001A]  Intertrochanteric fracture of right hip, open type III, initial encounter ([facility]) [S72.141C]  Procedure(s) (LRB):  FEMUR INTRAMEDULLARY NAIL MARTA INSERTION ANTEGRADE RIGHT (Right)  5 Days Post-Op  Inpatient: Payor: [facility] MEDICARE / Plan: [facility] MEDICARE-ADVANTAGE PPO / Product Type: Medicare /     ASSESSMENT:     REHAB RECOMMENDATIONS:   Recommendation to date pending progress:  Setting:  Short-term Rehab    Equipment:    None     ASSESSMENT:  Ms. Seals is doing fair today. Pt presents supine upon arrival. Pt required max A x2 for bed mobility and transfers at this time. Fair sitting balance noted at edge of bed. Pt was transferred over to chair and was assisted  supported sitting with moderate verbal, manual, and tactile cueing to increase independence, decrease assistance required, increase activity tolerance, and increase safety awareness. The patient was educated on transfer training and safety and patient will need reinforcement at next session.     TREATMENT GRID:  N/A    AFTER TREATMENT PRECAUTIONS: Bed/Chair Locked, Call light within reach, Chair, Needs within reach, and Visitors at bedside    INTERDISCIPLINARY COLLABORATION:  RN/ PCT, PT/ PTA, and OT/ CAIN    EDUCATION:  OT educated patient  on fall prevention strategies . Patient will need continued education at next session.          TOTAL TREATMENT DURATION AND TIME:  Time In: 0906  Time Out: 0931  Minutes: 25    LISE Westfall

## 2025-06-18 VITALS
WEIGHT: 191.7 LBS | HEART RATE: 57 BPM | DIASTOLIC BLOOD PRESSURE: 88 MMHG | BODY MASS INDEX: 29.05 KG/M2 | RESPIRATION RATE: 14 BRPM | SYSTOLIC BLOOD PRESSURE: 128 MMHG | HEIGHT: 68 IN | TEMPERATURE: 98.2 F | OXYGEN SATURATION: 96 %

## 2025-06-18 LAB
ANION GAP SERPL CALC-SCNC: 8 MMOL/L (ref 7–16)
BASOPHILS # BLD: 0.03 K/UL (ref 0–0.2)
BASOPHILS NFR BLD: 0.3 % (ref 0–2)
BUN SERPL-MCNC: 24 MG/DL (ref 8–23)
CALCIUM SERPL-MCNC: 8.6 MG/DL (ref 8.8–10.2)
CHLORIDE SERPL-SCNC: 105 MMOL/L (ref 98–107)
CO2 SERPL-SCNC: 27 MMOL/L (ref 20–29)
CREAT SERPL-MCNC: 0.82 MG/DL (ref 0.6–1.1)
DIFFERENTIAL METHOD BLD: ABNORMAL
EOSINOPHIL # BLD: 0.36 K/UL (ref 0–0.8)
EOSINOPHIL NFR BLD: 4.2 % (ref 0.5–7.8)
ERYTHROCYTE [DISTWIDTH] IN BLOOD BY AUTOMATED COUNT: 15.2 % (ref 11.9–14.6)
GLUCOSE SERPL-MCNC: 102 MG/DL (ref 70–99)
HCT VFR BLD AUTO: 28.6 % (ref 35.8–46.3)
HGB BLD-MCNC: 8.9 G/DL (ref 11.7–15.4)
IMM GRANULOCYTES # BLD AUTO: 0.05 K/UL (ref 0–0.5)
IMM GRANULOCYTES NFR BLD AUTO: 0.6 % (ref 0–5)
LYMPHOCYTES # BLD: 2.45 K/UL (ref 0.5–4.6)
LYMPHOCYTES NFR BLD: 28.5 % (ref 13–44)
MCH RBC QN AUTO: 30.6 PG (ref 26.1–32.9)
MCHC RBC AUTO-ENTMCNC: 31.1 G/DL (ref 31.4–35)
MCV RBC AUTO: 98.3 FL (ref 82–102)
MONOCYTES # BLD: 0.72 K/UL (ref 0.1–1.3)
MONOCYTES NFR BLD: 8.4 % (ref 4–12)
NEUTS SEG # BLD: 5 K/UL (ref 1.7–8.2)
NEUTS SEG NFR BLD: 58 % (ref 43–78)
NRBC # BLD: 0.02 K/UL (ref 0–0.2)
PLATELET # BLD AUTO: 238 K/UL (ref 150–450)
PMV BLD AUTO: 10 FL (ref 9.4–12.3)
POTASSIUM SERPL-SCNC: 4.4 MMOL/L (ref 3.5–5.1)
RBC # BLD AUTO: 2.91 M/UL (ref 4.05–5.2)
SODIUM SERPL-SCNC: 141 MMOL/L (ref 136–145)
WBC # BLD AUTO: 8.6 K/UL (ref 4.3–11.1)

## 2025-06-18 PROCEDURE — 6370000000 HC RX 637 (ALT 250 FOR IP): Performed by: HOSPITALIST

## 2025-06-18 PROCEDURE — 2500000003 HC RX 250 WO HCPCS: Performed by: ORTHOPAEDIC SURGERY

## 2025-06-18 PROCEDURE — 6370000000 HC RX 637 (ALT 250 FOR IP): Performed by: ORTHOPAEDIC SURGERY

## 2025-06-18 PROCEDURE — 51702 INSERT TEMP BLADDER CATH: CPT

## 2025-06-18 PROCEDURE — 6370000000 HC RX 637 (ALT 250 FOR IP): Performed by: INTERNAL MEDICINE

## 2025-06-18 PROCEDURE — 85025 COMPLETE CBC W/AUTO DIFF WBC: CPT

## 2025-06-18 PROCEDURE — 36415 COLL VENOUS BLD VENIPUNCTURE: CPT

## 2025-06-18 PROCEDURE — 80048 BASIC METABOLIC PNL TOTAL CA: CPT

## 2025-06-18 RX ORDER — SENNOSIDES 8.6 MG
325 CAPSULE ORAL DAILY
Qty: 30 TABLET | Refills: 0 | Status: SHIPPED | OUTPATIENT
Start: 2025-06-18 | End: 2025-07-18

## 2025-06-18 RX ORDER — HYDROCODONE BITARTRATE AND ACETAMINOPHEN 5; 325 MG/1; MG/1
1 TABLET ORAL EVERY 4 HOURS PRN
Qty: 9 TABLET | Refills: 0 | Status: SHIPPED | OUTPATIENT
Start: 2025-06-18 | End: 2025-06-21

## 2025-06-18 RX ADMIN — MAGNESIUM GLUCONATE 500 MG ORAL TABLET 400 MG: 500 TABLET ORAL at 07:37

## 2025-06-18 RX ADMIN — NALOXEGOL OXALATE 25 MG: 25 TABLET, FILM COATED ORAL at 06:13

## 2025-06-18 RX ADMIN — SODIUM CHLORIDE, PRESERVATIVE FREE 10 ML: 5 INJECTION INTRAVENOUS at 07:37

## 2025-06-18 RX ADMIN — MULTIVITAMIN TABLET 1 TABLET: TABLET at 07:36

## 2025-06-18 RX ADMIN — TRIMETHOPRIM 100 MG: 100 TABLET ORAL at 07:37

## 2025-06-18 RX ADMIN — Medication 1000 UNITS: at 07:37

## 2025-06-18 RX ADMIN — BISACODYL 5 MG: 5 TABLET, COATED ORAL at 07:37

## 2025-06-18 RX ADMIN — MEMANTINE 10 MG: 5 TABLET ORAL at 07:37

## 2025-06-18 RX ADMIN — GABAPENTIN 100 MG: 100 CAPSULE ORAL at 07:37

## 2025-06-18 RX ADMIN — ACETAMINOPHEN 1000 MG: 500 TABLET, FILM COATED ORAL at 07:36

## 2025-06-18 NOTE — PROGRESS NOTES
TRANSFER - OUT REPORT:    Verbal report given to Garth RAINEY on Angelique BALBUENA Bozena  being transferred to [facility] for routine progression of patient care       Report consisted of patient's Situation, Background, Assessment and   Recommendations(SBAR).     Information from the following report(s) Nurse Handoff Report was reviewed with the receiving nurse.         Opportunity for questions and clarification was provided.

## 2025-06-18 NOTE — DISCHARGE SUMMARY
Hospitalist Discharge Summary   Admit Date:  6/10/2025  9:31 PM   DC Note date: 2025  Name:  Angelique Seals   Age:  89 y.o.  Sex:  female  :  1935   MRN:  [id number]   Room:  [facility]  PCP:  Antonio Godfrey Jr., MD    Presenting Complaint: Altered Mental Status and Fatigue     Initial Admission Diagnosis: Closed fracture of right hip, initial encounter ([facility]) [S72.001A]  Intertrochanteric fracture of right hip, open type III, initial encounter ([facility]) [S72.141C]     Problem List for this Hospitalization (present on admission):    Principal Problem:    Intertrochanteric fracture of right hip, open type III, initial encounter ([facility])  Active Problems:    Permanent atrial fibrillation ([facility])    Presence of Watchman left atrial appendage closure device    Essential hypertension    Coronary artery disease involving native coronary artery of native heart without angina pectoris    Moderate dementia ([facility])    Orthostatic hypotension    Hypotension    Anemia    Thrombocytopenia  Resolved Problems:    * No resolved hospital problems. *      Hospital Course:    This is a 89 y.o. female with medical history of dementia, hypertension, neuropathy, neurogenic bladder, permanent atrial fibrillation status post Watchman procedure, status post pacemaker, ascending thoracic aneurysm measuring 4.5 cm who presented with fall.  Patient was noted to have right intertrochanteric hip fracture and underwent surgical repair on .      Patient had refractory hypotension after receiving morphine after procedure.  Patient had required ICU transfer with pressor support.   Patient blood pressure improved with hydration, and midodrine and Levophed was weaned off.  Patient transferred to the floor on 25.  Midodrine was weaned and patient's blood pressure was maintained well.  Losartan was discontinued.  Hemoglobin on admission was 10.1 and down trended to 7.8 on .  Hemoglobin stable at 8.9 on discharge.  Patient was started on

## 2025-06-18 NOTE — CARE COORDINATION
Pt is medically cleared for dc today and will transfer to room 312 at [facility] for STR services.  RN to call report to #[phone]. Transport scheduled for 1130 through [facility]. CM met with pt/dtr at the bedside to notify them of her transport time.  AVS provided to dtr.  CM remains available to assist as needed.       06/18/25 1100   Service Assessment   Patient Orientation Alert and Oriented   Cognition Dementia / Early Alzheimer's   History Provided By Child/Family;Medical Record   Primary Caregiver Other (Comment)  ([facility] staff)   Accompanied By/Relationship dtr   Support Systems Children;Family Members;Other (Comment)  ([facility] staff)   Patient's Healthcare Decision Maker is: Legal Next of Kin   PCP Verified by CM Yes   Prior Functional Level Assistance with the following:;Shopping;Housework;Cooking;Bathing;Dressing   Current Functional Level Shopping;Housework;Cooking;Bathing;Dressing;Mobility   Can patient return to prior living arrangement No   Ability to make needs known: Fair   Family able to assist with home care needs: No  (supportive of pt's [facility] placement)   Would you like for me to discuss the discharge plan with any other family members/significant others, and if so, who? Yes  (dtrs)   Financial Resources Medicare   Community Resources Assisted Living  ([facility] care at [facility])   Social/Functional History   Lives With Other (Comment);Alone  ([facility])   Type of Home Assisted living   Home Equipment Walker - Rolling;Wheelchair - Manual   Receives Help From Other (Comment)   Prior Level of Assist for ADLs Needs assistance   Prior Level of Assist for Homemaking Needs assistance   Homemaking Responsibilities No   Ambulation Assistance Needs assistance   Prior Level of Assist for Transfers Needs assistance   Active  No   Patient's  Info family   Occupation Retired   Discharge Planning   Type of Residence Skilled Nursing Facility   Living Arrangements Other (Comment);Alone  ([facility])   Current

## 2025-06-18 NOTE — PROGRESS NOTES
TRANSFER - OUT REPORT:    Verbal report given to Erica RN on Angelique Pererar  being transferred to [facility] for routine progression of patient care       Report consisted of patient's Situation, Background, Assessment and   Recommendations(SBAR).     Information from the following report(s) Nurse Handoff Report was reviewed with the receiving nurse.           Lines:   Peripheral IV 06/11/25 Left Wrist (Active)   Site Assessment Clean, dry & intact 06/18/25 0730   Line Status Capped;Flushed 06/18/25 0730   Line Care Cap changed;Connections checked and tightened 06/18/25 0730   Phlebitis Assessment No symptoms 06/18/25 0730   Infiltration Assessment 0 06/18/25 0730   Alcohol Cap Used Yes 06/18/25 0730   Dressing Status Clean, dry & intact 06/18/25 0730   Dressing Type Transparent 06/18/25 0730        Opportunity for questions and clarification was provided.

## 2025-06-19 ENCOUNTER — TELEPHONE (OUTPATIENT)
Dept: FAMILY MEDICINE CLINIC | Facility: CLINIC | Age: 89
End: 2025-06-19

## 2025-06-19 NOTE — TELEPHONE ENCOUNTER
Care Transitions Initial Follow Up Call    Outreach made within 2 business days of discharge: Yes    Patient: Angelique Seals Patient : 1935   MRN: [id number]  Reason for Admission: Fractured hip  Discharge Date: 25       Spoke with: Izabella Hickman    Discharge department/facility: [facility]    TCM Interactive Patient Contact:  Was patient able to fill all prescriptions: Yes  Was patient instructed to bring all medications to the follow-up visit: Yes  Is patient taking all medications as directed in the discharge summary? Yes  Does patient understand their discharge instructions: Yes  Does patient have questions or concerns that need addressed prior to 7-14 day follow up office visit: no    Additional needs identified to be addressed with provider  No needs identified             Patient is currently at rehab and will be in for about 3 weeks. Pt's daughter will call us once she is out to schedule an appointment as it is hard for her to get her in the car.     Follow Up  Future Appointments   Date Time Provider Department Center   2025 10:30 AM Mic Montes De Oca PA [facility] AM[facility]   2025  8:00 AM Wendy Guadarrama, APRN - NP [facility] AM[facility]   2025  2:15 PM Guillermo Roy DO [facility] AMB       Rafaela Prince MA

## 2025-06-21 PROBLEM — S72.141A DISPLACED INTERTROCHANTERIC FRACTURE OF RIGHT FEMUR, INITIAL ENCOUNTER FOR CLOSED FRACTURE (HCC): Status: ACTIVE | Noted: 2025-06-21

## 2025-06-30 ENCOUNTER — OFFICE VISIT (OUTPATIENT)
Dept: ORTHOPEDIC SURGERY | Age: 89
End: 2025-06-30

## 2025-06-30 DIAGNOSIS — Z48.89 ENCOUNTER FOR POSTOPERATIVE CARE: Primary | ICD-10-CM

## 2025-06-30 PROCEDURE — 99024 POSTOP FOLLOW-UP VISIT: CPT | Performed by: PHYSICIAN ASSISTANT

## 2025-06-30 NOTE — PROGRESS NOTES
[facility]thopa[facility] Trauma Clinic Note    Name: Angelique Seals  YOB: 1935  Gender: female  MRN: [id number]  PCP:    CC: post op R femur IMN    HPI:   Agnelique Seals is a 89 y.o.  female here two weeks s/p R femur IMN for R intertrochanteric femur fracture. She is accompanied by her daughter today who provides much of the history.  She notes therapy is still working on getting her to stand. She was a two person assist prior to this injury due to a fall about two months ago. She had recently been able to walk about 100-150 ft.  Goal is to at least get her to stand and transfer now.  -Sig PMH: a fib, dementia  -Sig PSH:      ROS/Meds/PSH/PMH/FH/SH:  Pertinent details discussed in HPI    Physical Examination:  General:  Awake and conversive, no distress, well nourished, age-appropriate.  Neurological: A&O x 3, normal coordination and tone.  Psych: Normal mood, affect and behavior. Normal thought content and judgment. Cooperative w/ exam.  Cardiovascular: RRR, + palpable pulses b/l upper extremity  Pulmonary: Chest excursion equal bilaterally, breathing non-labored    Musculoskeletal Exam:  Right lower Extremity  - Skin: staples intact. No erythema or drainage.  - Normal alignment, mild swelling/effusion , no focal TTP   - ROM: no obvious pain with passive ROM of hip and knee  - Sensation: SILT Sa/Aburto/T/SP/DP   - Motor: 5/5 TA/EHL/FHL/GS   - Digits warm, well-perfused, brisk cap refill    Gait: not tested    Imaging:   I personally reviewed and interpreted:  - XR R femur demonstrates intact R femur intramedullary nail with no obvious hardware complications.    Procedures: staple removal    Assessment:     ICD-10-CM    1. Encounter for postoperative care  Z48.89 XR FEMUR RIGHT (MIN 2 VIEWS)          Plan:   Staples removed. Steri strips placed.  Continue PT at facility. Discussed with daughter that realistic expectation would be stand and transfer.  She may be WBAT to RLE.      Return in 4 weeks with new XR R

## (undated) DEVICE — GLIDESHEATH SLENDER STAINLESS STEEL KIT: Brand: GLIDESHEATH SLENDER

## (undated) DEVICE — SYRINGE MED 50ML LUERLOCK TIP

## (undated) DEVICE — INTENDED FOR TISSUE SEPARATION, AND OTHER PROCEDURES THAT REQUIRE A SHARP SURGICAL BLADE TO PUNCTURE OR CUT.: Brand: BARD-PARKER ® CARBON RIB-BACK BLADES

## (undated) DEVICE — SUTURE VICRYL + SZ 0 L27IN ABSRB UD L36MM CT-1 1/2 CIR VCPP41D

## (undated) DEVICE — GLOVE ORANGE PI 7 1/2   MSG9075

## (undated) DEVICE — PERCLOSE™ PROSTYLE™ SUTURE-MEDIATED CLOSURE AND REPAIR SYSTEM: Brand: PERCLOSE™ PROSTYLE™

## (undated) DEVICE — Device: Brand: NRG TRANSSEPTAL NEEDLE

## (undated) DEVICE — INTRO PEELWY HEMVLV 6F 13CM -- SHRT PRELUDE SNAP

## (undated) DEVICE — CATHETER ANGIO 5FR L100CM GRY S STL NYL JL3.5 3 SEG BRAID L

## (undated) DEVICE — 18G NG KIT WITH 96IN PROBE COVER (10 PK): Brand: SITE-RITE

## (undated) DEVICE — SOLUTION IRRIG 1000ML 09% SOD CHL USP PIC PLAS CONTAINER

## (undated) DEVICE — NEEDLE HYPO 21GA L1.5IN INTRAMUSCULAR S STL LATCH BVL UP

## (undated) DEVICE — GUIDEWIRE ORTH L400MM DIA3.2MM FOR TFN

## (undated) DEVICE — UNDERGLOVE SURG SZ 8 FNGR THK0.21MIL GRN LTX BEAD CUF

## (undated) DEVICE — CATHETER DIAG 6FR L110CM PIG CRV SZ 6 SIDE H DBL BRAID WIRE

## (undated) DEVICE — SYSTEM CLOSURE 6-12 FR VEN VASC VASCADE MVP

## (undated) DEVICE — SPONGE GZ W4XL4IN COT 12 PLY TYP VII WVN C FLD DSGN STERILE

## (undated) DEVICE — SUTURE VICRYL + SZ 0 L18IN ABSRB UD L36MM CT-1 1/2 CIR VCP840D

## (undated) DEVICE — INTRODUCER SHTH CARDIAGUIDE FCL20100

## (undated) DEVICE — Device

## (undated) DEVICE — GUIDEWIRE 035IN 210CM PTFE COAT FIX COR J TIP 15MM FIRM BODY

## (undated) DEVICE — PLASMABLADE X PS210-030S-LIGHT 3.0SL: Brand: PLASMABLADE™ X

## (undated) DEVICE — DRAPE PT ISOLATN 130 IN X 96 IN

## (undated) DEVICE — SUTURE V-LOC 90 3-0 L9IN ABSRB VLT L26MM V-20 1/2 CIR TAPR VLOCM0644

## (undated) DEVICE — CHECK-FLO PERFORMER INTRODUCER: Brand: PERFORMER

## (undated) DEVICE — STOCKINETTE,DOUBLE PLY,4X48,STERILE: Brand: MEDLINE

## (undated) DEVICE — ROD RMR L950MM DIA2.5MM W/ EXTN BALL TIP

## (undated) DEVICE — SUTURE VICRYL SZ 2-0 L27IN ABSRB UD L26MM CT-2 1/2 CIR J269H

## (undated) DEVICE — SUTURE ETHBND EXCEL SZ 0 L30IN NONABSORBABLE GRN L26MM CT-2 X412H

## (undated) DEVICE — DRESSING POSTOP AG PRISMASEAL 3.5X6IN

## (undated) DEVICE — CATHETER ULTRASOUND NAVIGATIONAL 10 FRX90 CM VIVID I ACUNAV

## (undated) DEVICE — INTRODUCER SHTH L11CM OD10FR 0.38IN AD VASC W/OUT CRV

## (undated) DEVICE — GUIDEWIRE VASC L260CM DIA0.035IN L7CM DIA3MM J TIP PTFE S

## (undated) DEVICE — CATHETER ANGIO 5FR L100CM GRY S STL NYL JR4 3 SEG BRAID L

## (undated) DEVICE — SUTURE VICRYL SZ 2-0 L18IN ABSRB UD CT-1 L36MM 1/2 CIR J839D

## (undated) DEVICE — BASIC SINGLE BASIN 2-LF: Brand: MEDLINE INDUSTRIES, INC.

## (undated) DEVICE — CATHETER DIAG AD 5FR L110CM 145DEG COR GRY HYDRPHLC NYL

## (undated) DEVICE — SUTURE ABSORBABLE MONOFILAMENT 2-0 8 IN ANTIBACT STRATAFIX SXMP1B409

## (undated) DEVICE — GUIDE NDL ST W/ 14 X 147CM TELESCOPICALLY FLD CIV FLX CVR

## (undated) DEVICE — IMPREGNATED GAUZE DRESSING: Brand: CUTICERIN 7.5X7.5CM CTN 50

## (undated) DEVICE — TFN: Brand: MEDLINE INDUSTRIES, INC.

## (undated) DEVICE — BAND COMPR L24CM REG CLR PLAS HEMSTAT EXT HK AND LOOP RETEN

## (undated) DEVICE — ACCESS SHEATH WITH DILATOR: Brand: WATCHMAN FXD CURVE™ ACCESS SYSTEM

## (undated) DEVICE — SYRINGE BLB 50CC IRRIG PLIABLE FNGR FLNG GRAD FLSK DISP

## (undated) DEVICE — PAD,ABDOMINAL,5"X9",ST,LF,25/BX: Brand: MEDLINE INDUSTRIES, INC.

## (undated) DEVICE — SOLUTION IRRIG 1000ML H2O PIC PLAS SHATTERPROOF CONTAINER

## (undated) DEVICE — YANKAUER,FLEXIBLE HANDLE,REGLR CAPACITY: Brand: MEDLINE INDUSTRIES, INC.

## (undated) DEVICE — TUBING, SUCTION, 1/4" X 10', STRAIGHT: Brand: MEDLINE

## (undated) DEVICE — SUTURE ABSORBABLE BRAIDED 2-0 CT-1 27 IN UD VICRYL J259H

## (undated) DEVICE — BIT DRL L145MM DIA4.2MM NONSTERILE 3 FLUT NDL PNT QUIK CPL